# Patient Record
Sex: FEMALE | Race: WHITE | Employment: OTHER | URBAN - METROPOLITAN AREA
[De-identification: names, ages, dates, MRNs, and addresses within clinical notes are randomized per-mention and may not be internally consistent; named-entity substitution may affect disease eponyms.]

---

## 2017-01-01 ENCOUNTER — HOSPITAL ENCOUNTER (OUTPATIENT)
Dept: OTHER | Age: 77
Discharge: HOME | End: 2017-01-31
Attending: FAMILY MEDICINE | Admitting: FAMILY MEDICINE

## 2017-01-09 ENCOUNTER — OFFICE VISIT (OUTPATIENT)
Dept: FAMILY MEDICINE CLINIC | Age: 77
End: 2017-01-09

## 2017-01-09 VITALS
SYSTOLIC BLOOD PRESSURE: 156 MMHG | WEIGHT: 135 LBS | DIASTOLIC BLOOD PRESSURE: 68 MMHG | HEIGHT: 60 IN | BODY MASS INDEX: 26.5 KG/M2

## 2017-01-09 DIAGNOSIS — R53.83 FATIGUE, UNSPECIFIED TYPE: ICD-10-CM

## 2017-01-09 DIAGNOSIS — R30.0 DYSURIA: Primary | ICD-10-CM

## 2017-01-09 DIAGNOSIS — Z23 NEED FOR INFLUENZA VACCINATION: ICD-10-CM

## 2017-01-09 DIAGNOSIS — N39.0 URINARY TRACT INFECTION WITHOUT HEMATURIA, SITE UNSPECIFIED: ICD-10-CM

## 2017-01-09 LAB
A/G RATIO: 1.5 (ref 1.1–2.2)
ALBUMIN SERPL-MCNC: 4.1 G/DL (ref 3.4–5)
ALP BLD-CCNC: 176 U/L (ref 40–129)
ALT SERPL-CCNC: 10 U/L (ref 10–40)
ANION GAP SERPL CALCULATED.3IONS-SCNC: 14 MMOL/L (ref 3–16)
AST SERPL-CCNC: 18 U/L (ref 15–37)
BILIRUB SERPL-MCNC: 0.4 MG/DL (ref 0–1)
BILIRUBIN, POC: ABNORMAL
BLOOD URINE, POC: ABNORMAL
BUN BLDV-MCNC: 23 MG/DL (ref 7–20)
CALCIUM SERPL-MCNC: 10.6 MG/DL (ref 8.3–10.6)
CHLORIDE BLD-SCNC: 101 MMOL/L (ref 99–110)
CLARITY, POC: ABNORMAL
CO2: 27 MMOL/L (ref 21–32)
COLOR, POC: ABNORMAL
CREAT SERPL-MCNC: 0.8 MG/DL (ref 0.6–1.2)
GFR AFRICAN AMERICAN: >60
GFR NON-AFRICAN AMERICAN: >60
GLOBULIN: 2.8 G/DL
GLUCOSE BLD-MCNC: 103 MG/DL (ref 70–99)
GLUCOSE URINE, POC: ABNORMAL
HCT VFR BLD CALC: 38.6 % (ref 36–48)
HEMOGLOBIN: 12.8 G/DL (ref 12–16)
KETONES, POC: ABNORMAL
LEUKOCYTE EST, POC: ABNORMAL
MCH RBC QN AUTO: 30.6 PG (ref 26–34)
MCHC RBC AUTO-ENTMCNC: 33.1 G/DL (ref 31–36)
MCV RBC AUTO: 92.7 FL (ref 80–100)
NITRITE, POC: ABNORMAL
PDW BLD-RTO: 13.8 % (ref 12.4–15.4)
PH, POC: 5.5
PLATELET # BLD: 223 K/UL (ref 135–450)
PMV BLD AUTO: 8.6 FL (ref 5–10.5)
POTASSIUM SERPL-SCNC: 4.4 MMOL/L (ref 3.5–5.1)
PROTEIN, POC: ABNORMAL
RBC # BLD: 4.17 M/UL (ref 4–5.2)
SODIUM BLD-SCNC: 142 MMOL/L (ref 136–145)
SPECIFIC GRAVITY, POC: >1.03
TOTAL PROTEIN: 6.9 G/DL (ref 6.4–8.2)
TSH SERPL DL<=0.05 MIU/L-ACNC: 2.72 UIU/ML (ref 0.27–4.2)
UROBILINOGEN, POC: 0.2
WBC # BLD: 7 K/UL (ref 4–11)

## 2017-01-09 PROCEDURE — 81002 URINALYSIS NONAUTO W/O SCOPE: CPT | Performed by: FAMILY MEDICINE

## 2017-01-09 PROCEDURE — G0008 ADMIN INFLUENZA VIRUS VAC: HCPCS | Performed by: FAMILY MEDICINE

## 2017-01-09 PROCEDURE — 90662 IIV NO PRSV INCREASED AG IM: CPT | Performed by: FAMILY MEDICINE

## 2017-01-09 PROCEDURE — 36415 COLL VENOUS BLD VENIPUNCTURE: CPT | Performed by: FAMILY MEDICINE

## 2017-01-09 PROCEDURE — 99214 OFFICE O/P EST MOD 30 MIN: CPT | Performed by: FAMILY MEDICINE

## 2017-01-09 ASSESSMENT — ENCOUNTER SYMPTOMS: SHORTNESS OF BREATH: 1

## 2017-01-11 LAB
ORGANISM: ABNORMAL
URINE CULTURE, ROUTINE: ABNORMAL

## 2017-01-11 RX ORDER — TETRACYCLINE HYDROCHLORIDE 500 MG/1
500 CAPSULE ORAL 2 TIMES DAILY
Qty: 14 CAPSULE | Refills: 0 | Status: SHIPPED | OUTPATIENT
Start: 2017-01-11 | End: 2017-02-14 | Stop reason: ALTCHOICE

## 2017-01-11 RX ORDER — SERTRALINE HYDROCHLORIDE 100 MG/1
TABLET, FILM COATED ORAL
Qty: 90 TABLET | Refills: 0 | Status: SHIPPED | OUTPATIENT
Start: 2017-01-11 | End: 2017-04-06 | Stop reason: SDUPTHER

## 2017-01-11 RX ORDER — OMEPRAZOLE 40 MG/1
CAPSULE, DELAYED RELEASE ORAL
Qty: 180 CAPSULE | Refills: 0 | Status: SHIPPED | OUTPATIENT
Start: 2017-01-11 | End: 2017-04-06 | Stop reason: SDUPTHER

## 2017-01-11 RX ORDER — BUPROPION HYDROCHLORIDE 150 MG/1
TABLET, EXTENDED RELEASE ORAL
Qty: 180 TABLET | Refills: 0 | Status: SHIPPED | OUTPATIENT
Start: 2017-01-11 | End: 2017-04-06 | Stop reason: SDUPTHER

## 2017-01-11 RX ORDER — CLONAZEPAM 1 MG/1
TABLET ORAL
Qty: 90 TABLET | Refills: 0 | Status: SHIPPED | OUTPATIENT
Start: 2017-01-11 | End: 2017-04-06 | Stop reason: SDUPTHER

## 2017-01-19 ENCOUNTER — TELEPHONE (OUTPATIENT)
Dept: FAMILY MEDICINE CLINIC | Age: 77
End: 2017-01-19

## 2017-01-20 ENCOUNTER — NURSE ONLY (OUTPATIENT)
Dept: FAMILY MEDICINE CLINIC | Age: 77
End: 2017-01-20

## 2017-01-20 DIAGNOSIS — N39.0 URINARY TRACT INFECTION, SITE UNSPECIFIED: Primary | ICD-10-CM

## 2017-01-20 LAB
BILIRUBIN, POC: NORMAL
BLOOD URINE, POC: NORMAL
CLARITY, POC: NORMAL
COLOR, POC: YELLOW
GLUCOSE URINE, POC: NORMAL
KETONES, POC: NORMAL
LEUKOCYTE EST, POC: NORMAL
NITRITE, POC: NORMAL
PH, POC: 6
PROTEIN, POC: NORMAL
SPECIFIC GRAVITY, POC: >=1.03
UROBILINOGEN, POC: 0.2

## 2017-01-20 PROCEDURE — 81002 URINALYSIS NONAUTO W/O SCOPE: CPT | Performed by: FAMILY MEDICINE

## 2017-01-23 ENCOUNTER — HOSPITAL ENCOUNTER (OUTPATIENT)
Dept: CT IMAGING | Age: 77
Discharge: HOME | End: 2017-01-23
Attending: INTERNAL MEDICINE | Admitting: INTERNAL MEDICINE

## 2017-01-23 DIAGNOSIS — J84.9 INTERSTITIAL PULMONARY DISEASE (HCC): ICD-10-CM

## 2017-01-23 DIAGNOSIS — J84.9 ILD (INTERSTITIAL LUNG DISEASE) (HCC): ICD-10-CM

## 2017-01-24 ENCOUNTER — HOSPITAL ENCOUNTER (OUTPATIENT)
Dept: PHYSICAL THERAPY | Age: 77
Discharge: HOME | End: 2017-01-24
Admitting: FAMILY MEDICINE

## 2017-01-30 ENCOUNTER — HOSPITAL ENCOUNTER (OUTPATIENT)
Dept: PHYSICAL THERAPY | Age: 77
Discharge: HOME | End: 2017-01-30
Admitting: FAMILY MEDICINE

## 2017-02-02 ENCOUNTER — HOSPITAL ENCOUNTER (OUTPATIENT)
Dept: PHYSICAL THERAPY | Age: 77
Discharge: HOME | End: 2017-02-02
Admitting: FAMILY MEDICINE

## 2017-02-08 ENCOUNTER — HOSPITAL ENCOUNTER (OUTPATIENT)
Dept: PHYSICAL THERAPY | Age: 77
Discharge: HOME | End: 2017-02-08
Admitting: FAMILY MEDICINE

## 2017-02-10 ENCOUNTER — HOSPITAL ENCOUNTER (OUTPATIENT)
Dept: PHYSICAL THERAPY | Age: 77
Discharge: HOME | End: 2017-02-10
Admitting: FAMILY MEDICINE

## 2017-02-14 ENCOUNTER — OFFICE VISIT (OUTPATIENT)
Dept: PULMONOLOGY | Age: 77
End: 2017-02-14

## 2017-02-14 ENCOUNTER — HOSPITAL ENCOUNTER (OUTPATIENT)
Dept: PHYSICAL THERAPY | Age: 77
Discharge: HOME | End: 2017-02-14
Admitting: FAMILY MEDICINE

## 2017-02-14 VITALS
SYSTOLIC BLOOD PRESSURE: 138 MMHG | HEIGHT: 60 IN | OXYGEN SATURATION: 90 % | DIASTOLIC BLOOD PRESSURE: 60 MMHG | TEMPERATURE: 97.6 F | HEART RATE: 67 BPM | RESPIRATION RATE: 16 BRPM

## 2017-02-14 DIAGNOSIS — I77.82 ANCA-POSITIVE VASCULITIS: ICD-10-CM

## 2017-02-14 DIAGNOSIS — J96.11 CHRONIC RESPIRATORY FAILURE WITH HYPOXIA (HCC): ICD-10-CM

## 2017-02-14 DIAGNOSIS — J84.9 ILD (INTERSTITIAL LUNG DISEASE) (HCC): Primary | ICD-10-CM

## 2017-02-14 DIAGNOSIS — R76.8 SCL-70 ANTIBODY POSITIVE: ICD-10-CM

## 2017-02-14 PROCEDURE — 1036F TOBACCO NON-USER: CPT | Performed by: INTERNAL MEDICINE

## 2017-02-14 PROCEDURE — G8420 CALC BMI NORM PARAMETERS: HCPCS | Performed by: INTERNAL MEDICINE

## 2017-02-14 PROCEDURE — 3014F SCREEN MAMMO DOC REV: CPT | Performed by: INTERNAL MEDICINE

## 2017-02-14 PROCEDURE — 99214 OFFICE O/P EST MOD 30 MIN: CPT | Performed by: INTERNAL MEDICINE

## 2017-02-14 PROCEDURE — G8400 PT W/DXA NO RESULTS DOC: HCPCS | Performed by: INTERNAL MEDICINE

## 2017-02-14 PROCEDURE — 1123F ACP DISCUSS/DSCN MKR DOCD: CPT | Performed by: INTERNAL MEDICINE

## 2017-02-14 PROCEDURE — 1090F PRES/ABSN URINE INCON ASSESS: CPT | Performed by: INTERNAL MEDICINE

## 2017-02-14 PROCEDURE — G8427 DOCREV CUR MEDS BY ELIG CLIN: HCPCS | Performed by: INTERNAL MEDICINE

## 2017-02-14 PROCEDURE — G8484 FLU IMMUNIZE NO ADMIN: HCPCS | Performed by: INTERNAL MEDICINE

## 2017-02-14 PROCEDURE — 4040F PNEUMOC VAC/ADMIN/RCVD: CPT | Performed by: INTERNAL MEDICINE

## 2017-02-14 PROCEDURE — 3017F COLORECTAL CA SCREEN DOC REV: CPT | Performed by: INTERNAL MEDICINE

## 2017-02-16 ENCOUNTER — HOSPITAL ENCOUNTER (OUTPATIENT)
Dept: PHYSICAL THERAPY | Age: 77
Discharge: HOME | End: 2017-02-16
Admitting: FAMILY MEDICINE

## 2017-02-22 ENCOUNTER — OFFICE VISIT (OUTPATIENT)
Dept: FAMILY MEDICINE CLINIC | Age: 77
End: 2017-02-22

## 2017-02-22 VITALS
BODY MASS INDEX: 26.11 KG/M2 | DIASTOLIC BLOOD PRESSURE: 86 MMHG | SYSTOLIC BLOOD PRESSURE: 138 MMHG | HEIGHT: 60 IN | WEIGHT: 133 LBS

## 2017-02-22 DIAGNOSIS — M81.0 OSTEOPOROSIS: Primary | ICD-10-CM

## 2017-02-22 DIAGNOSIS — J84.9 ILD (INTERSTITIAL LUNG DISEASE) (HCC): ICD-10-CM

## 2017-02-22 DIAGNOSIS — N39.0 CHRONIC UTI: ICD-10-CM

## 2017-02-22 DIAGNOSIS — M48.54XD COMPRESSION FRACTURE OF THORACIC SPINE, NON-TRAUMATIC, WITH ROUTINE HEALING, SUBSEQUENT ENCOUNTER: ICD-10-CM

## 2017-02-22 PROCEDURE — 3017F COLORECTAL CA SCREEN DOC REV: CPT | Performed by: FAMILY MEDICINE

## 2017-02-22 PROCEDURE — 4040F PNEUMOC VAC/ADMIN/RCVD: CPT | Performed by: FAMILY MEDICINE

## 2017-02-22 PROCEDURE — 1123F ACP DISCUSS/DSCN MKR DOCD: CPT | Performed by: FAMILY MEDICINE

## 2017-02-22 PROCEDURE — G8400 PT W/DXA NO RESULTS DOC: HCPCS | Performed by: FAMILY MEDICINE

## 2017-02-22 PROCEDURE — G8420 CALC BMI NORM PARAMETERS: HCPCS | Performed by: FAMILY MEDICINE

## 2017-02-22 PROCEDURE — G8427 DOCREV CUR MEDS BY ELIG CLIN: HCPCS | Performed by: FAMILY MEDICINE

## 2017-02-22 PROCEDURE — 1090F PRES/ABSN URINE INCON ASSESS: CPT | Performed by: FAMILY MEDICINE

## 2017-02-22 PROCEDURE — 1036F TOBACCO NON-USER: CPT | Performed by: FAMILY MEDICINE

## 2017-02-22 PROCEDURE — 99214 OFFICE O/P EST MOD 30 MIN: CPT | Performed by: FAMILY MEDICINE

## 2017-02-22 PROCEDURE — 3014F SCREEN MAMMO DOC REV: CPT | Performed by: FAMILY MEDICINE

## 2017-02-22 PROCEDURE — 4005F PHARM THX FOR OP RXD: CPT | Performed by: FAMILY MEDICINE

## 2017-02-22 PROCEDURE — G8484 FLU IMMUNIZE NO ADMIN: HCPCS | Performed by: FAMILY MEDICINE

## 2017-02-22 RX ORDER — IBANDRONATE SODIUM 150 MG/1
150 TABLET, FILM COATED ORAL
Qty: 3 TABLET | Refills: 3 | Status: SHIPPED | OUTPATIENT
Start: 2017-02-22 | End: 2017-05-25 | Stop reason: ALTCHOICE

## 2017-02-22 ASSESSMENT — ENCOUNTER SYMPTOMS
RESPIRATORY NEGATIVE: 1
BACK PAIN: 1
GASTROINTESTINAL NEGATIVE: 1

## 2017-02-24 ENCOUNTER — HOSPITAL ENCOUNTER (OUTPATIENT)
Dept: PHYSICAL THERAPY | Age: 77
Discharge: HOME | End: 2017-02-24
Admitting: FAMILY MEDICINE

## 2017-03-01 ENCOUNTER — HOSPITAL ENCOUNTER (OUTPATIENT)
Dept: OTHER | Age: 77
Discharge: HOME | End: 2017-03-31
Attending: FAMILY MEDICINE | Admitting: FAMILY MEDICINE

## 2017-03-03 ENCOUNTER — HOSPITAL ENCOUNTER (OUTPATIENT)
Dept: PHYSICAL THERAPY | Age: 77
Discharge: HOME | End: 2017-03-04
Admitting: FAMILY MEDICINE

## 2017-03-07 ENCOUNTER — HOSPITAL ENCOUNTER (OUTPATIENT)
Dept: PHYSICAL THERAPY | Age: 77
Discharge: HOME | End: 2017-03-08
Admitting: FAMILY MEDICINE

## 2017-03-14 ENCOUNTER — HOSPITAL ENCOUNTER (OUTPATIENT)
Dept: PHYSICAL THERAPY | Age: 77
Discharge: HOME | End: 2017-03-15
Admitting: FAMILY MEDICINE

## 2017-03-15 ENCOUNTER — OFFICE VISIT (OUTPATIENT)
Dept: ORTHOPEDIC SURGERY | Age: 77
End: 2017-03-15

## 2017-03-15 VITALS
DIASTOLIC BLOOD PRESSURE: 79 MMHG | BODY MASS INDEX: 26.11 KG/M2 | HEART RATE: 68 BPM | HEIGHT: 60 IN | WEIGHT: 133 LBS | SYSTOLIC BLOOD PRESSURE: 138 MMHG

## 2017-03-15 DIAGNOSIS — M54.16 LUMBAR RADICULAR PAIN: ICD-10-CM

## 2017-03-15 DIAGNOSIS — S72.141D CLOSED INTERTROCHANTERIC FRACTURE OF RIGHT FEMUR, WITH ROUTINE HEALING, SUBSEQUENT ENCOUNTER: ICD-10-CM

## 2017-03-15 DIAGNOSIS — M25.551 HIP PAIN, RIGHT: Primary | ICD-10-CM

## 2017-03-15 PROCEDURE — 73502 X-RAY EXAM HIP UNI 2-3 VIEWS: CPT | Performed by: ORTHOPAEDIC SURGERY

## 2017-03-15 PROCEDURE — 1036F TOBACCO NON-USER: CPT | Performed by: ORTHOPAEDIC SURGERY

## 2017-03-15 PROCEDURE — G8400 PT W/DXA NO RESULTS DOC: HCPCS | Performed by: ORTHOPAEDIC SURGERY

## 2017-03-15 PROCEDURE — 3017F COLORECTAL CA SCREEN DOC REV: CPT | Performed by: ORTHOPAEDIC SURGERY

## 2017-03-15 PROCEDURE — 4040F PNEUMOC VAC/ADMIN/RCVD: CPT | Performed by: ORTHOPAEDIC SURGERY

## 2017-03-15 PROCEDURE — G8420 CALC BMI NORM PARAMETERS: HCPCS | Performed by: ORTHOPAEDIC SURGERY

## 2017-03-15 PROCEDURE — G8427 DOCREV CUR MEDS BY ELIG CLIN: HCPCS | Performed by: ORTHOPAEDIC SURGERY

## 2017-03-15 PROCEDURE — 1090F PRES/ABSN URINE INCON ASSESS: CPT | Performed by: ORTHOPAEDIC SURGERY

## 2017-03-15 PROCEDURE — 99213 OFFICE O/P EST LOW 20 MIN: CPT | Performed by: ORTHOPAEDIC SURGERY

## 2017-03-15 PROCEDURE — G8484 FLU IMMUNIZE NO ADMIN: HCPCS | Performed by: ORTHOPAEDIC SURGERY

## 2017-03-15 PROCEDURE — 3014F SCREEN MAMMO DOC REV: CPT | Performed by: ORTHOPAEDIC SURGERY

## 2017-03-15 PROCEDURE — 1123F ACP DISCUSS/DSCN MKR DOCD: CPT | Performed by: ORTHOPAEDIC SURGERY

## 2017-03-15 RX ORDER — METHYLPREDNISOLONE 4 MG/1
TABLET ORAL
Qty: 1 KIT | Refills: 0 | Status: SHIPPED | OUTPATIENT
Start: 2017-03-15 | End: 2017-03-27 | Stop reason: SDUPTHER

## 2017-03-16 ENCOUNTER — HOSPITAL ENCOUNTER (OUTPATIENT)
Dept: PHYSICAL THERAPY | Age: 77
Discharge: HOME | End: 2017-03-17
Admitting: FAMILY MEDICINE

## 2017-03-22 ENCOUNTER — HOSPITAL ENCOUNTER (OUTPATIENT)
Dept: PHYSICAL THERAPY | Age: 77
Discharge: HOME | End: 2017-03-23
Admitting: FAMILY MEDICINE

## 2017-03-24 ENCOUNTER — HOSPITAL ENCOUNTER (OUTPATIENT)
Dept: PHYSICAL THERAPY | Age: 77
Discharge: HOME | End: 2017-03-25
Admitting: FAMILY MEDICINE

## 2017-03-27 RX ORDER — METHYLPREDNISOLONE 4 MG/1
TABLET ORAL
Qty: 1 KIT | Refills: 0 | Status: SHIPPED | OUTPATIENT
Start: 2017-03-27 | End: 2017-04-02

## 2017-04-12 ENCOUNTER — OFFICE VISIT (OUTPATIENT)
Dept: ORTHOPEDIC SURGERY | Age: 77
End: 2017-04-12

## 2017-04-12 VITALS
HEART RATE: 60 BPM | DIASTOLIC BLOOD PRESSURE: 77 MMHG | HEIGHT: 60 IN | SYSTOLIC BLOOD PRESSURE: 165 MMHG | BODY MASS INDEX: 29.45 KG/M2 | WEIGHT: 150 LBS

## 2017-04-12 DIAGNOSIS — M54.5 LOW BACK PAIN, UNSPECIFIED BACK PAIN LATERALITY, UNSPECIFIED CHRONICITY, WITH SCIATICA PRESENCE UNSPECIFIED: Primary | ICD-10-CM

## 2017-04-12 PROCEDURE — G8427 DOCREV CUR MEDS BY ELIG CLIN: HCPCS | Performed by: PHYSICIAN ASSISTANT

## 2017-04-12 PROCEDURE — 72100 X-RAY EXAM L-S SPINE 2/3 VWS: CPT | Performed by: PHYSICIAN ASSISTANT

## 2017-04-12 PROCEDURE — 1123F ACP DISCUSS/DSCN MKR DOCD: CPT | Performed by: PHYSICIAN ASSISTANT

## 2017-04-12 PROCEDURE — G8400 PT W/DXA NO RESULTS DOC: HCPCS | Performed by: PHYSICIAN ASSISTANT

## 2017-04-12 PROCEDURE — 1090F PRES/ABSN URINE INCON ASSESS: CPT | Performed by: PHYSICIAN ASSISTANT

## 2017-04-12 PROCEDURE — G8420 CALC BMI NORM PARAMETERS: HCPCS | Performed by: PHYSICIAN ASSISTANT

## 2017-04-12 PROCEDURE — 4040F PNEUMOC VAC/ADMIN/RCVD: CPT | Performed by: PHYSICIAN ASSISTANT

## 2017-04-12 PROCEDURE — 3014F SCREEN MAMMO DOC REV: CPT | Performed by: PHYSICIAN ASSISTANT

## 2017-04-12 PROCEDURE — 1036F TOBACCO NON-USER: CPT | Performed by: PHYSICIAN ASSISTANT

## 2017-04-12 PROCEDURE — 3017F COLORECTAL CA SCREEN DOC REV: CPT | Performed by: PHYSICIAN ASSISTANT

## 2017-04-12 PROCEDURE — 99214 OFFICE O/P EST MOD 30 MIN: CPT | Performed by: PHYSICIAN ASSISTANT

## 2017-04-17 ENCOUNTER — OFFICE VISIT (OUTPATIENT)
Dept: FAMILY MEDICINE CLINIC | Age: 77
End: 2017-04-17

## 2017-04-17 VITALS
WEIGHT: 137 LBS | BODY MASS INDEX: 26.9 KG/M2 | SYSTOLIC BLOOD PRESSURE: 132 MMHG | DIASTOLIC BLOOD PRESSURE: 70 MMHG | HEIGHT: 60 IN

## 2017-04-17 DIAGNOSIS — L97.522 FOOT ULCER, LEFT, WITH FAT LAYER EXPOSED (HCC): Primary | ICD-10-CM

## 2017-04-17 DIAGNOSIS — M81.0 OSTEOPOROSIS: ICD-10-CM

## 2017-04-17 PROCEDURE — 4005F PHARM THX FOR OP RXD: CPT | Performed by: FAMILY MEDICINE

## 2017-04-17 PROCEDURE — 1090F PRES/ABSN URINE INCON ASSESS: CPT | Performed by: FAMILY MEDICINE

## 2017-04-17 PROCEDURE — 99214 OFFICE O/P EST MOD 30 MIN: CPT | Performed by: FAMILY MEDICINE

## 2017-04-17 PROCEDURE — G8420 CALC BMI NORM PARAMETERS: HCPCS | Performed by: FAMILY MEDICINE

## 2017-04-17 PROCEDURE — G8400 PT W/DXA NO RESULTS DOC: HCPCS | Performed by: FAMILY MEDICINE

## 2017-04-17 PROCEDURE — G8427 DOCREV CUR MEDS BY ELIG CLIN: HCPCS | Performed by: FAMILY MEDICINE

## 2017-04-17 PROCEDURE — 1036F TOBACCO NON-USER: CPT | Performed by: FAMILY MEDICINE

## 2017-04-17 PROCEDURE — 1123F ACP DISCUSS/DSCN MKR DOCD: CPT | Performed by: FAMILY MEDICINE

## 2017-04-17 PROCEDURE — 3014F SCREEN MAMMO DOC REV: CPT | Performed by: FAMILY MEDICINE

## 2017-04-17 PROCEDURE — 3017F COLORECTAL CA SCREEN DOC REV: CPT | Performed by: FAMILY MEDICINE

## 2017-04-17 PROCEDURE — 4040F PNEUMOC VAC/ADMIN/RCVD: CPT | Performed by: FAMILY MEDICINE

## 2017-04-17 RX ORDER — IBANDRONATE SODIUM 150 MG/1
150 TABLET, FILM COATED ORAL
Qty: 3 TABLET | Refills: 3 | Status: SHIPPED | OUTPATIENT
Start: 2017-04-17 | End: 2017-05-25 | Stop reason: ALTCHOICE

## 2017-04-17 RX ORDER — CALCITRIOL 0.25 UG/1
0.25 CAPSULE, LIQUID FILLED ORAL DAILY
Qty: 90 CAPSULE | Refills: 3 | Status: SHIPPED | OUTPATIENT
Start: 2017-04-17 | End: 2018-07-21 | Stop reason: SDUPTHER

## 2017-04-17 RX ORDER — IBANDRONATE SODIUM 150 MG/1
150 TABLET, FILM COATED ORAL
Qty: 30 TABLET | Refills: 3 | Status: SHIPPED | OUTPATIENT
Start: 2017-04-17 | End: 2017-04-17 | Stop reason: SDUPTHER

## 2017-04-17 ASSESSMENT — ENCOUNTER SYMPTOMS
BACK PAIN: 1
RESPIRATORY NEGATIVE: 1

## 2017-04-20 ENCOUNTER — HOSPITAL ENCOUNTER (OUTPATIENT)
Dept: WOUND CARE | Age: 77
Discharge: HOME | End: 2017-04-20
Attending: PODIATRIST | Admitting: PODIATRIST

## 2017-04-20 ENCOUNTER — HOSPITAL ENCOUNTER (OUTPATIENT)
Dept: OTHER | Age: 77
Discharge: HOME | End: 2017-04-20
Attending: PODIATRIST | Admitting: PODIATRIST

## 2017-04-20 VITALS
BODY MASS INDEX: 27.4 KG/M2 | RESPIRATION RATE: 20 BRPM | TEMPERATURE: 97.7 F | DIASTOLIC BLOOD PRESSURE: 75 MMHG | HEIGHT: 60 IN | SYSTOLIC BLOOD PRESSURE: 177 MMHG | HEART RATE: 59 BPM | WEIGHT: 139.55 LBS

## 2017-04-20 DIAGNOSIS — L97.522 ULCER OF TOE OF LEFT FOOT, WITH FAT LAYER EXPOSED (HCC): ICD-10-CM

## 2017-04-20 DIAGNOSIS — L97.522 ULCER OF TOE OF LEFT FOOT, WITH FAT LAYER EXPOSED (HCC): Primary | ICD-10-CM

## 2017-04-20 LAB
ALBUMIN SERPL-MCNC: 3.9 G/DL (ref 3.4–5)
ANION GAP SERPL CALCULATED.3IONS-SCNC: 12 MMOL/L (ref 3–16)
BUN BLDV-MCNC: 25 MG/DL (ref 7–20)
C-REACTIVE PROTEIN: 1.6 MG/L (ref 0–5.1)
CALCIUM SERPL-MCNC: 10.9 MG/DL (ref 8.3–10.6)
CHLORIDE BLD-SCNC: 102 MMOL/L (ref 99–110)
CO2: 28 MMOL/L (ref 21–32)
CREAT SERPL-MCNC: 0.8 MG/DL (ref 0.6–1.2)
GFR AFRICAN AMERICAN: >60
GFR NON-AFRICAN AMERICAN: >60
GLUCOSE BLD-MCNC: 96 MG/DL (ref 70–99)
HCT VFR BLD CALC: 37.9 % (ref 36–48)
HEMOGLOBIN: 12.4 G/DL (ref 12–16)
MCH RBC QN AUTO: 31.2 PG (ref 26–34)
MCHC RBC AUTO-ENTMCNC: 32.8 G/DL (ref 31–36)
MCV RBC AUTO: 95.3 FL (ref 80–100)
PDW BLD-RTO: 13.7 % (ref 12.4–15.4)
PLATELET # BLD: 207 K/UL (ref 135–450)
PMV BLD AUTO: 8 FL (ref 5–10.5)
POTASSIUM SERPL-SCNC: 4.3 MMOL/L (ref 3.5–5.1)
RBC # BLD: 3.98 M/UL (ref 4–5.2)
SEDIMENTATION RATE, ERYTHROCYTE: 29 MM/HR (ref 0–30)
SODIUM BLD-SCNC: 142 MMOL/L (ref 136–145)
WBC # BLD: 6.9 K/UL (ref 4–11)

## 2017-04-20 RX ORDER — LIDOCAINE 50 MG/G
OINTMENT TOPICAL ONCE
Status: DISCONTINUED | OUTPATIENT
Start: 2017-04-20 | End: 2017-04-21 | Stop reason: HOSPADM

## 2017-04-24 RX ORDER — ALENDRONATE SODIUM 70 MG/1
70 TABLET ORAL
Qty: 4 TABLET | Refills: 3 | Status: SHIPPED | OUTPATIENT
Start: 2017-04-24 | End: 2017-04-24 | Stop reason: SDUPTHER

## 2017-04-24 RX ORDER — ALENDRONATE SODIUM 70 MG/1
TABLET ORAL
Qty: 12 TABLET | Refills: 3 | Status: SHIPPED | OUTPATIENT
Start: 2017-04-24 | End: 2018-06-06 | Stop reason: SDUPTHER

## 2017-04-26 ENCOUNTER — HOSPITAL ENCOUNTER (OUTPATIENT)
Dept: VASCULAR LAB | Age: 77
Discharge: HOME | End: 2017-04-26
Attending: PODIATRIST | Admitting: PODIATRIST

## 2017-04-26 DIAGNOSIS — L97.522 NON-PRESSURE CHRONIC ULCER OF OTHER PART OF LEFT FOOT WITH FAT LAYER EXPOSED (HCC): ICD-10-CM

## 2017-04-27 ENCOUNTER — HOSPITAL ENCOUNTER (OUTPATIENT)
Dept: WOUND CARE | Age: 77
Discharge: HOME | End: 2017-04-27
Attending: PODIATRIST | Admitting: PODIATRIST

## 2017-04-27 VITALS
SYSTOLIC BLOOD PRESSURE: 164 MMHG | RESPIRATION RATE: 22 BRPM | DIASTOLIC BLOOD PRESSURE: 76 MMHG | TEMPERATURE: 97.5 F | HEART RATE: 64 BPM

## 2017-04-27 RX ORDER — LIDOCAINE 50 MG/G
OINTMENT TOPICAL ONCE
Status: DISCONTINUED | OUTPATIENT
Start: 2017-04-27 | End: 2017-04-28 | Stop reason: HOSPADM

## 2017-04-27 ASSESSMENT — PAIN SCALES - GENERAL: PAINLEVEL_OUTOF10: 0

## 2017-05-04 ENCOUNTER — HOSPITAL ENCOUNTER (OUTPATIENT)
Dept: WOUND CARE | Age: 77
Discharge: HOME | End: 2017-05-04
Attending: PODIATRIST | Admitting: PODIATRIST

## 2017-05-04 VITALS
BODY MASS INDEX: 26.82 KG/M2 | RESPIRATION RATE: 22 BRPM | DIASTOLIC BLOOD PRESSURE: 84 MMHG | HEART RATE: 64 BPM | SYSTOLIC BLOOD PRESSURE: 187 MMHG | TEMPERATURE: 97.6 F | WEIGHT: 137.35 LBS

## 2017-05-04 RX ORDER — LIDOCAINE 50 MG/G
OINTMENT TOPICAL PRN
Status: DISCONTINUED | OUTPATIENT
Start: 2017-05-04 | End: 2017-05-05 | Stop reason: HOSPADM

## 2017-05-04 ASSESSMENT — PAIN SCALES - GENERAL: PAINLEVEL_OUTOF10: 0

## 2017-05-11 ENCOUNTER — HOSPITAL ENCOUNTER (OUTPATIENT)
Dept: WOUND CARE | Age: 77
Discharge: HOME | End: 2017-05-11
Attending: PODIATRIST | Admitting: PODIATRIST

## 2017-05-11 VITALS
DIASTOLIC BLOOD PRESSURE: 77 MMHG | HEART RATE: 73 BPM | RESPIRATION RATE: 20 BRPM | SYSTOLIC BLOOD PRESSURE: 156 MMHG | TEMPERATURE: 97.3 F

## 2017-05-11 RX ORDER — LIDOCAINE 50 MG/G
OINTMENT TOPICAL ONCE
Status: DISCONTINUED | OUTPATIENT
Start: 2017-05-11 | End: 2017-05-12 | Stop reason: HOSPADM

## 2017-05-11 ASSESSMENT — PAIN SCALES - GENERAL: PAINLEVEL_OUTOF10: 0

## 2017-05-18 ENCOUNTER — HOSPITAL ENCOUNTER (OUTPATIENT)
Dept: WOUND CARE | Age: 77
Discharge: HOME | End: 2017-05-18
Attending: PODIATRIST | Admitting: PODIATRIST

## 2017-05-18 VITALS — HEART RATE: 60 BPM | TEMPERATURE: 97.6 F | RESPIRATION RATE: 20 BRPM

## 2017-05-18 ASSESSMENT — PAIN SCALES - GENERAL: PAINLEVEL_OUTOF10: 0

## 2017-05-24 ENCOUNTER — TELEPHONE (OUTPATIENT)
Dept: FAMILY MEDICINE CLINIC | Age: 77
End: 2017-05-24

## 2017-05-24 DIAGNOSIS — R26.9 ABNORMAL GAIT: Primary | ICD-10-CM

## 2017-05-25 ENCOUNTER — OFFICE VISIT (OUTPATIENT)
Dept: FAMILY MEDICINE CLINIC | Age: 77
End: 2017-05-25

## 2017-05-25 VITALS
SYSTOLIC BLOOD PRESSURE: 138 MMHG | DIASTOLIC BLOOD PRESSURE: 68 MMHG | BODY MASS INDEX: 27.64 KG/M2 | HEIGHT: 60 IN | WEIGHT: 140.8 LBS

## 2017-05-25 DIAGNOSIS — J84.9 ILD (INTERSTITIAL LUNG DISEASE) (HCC): Primary | ICD-10-CM

## 2017-05-25 DIAGNOSIS — F32.9 REACTIVE DEPRESSION: ICD-10-CM

## 2017-05-25 DIAGNOSIS — F41.9 ANXIETY: ICD-10-CM

## 2017-05-25 DIAGNOSIS — J84.112 IDIOPATHIC PULMONARY FIBROSIS (HCC): ICD-10-CM

## 2017-05-25 PROCEDURE — G8400 PT W/DXA NO RESULTS DOC: HCPCS | Performed by: FAMILY MEDICINE

## 2017-05-25 PROCEDURE — G8427 DOCREV CUR MEDS BY ELIG CLIN: HCPCS | Performed by: FAMILY MEDICINE

## 2017-05-25 PROCEDURE — 3017F COLORECTAL CA SCREEN DOC REV: CPT | Performed by: FAMILY MEDICINE

## 2017-05-25 PROCEDURE — 4040F PNEUMOC VAC/ADMIN/RCVD: CPT | Performed by: FAMILY MEDICINE

## 2017-05-25 PROCEDURE — 1036F TOBACCO NON-USER: CPT | Performed by: FAMILY MEDICINE

## 2017-05-25 PROCEDURE — 1090F PRES/ABSN URINE INCON ASSESS: CPT | Performed by: FAMILY MEDICINE

## 2017-05-25 PROCEDURE — 1123F ACP DISCUSS/DSCN MKR DOCD: CPT | Performed by: FAMILY MEDICINE

## 2017-05-25 PROCEDURE — G8420 CALC BMI NORM PARAMETERS: HCPCS | Performed by: FAMILY MEDICINE

## 2017-05-25 PROCEDURE — 99214 OFFICE O/P EST MOD 30 MIN: CPT | Performed by: FAMILY MEDICINE

## 2017-05-25 RX ORDER — CLONAZEPAM 1 MG/1
TABLET ORAL
Qty: 180 TABLET | Refills: 0 | Status: SHIPPED | OUTPATIENT
Start: 2017-05-25 | End: 2017-12-22 | Stop reason: SDUPTHER

## 2017-05-25 ASSESSMENT — ENCOUNTER SYMPTOMS
ABDOMINAL PAIN: 0
VISUAL CHANGE: 0

## 2017-06-05 ENCOUNTER — HOSPITAL ENCOUNTER (OUTPATIENT)
Dept: OTHER | Age: 77
Discharge: HOME | End: 2017-06-30
Attending: FAMILY MEDICINE | Admitting: FAMILY MEDICINE

## 2017-06-05 ASSESSMENT — PAIN DESCRIPTION - DESCRIPTORS: DESCRIPTORS: ACHING;DISCOMFORT

## 2017-06-05 ASSESSMENT — PAIN DESCRIPTION - LOCATION: LOCATION: LEG;KNEE

## 2017-06-05 ASSESSMENT — PAIN DESCRIPTION - ORIENTATION: ORIENTATION: LEFT

## 2017-06-05 ASSESSMENT — PAIN DESCRIPTION - FREQUENCY: FREQUENCY: INTERMITTENT

## 2017-06-05 ASSESSMENT — PAIN DESCRIPTION - ONSET: ONSET: ON-GOING

## 2017-06-05 ASSESSMENT — PAIN SCALES - GENERAL: PAINLEVEL_OUTOF10: 4

## 2017-06-05 ASSESSMENT — PAIN DESCRIPTION - PROGRESSION: CLINICAL_PROGRESSION: NOT CHANGED

## 2017-06-05 ASSESSMENT — PAIN DESCRIPTION - PAIN TYPE: TYPE: CHRONIC PAIN

## 2017-06-08 ENCOUNTER — HOSPITAL ENCOUNTER (OUTPATIENT)
Dept: PHYSICAL THERAPY | Age: 77
Discharge: HOME | End: 2017-06-09
Admitting: FAMILY MEDICINE

## 2017-06-09 ENCOUNTER — HOSPITAL ENCOUNTER (OUTPATIENT)
Dept: PHYSICAL THERAPY | Age: 77
Discharge: HOME | End: 2017-06-10
Admitting: FAMILY MEDICINE

## 2017-06-12 ENCOUNTER — HOSPITAL ENCOUNTER (OUTPATIENT)
Dept: PHYSICAL THERAPY | Age: 77
Discharge: HOME | End: 2017-06-13
Admitting: FAMILY MEDICINE

## 2017-06-14 ENCOUNTER — HOSPITAL ENCOUNTER (OUTPATIENT)
Dept: PHYSICAL THERAPY | Age: 77
Discharge: HOME | End: 2017-06-15
Admitting: FAMILY MEDICINE

## 2017-06-20 ENCOUNTER — HOSPITAL ENCOUNTER (OUTPATIENT)
Dept: PHYSICAL THERAPY | Age: 77
Discharge: HOME | End: 2017-06-21
Admitting: FAMILY MEDICINE

## 2017-06-27 ENCOUNTER — HOSPITAL ENCOUNTER (OUTPATIENT)
Dept: PHYSICAL THERAPY | Age: 77
Discharge: HOME | End: 2017-06-28
Admitting: FAMILY MEDICINE

## 2017-06-30 ENCOUNTER — HOSPITAL ENCOUNTER (OUTPATIENT)
Dept: PHYSICAL THERAPY | Age: 77
Discharge: HOME | End: 2017-07-01
Admitting: FAMILY MEDICINE

## 2017-07-02 RX ORDER — SERTRALINE HYDROCHLORIDE 100 MG/1
TABLET, FILM COATED ORAL
Qty: 90 TABLET | Refills: 0 | Status: SHIPPED | OUTPATIENT
Start: 2017-07-02 | End: 2017-09-28 | Stop reason: SDUPTHER

## 2017-07-02 RX ORDER — BUPROPION HYDROCHLORIDE 150 MG/1
TABLET, EXTENDED RELEASE ORAL
Qty: 180 TABLET | Refills: 0 | Status: SHIPPED | OUTPATIENT
Start: 2017-07-02 | End: 2017-09-28 | Stop reason: SDUPTHER

## 2017-07-02 RX ORDER — OMEPRAZOLE 40 MG/1
CAPSULE, DELAYED RELEASE ORAL
Qty: 180 CAPSULE | Refills: 0 | Status: SHIPPED | OUTPATIENT
Start: 2017-07-02 | End: 2017-09-28 | Stop reason: SDUPTHER

## 2017-07-03 ENCOUNTER — HOSPITAL ENCOUNTER (OUTPATIENT)
Dept: PHYSICAL THERAPY | Age: 77
Discharge: HOME | End: 2017-07-04
Admitting: FAMILY MEDICINE

## 2017-07-11 ENCOUNTER — HOSPITAL ENCOUNTER (OUTPATIENT)
Dept: PHYSICAL THERAPY | Age: 77
Discharge: HOME | End: 2017-07-12
Admitting: FAMILY MEDICINE

## 2017-07-13 ENCOUNTER — HOSPITAL ENCOUNTER (OUTPATIENT)
Dept: PHYSICAL THERAPY | Age: 77
Discharge: HOME | End: 2017-07-14
Admitting: FAMILY MEDICINE

## 2017-07-27 ENCOUNTER — HOSPITAL ENCOUNTER (OUTPATIENT)
Dept: WOUND CARE | Age: 77
Discharge: HOME | End: 2017-07-27
Attending: PODIATRIST | Admitting: PODIATRIST

## 2017-07-27 VITALS
HEART RATE: 59 BPM | HEIGHT: 60 IN | WEIGHT: 142.86 LBS | SYSTOLIC BLOOD PRESSURE: 180 MMHG | TEMPERATURE: 97.6 F | BODY MASS INDEX: 28.05 KG/M2 | DIASTOLIC BLOOD PRESSURE: 83 MMHG | RESPIRATION RATE: 18 BRPM

## 2017-07-27 RX ORDER — LIDOCAINE 50 MG/G
OINTMENT TOPICAL PRN
Status: DISCONTINUED | OUTPATIENT
Start: 2017-07-27 | End: 2017-07-28 | Stop reason: HOSPADM

## 2017-07-31 ENCOUNTER — TELEPHONE (OUTPATIENT)
Dept: PULMONOLOGY | Age: 77
End: 2017-07-31

## 2017-07-31 DIAGNOSIS — J84.9 ILD (INTERSTITIAL LUNG DISEASE) (HCC): Primary | ICD-10-CM

## 2017-08-03 ENCOUNTER — HOSPITAL ENCOUNTER (OUTPATIENT)
Dept: WOUND CARE | Age: 77
Discharge: HOME | End: 2017-08-03
Attending: PODIATRIST | Admitting: PODIATRIST

## 2017-08-03 VITALS
RESPIRATION RATE: 20 BRPM | DIASTOLIC BLOOD PRESSURE: 80 MMHG | HEIGHT: 60 IN | HEART RATE: 63 BPM | WEIGHT: 139.11 LBS | BODY MASS INDEX: 27.31 KG/M2 | SYSTOLIC BLOOD PRESSURE: 179 MMHG | TEMPERATURE: 97.5 F

## 2017-08-03 ASSESSMENT — PAIN SCALES - GENERAL
PAINLEVEL_OUTOF10: 0
PAINLEVEL_OUTOF10: 0

## 2017-08-09 ENCOUNTER — HOSPITAL ENCOUNTER (OUTPATIENT)
Dept: CT IMAGING | Age: 77
Discharge: HOME | End: 2017-08-09
Attending: INTERNAL MEDICINE | Admitting: INTERNAL MEDICINE

## 2017-08-09 DIAGNOSIS — J84.9 ILD (INTERSTITIAL LUNG DISEASE) (HCC): ICD-10-CM

## 2017-08-09 DIAGNOSIS — J84.9 INTERSTITIAL PULMONARY DISEASE (HCC): ICD-10-CM

## 2017-08-15 ENCOUNTER — OFFICE VISIT (OUTPATIENT)
Dept: PULMONOLOGY | Age: 77
End: 2017-08-15

## 2017-08-15 VITALS
WEIGHT: 138.6 LBS | DIASTOLIC BLOOD PRESSURE: 74 MMHG | SYSTOLIC BLOOD PRESSURE: 130 MMHG | BODY MASS INDEX: 27.21 KG/M2 | TEMPERATURE: 97.8 F | HEART RATE: 64 BPM | RESPIRATION RATE: 24 BRPM | HEIGHT: 60 IN | OXYGEN SATURATION: 95 %

## 2017-08-15 DIAGNOSIS — J96.11 CHRONIC RESPIRATORY FAILURE WITH HYPOXIA (HCC): ICD-10-CM

## 2017-08-15 DIAGNOSIS — I77.82 ANCA-POSITIVE VASCULITIS: ICD-10-CM

## 2017-08-15 DIAGNOSIS — R76.8 SCL-70 ANTIBODY POSITIVE: ICD-10-CM

## 2017-08-15 DIAGNOSIS — J84.9 ILD (INTERSTITIAL LUNG DISEASE) (HCC): Primary | ICD-10-CM

## 2017-08-15 DIAGNOSIS — J32.9 RHINOSINUSITIS: ICD-10-CM

## 2017-08-15 DIAGNOSIS — J31.0 RHINOSINUSITIS: ICD-10-CM

## 2017-08-15 PROCEDURE — 1123F ACP DISCUSS/DSCN MKR DOCD: CPT | Performed by: INTERNAL MEDICINE

## 2017-08-15 PROCEDURE — 99214 OFFICE O/P EST MOD 30 MIN: CPT | Performed by: INTERNAL MEDICINE

## 2017-08-15 PROCEDURE — 1090F PRES/ABSN URINE INCON ASSESS: CPT | Performed by: INTERNAL MEDICINE

## 2017-08-15 PROCEDURE — 4040F PNEUMOC VAC/ADMIN/RCVD: CPT | Performed by: INTERNAL MEDICINE

## 2017-08-15 PROCEDURE — 3017F COLORECTAL CA SCREEN DOC REV: CPT | Performed by: INTERNAL MEDICINE

## 2017-08-15 PROCEDURE — G8427 DOCREV CUR MEDS BY ELIG CLIN: HCPCS | Performed by: INTERNAL MEDICINE

## 2017-08-15 PROCEDURE — G8419 CALC BMI OUT NRM PARAM NOF/U: HCPCS | Performed by: INTERNAL MEDICINE

## 2017-08-15 PROCEDURE — 1036F TOBACCO NON-USER: CPT | Performed by: INTERNAL MEDICINE

## 2017-08-15 PROCEDURE — G8400 PT W/DXA NO RESULTS DOC: HCPCS | Performed by: INTERNAL MEDICINE

## 2017-08-15 RX ORDER — AZELASTINE 1 MG/ML
1 SPRAY, METERED NASAL 2 TIMES DAILY
Qty: 1 BOTTLE | Refills: 3 | Status: SHIPPED | OUTPATIENT
Start: 2017-08-15 | End: 2018-01-15 | Stop reason: ALTCHOICE

## 2017-08-25 ENCOUNTER — OFFICE VISIT (OUTPATIENT)
Dept: FAMILY MEDICINE CLINIC | Age: 77
End: 2017-08-25

## 2017-08-25 VITALS
HEART RATE: 84 BPM | BODY MASS INDEX: 26.37 KG/M2 | RESPIRATION RATE: 16 BRPM | WEIGHT: 135 LBS | DIASTOLIC BLOOD PRESSURE: 68 MMHG | SYSTOLIC BLOOD PRESSURE: 134 MMHG

## 2017-08-25 DIAGNOSIS — J98.4 CYSTIC-BULLOUS DISEASE OF LUNG: ICD-10-CM

## 2017-08-25 DIAGNOSIS — F32.9 REACTIVE DEPRESSION: ICD-10-CM

## 2017-08-25 DIAGNOSIS — R53.83 FATIGUE, UNSPECIFIED TYPE: Primary | ICD-10-CM

## 2017-08-25 DIAGNOSIS — Z23 NEED FOR INFLUENZA VACCINATION: ICD-10-CM

## 2017-08-25 DIAGNOSIS — N39.0 CHRONIC UTI: ICD-10-CM

## 2017-08-25 DIAGNOSIS — Z23 NEED FOR PNEUMOCOCCAL VACCINATION: ICD-10-CM

## 2017-08-25 LAB
A/G RATIO: 1.5 (ref 1.1–2.2)
ALBUMIN SERPL-MCNC: 4.5 G/DL (ref 3.4–5)
ALP BLD-CCNC: 107 U/L (ref 40–129)
ALT SERPL-CCNC: 9 U/L (ref 10–40)
ANION GAP SERPL CALCULATED.3IONS-SCNC: 15 MMOL/L (ref 3–16)
AST SERPL-CCNC: 19 U/L (ref 15–37)
BILIRUB SERPL-MCNC: 0.4 MG/DL (ref 0–1)
BILIRUBIN, POC: ABNORMAL
BLOOD URINE, POC: ABNORMAL
BUN BLDV-MCNC: 22 MG/DL (ref 7–20)
CALCIUM SERPL-MCNC: 11.3 MG/DL (ref 8.3–10.6)
CHLORIDE BLD-SCNC: 100 MMOL/L (ref 99–110)
CLARITY, POC: ABNORMAL
CO2: 27 MMOL/L (ref 21–32)
COLOR, POC: YELLOW
CREAT SERPL-MCNC: 1 MG/DL (ref 0.6–1.2)
GFR AFRICAN AMERICAN: >60
GFR NON-AFRICAN AMERICAN: 54
GLOBULIN: 3 G/DL
GLUCOSE BLD-MCNC: 99 MG/DL (ref 70–99)
GLUCOSE URINE, POC: ABNORMAL
HCT VFR BLD CALC: 39.6 % (ref 36–48)
HEMOGLOBIN: 13.4 G/DL (ref 12–16)
KETONES, POC: ABNORMAL
LEUKOCYTE EST, POC: ABNORMAL
MCH RBC QN AUTO: 32.2 PG (ref 26–34)
MCHC RBC AUTO-ENTMCNC: 33.7 G/DL (ref 31–36)
MCV RBC AUTO: 95.4 FL (ref 80–100)
NITRITE, POC: POSITIVE
PDW BLD-RTO: 13.4 % (ref 12.4–15.4)
PH, POC: 5.5
PLATELET # BLD: 230 K/UL (ref 135–450)
PMV BLD AUTO: 8.6 FL (ref 5–10.5)
POTASSIUM SERPL-SCNC: 5.2 MMOL/L (ref 3.5–5.1)
PROTEIN, POC: ABNORMAL
RBC # BLD: 4.15 M/UL (ref 4–5.2)
SODIUM BLD-SCNC: 142 MMOL/L (ref 136–145)
SPECIFIC GRAVITY, POC: >=1.03
TOTAL PROTEIN: 7.5 G/DL (ref 6.4–8.2)
TSH SERPL DL<=0.05 MIU/L-ACNC: 3.92 UIU/ML (ref 0.27–4.2)
UROBILINOGEN, POC: 1
VITAMIN B-12: 661 PG/ML (ref 211–911)
WBC # BLD: 6.6 K/UL (ref 4–11)

## 2017-08-25 PROCEDURE — 1090F PRES/ABSN URINE INCON ASSESS: CPT | Performed by: FAMILY MEDICINE

## 2017-08-25 PROCEDURE — G0009 ADMIN PNEUMOCOCCAL VACCINE: HCPCS | Performed by: FAMILY MEDICINE

## 2017-08-25 PROCEDURE — 1123F ACP DISCUSS/DSCN MKR DOCD: CPT | Performed by: FAMILY MEDICINE

## 2017-08-25 PROCEDURE — 4040F PNEUMOC VAC/ADMIN/RCVD: CPT | Performed by: FAMILY MEDICINE

## 2017-08-25 PROCEDURE — 36415 COLL VENOUS BLD VENIPUNCTURE: CPT | Performed by: FAMILY MEDICINE

## 2017-08-25 PROCEDURE — 81002 URINALYSIS NONAUTO W/O SCOPE: CPT | Performed by: FAMILY MEDICINE

## 2017-08-25 PROCEDURE — G8400 PT W/DXA NO RESULTS DOC: HCPCS | Performed by: FAMILY MEDICINE

## 2017-08-25 PROCEDURE — 1036F TOBACCO NON-USER: CPT | Performed by: FAMILY MEDICINE

## 2017-08-25 PROCEDURE — 90662 IIV NO PRSV INCREASED AG IM: CPT | Performed by: FAMILY MEDICINE

## 2017-08-25 PROCEDURE — 3017F COLORECTAL CA SCREEN DOC REV: CPT | Performed by: FAMILY MEDICINE

## 2017-08-25 PROCEDURE — G8427 DOCREV CUR MEDS BY ELIG CLIN: HCPCS | Performed by: FAMILY MEDICINE

## 2017-08-25 PROCEDURE — G8419 CALC BMI OUT NRM PARAM NOF/U: HCPCS | Performed by: FAMILY MEDICINE

## 2017-08-25 PROCEDURE — G0008 ADMIN INFLUENZA VIRUS VAC: HCPCS | Performed by: FAMILY MEDICINE

## 2017-08-25 PROCEDURE — 90670 PCV13 VACCINE IM: CPT | Performed by: FAMILY MEDICINE

## 2017-08-25 PROCEDURE — 99214 OFFICE O/P EST MOD 30 MIN: CPT | Performed by: FAMILY MEDICINE

## 2017-08-25 PROCEDURE — 96372 THER/PROPH/DIAG INJ SC/IM: CPT | Performed by: FAMILY MEDICINE

## 2017-08-25 RX ORDER — CYANOCOBALAMIN 1000 UG/ML
1000 INJECTION INTRAMUSCULAR; SUBCUTANEOUS ONCE
Status: COMPLETED | OUTPATIENT
Start: 2017-08-25 | End: 2017-08-25

## 2017-08-25 RX ADMIN — CYANOCOBALAMIN 1000 MCG: 1000 INJECTION INTRAMUSCULAR; SUBCUTANEOUS at 14:05

## 2017-08-25 ASSESSMENT — PATIENT HEALTH QUESTIONNAIRE - PHQ9
SUM OF ALL RESPONSES TO PHQ QUESTIONS 1-9: 0
1. LITTLE INTEREST OR PLEASURE IN DOING THINGS: 0
2. FEELING DOWN, DEPRESSED OR HOPELESS: 0
SUM OF ALL RESPONSES TO PHQ9 QUESTIONS 1 & 2: 0

## 2017-08-26 ASSESSMENT — ENCOUNTER SYMPTOMS
VISUAL CHANGE: 0
VOMITING: 0
COUGH: 0
NAUSEA: 0
ABDOMINAL PAIN: 0
SWOLLEN GLANDS: 0
SORE THROAT: 0
CHANGE IN BOWEL HABIT: 0

## 2017-08-27 LAB
ORGANISM: ABNORMAL
URINE CULTURE, ROUTINE: ABNORMAL

## 2017-08-27 RX ORDER — TETRACYCLINE HYDROCHLORIDE 250 MG/1
250 CAPSULE ORAL 3 TIMES DAILY
Qty: 30 CAPSULE | Refills: 0 | Status: SHIPPED | OUTPATIENT
Start: 2017-08-27 | End: 2017-12-04 | Stop reason: SDUPTHER

## 2017-09-28 RX ORDER — SERTRALINE HYDROCHLORIDE 100 MG/1
TABLET, FILM COATED ORAL
Qty: 90 TABLET | Refills: 0 | Status: SHIPPED | OUTPATIENT
Start: 2017-09-28 | End: 2017-12-22 | Stop reason: SDUPTHER

## 2017-09-28 RX ORDER — OMEPRAZOLE 40 MG/1
CAPSULE, DELAYED RELEASE ORAL
Qty: 180 CAPSULE | Refills: 0 | Status: SHIPPED | OUTPATIENT
Start: 2017-09-28 | End: 2017-12-22 | Stop reason: SDUPTHER

## 2017-09-28 RX ORDER — BUPROPION HYDROCHLORIDE 150 MG/1
TABLET, EXTENDED RELEASE ORAL
Qty: 180 TABLET | Refills: 0 | Status: SHIPPED | OUTPATIENT
Start: 2017-09-28 | End: 2017-12-22 | Stop reason: SDUPTHER

## 2017-10-16 ENCOUNTER — TELEPHONE (OUTPATIENT)
Dept: FAMILY MEDICINE CLINIC | Age: 77
End: 2017-10-16

## 2017-12-01 ENCOUNTER — TELEPHONE (OUTPATIENT)
Dept: FAMILY MEDICINE CLINIC | Age: 77
End: 2017-12-01

## 2017-12-04 ENCOUNTER — OFFICE VISIT (OUTPATIENT)
Dept: FAMILY MEDICINE CLINIC | Age: 77
End: 2017-12-04

## 2017-12-04 VITALS
DIASTOLIC BLOOD PRESSURE: 78 MMHG | SYSTOLIC BLOOD PRESSURE: 136 MMHG | BODY MASS INDEX: 24.74 KG/M2 | WEIGHT: 126 LBS | HEIGHT: 60 IN | TEMPERATURE: 97.5 F

## 2017-12-04 DIAGNOSIS — F33.41 MAJOR DEPRESSIVE DISORDER, RECURRENT, IN PARTIAL REMISSION (HCC): ICD-10-CM

## 2017-12-04 DIAGNOSIS — M25.50 POLYARTHRALGIA: ICD-10-CM

## 2017-12-04 DIAGNOSIS — K52.9 ACUTE GASTROENTERITIS: Primary | ICD-10-CM

## 2017-12-04 DIAGNOSIS — N30.00 ACUTE CYSTITIS WITHOUT HEMATURIA: ICD-10-CM

## 2017-12-04 PROCEDURE — 1090F PRES/ABSN URINE INCON ASSESS: CPT | Performed by: FAMILY MEDICINE

## 2017-12-04 PROCEDURE — 4040F PNEUMOC VAC/ADMIN/RCVD: CPT | Performed by: FAMILY MEDICINE

## 2017-12-04 PROCEDURE — 3017F COLORECTAL CA SCREEN DOC REV: CPT | Performed by: FAMILY MEDICINE

## 2017-12-04 PROCEDURE — G8400 PT W/DXA NO RESULTS DOC: HCPCS | Performed by: FAMILY MEDICINE

## 2017-12-04 PROCEDURE — G8427 DOCREV CUR MEDS BY ELIG CLIN: HCPCS | Performed by: FAMILY MEDICINE

## 2017-12-04 PROCEDURE — 99214 OFFICE O/P EST MOD 30 MIN: CPT | Performed by: FAMILY MEDICINE

## 2017-12-04 PROCEDURE — 1036F TOBACCO NON-USER: CPT | Performed by: FAMILY MEDICINE

## 2017-12-04 PROCEDURE — 1123F ACP DISCUSS/DSCN MKR DOCD: CPT | Performed by: FAMILY MEDICINE

## 2017-12-04 PROCEDURE — G8420 CALC BMI NORM PARAMETERS: HCPCS | Performed by: FAMILY MEDICINE

## 2017-12-04 PROCEDURE — G8484 FLU IMMUNIZE NO ADMIN: HCPCS | Performed by: FAMILY MEDICINE

## 2017-12-04 RX ORDER — TETRACYCLINE HYDROCHLORIDE 250 MG/1
250 CAPSULE ORAL 3 TIMES DAILY
Qty: 30 CAPSULE | Refills: 0 | Status: SHIPPED | OUTPATIENT
Start: 2017-12-04 | End: 2018-01-15 | Stop reason: ALTCHOICE

## 2017-12-04 ASSESSMENT — ENCOUNTER SYMPTOMS
DIARRHEA: 0
VOMITING: 1
ABDOMINAL PAIN: 0
COUGH: 0
NAUSEA: 1

## 2017-12-05 ENCOUNTER — TELEPHONE (OUTPATIENT)
Dept: FAMILY MEDICINE CLINIC | Age: 77
End: 2017-12-05

## 2017-12-05 ENCOUNTER — CARE COORDINATION (OUTPATIENT)
Dept: CARE COORDINATION | Age: 77
End: 2017-12-05

## 2017-12-05 NOTE — TELEPHONE ENCOUNTER
Pt  called to let Dr Lexx Jo know that the med is working. The pt is starting to eat and the food is staying down.  If any questions call pt [phone]

## 2017-12-15 ENCOUNTER — NURSE ONLY (OUTPATIENT)
Dept: FAMILY MEDICINE CLINIC | Age: 77
End: 2017-12-15

## 2017-12-15 ENCOUNTER — TELEPHONE (OUTPATIENT)
Dept: FAMILY MEDICINE CLINIC | Age: 77
End: 2017-12-15

## 2017-12-15 DIAGNOSIS — E86.0 DEHYDRATION: Primary | ICD-10-CM

## 2017-12-15 LAB
A/G RATIO: 1.5 (ref 1.1–2.2)
ALBUMIN SERPL-MCNC: 4 G/DL (ref 3.4–5)
ALP BLD-CCNC: 90 U/L (ref 40–129)
ALT SERPL-CCNC: 11 U/L (ref 10–40)
ANION GAP SERPL CALCULATED.3IONS-SCNC: 13 MMOL/L (ref 3–16)
AST SERPL-CCNC: 25 U/L (ref 15–37)
BILIRUB SERPL-MCNC: 0.4 MG/DL (ref 0–1)
BUN BLDV-MCNC: 19 MG/DL (ref 7–20)
CALCIUM SERPL-MCNC: 10.3 MG/DL (ref 8.3–10.6)
CHLORIDE BLD-SCNC: 102 MMOL/L (ref 99–110)
CO2: 28 MMOL/L (ref 21–32)
CREAT SERPL-MCNC: 0.7 MG/DL (ref 0.6–1.2)
GFR AFRICAN AMERICAN: >60
GFR NON-AFRICAN AMERICAN: >60
GLOBULIN: 2.6 G/DL
GLUCOSE BLD-MCNC: 105 MG/DL (ref 70–99)
HCT VFR BLD CALC: 38.3 % (ref 36–48)
HEMOGLOBIN: 12.7 G/DL (ref 12–16)
MCH RBC QN AUTO: 31.9 PG (ref 26–34)
MCHC RBC AUTO-ENTMCNC: 33.2 G/DL (ref 31–36)
MCV RBC AUTO: 96 FL (ref 80–100)
PDW BLD-RTO: 14.1 % (ref 12.4–15.4)
PLATELET # BLD: 227 K/UL (ref 135–450)
PMV BLD AUTO: 8.7 FL (ref 5–10.5)
POTASSIUM SERPL-SCNC: 5.1 MMOL/L (ref 3.5–5.1)
RBC # BLD: 3.99 M/UL (ref 4–5.2)
SODIUM BLD-SCNC: 143 MMOL/L (ref 136–145)
TOTAL PROTEIN: 6.6 G/DL (ref 6.4–8.2)
WBC # BLD: 7 K/UL (ref 4–11)

## 2017-12-15 PROCEDURE — 36415 COLL VENOUS BLD VENIPUNCTURE: CPT | Performed by: FAMILY MEDICINE

## 2017-12-15 NOTE — TELEPHONE ENCOUNTER
Pt called because she is not vomiting any more but she still is weak, tongue and throat sore, no energy, off balance and voice raspy. Pt finished the antibiotic that she was on. Pt feels worst then she was over all.  Please give pt a call to let her know what to do [phone]

## 2017-12-22 RX ORDER — BUPROPION HYDROCHLORIDE 150 MG/1
TABLET, EXTENDED RELEASE ORAL
Qty: 180 TABLET | Refills: 0 | Status: SHIPPED | OUTPATIENT
Start: 2017-12-22 | End: 2018-06-07 | Stop reason: SDUPTHER

## 2017-12-22 RX ORDER — OMEPRAZOLE 40 MG/1
CAPSULE, DELAYED RELEASE ORAL
Qty: 180 CAPSULE | Refills: 0 | Status: SHIPPED | OUTPATIENT
Start: 2017-12-22 | End: 2018-06-07 | Stop reason: SDUPTHER

## 2017-12-22 RX ORDER — SERTRALINE HYDROCHLORIDE 100 MG/1
TABLET, FILM COATED ORAL
Qty: 90 TABLET | Refills: 0 | Status: SHIPPED | OUTPATIENT
Start: 2017-12-22 | End: 2018-02-08 | Stop reason: ALTCHOICE

## 2017-12-22 RX ORDER — ACETAMINOPHEN AND CODEINE PHOSPHATE 300; 30 MG/1; MG/1
TABLET ORAL
Qty: 360 TABLET | Refills: 0 | Status: SHIPPED | OUTPATIENT
Start: 2017-12-22 | End: 2018-08-23 | Stop reason: HOSPADM

## 2017-12-22 RX ORDER — CLONAZEPAM 1 MG/1
TABLET ORAL
Qty: 180 TABLET | Refills: 0 | Status: SHIPPED | OUTPATIENT
Start: 2017-12-22 | End: 2018-06-07 | Stop reason: SDUPTHER

## 2017-12-29 ENCOUNTER — TELEPHONE (OUTPATIENT)
Dept: PULMONOLOGY | Age: 77
End: 2017-12-29

## 2017-12-29 DIAGNOSIS — J40 BRONCHITIS: Primary | ICD-10-CM

## 2017-12-29 RX ORDER — DOXYCYCLINE HYCLATE 100 MG/1
100 CAPSULE ORAL 2 TIMES DAILY
Qty: 20 CAPSULE | Refills: 0 | Status: SHIPPED | OUTPATIENT
Start: 2017-12-29 | End: 2018-01-15 | Stop reason: ALTCHOICE

## 2018-01-08 ENCOUNTER — TELEPHONE (OUTPATIENT)
Dept: FAMILY MEDICINE CLINIC | Age: 78
End: 2018-01-08
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
## 2018-01-15 PROBLEM — N13.8 OBSTRUCTIVE NEPHROPATHY: Status: ACTIVE | Noted: 2018-01-15

## 2018-01-19 ENCOUNTER — CARE COORDINATION (OUTPATIENT)
Dept: CASE MANAGEMENT | Age: 78
End: 2018-01-19

## 2018-01-19 ENCOUNTER — TELEPHONE (OUTPATIENT)
Dept: FAMILY MEDICINE CLINIC | Age: 78
End: 2018-01-19

## 2018-01-19 ENCOUNTER — TELEPHONE (OUTPATIENT)
Dept: PULMONOLOGY | Age: 78
End: 2018-01-19

## 2018-01-19 NOTE — TELEPHONE ENCOUNTER
Can we ask the type of anesthesia she will have (general vs local) and the approximate duration of surgery?   This will help me in terms if she needs to be seen    Thanks

## 2018-01-22 RX ORDER — CLOTRIMAZOLE 10 MG/1
10 LOZENGE ORAL; TOPICAL
Qty: 50 TABLET | Refills: 0 | Status: SHIPPED | OUTPATIENT
Start: 2018-01-22 | End: 2018-02-01

## 2018-01-22 NOTE — TELEPHONE ENCOUNTER
Shawanda Hernandez called to report that the patient fell last night. Bruising to her left side and bump on the back of her head. No loss of conscious or dizziness.

## 2018-01-23 ENCOUNTER — TELEPHONE (OUTPATIENT)
Dept: FAMILY MEDICINE CLINIC | Age: 78
End: 2018-01-23

## 2018-01-25 ENCOUNTER — CARE COORDINATION (OUTPATIENT)
Dept: CASE MANAGEMENT | Age: 78
End: 2018-01-25

## 2018-01-25 NOTE — CARE COORDINATION
[facility] Transitions Follow Up Call    2018    Patient: Matthew Khoury  Patient : 1940   MRN: [id number]  Reason for Admission: There are no discharge diagnoses documented for the most recent discharge. Discharge Date: 18 RARS: Risk Score: 20.5       Spoke with: Jackie Vazquez (patient)    Care Transitions Subsequent and Final Call    Subsequent and Final Calls  Do you have any ongoing symptoms?:  No  Have your medications changed?:  No  Do you have any questions related to your medications?:  No  Do you currently have any active services?:  Yes  Are you currently active with any services?:  Home Health  Do you have any needs or concerns that I can assist you with?:  No  Identified Barriers:  None  Care Transitions Interventions  Other Interventions: Follow Up: Spoke with Noris Holden. She states she feels good - no pain. States she is not doing much. Noris Holden fell - states she has poor balance - just some bruising - her  helped her up. [facility] is active - they are working on her balance. Noris Holden is using her home O2. She has not scheduled with PCP because she has some urology procedures coming up. Noris Holden denies any needs or concerns today. She is agreeable to f/u calls per CTC.   Future Appointments  Date Time Provider Karyn Hyde   2018 1:25 PM Zuhair Shelton MD [facility] None       Zeina Aguirre, JORGE LUIS

## 2018-01-31 ENCOUNTER — CARE COORDINATION (OUTPATIENT)
Dept: CASE MANAGEMENT | Age: 78
End: 2018-01-31

## 2018-02-08 ENCOUNTER — PAT TELEPHONE (OUTPATIENT)
Dept: PREADMISSION TESTING | Age: 78
End: 2018-02-08

## 2018-02-08 ENCOUNTER — TELEPHONE (OUTPATIENT)
Dept: FAMILY MEDICINE CLINIC | Age: 78
End: 2018-02-08

## 2018-02-08 VITALS — WEIGHT: 140 LBS | BODY MASS INDEX: 23.9 KG/M2 | HEIGHT: 64 IN

## 2018-02-08 RX ORDER — ASCORBIC ACID 500 MG
500 TABLET ORAL DAILY
COMMUNITY
End: 2019-03-11 | Stop reason: ALTCHOICE

## 2018-02-08 RX ORDER — SERTRALINE HYDROCHLORIDE 100 MG/1
100 TABLET, FILM COATED ORAL DAILY
COMMUNITY
End: 2018-10-05 | Stop reason: ALTCHOICE

## 2018-02-08 NOTE — TELEPHONE ENCOUNTER
Patient  Markel Borden called wanting that his wife might have a UTI. He wants to know if he could drop off a specimen some time today? Contact Nick at [phone] with appointment time.

## 2018-02-08 NOTE — TELEPHONE ENCOUNTER
Patient called wanting to know if the specimen could be dropped off? She needs to know if she has a UTI or not a/c of she is do to have stents removed next week. Please advise.

## 2018-02-09 ENCOUNTER — OFFICE VISIT (OUTPATIENT)
Dept: FAMILY MEDICINE CLINIC | Age: 78
End: 2018-02-09

## 2018-02-09 VITALS
BODY MASS INDEX: 23.05 KG/M2 | SYSTOLIC BLOOD PRESSURE: 116 MMHG | HEART RATE: 76 BPM | HEIGHT: 64 IN | DIASTOLIC BLOOD PRESSURE: 62 MMHG | WEIGHT: 135 LBS

## 2018-02-09 DIAGNOSIS — Z01.818 PREOP EXAMINATION: ICD-10-CM

## 2018-02-09 DIAGNOSIS — F33.41 MAJOR DEPRESSIVE DISORDER, RECURRENT, IN PARTIAL REMISSION (HCC): ICD-10-CM

## 2018-02-09 DIAGNOSIS — M34.9 SCLERODERMA (HCC): ICD-10-CM

## 2018-02-09 DIAGNOSIS — J96.11 HYPOXEMIC RESPIRATORY FAILURE, CHRONIC (HCC): ICD-10-CM

## 2018-02-09 DIAGNOSIS — R31.9 URINARY TRACT INFECTION WITH HEMATURIA, SITE UNSPECIFIED: ICD-10-CM

## 2018-02-09 DIAGNOSIS — N39.0 URINARY TRACT INFECTION WITH HEMATURIA, SITE UNSPECIFIED: ICD-10-CM

## 2018-02-09 DIAGNOSIS — N20.0 KIDNEY STONE: Primary | ICD-10-CM

## 2018-02-09 LAB
BILIRUBIN, POC: ABNORMAL
BLOOD URINE, POC: ABNORMAL
CLARITY, POC: ABNORMAL
COLOR, POC: ABNORMAL
GLUCOSE URINE, POC: ABNORMAL
KETONES, POC: ABNORMAL
LEUKOCYTE EST, POC: ABNORMAL
NITRITE, POC: ABNORMAL
PH, POC: 5.5
PROTEIN, POC: ABNORMAL
SPECIFIC GRAVITY, POC: >1.03
UROBILINOGEN, POC: 0.2

## 2018-02-09 PROCEDURE — G8484 FLU IMMUNIZE NO ADMIN: HCPCS | Performed by: FAMILY MEDICINE

## 2018-02-09 PROCEDURE — 81002 URINALYSIS NONAUTO W/O SCOPE: CPT | Performed by: FAMILY MEDICINE

## 2018-02-09 PROCEDURE — 3017F COLORECTAL CA SCREEN DOC REV: CPT | Performed by: FAMILY MEDICINE

## 2018-02-09 PROCEDURE — G8427 DOCREV CUR MEDS BY ELIG CLIN: HCPCS | Performed by: FAMILY MEDICINE

## 2018-02-09 PROCEDURE — 1090F PRES/ABSN URINE INCON ASSESS: CPT | Performed by: FAMILY MEDICINE

## 2018-02-09 PROCEDURE — G8400 PT W/DXA NO RESULTS DOC: HCPCS | Performed by: FAMILY MEDICINE

## 2018-02-09 PROCEDURE — 4040F PNEUMOC VAC/ADMIN/RCVD: CPT | Performed by: FAMILY MEDICINE

## 2018-02-09 PROCEDURE — 1036F TOBACCO NON-USER: CPT | Performed by: FAMILY MEDICINE

## 2018-02-09 PROCEDURE — G8420 CALC BMI NORM PARAMETERS: HCPCS | Performed by: FAMILY MEDICINE

## 2018-02-09 PROCEDURE — 1123F ACP DISCUSS/DSCN MKR DOCD: CPT | Performed by: FAMILY MEDICINE

## 2018-02-09 PROCEDURE — 1111F DSCHRG MED/CURRENT MED MERGE: CPT | Performed by: FAMILY MEDICINE

## 2018-02-09 PROCEDURE — 99214 OFFICE O/P EST MOD 30 MIN: CPT | Performed by: FAMILY MEDICINE

## 2018-02-09 RX ORDER — CEFDINIR 300 MG/1
300 CAPSULE ORAL EVERY 12 HOURS SCHEDULED
Qty: 10 CAPSULE | Refills: 0 | Status: SHIPPED | OUTPATIENT
Start: 2018-02-09 | End: 2018-02-14

## 2018-02-09 ASSESSMENT — ENCOUNTER SYMPTOMS
SHORTNESS OF BREATH: 1
GASTROINTESTINAL NEGATIVE: 1
BACK PAIN: 1

## 2018-02-09 NOTE — PROGRESS NOTES
0.25 MCG capsule Take 1 capsule by mouth daily 90 capsule 3    OXYGEN Inhale 4 L into the lungs continuous (Patient taking differently: Inhale 3 L into the lungs nightly ) 1 Container 1    ferrous sulfate 324 (65 FE) MG EC tablet Take 1 tablet by mouth 2 times daily (with meals) 30 tablet 1    Multiple Vitamins-Minerals (MULTIVITAMIN PO) Take 1 tablet by mouth daily.  Cholecalciferol ([employer] VITAMIN D) 1000 UNIT CAPS Take 1 capsule by mouth daily          Vitals:    02/09/18 1131   BP: 116/62   Pulse: 76   Weight: 135 lb (61.2 kg)   Height: 5' 4\" (1.626 m)     Body mass index is 23.17 kg/m². Wt Readings from Last 3 Encounters:   02/09/18 135 lb (61.2 kg)   02/08/18 140 lb (63.5 kg)   01/18/18 126 lb 8.7 oz (57.4 kg)     BP Readings from Last 3 Encounters:   02/09/18 116/62   01/18/18 (!) 147/74   12/04/17 136/78       Objective:   Physical Exam   Constitutional: She is oriented to person, place, and time. She appears well-developed and well-nourished. HENT:   Head: Normocephalic. Neck: No thyromegaly present. Cardiovascular: Normal rate, regular rhythm and normal heart sounds. Pulmonary/Chest: Effort normal and breath sounds normal. No respiratory distress. She has no wheezes. She has no rales. She exhibits no tenderness. On continuous O2 nasal cannula     Abdominal: She exhibits no mass. There is no tenderness. Lymphadenopathy:     She has no cervical adenopathy. Neurological: She is alert and oriented to person, place, and time. Skin: No rash noted. Psychiatric: She has a normal mood and affect. Her behavior is normal. Judgment and thought content normal.   Nursing note and vitals reviewed. Assessment:            Plan:      Assessment/plan;  Sammy Chavez was seen today for pre-op exam and urinary tract infection.     Diagnoses and all orders for this visit:    Kidney stone    Preop examination    Scleroderma ([facility] [address]    Urinary tract infection with hematuria, site unspecified  -     POCT

## 2018-02-11 LAB
ORGANISM: ABNORMAL
URINE CULTURE, ROUTINE: ABNORMAL
URINE CULTURE, ROUTINE: ABNORMAL

## 2018-02-12 ENCOUNTER — SURG/PROC ORDERS (OUTPATIENT)
Dept: ANESTHESIOLOGY | Age: 78
End: 2018-02-12

## 2018-02-12 RX ORDER — SODIUM CHLORIDE 0.9 % (FLUSH) 0.9 %
10 SYRINGE (ML) INJECTION PRN
Status: CANCELLED | OUTPATIENT
Start: 2018-02-12

## 2018-02-12 RX ORDER — SODIUM CHLORIDE 9 MG/ML
INJECTION, SOLUTION INTRAVENOUS CONTINUOUS
Status: CANCELLED | OUTPATIENT
Start: 2018-02-12

## 2018-02-12 RX ORDER — SODIUM CHLORIDE 0.9 % (FLUSH) 0.9 %
10 SYRINGE (ML) INJECTION EVERY 12 HOURS SCHEDULED
Status: CANCELLED | OUTPATIENT
Start: 2018-02-12

## 2018-02-13 ENCOUNTER — TELEPHONE (OUTPATIENT)
Dept: FAMILY MEDICINE CLINIC | Age: 78
End: 2018-02-13

## 2018-02-14 ENCOUNTER — HOSPITAL ENCOUNTER (OUTPATIENT)
Dept: SURGERY | Age: 78
Discharge: HOME | End: 2018-02-14
Attending: UROLOGY | Admitting: UROLOGY

## 2018-02-14 VITALS
WEIGHT: 125 LBS | DIASTOLIC BLOOD PRESSURE: 74 MMHG | SYSTOLIC BLOOD PRESSURE: 162 MMHG | OXYGEN SATURATION: 98 % | HEART RATE: 72 BPM | BODY MASS INDEX: 21.46 KG/M2 | TEMPERATURE: 98 F | RESPIRATION RATE: 14 BRPM

## 2018-02-14 LAB
ANION GAP SERPL CALCULATED.3IONS-SCNC: 13 MMOL/L (ref 3–16)
BACTERIA: ABNORMAL /HPF
BILIRUBIN URINE: ABNORMAL
BLOOD, URINE: ABNORMAL
BUN BLDV-MCNC: 19 MG/DL (ref 7–20)
CALCIUM SERPL-MCNC: 10.1 MG/DL (ref 8.3–10.6)
CHLORIDE BLD-SCNC: 108 MMOL/L (ref 99–110)
CLARITY: ABNORMAL
CO2: 21 MMOL/L (ref 21–32)
COLOR: ABNORMAL
COMMENT UA: ABNORMAL
CREAT SERPL-MCNC: 0.9 MG/DL (ref 0.6–1.2)
EPITHELIAL CELLS, UA: ABNORMAL /HPF
GFR AFRICAN AMERICAN: >60
GFR NON-AFRICAN AMERICAN: >60
GLUCOSE BLD-MCNC: 91 MG/DL (ref 70–99)
GLUCOSE URINE: NEGATIVE MG/DL
HCT VFR BLD CALC: 35.6 % (ref 36–48)
HEMOGLOBIN: 12.1 G/DL (ref 12–16)
KETONES, URINE: ABNORMAL MG/DL
LEUKOCYTE ESTERASE, URINE: ABNORMAL
MCH RBC QN AUTO: 31.4 PG (ref 26–34)
MCHC RBC AUTO-ENTMCNC: 34 G/DL (ref 31–36)
MCV RBC AUTO: 92.5 FL (ref 80–100)
MICROSCOPIC EXAMINATION: YES
NITRITE, URINE: NEGATIVE
PDW BLD-RTO: 13.8 % (ref 12.4–15.4)
PH UA: 6
PLATELET # BLD: 250 K/UL (ref 135–450)
PMV BLD AUTO: 7.6 FL (ref 5–10.5)
POTASSIUM REFLEX MAGNESIUM: 4.5 MMOL/L (ref 3.5–5.1)
PROTEIN UA: >=300 MG/DL
RBC # BLD: 3.85 M/UL (ref 4–5.2)
RBC UA: >100 /HPF (ref 0–2)
SODIUM BLD-SCNC: 142 MMOL/L (ref 136–145)
SPECIFIC GRAVITY UA: >=1.03
URINE REFLEX TO CULTURE: YES
URINE TYPE: ABNORMAL
UROBILINOGEN, URINE: 0.2 E.U./DL
WBC # BLD: 7.7 K/UL (ref 4–11)
WBC UA: >100 /HPF (ref 0–5)

## 2018-02-14 RX ORDER — SODIUM CHLORIDE 0.9 % (FLUSH) 0.9 %
10 SYRINGE (ML) INJECTION PRN
Status: DISCONTINUED | OUTPATIENT
Start: 2018-02-14 | End: 2018-02-15 | Stop reason: HOSPADM

## 2018-02-14 RX ORDER — SODIUM CHLORIDE 9 MG/ML
INJECTION, SOLUTION INTRAVENOUS CONTINUOUS
Status: DISCONTINUED | OUTPATIENT
Start: 2018-02-14 | End: 2018-02-15 | Stop reason: HOSPADM

## 2018-02-14 RX ORDER — CEFDINIR 300 MG/1
300 CAPSULE ORAL 2 TIMES DAILY
Qty: 6 CAPSULE | Refills: 0 | Status: SHIPPED | OUTPATIENT
Start: 2018-02-14 | End: 2018-12-04 | Stop reason: SDUPTHER

## 2018-02-14 RX ORDER — SODIUM CHLORIDE 0.9 % (FLUSH) 0.9 %
10 SYRINGE (ML) INJECTION EVERY 12 HOURS SCHEDULED
Status: DISCONTINUED | OUTPATIENT
Start: 2018-02-14 | End: 2018-02-15 | Stop reason: HOSPADM

## 2018-02-14 ASSESSMENT — PAIN SCALES - GENERAL
PAINLEVEL_OUTOF10: 0
PAINLEVEL_OUTOF10: 0

## 2018-02-14 ASSESSMENT — COPD QUESTIONNAIRES: CAT_SEVERITY: SEVERE

## 2018-02-14 ASSESSMENT — ENCOUNTER SYMPTOMS: SHORTNESS OF BREATH: 1

## 2018-02-14 NOTE — ANESTHESIA POST-OP
[facility] Department of Anesthesiology  Post-Anesthesia Note       Name:  Karen Fuentes                                         Age:  78 y.o.   MRN:  [id number]     Last Vitals & Oxygen Saturation: BP (!) 162/74   Pulse 72   Temp 98 °F (36.7 °C) (Temporal)   Resp 14   Wt 125 lb (56.7 kg)   SpO2 98%   BMI 21.46 kg/m²   Patient Vitals for the past 4 hrs:   BP Temp Temp src Pulse Resp SpO2 Weight   02/14/18 1638 (!) 162/74 - - 72 14 98 % -   02/14/18 1504 (!) 156/76 98 °F (36.7 °C) Temporal 70 16 98 % -   02/14/18 1458 (!) 174/87 - - 70 20 98 % -   02/14/18 1452 (!) 182/76 - - 69 16 98 % -   02/14/18 1442 (!) 177/81 - - 61 14 100 % -   02/14/18 1440 (!) 176/80 97.7 °F (36.5 °C) Temporal 71 14 100 % -   02/14/18 1254 - - - - - - 125 lb (56.7 kg)   02/14/18 1251 (!) 141/65 96.8 °F (36 °C) Temporal 58 - 100 % -       Level of consciousness: awake, alert and oriented    Respiratory: stable     Cardiovascular: stable     Hydration: stable     PONV: stable     Post-op pain: adequate analgesia    Post-op assessment: no apparent anesthetic complications    Complications:  none    Kit Flores MD  February 14, 2018   4:48 PM

## 2018-02-14 NOTE — PROGRESS NOTES
Pt arrived to PACU from OR, monitors on VSS 3L O2. Pt alert oriented, denies pain. IV infusing. Pt with moist cough, states cough was persistent preop.

## 2018-02-14 NOTE — BRIEF OP NOTE
Brief Postoperative Note    Candido Paz  YOB: 1940  [id number]    Pre-operative Diagnosis: left renal and ureteral calculi, left hydronephrosis    Post-operative Diagnosis: Same    Procedure: cysto, left uretero with laser litho, left stent excahnge    Anesthesia: General    Surgeons/Assistants: Brian Wagner    Estimated Blood Loss: less than 50     Complications: None    Specimens: Was Not Obtained    Findings: 6mm left proximal ureteral stone with multiple small left renal calculi - all fragmented into 1mm size    Drains: 6/26 left ureteral stent    Electronically signed by Jonathan Bedoya MD on 2/14/2018 at 2:27 PM

## 2018-02-14 NOTE — H&P (VIEW-ONLY)
Hospital Medicine History & Physical      PCP: Mesfin Thorpe DO    Date of Admission: 1/15/2018    Date of Service: Pt seen/examined on 1/15/2018 and Admitted to Inpatient. Chief Complaint:  Confusion with left flank pain      History Of Present Illness: The patient is a 78 y.o. female with a history of frequent UTIs, pulmonary fibrosis, osteoporosis, and nephrolithiasis who presents to [facility] ED with complaints of left flank pain. Patient was unable to give me a full history and her  and son are present at the bedside assisting with HPI. They state that this morning she was \"out of her routine\" and was up early in the morning. They state that later in the day she was unable to walk. They also noted twitching on the right side and was concerned about a stroke. When she got to the ED she had a bout of \"projectile vomiting. \" They state that she's fallen 3 times since Christmas and had a neurology appointment on Thursday to follow up with this. During her hospitalization she complained of left flank pain and a CT was performed showing a 7 mm obstructive stone. No fevers, chills, chest pain, shortness of breath, or dysuria at present. No other aggravating or alleviating factors noted at this time. Chart was reviewed she was treated for UTI in December. She follows with Dr. Bere Petty as an outpatient for ILD.      Past Medical History:        Diagnosis Date    Anxiety     Arthritis     Cancer of skin of leg     Chronic low back pain     Depression     GERD (gastroesophageal reflux disease)     Insomnia     Iron deficiency anemia     Kidney stone     PUD (peptic ulcer disease)     Pulmonary fibrosis ([address]     Stomach ulcer     Ulcer - lesion MARCH 2010    UTI (urinary tract infection)        Past Surgical History:        Procedure clear.  Neck: Supple, No jugular venous distention/bruits. Trachea midline without thyromegaly or adenopathy with full range of motion. Lungs: Clear to auscultation, bilaterally without Rales/Wheezes/Rhonchi with good respiratory effort. Heart: Regular rate and rhythm with Normal S1/S2 without murmurs, rubs or gallops  Abdomen: Soft, non-tender or non-distended without rigidity or guarding and positive bowel sounds all four quadrants. Extremities: No clubbing, cyanosis, or edema bilaterally. Full range of motion without deformity and normal gait intact. Skin: Skin color, texture, turgor normal.  No rashes or lesions. Neurologic: Alert and oriented x1, neurovascularly intact with sensory/motor intact upper extremities/lower extremities, bilaterally. Mental status: Alert, oriented, vague comments  Capillary Refill: Acceptable  < 3 seconds  Peripheral Pulses: +3 Easily felt, not easily obliterated with pressure      CXR:  I have reviewed the CXR with the following interpretation: atelectasis vs PNA  EKG:  I have reviewed the EKG with the following interpretation:     CBC   Recent Labs      01/15/18   1640   WBC  14.7*   HGB  12.8   HCT  38.4   PLT  182      RENAL  Recent Labs      01/15/18   1640   NA  136   K  4.5   CL  99   CO2  25   BUN  24*   CREATININE  0.9     LFT'S  Recent Labs      01/15/18   1640   AST  17   ALT  8*   BILITOT  0.8   ALKPHOS  102     COAG  No results for input(s): INR in the last 72 hours.   CARDIAC ENZYMES  Recent Labs      01/15/18   1640   TROPONINI  <0.01       U/A:    Lab Results   Component Value Date    NITRITE positive 08/25/2017    COLORU YELLOW 01/15/2018    WBCUA 510 01/15/2018    RBCUA 9 01/15/2018    BACTERIA 4+ 01/15/2018    CLARITYU CLOUDY 01/15/2018    SPECGRAV 1.015 01/15/2018    LEUKOCYTESUR LARGE 01/15/2018    BLOODU MODERATE 01/15/2018    GLUCOSEU Negative 01/15/2018    GLUCOSEU NEGATIVE 03/04/2010       ABG  No results found for: KYU7LVK, BEART, W9NLJZIH, PHART,

## 2018-02-14 NOTE — INTERVAL H&P NOTE
Preoperative H&P Update    The patient's History and Physical in the medical record  was reviewed by me today. I reviewed the HPI, medications, allergies, reason for surgery, diagnosis and treatment plan and there has been no change.     Electronically signed by Kaylee Botello MD on 2/14/2018 at 1:18 PM

## 2018-02-14 NOTE — ANESTHESIA PRE-OP
disease) ([facility] [address]    Obstructive nephropathy     Past Medical History:   Diagnosis Date    Anxiety and depression     Arthritis     Cancer of skin of leg     Chronic low back pain     GERD (gastroesophageal reflux disease)     Insomnia     Iron deficiency anemia     Kidney stone     PUD (peptic ulcer disease)     Pulmonary fibrosis (HCC)     Dr. Mary Jo Richter has a CT done every 6 months    Stomach ulcer     Ulcer - lesion MARCH 2010    UTI (urinary tract infection)     frequent     Past Surgical History:   Procedure Laterality Date    BACK SURGERY  MARCH 2004,OCT 02, FEB 05    x4--fusions    BACK SURGERY  06/2000    laminectomy-lumbar    CARPAL TUNNEL RELEASE  9/27/11    Left    COLONOSCOPY      CYSTOSCOPY Left 09/2016    cysto left ureteral stent placement    CYSTOSCOPY  01/15/2018    Left Stent Insertion    EYE SURGERY Bilateral 2008    cataract removed with lens implants    FRACTURE SURGERY Right 06/05/2016    femur fracture    FRACTURE SURGERY Left     femur fracture    HYSTERECTOMY  1993    [address] REPL[address]  4/20/2000    LEFT KNEE    JOINT REPLACEMENT  2/27/08    RIGHT KNEE    STOMACH SURGERY  2010    For PUD     Social History   Substance Use Topics    Smoking status: Never Smoker    Smokeless tobacco: Never Used      Comment: encouraged to never smoke     Alcohol use No     Medications  Current Outpatient Prescriptions on File Prior to Encounter   Medication Sig Dispense Refill    cefdinir (OMNICEF) 300 MG capsule Take 1 capsule by mouth every 12 hours for 5 days 10 capsule 0    sertraline (ZOLOFT) 100 MG tablet Take 100 mg by mouth daily      vitamin C (ASCORBIC ACID) 500 MG tablet Take 500 mg by mouth daily      docusate sodium (COLACE) 100 MG capsule Take 100 mg by mouth daily      buPROPion (WELLBUTRIN SR) 150 MG extended release tablet TAKE 1 TABLET BY MOUTH TWICE DAILY 180 tablet 0    omeprazole (PRILOSEC) 40 MG delayed release capsule TAKE 1

## 2018-02-15 ENCOUNTER — TELEPHONE (OUTPATIENT)
Dept: FAMILY MEDICINE CLINIC | Age: 78
End: 2018-02-15

## 2018-02-15 LAB
ORGANISM: ABNORMAL
URINE CULTURE, ROUTINE: ABNORMAL
URINE CULTURE, ROUTINE: ABNORMAL

## 2018-02-16 NOTE — OP NOTE
identified, grasped, and pulled out of the  urethral meatus. A 0.035 ZIPwire was fed through the stent and up to the  left kidney. The stent was removed and a rigid ureteroscope was placed  alongside the wire and up to the stone. The stone was identified in the  very proximal ureter, and a 365-micron laser fiber was used to fragment the  stone into several small pieces. This mostly pushed back into the kidney,  as it was right at the UPJ. Knowing that the patient did have some renal calculi as well, I elected to  use a flexible scope as well. I placed a wire through the rigid scope and  removed it. I then loaded the flexible cystoscope over the top of the  Sensor wire and up to the kidney. Several more small stones were seen as  well as some blood clots within the kidney. I used a 365-micron laser  fiber to fragment any stones that were larger than 1 mm into smaller  fragments. I pulled back and left at the UPJ and noted inflammation at the  area where the stone had been impacted. So, I elected to replace the  stent. I removed the ureteroscope and placed the cystoscope back over the top of  the wire and replaced the 6-Australian x 26-cm double-J left ureteral stent. The wire was removed and good curl was noted proximally on fluoroscopy and  distally on cystoscopy. The patient tolerated the procedure well and was taken to the recovery room  in good condition.   She will follow up in one or two weeks for stent  removal.        Andrade Delgado MD    D: 02/16/2018 6:27:45       T: 02/16/2018 12:47:09     [email or web address]  Job#: [id number]     Doc#: [id number]    CC:

## 2018-02-22 ENCOUNTER — TELEPHONE (OUTPATIENT)
Dept: FAMILY MEDICINE CLINIC | Age: 78
End: 2018-02-22

## 2018-02-22 NOTE — TELEPHONE ENCOUNTER
Adriana Villa the nurse with [facility] called to let Dr Russell Young know that she is discharging the pt from skilled nursing today. Her  is independent with her care.  If any questions call Adriana Villa [phone]

## 2018-02-23 ENCOUNTER — TELEPHONE (OUTPATIENT)
Dept: PULMONOLOGY | Age: 78
End: 2018-02-23

## 2018-03-09 ENCOUNTER — HOSPITAL ENCOUNTER (OUTPATIENT)
Dept: VASCULAR LAB | Age: 78
Discharge: HOME | End: 2018-03-10

## 2018-03-09 ENCOUNTER — HOSPITAL ENCOUNTER (OUTPATIENT)
Dept: NON INVASIVE DIAGNOSTICS | Age: 78
Discharge: HOME | End: 2018-03-09
Attending: PSYCHIATRY & NEUROLOGY | Admitting: PSYCHIATRY & NEUROLOGY

## 2018-03-09 DIAGNOSIS — I63.9 CEREBRAL INFARCTION (HCC): ICD-10-CM

## 2018-03-09 LAB
LV EF: 58 %
LVEF MODALITY: NORMAL

## 2018-03-12 ENCOUNTER — OFFICE VISIT (OUTPATIENT)
Dept: FAMILY MEDICINE CLINIC | Age: 78
End: 2018-03-12

## 2018-03-12 VITALS
BODY MASS INDEX: 20.83 KG/M2 | HEIGHT: 65 IN | OXYGEN SATURATION: 91 % | TEMPERATURE: 96.3 F | HEART RATE: 67 BPM | WEIGHT: 125 LBS | DIASTOLIC BLOOD PRESSURE: 75 MMHG | SYSTOLIC BLOOD PRESSURE: 149 MMHG | RESPIRATION RATE: 16 BRPM

## 2018-03-12 DIAGNOSIS — R31.9 HEMATURIA, UNSPECIFIED TYPE: ICD-10-CM

## 2018-03-12 DIAGNOSIS — E53.8 VITAMIN B12 DEFICIENCY: Primary | ICD-10-CM

## 2018-03-12 LAB
BILIRUBIN, POC: ABNORMAL
BLOOD URINE, POC: ABNORMAL
CLARITY, POC: ABNORMAL
COLOR, POC: YELLOW
GLUCOSE URINE, POC: ABNORMAL
KETONES, POC: ABNORMAL
LEUKOCYTE EST, POC: ABNORMAL
NITRITE, POC: POSITIVE
PH, POC: 5.5
PROTEIN, POC: ABNORMAL
SPECIFIC GRAVITY, POC: >=1.03
UROBILINOGEN, POC: ABNORMAL
VITAMIN B-12: 420 PG/ML (ref 211–911)

## 2018-03-12 PROCEDURE — 1090F PRES/ABSN URINE INCON ASSESS: CPT | Performed by: FAMILY MEDICINE

## 2018-03-12 PROCEDURE — G8400 PT W/DXA NO RESULTS DOC: HCPCS | Performed by: FAMILY MEDICINE

## 2018-03-12 PROCEDURE — 36415 COLL VENOUS BLD VENIPUNCTURE: CPT | Performed by: FAMILY MEDICINE

## 2018-03-12 PROCEDURE — 1036F TOBACCO NON-USER: CPT | Performed by: FAMILY MEDICINE

## 2018-03-12 PROCEDURE — G8420 CALC BMI NORM PARAMETERS: HCPCS | Performed by: FAMILY MEDICINE

## 2018-03-12 PROCEDURE — G8427 DOCREV CUR MEDS BY ELIG CLIN: HCPCS | Performed by: FAMILY MEDICINE

## 2018-03-12 PROCEDURE — 1123F ACP DISCUSS/DSCN MKR DOCD: CPT | Performed by: FAMILY MEDICINE

## 2018-03-12 PROCEDURE — 81002 URINALYSIS NONAUTO W/O SCOPE: CPT | Performed by: FAMILY MEDICINE

## 2018-03-12 PROCEDURE — 3017F COLORECTAL CA SCREEN DOC REV: CPT | Performed by: FAMILY MEDICINE

## 2018-03-12 PROCEDURE — G8598 ASA/ANTIPLAT THER USED: HCPCS | Performed by: FAMILY MEDICINE

## 2018-03-12 PROCEDURE — 4040F PNEUMOC VAC/ADMIN/RCVD: CPT | Performed by: FAMILY MEDICINE

## 2018-03-12 PROCEDURE — G8482 FLU IMMUNIZE ORDER/ADMIN: HCPCS | Performed by: FAMILY MEDICINE

## 2018-03-12 PROCEDURE — 99213 OFFICE O/P EST LOW 20 MIN: CPT | Performed by: FAMILY MEDICINE

## 2018-03-12 ASSESSMENT — ENCOUNTER SYMPTOMS
GASTROINTESTINAL NEGATIVE: 1
RESPIRATORY NEGATIVE: 1

## 2018-03-12 NOTE — PROGRESS NOTES
Subjective:      Patient ID: Bernadette Chinchilla is a 78 y.o. female. HPI  Vitamin b12 deficiency  Pt went to Dr Yue Langston neurology    He wants her to get a vitamin B12 test  Also had mri of her head and neck  She was told she has had multiple strokes  She had carotid doppler and echo  Wants to review  She did have vitamin B12 deficiency in the past    Chronic UTI  Wants to have her urine rechecked  Recently had a stone and had a stent placed  Feels like since th is was removed her incontinence is worse  Not really burning or urgency    Review of Systems   Constitutional: Negative. Respiratory: Negative. Cardiovascular: Negative. Gastrointestinal: Negative. Skin: Negative. Neurological: Negative. Objective:   Physical Exam   Constitutional: She is oriented to person, place, and time. She appears well-developed and well-nourished. HENT:   Head: Normocephalic. Neurological: She is alert and oriented to person, place, and time. Psychiatric: She has a normal mood and affect. Her behavior is normal. Judgment and thought content normal.       Assessment:      Assessment/plan;  Sammy Chavez was seen today for discuss labs.     Diagnoses and all orders for this visit:    Vitamin B12 deficiency  -     Vitamin B12    await lab

## 2018-03-14 ENCOUNTER — TELEPHONE (OUTPATIENT)
Dept: FAMILY MEDICINE CLINIC | Age: 78
End: 2018-03-14

## 2018-03-14 LAB
ORGANISM: ABNORMAL
URINE CULTURE, ROUTINE: ABNORMAL
URINE CULTURE, ROUTINE: ABNORMAL

## 2018-03-14 NOTE — TELEPHONE ENCOUNTER
Sunny Leyva is calling to let Dr Aleja Oliveiar know that the patient has been discharged from Occupational therapy.  She has met all her goals

## 2018-03-22 ENCOUNTER — TELEPHONE (OUTPATIENT)
Dept: FAMILY MEDICINE CLINIC | Age: 78
End: 2018-03-22

## 2018-03-22 NOTE — TELEPHONE ENCOUNTER
Patient is calling because Dr. Park Lira had suggested she start on B12 and the patient wasn't sure if she is suppose to start taking pills and if so how much or if it was a shot? She would prefer the pills so she would not need to come into the office as much.  Please call the patient and let her know [phone]

## 2018-03-26 ENCOUNTER — OFFICE VISIT (OUTPATIENT)
Dept: PULMONOLOGY | Age: 78
End: 2018-03-26

## 2018-03-26 VITALS
TEMPERATURE: 97 F | RESPIRATION RATE: 20 BRPM | OXYGEN SATURATION: 93 % | HEIGHT: 65 IN | DIASTOLIC BLOOD PRESSURE: 81 MMHG | HEART RATE: 67 BPM | SYSTOLIC BLOOD PRESSURE: 167 MMHG

## 2018-03-26 DIAGNOSIS — R76.8 SCL-70 ANTIBODY POSITIVE: ICD-10-CM

## 2018-03-26 DIAGNOSIS — J84.9 ILD (INTERSTITIAL LUNG DISEASE) (HCC): Primary | ICD-10-CM

## 2018-03-26 DIAGNOSIS — J96.11 CHRONIC RESPIRATORY FAILURE WITH HYPOXIA (HCC): ICD-10-CM

## 2018-03-26 PROCEDURE — G8420 CALC BMI NORM PARAMETERS: HCPCS | Performed by: INTERNAL MEDICINE

## 2018-03-26 PROCEDURE — 4040F PNEUMOC VAC/ADMIN/RCVD: CPT | Performed by: INTERNAL MEDICINE

## 2018-03-26 PROCEDURE — G8598 ASA/ANTIPLAT THER USED: HCPCS | Performed by: INTERNAL MEDICINE

## 2018-03-26 PROCEDURE — 3017F COLORECTAL CA SCREEN DOC REV: CPT | Performed by: INTERNAL MEDICINE

## 2018-03-26 PROCEDURE — G8427 DOCREV CUR MEDS BY ELIG CLIN: HCPCS | Performed by: INTERNAL MEDICINE

## 2018-03-26 PROCEDURE — 1123F ACP DISCUSS/DSCN MKR DOCD: CPT | Performed by: INTERNAL MEDICINE

## 2018-03-26 PROCEDURE — 1090F PRES/ABSN URINE INCON ASSESS: CPT | Performed by: INTERNAL MEDICINE

## 2018-03-26 PROCEDURE — G8400 PT W/DXA NO RESULTS DOC: HCPCS | Performed by: INTERNAL MEDICINE

## 2018-03-26 PROCEDURE — G8482 FLU IMMUNIZE ORDER/ADMIN: HCPCS | Performed by: INTERNAL MEDICINE

## 2018-03-26 PROCEDURE — 99214 OFFICE O/P EST MOD 30 MIN: CPT | Performed by: INTERNAL MEDICINE

## 2018-03-26 PROCEDURE — 1036F TOBACCO NON-USER: CPT | Performed by: INTERNAL MEDICINE

## 2018-03-26 NOTE — PROGRESS NOTES
Chief complaint  This is a 78y.o. year old female  who comes to see me with a chief complaint of   Chief Complaint   Patient presents with    Follow-up       HPI  Here with cc of follow up on ILD. Doing ok. Breathing is stable. Oxygen usage is stable. Measures her oxygen levels in high 80's to low 90's with exertion. This is stable and unchanged. Having falling spells and her Neurologist says she has been having mini-strokes. Recently had kidney stones.   No other changes    Past Medical History:   Diagnosis Date    Anxiety and depression     Arthritis     Cancer of skin of leg     Chronic low back pain     GERD (gastroesophageal reflux disease)     Insomnia     Iron deficiency anemia     Kidney stone     PUD (peptic ulcer disease)     Pulmonary fibrosis (HCC)     Dr. Jenni Montero has a CT done every 6 months    Stomach ulcer     Ulcer - lesion MARCH 2010    UTI (urinary tract infection)     frequent       Past Surgical History:   Procedure Laterality Date    BACK SURGERY  MARCH 2004,OCT 02, [place] 49    x4--fusions    BACK SURGERY  06/2000    laminectomy-lumbar    CARPAL TUNNEL RELEASE  9/27/11    Left    COLONOSCOPY      CYSTOSCOPY Left 09/2016    cysto left ureteral stent placement    CYSTOSCOPY  01/15/2018    Left Stent Insertion    CYSTOSCOPY  02/14/2018    cysto, left uretero with laser litho, left stent excahnge    EYE SURGERY Bilateral 2008    cataract removed with lens implants    FRACTURE SURGERY Right 06/05/2016    femur fracture    FRACTURE SURGERY Left     femur fracture    HYSTERECTOMY  1993    [address] REPLAC[address]  4/20/2000    LEFT KNEE    JOINT REPLACEMENT  2/27/08    RIGHT KNEE    STOMACH SURGERY  2010    For PUD           Current Outpatient Prescriptions   Medication Sig Dispense Refill    sertraline (ZOLOFT) 100 MG tablet Take 100 mg by mouth daily      vitamin C (ASCORBIC ACID) 500 MG tablet Take 500 mg by mouth daily      docusate sodium

## 2018-03-29 ENCOUNTER — TELEPHONE (OUTPATIENT)
Dept: FAMILY MEDICINE CLINIC | Age: 78
End: 2018-03-29

## 2018-04-13 ENCOUNTER — OFFICE VISIT (OUTPATIENT)
Dept: ORTHOPEDIC SURGERY | Age: 78
End: 2018-04-13

## 2018-04-13 VITALS
SYSTOLIC BLOOD PRESSURE: 131 MMHG | BODY MASS INDEX: 20.83 KG/M2 | HEIGHT: 65 IN | DIASTOLIC BLOOD PRESSURE: 68 MMHG | WEIGHT: 125 LBS | HEART RATE: 68 BPM

## 2018-04-13 DIAGNOSIS — M97.12XA PERIPROSTHETIC FRACTURE AROUND INTERNAL PROSTHETIC LEFT KNEE JOINT, INITIAL ENCOUNTER: Primary | ICD-10-CM

## 2018-04-13 DIAGNOSIS — M25.562 ACUTE PAIN OF LEFT KNEE: ICD-10-CM

## 2018-04-13 PROCEDURE — 1123F ACP DISCUSS/DSCN MKR DOCD: CPT | Performed by: ORTHOPAEDIC SURGERY

## 2018-04-13 PROCEDURE — G8598 ASA/ANTIPLAT THER USED: HCPCS | Performed by: ORTHOPAEDIC SURGERY

## 2018-04-13 PROCEDURE — 3017F COLORECTAL CA SCREEN DOC REV: CPT | Performed by: ORTHOPAEDIC SURGERY

## 2018-04-13 PROCEDURE — 1036F TOBACCO NON-USER: CPT | Performed by: ORTHOPAEDIC SURGERY

## 2018-04-13 PROCEDURE — G8427 DOCREV CUR MEDS BY ELIG CLIN: HCPCS | Performed by: ORTHOPAEDIC SURGERY

## 2018-04-13 PROCEDURE — 99214 OFFICE O/P EST MOD 30 MIN: CPT | Performed by: ORTHOPAEDIC SURGERY

## 2018-04-13 PROCEDURE — 1090F PRES/ABSN URINE INCON ASSESS: CPT | Performed by: ORTHOPAEDIC SURGERY

## 2018-04-13 PROCEDURE — G8400 PT W/DXA NO RESULTS DOC: HCPCS | Performed by: ORTHOPAEDIC SURGERY

## 2018-04-13 PROCEDURE — G8420 CALC BMI NORM PARAMETERS: HCPCS | Performed by: ORTHOPAEDIC SURGERY

## 2018-04-13 PROCEDURE — 4040F PNEUMOC VAC/ADMIN/RCVD: CPT | Performed by: ORTHOPAEDIC SURGERY

## 2018-04-14 ENCOUNTER — TELEPHONE (OUTPATIENT)
Dept: ORTHOPEDIC SURGERY | Age: 78
End: 2018-04-14

## 2018-04-14 RX ORDER — METHYLPREDNISOLONE 4 MG/1
TABLET ORAL
Qty: 1 KIT | Refills: 0 | Status: SHIPPED | OUTPATIENT
Start: 2018-04-14 | End: 2018-04-19

## 2018-04-26 ENCOUNTER — OFFICE VISIT (OUTPATIENT)
Dept: ORTHOPEDIC SURGERY | Age: 78
End: 2018-04-26

## 2018-04-26 VITALS
SYSTOLIC BLOOD PRESSURE: 139 MMHG | WEIGHT: 125 LBS | BODY MASS INDEX: 20.83 KG/M2 | DIASTOLIC BLOOD PRESSURE: 75 MMHG | HEART RATE: 70 BPM | HEIGHT: 65 IN

## 2018-04-26 DIAGNOSIS — M54.5 LOW BACK PAIN, UNSPECIFIED BACK PAIN LATERALITY, UNSPECIFIED CHRONICITY, WITH SCIATICA PRESENCE UNSPECIFIED: Primary | ICD-10-CM

## 2018-04-26 DIAGNOSIS — M70.62 GREATER TROCHANTERIC BURSITIS OF LEFT HIP: ICD-10-CM

## 2018-04-26 PROCEDURE — 1123F ACP DISCUSS/DSCN MKR DOCD: CPT | Performed by: NURSE PRACTITIONER

## 2018-04-26 PROCEDURE — 99214 OFFICE O/P EST MOD 30 MIN: CPT | Performed by: NURSE PRACTITIONER

## 2018-04-26 PROCEDURE — G8420 CALC BMI NORM PARAMETERS: HCPCS | Performed by: NURSE PRACTITIONER

## 2018-04-26 PROCEDURE — G8598 ASA/ANTIPLAT THER USED: HCPCS | Performed by: NURSE PRACTITIONER

## 2018-04-26 PROCEDURE — 1036F TOBACCO NON-USER: CPT | Performed by: NURSE PRACTITIONER

## 2018-04-26 PROCEDURE — G8400 PT W/DXA NO RESULTS DOC: HCPCS | Performed by: NURSE PRACTITIONER

## 2018-04-26 PROCEDURE — 20610 DRAIN/INJ JOINT/BURSA W/O US: CPT | Performed by: NURSE PRACTITIONER

## 2018-04-26 PROCEDURE — 4040F PNEUMOC VAC/ADMIN/RCVD: CPT | Performed by: NURSE PRACTITIONER

## 2018-04-26 PROCEDURE — 1090F PRES/ABSN URINE INCON ASSESS: CPT | Performed by: NURSE PRACTITIONER

## 2018-04-26 PROCEDURE — G8427 DOCREV CUR MEDS BY ELIG CLIN: HCPCS | Performed by: NURSE PRACTITIONER

## 2018-04-30 ENCOUNTER — TELEPHONE (OUTPATIENT)
Dept: ORTHOPEDIC SURGERY | Age: 78
End: 2018-04-30

## 2018-05-30 ENCOUNTER — TELEPHONE (OUTPATIENT)
Dept: ORTHOPEDIC SURGERY | Age: 78
End: 2018-05-30

## 2018-06-06 RX ORDER — ALENDRONATE SODIUM 70 MG/1
TABLET ORAL
Qty: 12 TABLET | Refills: 0 | Status: SHIPPED | OUTPATIENT
Start: 2018-06-06 | End: 2018-08-26 | Stop reason: SDUPTHER

## 2018-06-06 RX ORDER — SERTRALINE HYDROCHLORIDE 100 MG/1
TABLET, FILM COATED ORAL
Qty: 90 TABLET | Refills: 0 | Status: SHIPPED | OUTPATIENT
Start: 2018-06-06 | End: 2018-06-19 | Stop reason: ALTCHOICE

## 2018-06-07 ENCOUNTER — OFFICE VISIT (OUTPATIENT)
Dept: ORTHOPEDIC SURGERY | Age: 78
End: 2018-06-07

## 2018-06-07 VITALS
HEART RATE: 60 BPM | SYSTOLIC BLOOD PRESSURE: 137 MMHG | WEIGHT: 125 LBS | HEIGHT: 65 IN | DIASTOLIC BLOOD PRESSURE: 76 MMHG | BODY MASS INDEX: 20.83 KG/M2

## 2018-06-07 DIAGNOSIS — M70.62 GREATER TROCHANTERIC BURSITIS OF LEFT HIP: Primary | ICD-10-CM

## 2018-06-07 DIAGNOSIS — F41.1 GAD (GENERALIZED ANXIETY DISORDER): Primary | ICD-10-CM

## 2018-06-07 PROCEDURE — 1123F ACP DISCUSS/DSCN MKR DOCD: CPT | Performed by: NURSE PRACTITIONER

## 2018-06-07 PROCEDURE — 1036F TOBACCO NON-USER: CPT | Performed by: NURSE PRACTITIONER

## 2018-06-07 PROCEDURE — G8400 PT W/DXA NO RESULTS DOC: HCPCS | Performed by: NURSE PRACTITIONER

## 2018-06-07 PROCEDURE — 1090F PRES/ABSN URINE INCON ASSESS: CPT | Performed by: NURSE PRACTITIONER

## 2018-06-07 PROCEDURE — 99213 OFFICE O/P EST LOW 20 MIN: CPT | Performed by: NURSE PRACTITIONER

## 2018-06-07 PROCEDURE — G8598 ASA/ANTIPLAT THER USED: HCPCS | Performed by: NURSE PRACTITIONER

## 2018-06-07 PROCEDURE — G8420 CALC BMI NORM PARAMETERS: HCPCS | Performed by: NURSE PRACTITIONER

## 2018-06-07 PROCEDURE — 4040F PNEUMOC VAC/ADMIN/RCVD: CPT | Performed by: NURSE PRACTITIONER

## 2018-06-07 PROCEDURE — G8427 DOCREV CUR MEDS BY ELIG CLIN: HCPCS | Performed by: NURSE PRACTITIONER

## 2018-06-07 RX ORDER — BUPROPION HYDROCHLORIDE 150 MG/1
TABLET, EXTENDED RELEASE ORAL
Qty: 180 TABLET | Refills: 0 | Status: SHIPPED | OUTPATIENT
Start: 2018-06-07 | End: 2018-12-24 | Stop reason: SDUPTHER

## 2018-06-07 RX ORDER — CLONAZEPAM 1 MG/1
TABLET ORAL
Qty: 180 TABLET | Refills: 0 | Status: SHIPPED | OUTPATIENT
Start: 2018-06-07 | End: 2018-10-05 | Stop reason: ALTCHOICE

## 2018-06-07 RX ORDER — OMEPRAZOLE 40 MG/1
CAPSULE, DELAYED RELEASE ORAL
Qty: 180 CAPSULE | Refills: 0 | Status: SHIPPED | OUTPATIENT
Start: 2018-06-07 | End: 2018-10-18 | Stop reason: SDUPTHER

## 2018-06-19 ENCOUNTER — TELEPHONE (OUTPATIENT)
Dept: FAMILY MEDICINE CLINIC | Age: 78
End: 2018-06-19

## 2018-06-19 ENCOUNTER — OFFICE VISIT (OUTPATIENT)
Dept: FAMILY MEDICINE CLINIC | Age: 78
End: 2018-06-19

## 2018-06-19 VITALS
HEIGHT: 65 IN | WEIGHT: 125.4 LBS | BODY MASS INDEX: 20.89 KG/M2 | DIASTOLIC BLOOD PRESSURE: 70 MMHG | SYSTOLIC BLOOD PRESSURE: 140 MMHG

## 2018-06-19 DIAGNOSIS — L89.222 DECUBITUS ULCER OF LEFT HIP, STAGE 2 (HCC): Primary | ICD-10-CM

## 2018-06-19 DIAGNOSIS — J40 BRONCHITIS: ICD-10-CM

## 2018-06-19 PROCEDURE — G8420 CALC BMI NORM PARAMETERS: HCPCS | Performed by: FAMILY MEDICINE

## 2018-06-19 PROCEDURE — G8427 DOCREV CUR MEDS BY ELIG CLIN: HCPCS | Performed by: FAMILY MEDICINE

## 2018-06-19 PROCEDURE — 1090F PRES/ABSN URINE INCON ASSESS: CPT | Performed by: FAMILY MEDICINE

## 2018-06-19 PROCEDURE — 99213 OFFICE O/P EST LOW 20 MIN: CPT | Performed by: FAMILY MEDICINE

## 2018-06-19 PROCEDURE — 1123F ACP DISCUSS/DSCN MKR DOCD: CPT | Performed by: FAMILY MEDICINE

## 2018-06-19 PROCEDURE — 4040F PNEUMOC VAC/ADMIN/RCVD: CPT | Performed by: FAMILY MEDICINE

## 2018-06-19 PROCEDURE — G8598 ASA/ANTIPLAT THER USED: HCPCS | Performed by: FAMILY MEDICINE

## 2018-06-19 PROCEDURE — 1036F TOBACCO NON-USER: CPT | Performed by: FAMILY MEDICINE

## 2018-06-19 PROCEDURE — G8400 PT W/DXA NO RESULTS DOC: HCPCS | Performed by: FAMILY MEDICINE

## 2018-06-19 RX ORDER — DOXYCYCLINE HYCLATE 100 MG/1
100 CAPSULE ORAL 2 TIMES DAILY
Qty: 20 CAPSULE | Refills: 0 | Status: SHIPPED | OUTPATIENT
Start: 2018-06-19 | End: 2018-07-02 | Stop reason: ALTCHOICE

## 2018-06-19 ASSESSMENT — ENCOUNTER SYMPTOMS
RESPIRATORY NEGATIVE: 1
BACK PAIN: 1

## 2018-06-25 ENCOUNTER — HOSPITAL ENCOUNTER (OUTPATIENT)
Dept: WOUND CARE | Age: 78
Discharge: HOME | End: 2018-06-25
Attending: SURGERY | Admitting: SURGERY

## 2018-06-25 VITALS
DIASTOLIC BLOOD PRESSURE: 79 MMHG | HEART RATE: 60 BPM | WEIGHT: 131.61 LBS | TEMPERATURE: 97.6 F | SYSTOLIC BLOOD PRESSURE: 149 MMHG | RESPIRATION RATE: 18 BRPM | BODY MASS INDEX: 21.93 KG/M2 | HEIGHT: 65 IN

## 2018-06-25 RX ORDER — LIDOCAINE HYDROCHLORIDE 40 MG/ML
SOLUTION TOPICAL PRN
Status: DISCONTINUED | OUTPATIENT
Start: 2018-06-25 | End: 2018-06-26 | Stop reason: HOSPADM

## 2018-06-25 ASSESSMENT — PAIN SCALES - GENERAL: PAINLEVEL_OUTOF10: 8

## 2018-06-25 ASSESSMENT — PAIN DESCRIPTION - PAIN TYPE: TYPE: ACUTE PAIN

## 2018-06-25 ASSESSMENT — PAIN DESCRIPTION - ONSET: ONSET: ON-GOING

## 2018-06-25 ASSESSMENT — PAIN DESCRIPTION - PROGRESSION: CLINICAL_PROGRESSION: NOT CHANGED

## 2018-06-25 ASSESSMENT — PAIN DESCRIPTION - FREQUENCY: FREQUENCY: INTERMITTENT

## 2018-06-25 ASSESSMENT — PAIN DESCRIPTION - DESCRIPTORS: DESCRIPTORS: ACHING

## 2018-06-25 ASSESSMENT — PAIN DESCRIPTION - LOCATION: LOCATION: HIP

## 2018-06-25 ASSESSMENT — PAIN DESCRIPTION - ORIENTATION: ORIENTATION: LEFT

## 2018-07-02 ENCOUNTER — HOSPITAL ENCOUNTER (OUTPATIENT)
Dept: WOUND CARE | Age: 78
Discharge: HOME | End: 2018-07-02
Attending: SURGERY | Admitting: SURGERY

## 2018-07-02 VITALS
HEART RATE: 62 BPM | RESPIRATION RATE: 17 BRPM | SYSTOLIC BLOOD PRESSURE: 152 MMHG | DIASTOLIC BLOOD PRESSURE: 72 MMHG | TEMPERATURE: 97.8 F

## 2018-07-02 DIAGNOSIS — S71.002A: ICD-10-CM

## 2018-07-02 RX ORDER — LIDOCAINE HYDROCHLORIDE 40 MG/ML
SOLUTION TOPICAL PRN
Status: DISCONTINUED | OUTPATIENT
Start: 2018-07-02 | End: 2018-07-03 | Stop reason: HOSPADM

## 2018-07-02 ASSESSMENT — PAIN SCALES - GENERAL: PAINLEVEL_OUTOF10: 0

## 2018-07-02 ASSESSMENT — PAIN DESCRIPTION - FREQUENCY: FREQUENCY: INTERMITTENT

## 2018-07-02 ASSESSMENT — PAIN DESCRIPTION - PROGRESSION: CLINICAL_PROGRESSION: NOT CHANGED

## 2018-07-02 ASSESSMENT — PAIN DESCRIPTION - DESCRIPTORS: DESCRIPTORS: SHARP

## 2018-07-02 ASSESSMENT — PAIN DESCRIPTION - LOCATION: LOCATION: HIP

## 2018-07-02 ASSESSMENT — PAIN DESCRIPTION - ORIENTATION: ORIENTATION: LEFT

## 2018-07-02 ASSESSMENT — PAIN DESCRIPTION - ONSET: ONSET: ON-GOING

## 2018-07-03 NOTE — PROGRESS NOTES
Alyssa Olivier 39   Progress Note and Procedure Note      Felicita Zamarripa [facility]  MEDICAL RECORD NUMBER:  [id number]  AGE: 70 y.o. GENDER: female  : 1940  EPISODE DATE:  2018    Subjective:     Chief Complaint   Patient presents with    Wound Check     follow up for wound on left buttocks         HISTORY of PRESENT ILLNESS SORAIDA Ponce is a 70 y.o. female who presents today for wound/ulcer evaluation. History of Wound Context:  Pt presents with a wound in the lt buttock. The area started out as a skin dimpling causing pain. Pt has had hip fracture surgery by  and pt saw him who referred her to  who thought this could be bursitis and gave an injection and prescribed a compounded cream containing 4 different medications to be applied to the site. The area subsequently opened up becoming a wound. Pt saw Mariya Hernandez, her PCP who prescribed Doxycycline and discontinued the cream. Pt was advised to be seen at the [address] [place] [address],[address].   Wound/Ulcer Pain Timing/Severity: intermittent  Quality of pain: aching  Severity:  3 / 10   Modifying Factors: None  Associated Signs/Symptoms: erythema, drainage and pain     Ulcer Identification:  Ulcer Type: pressure  Contributing Factors: decreased mobility, shear force and decreased tissue oxygenation     Wound: N/A            PAST MEDICAL HISTORY        Diagnosis Date    Anxiety and depression     Arthritis     Cancer of skin of leg     Chronic low back pain     GERD (gastroesophageal reflux disease)     Insomnia     Iron deficiency anemia     Kidney stone     PUD (peptic ulcer disease)     Pulmonary fibrosis (HCC)     Dr. Art Brito has a CT done every 6 months    Stomach ulcer     Ulcer - lesion 2010    UTI (urinary tract infection)     frequent       PAST SURGICAL HISTORY    Past Surgical History:   Procedure Laterality Date    BACK SURGERY  2004,OCT 02, FEB 5    x4--fusions    BACK SURGERY  2000    laminectomy-lumbar   [facility] by mouth      aspirin 81 MG tablet Take 81 mg by mouth daily      sertraline (ZOLOFT) 100 MG tablet Take 100 mg by mouth daily      vitamin C (ASCORBIC ACID) 500 MG tablet Take 500 mg by mouth daily      docusate sodium (COLACE) 100 MG capsule Take 100 mg by mouth daily      acetaminophen-codeine (TYLENOL #3) 300-30 MG per tablet TAKE 1 TABLET BY MOUTH EVERY 4 HOURS AS NEEDED 360 tablet 0    calcitRIOL (ROCALTROL) 0.25 MCG capsule Take 1 capsule by mouth daily 90 capsule 3    OXYGEN Inhale 4 L into the lungs continuous (Patient taking differently: Inhale 3 L into the lungs nightly ) 1 Container 1    ferrous sulfate 324 (65 FE) MG EC tablet Take 1 tablet by mouth 2 times daily (with meals) 30 tablet 1    Multiple Vitamins-Minerals (MULTIVITAMIN PO) Take 1 tablet by mouth daily.  Cholecalciferol ([employer] VITAMIN D) 1000 UNIT CAPS Take 1 capsule by mouth daily       collagenase (SANTYL) 250 UNIT/GM ointment Apply topically daily. 1 Tube 5     No current facility-administered medications on file prior to encounter. REVIEW OF SYSTEMS    A comprehensive review of systems was negative. Objective:      BP (!) 152/72   Pulse 62   Temp 97.8 °F (36.6 °C) (Oral)   Resp 17     Wt Readings from Last 3 Encounters:   06/25/18 131 lb 9.8 oz (59.7 kg)   06/19/18 125 lb 6.4 oz (56.9 kg)   06/07/18 125 lb (56.7 kg)       PHYSICAL EXAM    Lt hip wound as scribed. Minimal erythema and  drainage.        Assessment:      Patient Active Problem List   Diagnosis Code    Abnormality of gait R26.9    Osteoporosis M81.0    Peptic ulcer K27.9    Disorder of kidney and ureter N28.9    Anemia D64.9    Pneumonia due to organism J18.9    Acute bronchitis J20.9    Osteoarthrosis, unspecified whether generalized or localized, pelvic region and thigh M16.9    Closed fracture of thoracic vertebra (HCC) S22.009A    Displacement of lumbar intervertebral disc without myelopathy M51.26    Scoliosis (and kyphoscoliosis), except measurements:    Wound 06/25/18 Buttocks Left Wound #3; noted 6/18/18 (Active)   Wound Image   6/25/2018 11:00 AM   Wound Type Wound 7/2/2018 10:34 AM   Wound Pressure Stage  3 7/2/2018 10:34 AM   Dressing Status Intact; Old drainage 7/2/2018 10:34 AM   Dressing Changed Changed/New 6/25/2018 12:00 PM   Dressing/Treatment Other (Comment) 7/2/2018 11:33 AM   Wound Length (cm) 1.4 cm 7/2/2018 11:07 AM   Wound Width (cm) 1.3 cm 7/2/2018 11:07 AM   Wound Depth (cm)  0.6 7/2/2018 11:07 AM   Calculated Wound Size (cm^2) (l*w) 1.82 cm^2 7/2/2018 11:07 AM   Change in Wound Size % (l*w) 38.1 7/2/2018 11:07 AM   Wound Assessment Granulation tissue;Pink;Slough; Yellow 7/2/2018 10:34 AM   Drainage Amount Small 7/2/2018 10:34 AM   Drainage Description Yellow 7/2/2018 10:34 AM   Odor None 7/2/2018 10:34 AM   Margins Undefined edges 7/2/2018 10:34 AM   Mirian-wound Assessment Dry;Fragile;Pink 7/2/2018 10:34 AM   Non-staged Wound Description Full thickness 7/2/2018 10:34 AM   [place]%Wound Bed 5 7/2/2018 10:34 AM   Yellow%Wound Bed 95 7/2/2018 10:34 AM   Op First Treatment Date 06/25/18 7/2/2018 10:34 AM   Number of days: 7       Percent of Wound(s)/Ulcer(s) Debrided: 100%    Total Surface Area Debrided:  1.82 sq cm       Diabetic/Pressure/Non Pressure Ulcers only:  Ulcer: Non-Pressure ulcer, fat layer exposed      Estimated Blood Loss:  Minimal    Hemostasis Achieved:  by pressure    Procedural Pain:  4  / 10     Post Procedural Pain:  0 / 10     Response to treatment:  Well tolerated by patient. Plan:     Treatment Note please see attached Discharge Instructions    Written patient dismissal instructions given to patient and signed by patient or POA.          Discharge Instructions       Wound Clinic Physician Orders and Discharge Instructions  [address]   [address] [place] [address] [facility]  Telephone: [phone]     NAME:  Mary Cortez  DATE OF BIRTH:

## 2018-07-09 ENCOUNTER — HOSPITAL ENCOUNTER (OUTPATIENT)
Dept: WOUND CARE | Age: 78
Discharge: HOME | End: 2018-07-09
Attending: SURGERY | Admitting: SURGERY

## 2018-07-09 VITALS
HEART RATE: 69 BPM | TEMPERATURE: 97.6 F | DIASTOLIC BLOOD PRESSURE: 50 MMHG | WEIGHT: 126.98 LBS | BODY MASS INDEX: 21.13 KG/M2 | SYSTOLIC BLOOD PRESSURE: 134 MMHG | RESPIRATION RATE: 18 BRPM

## 2018-07-09 RX ORDER — LIDOCAINE 50 MG/G
OINTMENT TOPICAL PRN
Status: DISCONTINUED | OUTPATIENT
Start: 2018-07-09 | End: 2018-07-10 | Stop reason: HOSPADM

## 2018-07-10 NOTE — PROGRESS NOTES
Alyssa Olivier 39   Progress Note and Procedure Note      Yumiko Thomason [facility]  MEDICAL RECORD NUMBER:  [id number]  AGE: 70 y.o. GENDER: female  : 1940  EPISODE DATE:  2018    Subjective:     Chief Complaint   Patient presents with    Wound Check         HISTORY of PRESENT ILLNESS HPI    Yumiko New is a 70 y. o. female who presents today for wound/ulcer evaluation. History of Wound Context:  Pt presents with a wound in the lt buttock. The area started out as a skin dimpling causing pain. Pt has had hip fracture surgery by  and pt saw him who referred her to  who thought this could be bursitis and gave an injection and prescribed a compounded cream containing 4 different medications to be applied to the site. The area subsequently opened up becoming a wound. Pt saw Chrissy Graham, her PCP who prescribed Doxycycline and discontinued the cream. Pt was advised to be seen at the [address] [place] [address],[address].   Wound/Ulcer Pain Timing/Severity: intermittent  Quality of pain: aching  Severity:  3 / 10   Modifying Factors: None  Associated Signs/Symptoms: erythema, drainage and pain     Ulcer Identification:  Ulcer Type: pressure  Contributing Factors: decreased mobility, shear force and decreased tissue oxygenation     Wound: N/A              PAST MEDICAL HISTORY        Diagnosis Date    Anxiety and depression     Arthritis     Cancer of skin of leg     Chronic low back pain     GERD (gastroesophageal reflux disease)     Insomnia     Iron deficiency anemia     Kidney stone     PUD (peptic ulcer disease)     Pulmonary fibrosis (HCC)     Dr. Marco Martinez has a CT done every 6 months    Stomach ulcer     Ulcer - lesion 2010    UTI (urinary tract infection)     frequent       PAST SURGICAL HISTORY    Past Surgical History:   Procedure Laterality Date   [facility] SURGERY  2004,OCT 02, [place] 08    x4--fusions    BACK SURGERY  2000    laminectomy-lumbar    CARPAL TUNNEL RELEASE  11    Left  COLONOSCOPY      CYSTOSCOPY Left 2016    cysto left ureteral stent placement    CYSTOSCOPY  01/15/2018    Left Stent Insertion    CYSTOSCOPY  2018    cysto, left uretero with laser litho, left stent excahnge    EYE SURGERY Bilateral     cataract removed with lens implants    FRACTURE SURGERY Right 2016    femur fracture    FRACTURE SURGERY Left     femur fracture    HYSTERECTOMY      HYSTEROSCOPY  1993    JOINT REPLACEMENT  2000    LEFT KNEE    JOINT REPLACEMENT  08    RIGHT KNEE    STOMACH SURGERY      For PUD       FAMILY HISTORY    Family History   Problem Relation Age of Onset    Emphysema Mother          AGE 64    Depression Mother     Clotting Disorder Father             Stroke Father        SOCIAL HISTORY    Social History   Substance Use Topics    Smoking status: Never Smoker    Smokeless tobacco: Never Used      Comment: encouraged to never smoke     Alcohol use No       ALLERGIES    Allergies   Allergen Reactions    Ampicillin Hives    Ciprofloxacin Other (See Comments)     Sores in mouth      Nitrofurantoin Other (See Comments)     Unable to recall reaction    Sulfa Antibiotics Other (See Comments)     Unable to recall reaction    Penicillins Hives and Rash       MEDICATIONS    Current Outpatient Prescriptions on File Prior to Encounter   Medication Sig Dispense Refill    UNABLE TO FIND       buPROPion (WELLBUTRIN SR) 150 MG extended release tablet TAKE 1 TABLET BY MOUTH TWICE DAILY 180 tablet 0    omeprazole (PRILOSEC) 40 MG delayed release capsule TAKE 1 CAPSULE BY MOUTH TWICE DAILY 180 capsule 0    clonazePAM (KLONOPIN) 1 MG tablet TAKE 1/2 TABLET BY MOUTH IN THE MORNING, 1/2 TABLET IN THE AFTERNOON, AND 1 TABLET AT BEDTIME AS NEEDED FOR ANXIETY 180 tablet 0    alendronate (FOSAMAX) 70 MG tablet TAKE 1 TABLET BY MOUTH EVERY 7 DAYS 12 tablet 0    Cyanocobalamin (VITAMIN B 12 PO) Take by mouth      aspirin 81 MG tablet 2 hours when in bed.  Avoid position directing pressure on wound site.  Limit side lying to 30 degree tilt.  Limit HOB elevation to 30 degrees.          Off-Loading:   [] Off-loading when        [] walking           [] in bed [] sitting  [] Total non-weight bearing  [] Right Leg  [] Left Leg          [x] Assistive Device         [x] Walker            [] Cane   [] Wheelchair      [] Crutches              [] Surgical shoe    [] Podus Boot(s)   [] Foam Boot(s)  [] Roll About              [] Cast Boot       [] CROW Boot  [] Other:     Dietary:  [] Diet as tolerated:        [] Calorie Diabetic Diet: [] No Added Salt:  [] Increase Protein:        [] Other:     Activity:  [x] Activity as tolerated:  [] Patient has no activity restrictions     [] Strict Bedrest:            [] Remain off Work:     [] May return to full duty work: Damian Black to work with [employer] [address]     Return Appointment:  [x] Wound and dressing supply provider: [facility]  [] ECF or Home Healthcare:  [] Nurse visit: Philippe Frankel or NP scheduled for Nurse Visit:   [x] Return Appointment: With DR Zoila Haywood  in  1 Week(s)  [] Ordered tests:      **STOP BIOMED CREAM**     Nurse Case Manger: Elysia Rowe     Electronically signed by José Swanson RN on 7/9/2018 at [address] [place] [address] Information: Should you experience any significant changes in your wound(s) or have questions about your wound care, please contact the [address] [place] [address] [phone] [facility] - THURSDAY 8:30 am - 4:30 pm and Friday 8:30 am - 1:00 pm.  If you need help with your wound outside these hours and cannot wait until we are again available, contact your PCP or go to the hospital emergency room.      Nurse Signature:_______________________     Date: ___________ Time:  ____________        Discharge Nurse Signature        PLEASE NOTE: IF YOU ARE UNABLE TO OBTAIN WOUND SUPPLIES, CONTINUE TO USE THE SUPPLIES YOU HAVE AVAILABLE UNTIL YOU ARE ABLE TO REACH US.  IT IS MOST IMPORTANT TO KEEP THE WOUND COVERED AT ALL TIMES.     Physician Signature:_______________________     Date: ___________ Time:  ____________                    [] Dr Ebony Lara    [] Dr Selma Sutton   [] Cynthia Oneill CNP                 [] Dr Kia Hernandez  [] Dr Wendy Estevez   [] Dr Flora Kern             [x] Dr Cindy Brooks                [] Dr Andrew Stauffer   [] Jason Arreguin NP            The information contained in the After Visit Summary has been reviewed with me, the patient and/or responsible adult, by my health care provider(s).  I had the opportunity to ask questions regarding this information.  I have elected to receive;        Patient Signature:_______________________     Date: ___________ Time:  ____________     [] Patient unable to sign Discharge Instructions given to ECF/Transportation/POA        [x]  After Visit Summary  []  Comprehensive Discharge Instruction        Electronically signed by Josefina Tran MD on 7/10/2018 at 9:24 AM

## 2018-07-16 ENCOUNTER — HOSPITAL ENCOUNTER (OUTPATIENT)
Dept: WOUND CARE | Age: 78
Discharge: HOME | End: 2018-07-16
Attending: SURGERY | Admitting: SURGERY

## 2018-07-16 VITALS
TEMPERATURE: 97.4 F | HEART RATE: 69 BPM | RESPIRATION RATE: 20 BRPM | SYSTOLIC BLOOD PRESSURE: 163 MMHG | DIASTOLIC BLOOD PRESSURE: 66 MMHG

## 2018-07-16 RX ORDER — LIDOCAINE 50 MG/G
OINTMENT TOPICAL PRN
Status: DISCONTINUED | OUTPATIENT
Start: 2018-07-16 | End: 2018-07-17 | Stop reason: HOSPADM

## 2018-07-16 RX ORDER — LIDOCAINE HYDROCHLORIDE 40 MG/ML
SOLUTION TOPICAL PRN
Status: DISCONTINUED | OUTPATIENT
Start: 2018-07-16 | End: 2018-07-17 | Stop reason: HOSPADM

## 2018-07-17 NOTE — PROGRESS NOTES
CARPAL TUNNEL RELEASE  11    Left    COLONOSCOPY      CYSTOSCOPY Left 2016    cysto left ureteral stent placement    CYSTOSCOPY  01/15/2018    Left Stent Insertion    CYSTOSCOPY  2018    cysto, left uretero with laser litho, left stent excahnge    EYE SURGERY Bilateral     cataract removed with lens implants    FRACTURE SURGERY Right 2016    femur fracture    FRACTURE SURGERY Left     femur fracture    HYSTERECTOMY  1993    HYSTEROSCOPY  1993    JOINT REPLACEMENT  2000    LEFT KNEE    JOINT REPLACEMENT  08    RIGHT KNEE    STOMACH SURGERY      For PUD       FAMILY HISTORY    Family History   Problem Relation Age of Onset    Emphysema Mother          AGE 64    Depression Mother     Clotting Disorder Father             Stroke Father        SOCIAL HISTORY    Social History   Substance Use Topics    Smoking status: Never Smoker    Smokeless tobacco: Never Used      Comment: encouraged to never smoke     Alcohol use No       ALLERGIES    Allergies   Allergen Reactions    Ampicillin Hives    Ciprofloxacin Other (See Comments)     Sores in mouth      Nitrofurantoin Other (See Comments)     Unable to recall reaction    Sulfa Antibiotics Other (See Comments)     Unable to recall reaction    Penicillins Hives and Rash       MEDICATIONS    Current Outpatient Prescriptions on File Prior to Encounter   Medication Sig Dispense Refill    buPROPion (WELLBUTRIN SR) 150 MG extended release tablet TAKE 1 TABLET BY MOUTH TWICE DAILY 180 tablet 0    omeprazole (PRILOSEC) 40 MG delayed release capsule TAKE 1 CAPSULE BY MOUTH TWICE DAILY 180 capsule 0    clonazePAM (KLONOPIN) 1 MG tablet TAKE 1/2 TABLET BY MOUTH IN THE MORNING, 1/2 TABLET IN THE AFTERNOON, AND 1 TABLET AT BEDTIME AS NEEDED FOR ANXIETY 180 tablet 0    alendronate (FOSAMAX) 70 MG tablet TAKE 1 TABLET BY MOUTH EVERY 7 DAYS 12 tablet 0    Cyanocobalamin (VITAMIN B 12 PO) Take by mouth      aspirin neuritis or radiculitis, unspecified [id number]    Dermatitis due to drug taken internally L27.0    Cystitis N30.90    Edema R60.9    Lumbago M54.5    CTS (carpal tunnel syndrome) G56.00    Depression F32.9    Anxiety F41.9    Depression F32.9    Femoral distal fracture ([facility]) S72.409A    Fibrotic lung diseases ([facility]) J84.10    Periprosthetic fracture around internal prosthetic left knee joint M97. 12XA    Fracture of femur ([address] S72.90XA    Acute respiratory failure with hypoxia ([facility]) J96.01    Acute blood loss anemia D62    Pain from implanted hardware T85.848A    Idiopathic pulmonary fibrosis ([facility]) J84.112    Hypoxia R09.02    Abnormal CT scan, chest R93.8    Exertional dyspnea R06.09    Scleroderma ([facility]) M34.9    Closed intertrochanteric fracture of right femur ([facility]) S72.141A    Chronic respiratory failure with hypoxia ([facility]) J96.11    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 Performed by: Ellie Barnes MD    Consent obtained:  Yes    Time out taken:  Yes    Pain Control: Anesthetic  Anesthetic: 5% Lidocaine Ointment Topical (1cm)       Debridement:Excisional Debridement    Using curette and forceps the wound(s)/ulcer(s) was/were sharply debrided down through and including the removal of epidermis, dermis and subcutaneous tissue.         Devitalized Tissue Debrided:  fibrin and slough    Pre Debridement Measurements:  Are located in the [facility]  Documentation Flow Sheet    Wound/Ulcer #: 3    Post Debridement Measurements:  Wound/Ulcer Descriptions are Pre Debridement except measurements:    Wound 06/25/18 Buttocks Left Wound #3; noted 6/18/18 (Active)   Wound  1940  MEDICAL RECORD NUMBER:  [id number]  DATE:  7/16/2018     Wound Cleansing:   Do not scrub or use excessive force. Cleanse wound prior to applying a clean dressing with:  [x] Normal Saline            [] Keep Wound Dry in Shower    [] Wound Cleanser   [] Cleanse wound with Mild Soap & Water  [x] May Shower    [] Other:        Topical Treatments:  Do not apply lotions, creams, or ointments to wound bed unless directed. [] Apply moisturizing lotion to skin surrounding the wound prior to dressing change.  [] Apply antifungal ointment to skin surrounding the wound prior to dressing change. [x] Apply thin film of moisture barrier ointment to skin immediately around wound. [] Other:       Dressings:                  Wound Location : RIGHT BUTTOCK           [x] Apply Primary Dressing:                                                 [x]  SANTYL COVERED WITH SALINE DAMPENED 1/4\" PLAIN GUAZE     [x] Cover and Secure with:                       [x] DRY Gauze [] Daniela   [] Kerlix              [] Ace Wrap       [x] Cover Roll Tape         [] ABD                              [] Other:               Avoid contact of tape with skin. [x] Change dressing:       [x] Daily              [] Every Other Day        [] Three times per week              [] Once a week  [] Do Not Change Dressing         [] Other:     Negative Pressure:           Wound Location:   Dirrobert@BOOK A TIGER              [] Black  [] White Foam    [] Other:   []Change dressing:        []Three times per week             []Other:      Pressure Relief:  [x] When sitting, shift position or do seat lifts every 15 minutes.  DO NOT SIT FOR MORE THAN 30 MINUTES AT A TIME  [] Wheelchair cushion   [] Specialty Bed/Mattress  [x] Turn every 2 hours when in bed.  Avoid position directing pressure on wound site.  Limit side lying to 30 degree tilt.  Limit HOB elevation to 30 degrees.           Off-Loading:   [] Off-loading when        [] walking           [] in bed [] sitting  [] Total non-weight bearing  [] Right Leg  [] Left Leg          [x] Assistive Device         [x] Walker            [] Cane   [] Wheelchair      [] Crutches              [] Surgical shoe    [] Podus Boot(s)   [] Foam Boot(s)  [] Roll About              [] Cast Boot       [] CROW Boot  [] Other:     Dietary:  [] Diet as tolerated:        [] Calorie Diabetic Diet: [] No Added Salt:  [] Increase Protein:        [] Other:     Activity:  [x] Activity as tolerated:  [] Patient has no activity restrictions     [] Strict Bedrest:            [] Remain off Work:     [] May return to full duty work:                                       [] [employer] to work with [employer]     Return Appointment:  [x] Wound and dressing supply provider: [facility]  [] ECF or Home Healthcare:  [] Nurse visit: [place] Soto or NP scheduled for Nurse Visit:   [x] Return Appointment: With DR Steven Cohen  in  1 Week(s)  [] Ordered tests:      **STOP BIOMED CREAM**     Nurse Case Manger: Bobcus Avers     Electronically signed by Deloris Garcia RN on 7/16/2018 at 11:27 [address] Information: Should you experience any significant changes in your wound(s) or have questions about your wound care, please contact the [address] [place] [address] [phone] [facility] - THURSDAY 8:30 am - 4:30 pm and Friday 8:30 am - 1:00 pm.  If you need help with your wound outside these hours and cannot wait until we are again available, contact your PCP or go to the hospital emergency room.      Nurse Signature:_______________________     Date: ___________ Time:  ____________        Discharge Nurse Signature        PLEASE NOTE: IF YOU ARE UNABLE TO OBTAIN WOUND SUPPLIES, CONTINUE TO USE THE SUPPLIES YOU HAVE AVAILABLE UNTIL YOU ARE ABLE TO [facility].  IT IS MOST IMPORTANT TO KEEP THE WOUND COVERED AT ALL TIMES.     Physician

## 2018-07-21 DIAGNOSIS — M81.0 OSTEOPOROSIS: ICD-10-CM

## 2018-07-22 RX ORDER — CALCITRIOL 0.25 UG/1
0.25 CAPSULE, LIQUID FILLED ORAL DAILY
Qty: 90 CAPSULE | Refills: 0 | Status: SHIPPED | OUTPATIENT
Start: 2018-07-22 | End: 2018-09-26 | Stop reason: SDUPTHER

## 2018-07-23 ENCOUNTER — HOSPITAL ENCOUNTER (OUTPATIENT)
Dept: WOUND CARE | Age: 78
Discharge: HOME | End: 2018-07-23
Attending: SURGERY | Admitting: SURGERY

## 2018-07-23 VITALS
HEART RATE: 69 BPM | TEMPERATURE: 98.3 F | SYSTOLIC BLOOD PRESSURE: 152 MMHG | DIASTOLIC BLOOD PRESSURE: 73 MMHG | RESPIRATION RATE: 20 BRPM

## 2018-07-23 RX ORDER — LIDOCAINE HYDROCHLORIDE 40 MG/ML
SOLUTION TOPICAL PRN
Status: DISCONTINUED | OUTPATIENT
Start: 2018-07-23 | End: 2018-07-24 | Stop reason: HOSPADM

## 2018-07-23 ASSESSMENT — PAIN SCALES - GENERAL: PAINLEVEL_OUTOF10: 0

## 2018-07-24 NOTE — PROGRESS NOTES
Alyssa Olivier 39   Progress Note and Procedure Note      [facility]  MEDICAL RECORD NUMBER:  [id number]  AGE: 70 y.o. GENDER: female  : 1940  EPISODE DATE:  2018    Subjective:     Chief Complaint   Patient presents with    Wound Check     Follow up visit for left buttocks wound         HISTORY of PRESENT ILLNESS HPI         Denice New is a 70 y. o. female who presents today for wound/ulcer evaluation. History of Wound Context:  Pt presents with a wound in the lt buttock. The area started out as a skin dimpling causing pain. Pt has had hip fracture surgery by  and pt saw him who referred her to  who thought this could be bursitis and gave an injection and prescribed a compounded cream containing 4 different medications to be applied to the site. The area subsequently opened up becoming a wound. Pt saw Tone Nicholson, her PCP who prescribed Doxycycline and discontinued the cream. Pt was advised to be seen at the [facility].   Wound/Ulcer Pain Timing/Severity: intermittent  Quality of pain: aching  Severity:  3 / 10   Modifying Factors: None  Associated Signs/Symptoms: erythema, drainage and pain     Ulcer Identification:  Ulcer Type: pressure  Contributing Factors: decreased mobility, shear force and decreased tissue oxygenation     Wound: N/A            PAST MEDICAL HISTORY        Diagnosis Date    Anxiety and depression     Arthritis     Cancer of skin of leg     Chronic low back pain     GERD (gastroesophageal reflux disease)     Insomnia     Iron deficiency anemia     Kidney stone     PUD (peptic ulcer disease)     Pulmonary fibrosis (HCC)     Dr. Mariya Lafleur has a CT done every 6 months    Stomach ulcer     Ulcer - lesion 2010    UTI (urinary tract infection)     frequent       PAST SURGICAL HISTORY    Past Surgical History:   Procedure Laterality Date   [facility] SURGERY  2004,OCT 02, FEB 5    x4--fusions    BACK SURGERY  2000 laminectomy-lumbar    CARPAL TUNNEL RELEASE  11    Left    COLONOSCOPY      CYSTOSCOPY Left 2016    cysto left ureteral stent placement    CYSTOSCOPY  01/15/2018    Left Stent Insertion    CYSTOSCOPY  2018    cysto, left uretero with laser litho, left stent excahnge    EYE SURGERY Bilateral     cataract removed with lens implants    FRACTURE SURGERY Right 2016    femur fracture    FRACTURE SURGERY Left     femur fracture    HYSTERECTOMY  1993    HYSTEROSCOPY  1993    JOINT REPLACEMENT  2000    LEFT KNEE    JOINT REPLACEMENT  08    RIGHT KNEE    STOMACH SURGERY      For PUD       FAMILY HISTORY    Family History   Problem Relation Age of Onset    Emphysema Mother          AGE 64    Depression Mother     Clotting Disorder Father             Stroke Father        SOCIAL HISTORY    Social History   Substance Use Topics    Smoking status: Never Smoker    Smokeless tobacco: Never Used      Comment: encouraged to never smoke     Alcohol use No       ALLERGIES    Allergies   Allergen Reactions    Ampicillin Hives    Ciprofloxacin Other (See Comments)     Sores in mouth      Nitrofurantoin Other (See Comments)     Unable to recall reaction    Sulfa Antibiotics Other (See Comments)     Unable to recall reaction    Penicillins Hives and Rash       MEDICATIONS    Current Outpatient Prescriptions on File Prior to Encounter   Medication Sig Dispense Refill    calcitRIOL (ROCALTROL) 0.25 MCG capsule TAKE 1 CAPSULE BY MOUTH DAILY 90 capsule 0    buPROPion (WELLBUTRIN SR) 150 MG extended release tablet TAKE 1 TABLET BY MOUTH TWICE DAILY 180 tablet 0    omeprazole (PRILOSEC) 40 MG delayed release capsule TAKE 1 CAPSULE BY MOUTH TWICE DAILY 180 capsule 0    clonazePAM (KLONOPIN) 1 MG tablet TAKE 1/2 TABLET BY MOUTH IN THE MORNING, 1/2 TABLET IN THE AFTERNOON, AND 1 TABLET AT BEDTIME AS NEEDED FOR ANXIETY 180 tablet 0    alendronate (FOSAMAX) 70 MG tablet  Thoracic or lumbosacral neuritis or radiculitis, unspecified [id number]    Dermatitis due to drug taken internally L27.0    Cystitis N30.90    Edema R60.9    Lumbago M54.5    CTS (carpal tunnel syndrome) G56.00    Depression F32.9    Anxiety F41.9    Depression F32.9    Femoral distal fracture ([facility]) S72.409A    Fibrotic lung diseases ([facility]) J84.10    Periprosthetic fracture around internal prosthetic left knee joint M97. 12XA    Fracture of femur ([address] S72.90XA    Acute respiratory failure with hypoxia ([facility]) J96.01    Acute blood loss anemia D62    Pain from implanted hardware T85.848A    Idiopathic pulmonary fibrosis ([facility]) J84.112    Hypoxia R09.02    Abnormal CT scan, chest R93.8    Exertional dyspnea R06.09    Scleroderma ([facility]) M34.9    Closed intertrochanteric fracture of right femur ([facility]) S72.141A    Chronic respiratory failure with hypoxia ([facility]) J96.11    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 Performed by: Marcel Cornell MD    Consent obtained:  Yes    Time out taken:  Yes    Pain Control: Anesthetic  Anesthetic: 4% Lidocaine Liquid Topical (2.5 ml)       Debridement:Excisional Debridement    Using curette and forceps the wound(s)/ulcer(s) was/were sharply debrided down through and including the removal of epidermis, dermis and subcutaneous tissue.         Devitalized Tissue Debrided:  fibrin and slough    Pre Debridement Measurements:  Are located in the [place]  Documentation Flow Sheet    Wound/Ulcer #: 3    Post Debridement Measurements:  Wound/Ulcer Descriptions are Pre Debridement except measurements:    Wound 06/25/18 Buttocks Left Wound #3; noted 6/18/18 (Active)   Wound Image   7/9/2018 10:26 AM   Wound Type Wound 7/23/2018 10:45 AM   Wound Pressure Stage  3 7/2/2018 10:34 AM   Dressing Status Old drainage 7/23/2018 10:45 AM   Dressing Changed Changed/New 7/23/2018 11:30 AM   Dressing/Treatment Other (Comment) 7/23/2018 11:30 AM   Wound Length (cm) 0.9 cm 7/23/2018 11:18 AM   Wound Width (cm) 2.5 cm 7/23/2018 11:18 AM   Wound Depth (cm)  1.3 7/23/2018 11:18 AM   Calculated Wound Size (cm^2) (l*w) 2.25 cm^2 7/23/2018 11:18 AM   Change in Wound Size % (l*w) 23.47 7/23/2018 11:18 AM   Distance Tunneling (cm) 1.8 cm 7/23/2018 11:18 AM   Tunneling Position ___ O'Clock 8 7/23/2018 11:18 AM   Wound Assessment Granulation tissue 7/23/2018 10:45 AM   Drainage Amount Small 7/23/2018 10:45 AM   Drainage Description Serous; Yellow 7/16/2018 10:54 AM   Odor None 7/23/2018 10:45 AM   Margins Undefined edges 7/23/2018 10:45 AM   Mirian-wound Assessment Clean;Dry 7/23/2018 10:45 AM   Non-staged Wound Description Full thickness 7/23/2018 10:45 AM   [place]%Wound Bed 90 7/23/2018 10:45 AM   Yellow%Wound Bed 10 7/23/2018 10:45 AM   Op First Treatment Date 06/25/18 7/2/2018 10:34 AM   Number of days: 28       Percent of Wound(s)/Ulcer(s) Debrided: 100%    Total Surface Area Debrided:  2.25 sq cm       Diabetic/Pressure/Non Pressure Ulcers only:  Ulcer: Pressure ulcer, Stage 3      Estimated Blood Loss:  Minimal    Hemostasis Achieved:  by pressure    Procedural Pain:  3  / 10     Post Procedural Pain:  0 / 10     Response to treatment:  Well tolerated by patient. Plan:     Treatment Note please see attached Discharge Instructions    Written patient dismissal instructions given to patient and signed by patient or POA.          Discharge Instructions       Wound Clinic Physician Orders and Discharge Instructions  [address]   [address] [facility] [address], [facility] [address]  Telephone: [phone]      FAX [phone]     NAME: Robert Plasencia COVERED AT ALL TIMES.     Physician Signature:_______________________     Date: ___________ Time:  ____________                    [] Dr Isabelle Stern    [] Dr Celia Mendieta   [] Cynthia Oneill CNP                 [] Dr Monisha Khoury  [] Dr Meagan Lombardo   [] Dr Daryle Becker Rose             [x] Dr Torri Hameed                [] Dr Seema Hess   [] Jason Salas NP            The information contained in the After Visit Summary has been reviewed with me, the patient and/or responsible adult, by my health care provider(s).  I had the opportunity to ask questions regarding this information.  I have elected to receive;        Patient Signature:_______________________     Date: ___________ Time:  ____________     [] Patient unable to sign Discharge Instructions given to ECF/Transportation/POA        [x]  After Visit Summary  []  Comprehensive Discharge Instruction        Electronically signed by Teresa Mg MD on 7/23/2018 at 10:23 PM

## 2018-07-30 ENCOUNTER — HOSPITAL ENCOUNTER (OUTPATIENT)
Dept: WOUND CARE | Age: 78
Discharge: HOME | End: 2018-07-30
Attending: SURGERY | Admitting: SURGERY

## 2018-07-30 VITALS
HEART RATE: 67 BPM | RESPIRATION RATE: 18 BRPM | TEMPERATURE: 97.6 F | DIASTOLIC BLOOD PRESSURE: 73 MMHG | SYSTOLIC BLOOD PRESSURE: 158 MMHG

## 2018-07-30 RX ORDER — LIDOCAINE HYDROCHLORIDE 40 MG/ML
SOLUTION TOPICAL PRN
Status: DISCONTINUED | OUTPATIENT
Start: 2018-07-30 | End: 2018-07-31 | Stop reason: HOSPADM

## 2018-07-31 NOTE — PROGRESS NOTES
2000    laminectomy-lumbar    CARPAL TUNNEL RELEASE  11    Left    COLONOSCOPY      CYSTOSCOPY Left 2016    cysto left ureteral stent placement    CYSTOSCOPY  01/15/2018    Left Stent Insertion    CYSTOSCOPY  2018    cysto, left uretero with laser litho, left stent excahnge    EYE SURGERY Bilateral     cataract removed with lens implants    FRACTURE SURGERY Right 2016    femur fracture    FRACTURE SURGERY Left     femur fracture    HYSTERECTOMY  1993    HYSTEROSCOPY  1993    JOINT REPLACEMENT  2000    LEFT KNEE    JOINT REPLACEMENT  08    RIGHT KNEE    STOMACH SURGERY      For PUD       FAMILY HISTORY    Family History   Problem Relation Age of Onset    Emphysema Mother          AGE 64    Depression Mother     Clotting Disorder Father             Stroke Father        SOCIAL HISTORY    Social History   Substance Use Topics    Smoking status: Never Smoker    Smokeless tobacco: Never Used      Comment: encouraged to never smoke     Alcohol use No       ALLERGIES    Allergies   Allergen Reactions    Ampicillin Hives    Ciprofloxacin Other (See Comments)     Sores in mouth      Nitrofurantoin Other (See Comments)     Unable to recall reaction    Sulfa Antibiotics Other (See Comments)     Unable to recall reaction    Penicillins Hives and Rash       MEDICATIONS    Current Outpatient Prescriptions on File Prior to Encounter   Medication Sig Dispense Refill    calcitRIOL (ROCALTROL) 0.25 MCG capsule TAKE 1 CAPSULE BY MOUTH DAILY 90 capsule 0    buPROPion (WELLBUTRIN SR) 150 MG extended release tablet TAKE 1 TABLET BY MOUTH TWICE DAILY 180 tablet 0    omeprazole (PRILOSEC) 40 MG delayed release capsule TAKE 1 CAPSULE BY MOUTH TWICE DAILY 180 capsule 0    clonazePAM (KLONOPIN) 1 MG tablet TAKE 1/2 TABLET BY MOUTH IN THE MORNING, 1/2 TABLET IN THE AFTERNOON, AND 1 TABLET AT BEDTIME AS NEEDED FOR ANXIETY 180 tablet 0    alendronate (FOSAMAX) 70 MG tablet TAKE 1 TABLET BY MOUTH EVERY 7 DAYS 12 tablet 0    Cyanocobalamin (VITAMIN B 12 PO) Take by mouth      aspirin 81 MG tablet Take 81 mg by mouth daily      sertraline (ZOLOFT) 100 MG tablet Take 100 mg by mouth daily      vitamin C (ASCORBIC ACID) 500 MG tablet Take 500 mg by mouth daily      docusate sodium (COLACE) 100 MG capsule Take 100 mg by mouth daily      ferrous sulfate 324 (65 FE) MG EC tablet Take 1 tablet by mouth 2 times daily (with meals) 30 tablet 1    Multiple Vitamins-Minerals (MULTIVITAMIN PO) Take 1 tablet by mouth daily.  Cholecalciferol ([employer] VITAMIN D) 1000 UNIT CAPS Take 1 capsule by mouth daily       UNABLE TO FIND       acetaminophen-codeine (TYLENOL #3) 300-30 MG per tablet TAKE 1 TABLET BY MOUTH EVERY 4 HOURS AS NEEDED 360 tablet 0    OXYGEN Inhale 4 L into the lungs continuous (Patient taking differently: Inhale 3 L into the lungs nightly ) 1 Container 1     No current facility-administered medications on file prior to encounter. REVIEW OF SYSTEMS    A comprehensive review of systems was negative. Objective:      BP (!) 158/73   Pulse 67   Temp 97.6 °F (36.4 °C) (Oral)   Resp 18     Wt Readings from Last 3 Encounters:   07/09/18 126 lb 15.8 oz (57.6 kg)   06/25/18 131 lb 9.8 oz (59.7 kg)   06/19/18 125 lb 6.4 oz (56.9 kg)       PHYSICAL EXAM    Lt buttock wound with tunneling and undermining. No overt infection.       Assessment:      Patient Active Problem List   Diagnosis Code    Abnormality of gait R26.9    Osteoporosis M81.0    Peptic ulcer K27.9    Disorder of kidney and ureter N28.9    Anemia D64.9    Pneumonia due to organism J18.9    Acute bronchitis J20.9    Osteoarthrosis, unspecified whether generalized or localized, pelvic region and thigh M16.9    Closed fracture of thoracic vertebra (HCC) S22.009A    Displacement of lumbar intervertebral disc without myelopathy M51.26    Scoliosis (and kyphoscoliosis), idiopathic M41.20    Enthesopathy of hip region M76.899    Thoracic or lumbosacral neuritis or radiculitis, unspecified [id number]    Dermatitis due to drug taken internally L27.0    Cystitis N30.90    Edema R60.9    Lumbago M54.5    CTS (carpal tunnel syndrome) G56.00    Depression F32.9    Anxiety F41.9    Depression F32.9    Femoral distal fracture ([facility]) S72.409A    Fibrotic lung diseases ([facility]) J84.10    Periprosthetic fracture around internal prosthetic left knee joint M97. 12XA    Fracture of femur ([address] S72.90XA    Acute respiratory failure with hypoxia ([facility]) J96.01    Acute blood loss anemia D62    Pain from implanted hardware T85.848A    Idiopathic pulmonary fibrosis ([facility]) J84.112    Hypoxia R09.02    Abnormal CT scan, chest R93.8    Exertional dyspnea R06.09    Scleroderma ([facility]) M34.9    Closed intertrochanteric fracture of right femur ([facility]) S72.141A    Chronic respiratory failure with hypoxia ([facility]) J96.11    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    ILD (interstitial lung disease) ([facility]) J84.9    Obstructive nephropathy N13.8    Open wound of hip, complicated, left, initial encounter S71.002A        Procedure Note  Indications:  Based on my examination of this patient's wound(s)/ulcer(s) today, debridement is required to promote healing and evaluate the wound base. Performed by: Liliana Russo MD    Consent obtained:  Yes    Time out taken:  Yes    Pain Control: Anesthetic  Anesthetic: 4% Lidocaine Liquid Topical (2.5 ml)       Debridement:Excisional Debridement    Using curette and forceps the wound(s)/ulcer(s) was/were sharply debrided down through and including the removal of epidermis, dermis and subcutaneous tissue.         Devitalized Tissue Debrided:  fibrin and slough    Pre Debridement Measurements:  Are located in the Wound/Ulcer Documentation Flow Sheet    Wound/Ulcer #: 3    Post Debridement Measurements:  Wound/Ulcer Descriptions are Pre Debridement except measurements:    Wound  ____________        Discharge Nurse Signature        PLEASE NOTE: IF YOU ARE UNABLE TO OBTAIN WOUND SUPPLIES, CONTINUE TO USE THE SUPPLIES YOU HAVE AVAILABLE UNTIL YOU ARE ABLE TO REACH US.  IT IS MOST IMPORTANT TO KEEP THE WOUND COVERED AT ALL TIMES.     Physician Signature:_______________________     Date: ___________ Time:  ____________                    [] Dr Vianca Flores    [] Dr John Fiore   [] Cynthia Oneill CNP                 [] Dr Kiera Valencia  [] Dr Gertrudis Moreland   [] Dr Mey Kern             [x] Dr Angelia Ramires                [] Dr Juliann Villegas   [] Jason Bhatia NP            The information contained in the After Visit Summary has been reviewed with me, the patient and/or responsible adult, by my health care provider(s).  I had the opportunity to ask questions regarding this information.  I have elected to receive;        Patient Signature:_______________________     Date: ___________ Time:  ____________     [] Patient unable to sign Discharge Instructions given to ECF/Transportation/POA        [x]  After Visit Summary  []  Comprehensive Discharge Instruction          Electronically signed by Remo Bloch, MD on 7/31/2018 at 8:55 AM

## 2018-08-06 ENCOUNTER — HOSPITAL ENCOUNTER (OUTPATIENT)
Dept: WOUND CARE | Age: 78
Discharge: HOME | End: 2018-08-06
Attending: SURGERY | Admitting: SURGERY

## 2018-08-06 VITALS
DIASTOLIC BLOOD PRESSURE: 75 MMHG | TEMPERATURE: 97.5 F | HEART RATE: 69 BPM | SYSTOLIC BLOOD PRESSURE: 161 MMHG | RESPIRATION RATE: 18 BRPM

## 2018-08-06 RX ORDER — LIDOCAINE HYDROCHLORIDE 40 MG/ML
SOLUTION TOPICAL PRN
Status: DISCONTINUED | OUTPATIENT
Start: 2018-08-06 | End: 2018-08-07 | Stop reason: HOSPADM

## 2018-08-07 NOTE — PROGRESS NOTES
laminectomy-lumbar    CARPAL TUNNEL RELEASE  11    Left    COLONOSCOPY      CYSTOSCOPY Left 2016    cysto left ureteral stent placement    CYSTOSCOPY  01/15/2018    Left Stent Insertion    CYSTOSCOPY  2018    cysto, left uretero with laser litho, left stent excahnge    EYE SURGERY Bilateral     cataract removed with lens implants    FRACTURE SURGERY Right 2016    femur fracture    FRACTURE SURGERY Left     femur fracture    HYSTERECTOMY  1993    HYSTEROSCOPY  1993    JOINT REPLACEMENT  2000    LEFT KNEE    JOINT REPLACEMENT  08    RIGHT KNEE    STOMACH SURGERY      For PUD       FAMILY HISTORY    Family History   Problem Relation Age of Onset    Emphysema Mother          AGE 64    Depression Mother     Clotting Disorder Father             Stroke Father        SOCIAL HISTORY    Social History   Substance Use Topics    Smoking status: Never Smoker    Smokeless tobacco: Never Used      Comment: encouraged to never smoke     Alcohol use No       ALLERGIES    Allergies   Allergen Reactions    Ampicillin Hives    Ciprofloxacin Other (See Comments)     Sores in mouth      Nitrofurantoin Other (See Comments)     Unable to recall reaction    Sulfa Antibiotics Other (See Comments)     Unable to recall reaction    Penicillins Hives and Rash       MEDICATIONS    Current Outpatient Prescriptions on File Prior to Encounter   Medication Sig Dispense Refill    calcitRIOL (ROCALTROL) 0.25 MCG capsule TAKE 1 CAPSULE BY MOUTH DAILY 90 capsule 0    UNABLE TO FIND       buPROPion (WELLBUTRIN SR) 150 MG extended release tablet TAKE 1 TABLET BY MOUTH TWICE DAILY 180 tablet 0    omeprazole (PRILOSEC) 40 MG delayed release capsule TAKE 1 CAPSULE BY MOUTH TWICE DAILY 180 capsule 0    clonazePAM (KLONOPIN) 1 MG tablet TAKE 1/2 TABLET BY MOUTH IN THE MORNING, 1/2 TABLET IN THE AFTERNOON, AND 1 TABLET AT BEDTIME AS NEEDED FOR ANXIETY 180 tablet 0    alendronate of hip region M76.899    Thoracic or lumbosacral neuritis or radiculitis, unspecified [id number]    Dermatitis due to drug taken internally L27.0    Cystitis N30.90    Edema R60.9    Lumbago M54.5    CTS (carpal tunnel syndrome) G56.00    Depression F32.9    Anxiety F41.9    Depression F32.9    Femoral distal fracture ([facility]) S72.409A    Fibrotic lung diseases ([facility]) J84.10    Periprosthetic fracture around internal prosthetic left knee joint M97. 12XA    Fracture of femur ([address] S72.90XA    Acute respiratory failure with hypoxia ([facility]) J96.01    Acute blood loss anemia D62    Pain from implanted hardware T85.848A    Idiopathic pulmonary fibrosis ([facility]) J84.112    Hypoxia R09.02    Abnormal CT scan, chest R93.8    Exertional dyspnea R06.09    Scleroderma ([facility]) M34.9    Closed intertrochanteric fracture of right femur ([facility]) S72.141A    Chronic respiratory failure with hypoxia ([facility]) J96.11    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    ILD (interstitial lung disease) ([facility]) J84.9    Obstructive nephropathy N13.8    Open wound of hip, complicated, left, initial encounter S71.002A        Procedure Note  Indications:  Based on my examination of this patient's wound(s)/ulcer(s) today, debridement is required to promote healing and evaluate the wound base. Performed by: Anna Morgan MD    Consent obtained:  Yes    Time out taken:  Yes    Pain Control: Anesthetic  Anesthetic: 4% Lidocaine Liquid Topical (2.5ml)       Debridement:Excisional Debridement    Using curette and forceps the wound(s)/ulcer(s) was/were sharply debrided down through and including the removal of epidermis, dermis and subcutaneous tissue.         Devitalized Tissue Debrided:  fibrin and slough    Pre Debridement Measurements:  Are located in the [place]  Documentation Flow Sheet    Wound/Ulcer #: 3    Post Debridement Measurements:  Wound/Ulcer Descriptions are Pre Debridement except measurements:    [facility] 06[facility]25/[facility]   [address] [facility]   [address] [place] [address]  Telephone: [phone]      FAX [phone]     NAME: Marlena Romberg  DATE OF BIRTH:  1940  MEDICAL RECORD NUMBER:  [id number]  DATE:  8/6/2018     Wound Cleansing:   Do not scrub or use excessive force. Cleanse wound prior to applying a clean dressing with:  [x] Normal Saline            [] Keep Wound Dry in Shower    [] Wound Cleanser   [] Cleanse wound with Mild Soap & Water  [x] May Shower    [] Other:        Topical Treatments:  Do not apply lotions, creams, or ointments to wound bed unless directed. [] Apply moisturizing lotion to skin surrounding the wound prior to dressing change.  [] Apply antifungal ointment to skin surrounding the wound prior to dressing change. [x] Apply thin film of moisture barrier ointment to skin immediately around wound. [] Other:       Dressings:                  Wound Location : LEFT BUTTOCK           [x] Apply Primary Dressing:                                                 [x]  PACK BASE OF WOUND WITH COLLAGEN WITH SILVER DAMPENED SLIGHTLY WITH SALINE, THEN PLAIN ALGINATE ROPE TO REST OF WOUND     [x] Cover and Secure with:                       [x] DRY Gauze [] Daniela   [] Kerlix              [] Ace Wrap       [x] Cover Roll Tape         [] ABD                              [] Other:               Avoid contact of tape with skin. [x] Change dressing:       [] Daily              [] Every Other Day        [x] Three times per week               [] Once a week  [] Do Not Change Dressing         [] Other:     Negative Pressure:           Wound Location:   [email or web address]              [] Black  [] White Foam    [] Other:   []Change dressing:        []Three times per week             []Other:      Pressure Relief:  [x] When sitting, shift position or do seat lifts every 15 minutes.  DO NOT SIT FOR MORE THAN 30 MINUTES AT A TIME  [] Wheelchair cushion   [] Specialty Bed/Mattress  [x] Turn every 2 hours when in bed.  Avoid position directing pressure on wound site.  Limit side lying to 30 degree tilt.  Limit HOB elevation to 30 degrees.           Off-Loading:   [] Off-loading when        [] walking           [] in bed [] sitting  [] Total non-weight bearing  [] Right Leg  [] Left Leg          [x] Assistive Device         [x] Walker            [] Cane   [] Wheelchair      [] Crutches              [] Surgical shoe    [] Podus Boot(s)   [] Foam Boot(s)  [] Roll About              [] Cast Boot       [] CROW Boot  [] Other:     Dietary:  [] Diet as tolerated:        [] Calorie Diabetic Diet: [] No Added Salt:  [] Increase Protein:        [] Other:     Activity:  [x] Activity as tolerated:  [] Patient has no activity restrictions     [] Strict Bedrest:            [] Remain off Work:     [] May return to full duty work:                                       [] [employer] to work with [employer]     Return Appointment:  [x] Wound and dressing supply provider: [facility]  [] ECF or Home Healthcare:  [] Nurse visit: Azra Chaney or NP scheduled for Nurse Visit:   [x] Return Appointment: With DR Laurent West  in  1 Week(s) - EVALUATION FOR POSSIBLE SURGERY TO OPEN WOUND  [] Ordered tests:      **STOP BIOMED CREAM**     Nurse Case Manger: Mark Salinas     Electronically signed by Kamaljit Mcnamara RN on 8/6/2018 at 11:39 AM          [address] [facility] Information: Should you experience any significant changes in your wound(s) or have questions about your wound care, please contact the [address] [place] [address] [phone] [facility] - THURSDAY 8:30 am - 4:30 pm and Friday 8:30 am - 1:00 pm.  If you need help with your wound outside these hours and cannot wait until we are again available, contact your PCP or go to the hospital emergency room.      Nurse Signature:_______________________     Date: ___________ Time:

## 2018-08-16 ENCOUNTER — HOSPITAL ENCOUNTER (OUTPATIENT)
Dept: WOUND CARE | Age: 78
Discharge: HOME | End: 2018-08-16
Attending: SURGERY | Admitting: SURGERY

## 2018-08-16 VITALS
DIASTOLIC BLOOD PRESSURE: 79 MMHG | TEMPERATURE: 97.6 F | RESPIRATION RATE: 18 BRPM | SYSTOLIC BLOOD PRESSURE: 178 MMHG | HEART RATE: 73 BPM

## 2018-08-16 DIAGNOSIS — S71.002A: Primary | ICD-10-CM

## 2018-08-16 PROCEDURE — 11042 DBRDMT SUBQ TIS 1ST 20SQCM/<: CPT | Performed by: SURGERY

## 2018-08-16 RX ORDER — LIDOCAINE HYDROCHLORIDE 40 MG/ML
SOLUTION TOPICAL ONCE
Status: DISCONTINUED | OUTPATIENT
Start: 2018-08-16 | End: 2018-08-17 | Stop reason: HOSPADM

## 2018-08-16 NOTE — PROGRESS NOTES
8/16/18     Patient presents with    Wound Check       f/u left buttocks wound         HISTORY of PRESENT ILLNESS HPI     Ramona New is a 70 y. o. female who presents today for wound/ulcer evaluation. History of Wound Context:  Pt presents with a wound in the lt buttock. The area started out as a skin dimpling causing pain. Pt has had hip fracture surgery by  and pt saw him who referred her to  who thought this could be bursitis and gave an injection and prescribed a compounded cream containing 4 different medications to be applied to the site. The area subsequently opened up becoming a wound. Pt saw Davi Donato, her PCP who prescribed Doxycycline and discontinued the cream. Pt was advised to be [facility] at the [facility]. H/o Pulmonary fibrosis. O2 dependent. Dr. Corbin Adam is asked us to consult on Mrs. New about operative debridement. Her left hip decubitus this skin opening has gotten small but there is still depth and 2 cm undermining also is having a lot of pain when her  tries to pack it because of the small opening    Wound/Ulcer Pain Timing/Severity: intermittent  Quality of pain: aching  Severity:  3 / 10   Modifying Factors: None  Associated Signs/Symptoms: erythema, drainage and pain     Ulcer Identification:  Ulcer Type: pressure  Contributing Factors: decreased mobility, shear force and decreased tissue oxygenation                                                 PAST MEDICAL HISTORY     Past Medical History             Diagnosis Date    Anxiety and depression      Arthritis      Cancer of skin of leg      Chronic low back pain      GERD (gastroesophageal reflux disease)      Insomnia      Iron deficiency anemia      Kidney stone      PUD (peptic ulcer disease)      Pulmonary fibrosis (HCC)       Dr. Parrish Ovalle has a CT done every 6 months    Stomach ulcer      Ulcer - lesion MARCH 2010    UTI (urinary tract infection)       frequent            PAST SURGICAL HISTORY     Past Surgical History         Past Surgical History:   Procedure Laterality Date    BACK SURGERY   2004,OCT 02, FEB 5     x4--fusions    BACK SURGERY   2000     laminectomy-lumbar    CARPAL TUNNEL RELEASE   11     Left    COLONOSCOPY        CYSTOSCOPY Left 2016     cysto left ureteral stent placement    CYSTOSCOPY   01/15/2018     Left Stent Insertion    CYSTOSCOPY   2018     cysto, left uretero with laser litho, left stent excahnge    EYE SURGERY Bilateral      cataract removed with lens implants    FRACTURE SURGERY Right 2016     femur fracture    FRACTURE SURGERY Left       femur fracture    HYSTERECTOMY   1993    HYSTEROSCOPY   1993    JOINT REPLACEMENT   2000     LEFT KNEE    JOINT REPLACEMENT   08     RIGHT KNEE    STOMACH SURGERY        For PUD            FAMILY HISTORY     Family History   Family History   Problem Relation Age of Onset    Emphysema Mother            AGE 64    Depression Mother      Clotting Disorder Father               Stroke Father              SOCIAL HISTORY            Social History   Substance Use Topics    Smoking status: Never Smoker    Smokeless tobacco: Never Used         Comment: encouraged to never smoke     Alcohol use No         ALLERGIES           Allergies   Allergen Reactions    Ampicillin Hives    Ciprofloxacin Other (See Comments)       Sores in mouth       Nitrofurantoin Other (See Comments)       Unable to recall reaction    Sulfa Antibiotics Other (See Comments)       Unable to recall reaction    Penicillins Hives and Rash         MEDICATIONS            Current Outpatient Prescriptions on File Prior to Encounter   Medication Sig Dispense Refill    calcitRIOL (ROCALTROL) 0.25 MCG capsule TAKE 1 CAPSULE BY MOUTH DAILY 90 capsule 0    UNABLE TO FIND          buPROPion (WELLBUTRIN SR) 150 MG extended release tablet TAKE 1 TABLET BY MOUTH TWICE DAILY 180 tablet 0   

## 2018-08-20 ENCOUNTER — HOSPITAL ENCOUNTER (OUTPATIENT)
Dept: WOUND CARE | Age: 78
Discharge: HOME | End: 2018-08-20
Attending: SURGERY | Admitting: SURGERY

## 2018-08-20 VITALS
SYSTOLIC BLOOD PRESSURE: 192 MMHG | DIASTOLIC BLOOD PRESSURE: 79 MMHG | TEMPERATURE: 97.6 F | RESPIRATION RATE: 18 BRPM | HEART RATE: 71 BPM

## 2018-08-20 RX ORDER — LIDOCAINE HYDROCHLORIDE 40 MG/ML
SOLUTION TOPICAL PRN
Status: DISCONTINUED | OUTPATIENT
Start: 2018-08-20 | End: 2018-08-21 | Stop reason: HOSPADM

## 2018-08-21 NOTE — PROGRESS NOTES
History:   Procedure Laterality Date    BACK SURGERY  [address], FEB 05    x4--fusions    BACK SURGERY  2000    laminectomy-lumbar    CARPAL TUNNEL RELEASE  11    Left    COLONOSCOPY      CYSTOSCOPY Left 2016    cysto left ureteral stent placement    CYSTOSCOPY  01/15/2018    Left Stent Insertion    CYSTOSCOPY  2018    cysto, left uretero with laser litho, left stent excahnge    EYE SURGERY Bilateral     cataract removed with lens implants    FRACTURE SURGERY Right 2016    femur fracture    FRACTURE SURGERY Left     femur fracture    HYSTERECTOMY      HYSTEROSCOPY  1993    JOINT REPLACEMENT  2000    LEFT KNEE    JOINT REPLACEMENT  08    RIGHT KNEE    STOMACH SURGERY      For PUD       FAMILY HISTORY    Family History   Problem Relation Age of Onset    Emphysema Mother          AGE 64    Depression Mother     Clotting Disorder Father             Stroke Father        SOCIAL HISTORY    Social History   Substance Use Topics    Smoking status: Never Smoker    Smokeless tobacco: Never Used      Comment: encouraged to never smoke     Alcohol use No       ALLERGIES    Allergies   Allergen Reactions    Ampicillin Hives    Ciprofloxacin Other (See Comments)     Sores in mouth      Nitrofurantoin Other (See Comments)     Unable to recall reaction    Sulfa Antibiotics Other (See Comments)     Unable to recall reaction    Penicillins Hives and Rash       MEDICATIONS    Current Outpatient Prescriptions on File Prior to Encounter   Medication Sig Dispense Refill    calcitRIOL (ROCALTROL) 0.25 MCG capsule TAKE 1 CAPSULE BY MOUTH DAILY 90 capsule 0    buPROPion (WELLBUTRIN SR) 150 MG extended release tablet TAKE 1 TABLET BY MOUTH TWICE DAILY 180 tablet 0    omeprazole (PRILOSEC) 40 MG delayed release capsule TAKE 1 CAPSULE BY MOUTH TWICE DAILY 180 capsule 0    clonazePAM (KLONOPIN) 1 MG tablet TAKE 1/2 TABLET BY MOUTH IN THE MORNING, /2 TABLET IN THE AFTERNOON, AND 1 TABLET AT BEDTIME AS NEEDED FOR ANXIETY 180 tablet 0    alendronate (FOSAMAX) 70 MG tablet TAKE 1 TABLET BY MOUTH EVERY 7 DAYS 12 tablet 0    Cyanocobalamin (VITAMIN B 12 PO) Take by mouth      aspirin 81 MG tablet Take 81 mg by mouth daily      vitamin C (ASCORBIC ACID) 500 MG tablet Take 500 mg by mouth daily      docusate sodium (COLACE) 100 MG capsule Take 100 mg by mouth daily      acetaminophen-codeine (TYLENOL #3) 300-30 MG per tablet TAKE 1 TABLET BY MOUTH EVERY 4 HOURS AS NEEDED 360 tablet 0    OXYGEN Inhale 4 L into the lungs continuous (Patient taking differently: Inhale 3 L into the lungs nightly ) 1 Container 1    ferrous sulfate 324 (65 FE) MG EC tablet Take 1 tablet by mouth 2 times daily (with meals) 30 tablet 1    Multiple Vitamins-Minerals (MULTIVITAMIN PO) Take 1 tablet by mouth daily.  Cholecalciferol ([employer] VITAMIN D) 1000 UNIT CAPS Take 1 capsule by mouth daily       UNABLE TO FIND       sertraline (ZOLOFT) 100 MG tablet Take 100 mg by mouth daily       No current facility-administered medications on file prior to encounter. REVIEW OF SYSTEMS    Pertinent items are noted in HPI. Objective:      BP (!) 192/79   Pulse 71   Temp 97.6 °F (36.4 °C) (Oral)   Resp 18     Wt Readings from Last 3 Encounters:   07/09/18 126 lb 15.8 oz (57.6 kg)   06/25/18 131 lb 9.8 oz (59.7 kg)   06/19/18 125 lb 6.4 oz (56.9 kg)       PHYSICAL EXAM    General Appearance: alert and oriented to person, place and time, well developed and well- nourished, in no acute distress  Skin: warm and dry, no rash or erythema  Head: normocephalic and atraumatic  Eyes: pupils equal, round, and reactive to light, extraocular eye movements intact, conjunctivae normal  ENT: external ear and ear canal normal bilaterally, nose without deformity, nasal O2 in place.   Neck: supple and non-tender without mass, no thyromegaly or thyroid nodules, no cervical lymphadenopathy  Pulmonary/Chest: clear to auscultation bilaterally- no wheezes, rales or rhonchi, normal air movement, no respiratory distress  Cardiovascular: normal rate, regular rhythm, normal S1 and S2, no murmurs, rubs, clicks, or gallops, distal pulses intact,   Abdomen: soft, non-tender, non-distended, normal bowel sounds, no masses or organomegaly  Extremities: no cyanosis, clubbing or edema. Lt upper gluteal wound as scribed. Skin opening is very small but undermining persists. No overt infection. Musculoskeletal: normal range of motion, no joint swelling, deformity or tenderness  Neurologic:  no cranial nerve deficit, coordination and speech normal      Assessment:      Patient Active Problem List   Diagnosis Code    Abnormality of gait R26.9    Osteoporosis M81.0    Peptic ulcer K27.9    Disorder of kidney and ureter N28.9    Anemia D64.9    Pneumonia due to organism J18.9    Acute bronchitis J20.9    Osteoarthrosis, unspecified whether generalized or localized, pelvic region and thigh M16.9    Closed fracture of thoracic vertebra ([address] S22.009A    Displacement of lumbar intervertebral disc without myelopathy M51.26    Scoliosis (and kyphoscoliosis), idiopathic M41.20    Enthesopathy of hip region M76.899    Thoracic or lumbosacral neuritis or radiculitis, unspecified [id number]    Dermatitis due to drug taken internally L27.0    Cystitis N30.90    Edema R60.9    Lumbago M54.5    CTS (carpal tunnel syndrome) G56.00    Depression F32.9    Anxiety F41.9    Depression F32.9    Femoral distal fracture ([facility]) S72.409A    Fibrotic lung diseases ([address] J84.10    Periprosthetic fracture around internal prosthetic left knee joint M97. 12XA    Fracture of femur ([address] S72.90XA    Acute respiratory failure with hypoxia ([facility]) J96.01    Acute blood loss anemia D62    Pain from implanted hardware T85.848A    Idiopathic pulmonary fibrosis ([facility]) J84.112    Hypoxia R09.02    Abnormal CT scan, chest R93.8    Exertional dyspnea R06.09    Scleroderma ([facility] M34.9    Closed intertrochanteric fracture of right femur ([facility]) S72.141A    Chronic respiratory failure with hypoxia ([facility]) J96.11    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    ILD (interstitial lung disease) ([facility] J84.9    Obstructive nephropathy N13.8    Open wound of hip, complicated, left, initial encounter S71.002A        Procedure Note : none         Wound 06/25/18 Buttocks Left Wound #3; noted 6/18/18 (Active)   Wound Image   8/6/2018 10:56 AM   Wound Type Wound 8/6/2018 10:56 AM   Wound Pressure Stage  3 7/2/2018 10:34 AM   Dressing Status Intact; Old drainage 8/20/2018 10:42 AM   Dressing Changed Changed/New 8/20/2018 11:14 AM   Dressing/Treatment Other (Comment) 8/20/2018 11:14 AM   Wound Length (cm) 0.3 cm 8/20/2018 10:42 AM   Wound Width (cm) 0.5 cm 8/20/2018 10:42 AM   Wound Depth (cm)  1.7 8/20/2018 10:42 AM   Calculated Wound Size (cm^2) (l*w) 0.15 cm^2 8/20/2018 10:42 AM   Change in Wound Size % (l*w) 94.9 8/20/2018 10:42 AM   Distance Tunneling (cm) 1.8 cm 7/23/2018 11:18 AM   Tunneling Position ___ O'Clock 8 7/23/2018 11:18 AM   Undermining Starts ___ O'Clock 6 8/20/2018 10:42 AM   Undermining Ends___ O'Clock 12 8/20/2018 10:42 AM   Undermining Maxium Distance (cm) 1.8 8/20/2018 10:42 AM   Wound Assessment Granulation tissue;Red;Yellow 8/20/2018 10:42 AM   Drainage Amount Small 8/20/2018 10:42 AM   Drainage Description Serosanguinous 8/20/2018 10:42 AM   Odor None 8/20/2018 10:42 AM   Margins Undefined edges 8/20/2018 10:42 AM   Mirian-wound Assessment Dry;Pink 8/20/2018 10:42 AM   Non-staged Wound Description Full thickness 8/20/2018 10:42 AM   [place]%Wound Bed 20 8/6/2018 10:56 AM   Red%Wound Bed 95 8/20/2018 10:42 AM   Yellow%Wound Bed 5 8/20/2018 10:42 AM   Op First Treatment Date 06/25/18 7/2/2018 10:34 AM   Number of days: 56         Plan:     Treatment Note please see attached Discharge Instructions    Written patient dismissal instructions given to patient and signed by patient or POA. Discharge Instructions       Wound Clinic Physician Orders and Discharge Instructions  [address]   [address], [place]  Telephone: [phone]      FAX [phone]     NAME: Enrike Gilbert OF BIRTH:  1940  MEDICAL RECORD NUMBER:  [id number]  DATE:  8/20/2018     Wound Cleansing:   Do not scrub or use excessive force. Cleanse wound prior to applying a clean dressing with:  [x] Normal Saline            [] Keep Wound Dry in Shower    [] Wound Cleanser   [] Cleanse wound with Mild Soap & Water  [x] May Shower    [] Other:        Topical Treatments:  Do not apply lotions, creams, or ointments to wound bed unless directed. [] Apply moisturizing lotion to skin surrounding the wound prior to dressing change.  [] Apply antifungal ointment to skin surrounding the wound prior to dressing change. [x] Apply thin film of moisture barrier ointment to skin immediately around wound. [] Other:       Dressings:                  Wound Location : LEFT BUTTOCK           [x] Apply Primary Dressing:                                                 [x]  PLAIN ALGINATE ROPE      [x] Cover and Secure with:                       [x] DRY Gauze [] Daniela   [] Kerlix              [] Ace Wrap       [x] Cover Roll Tape         [] ABD                              [] Other:               Avoid contact of tape with skin.   [] Change dressing:       [] Daily              [] Every Other Day        [] Three times per week               [] Once a week  [x] Do Not Change Dressing         [] Other:      Negative Pressure:           Wound Location:   [email or web address]              [] Black  [] White Foam    [] Other:   []Change dressing:        []Three times per week             []Other:      Pressure Relief:  [x] When sitting, shift position or do seat    Nurse Signature:_______________________     Date: ___________ Time:  ____________        Discharge Nurse Signature        PLEASE NOTE: IF YOU ARE UNABLE TO OBTAIN WOUND SUPPLIES, CONTINUE TO USE THE SUPPLIES YOU HAVE AVAILABLE UNTIL YOU ARE ABLE TO REACH US.  IT IS MOST IMPORTANT TO KEEP THE WOUND COVERED AT ALL TIMES.     Physician Signature:_______________________     Date: ___________ Time:  ____________                    [] Dr Vy Emmanuel    [] Dr Sd Salazar CNP               [] Dr Renu Carbone  [] Dr Geraldo Gomez   [] Dr Rommel Kern             [x] Dr Allison Zheng                [] Dr Rashard Lobato   [] Jason Artis Knee NP            The information contained in the After Visit Summary has been reviewed with me, the patient and/or responsible adult, by my health care provider(s).  I had the opportunity to ask questions regarding this information.  I have elected to receive;        Patient Signature:_______________________     Date: ___________ Time:  ____________     [] Patient unable to sign Discharge Instructions given to ECF/Transportation/POA        [x]  After Visit Summary  []  Comprehensive Discharge Instruction        Electronically signed by Mika Preciado MD on 8/21/2018 at 10:32 AM

## 2018-08-23 ENCOUNTER — HOSPITAL ENCOUNTER (OUTPATIENT)
Dept: SURGERY | Age: 78
Discharge: HOME | End: 2018-08-23
Attending: SURGERY | Admitting: SURGERY

## 2018-08-23 VITALS
HEART RATE: 68 BPM | OXYGEN SATURATION: 99 % | RESPIRATION RATE: 20 BRPM | SYSTOLIC BLOOD PRESSURE: 162 MMHG | DIASTOLIC BLOOD PRESSURE: 74 MMHG | TEMPERATURE: 97.3 F

## 2018-08-23 DIAGNOSIS — S71.002A: ICD-10-CM

## 2018-08-23 DIAGNOSIS — M34.9 SCLERODERMA (HCC): ICD-10-CM

## 2018-08-23 DIAGNOSIS — G89.18 ACUTE POST-OPERATIVE PAIN: Primary | ICD-10-CM

## 2018-08-23 PROCEDURE — 11043 DBRDMT MUSC&/FSCA 1ST 20/<: CPT | Performed by: SURGERY

## 2018-08-23 RX ORDER — OXYCODONE HYDROCHLORIDE AND ACETAMINOPHEN 5; 325 MG/1; MG/1
1 TABLET ORAL EVERY 6 HOURS PRN
Qty: 20 TABLET | Refills: 0 | Status: SHIPPED | OUTPATIENT
Start: 2018-08-23 | End: 2018-08-28

## 2018-08-23 ASSESSMENT — PAIN SCALES - GENERAL
PAINLEVEL_OUTOF10: 0
PAINLEVEL_OUTOF10: 0

## 2018-08-23 ASSESSMENT — PAIN DESCRIPTION - DESCRIPTORS: DESCRIPTORS: ACHING;DISCOMFORT;SORE

## 2018-08-23 ASSESSMENT — PAIN - FUNCTIONAL ASSESSMENT: PAIN_FUNCTIONAL_ASSESSMENT: 0-10

## 2018-08-23 NOTE — BRIEF OP NOTE
Brief Postoperative Note    Mccurdy Heal  YOB: 1940  [id number]    Pre-operative Diagnosis: non healing lt hip decubitus ulcer    Post-operative Diagnosis: Same    Procedure: excisional debridement skin sub q and Fascia Lt hip    Anesthesia: Local    Surgeons/Assistants: Simran     Estimated Blood Loss: less than 50     Complications: None    Specimens: Was Obtained: debris    Findings: not done to bone just fascia    Electronically signed by Amol Lockwood MD on 8/23/2018 at 9:03 AM

## 2018-08-23 NOTE — PROGRESS NOTES
Alert and oriented. Agreeable to procedure. Consent signed by pt.   Electronically signed by José Miguel Rangel RN on 8/23/2018 at 8:10 AM Lisinopril: When do you want return visit?     Last OV relevant to medication: 11/21/17 htn  Last refill date: 4/23/18     #/refills: 90+0  When pt was asked to return for OV: not stated  Upcoming appt/reason: none    Lab Results  Component Value Date   GLU

## 2018-08-24 NOTE — OP NOTE
[address]                                 OPERATIVE REPORT    PATIENT NAME: Tequila Pickering                     :        1940  MED REC NO:   [id number]                          ROOM:  ACCOUNT NO:   [de-identified]                          ADMIT DATE: 2018  PROVIDER:     Krista Martínez MD    DATE OF PROCEDURE:  2018    PREOPERATIVE DIAGNOSES:  Decubitus ulcer of the left hip. POSTOPERATIVE DIAGNOSIS:  Decubitus ulcer of the left hip. OPERATION PERFORMED:  Wide excisional debridement of skin, subcutaneous  tissue, and fascia. SURGEON:  Krista Martínez MD    ANESTHESIA:  Local 1% lidocaine. INDICATIONS:  This is a 70-year-old frail woman, patient of  _____ and  Dr. Garland Mohamud, who has been seen at the wound clinic, has an  open wound over left hip, presumably a combination of pressure and  scleroderma. The woman is on chronic oxygen and again is relatively thin  and frail. Preop diagnosis is nonhealing left decubitus ulcer that has  become a small tract and is difficult to pack and difficult to debride at  least as an outpatient in the wound clinic, and we have been asked to  widely debride this and open it up, so that it is more amenable to healing,  to debridement, and to packing. OPERATIVE PROCEDURE:  The patient was placed on the table on the right side  down, made sure she was comfortable to load and warm and had oxygen in  place. The 1% lidocaine local anesthetic was used that is infiltrated by  anesthesia circumferentially around the lesion. The opening was relatively  small just a few millimeters, but it went in good 2 cm and there was  significant undermining from about the 9 to 1 o'clock position.   In any  case, after infiltrating with local, we slipped a hemostat in and split  down to this tunnel and then excised the entire tract including the skin  and subcutaneous tissue and down to the fascia. No bone was palpable. The  tissue itself was very harder rubbery, and we used scissors, forceps,  scalpel, and a 4-mm curette until we got down to healthy tissue, taking out  all the scar tissue and this left an opening now that was 2.3 cm wide, 1.3  cm long, and 1.6 cm deep. We as said made sure it was dried with the Bovie unit and packed it with a  4x4 gauze and covered with dressing. She is going to go back to her usual  dressing changes on Saturday. Her  is going to do it and I believe  he has _____ as well as another dressing that he uses routinely and then  they are going to see Dr. Briseida Clayton and then we will leave the wound care  directions up to Dr. Briseida Clayton to deal from there. The patient tolerated  the procedure well. I did give her prescription for Percocet 20 tablets  and warned her about the side effects of Percocet. She had been taking Tylenol No. 3 at home.   I told her not to take both of  these things        Shun Real MD    D: 08/23/2018 9:43:00       T: 08/23/2018 9:44:57     [email or web address]  Job#: [id number]     Doc#: [id number]    CC:  MD Celia Maradiaga MD Chancy Forester,

## 2018-08-27 ENCOUNTER — HOSPITAL ENCOUNTER (OUTPATIENT)
Dept: WOUND CARE | Age: 78
Discharge: HOME | End: 2018-08-27
Attending: SURGERY | Admitting: SURGERY

## 2018-08-27 ENCOUNTER — TELEPHONE (OUTPATIENT)
Dept: PULMONOLOGY | Age: 78
End: 2018-08-27

## 2018-08-27 VITALS
SYSTOLIC BLOOD PRESSURE: 155 MMHG | HEART RATE: 69 BPM | TEMPERATURE: 97.8 F | RESPIRATION RATE: 18 BRPM | DIASTOLIC BLOOD PRESSURE: 71 MMHG

## 2018-08-27 RX ORDER — ALENDRONATE SODIUM 70 MG/1
TABLET ORAL
Qty: 12 TABLET | Refills: 0 | Status: SHIPPED | OUTPATIENT
Start: 2018-08-27 | End: 2018-10-05 | Stop reason: ALTCHOICE

## 2018-08-27 RX ORDER — LIDOCAINE HYDROCHLORIDE 40 MG/ML
SOLUTION TOPICAL PRN
Status: DISCONTINUED | OUTPATIENT
Start: 2018-08-27 | End: 2018-08-28 | Stop reason: HOSPADM

## 2018-08-27 ASSESSMENT — PAIN SCALES - GENERAL: PAINLEVEL_OUTOF10: 0

## 2018-08-27 NOTE — PROGRESS NOTES
[address]  Telephone: [phone]      FAX [phone]     NAME: Ana Lilia Freeman  DATE OF BIRTH:  1940  MEDICAL RECORD NUMBER:  [id number]  DATE:  8/27/2018     Wound Cleansing:   Do not scrub or use excessive force. Cleanse wound prior to applying a clean dressing with:  [x] Normal Saline            [] Keep Wound Dry in Shower    [] Wound Cleanser   [] Cleanse wound with Mild Soap & Water  [x] May Shower    [] Other:        Topical Treatments:  Do not apply lotions, creams, or ointments to wound bed unless directed. [] Apply moisturizing lotion to skin surrounding the wound prior to dressing change.  [] Apply antifungal ointment to skin surrounding the wound prior to dressing change.  [] Apply thin film of moisture barrier ointment to skin immediately around wound. [x] Other: OVER THE COUNTER HYDROCORTISONE TO RED RASH AROUND WOUND     Dressings:                  Wound Location : LEFT BUTTOCK           [x] Apply Primary Dressing:                                                 [x]  SILVER ALGINATE SHEET - GENTLY PACK INTO WOUND      [x] Cover and Secure with:                       [x] DRY Gauze - ALSO APPLY TO RED RASH AROUND WOUND [] Daniela   [] Kerlix              [] Ace Wrap       [x] Cover Roll Tape (DO NOT APPLY TO RED RASH)         [] ABD                              [] Other:               Avoid contact of tape with skin. [x] Change dressing:       [] Daily              [] Every Other Day        [x] Three times per week               [] Once a week  [] Do Not Change Dressing         [] Other:      Negative Pressure:           Wound Location:   [email or web address]              [] Black  [] White Foam    [] Other:   []Change dressing:        []Three times per week             []Other:      Pressure Relief:  [x] When sitting, shift position or do seat lifts every 15 minutes.  DO NOT SIT FOR MORE THAN 30 MINUTES AT A TIME  [] Wheelchair cushion   [] Specialty Bed/Mattress  [x] Turn every 2 hours when in bed.  Avoid position directing pressure on wound site.  Limit side lying to 30 degree tilt.  Limit HOB elevation to 30 degrees.           Off-Loading:   [] Off-loading when        [] walking           [] in bed [] sitting  [] Total non-weight bearing  [] Right Leg  [] Left Leg          [x] Assistive Device         [x] Walker            [] Cane   [] Wheelchair      [] Crutches              [] Surgical shoe    [] Podus Boot(s)   [] Foam Boot(s)  [] Roll About              [] Cast Boot       [] CROW Boot  [] Other:     Dietary:  [] Diet as tolerated:        [] Calorie Diabetic Diet: [] No Added Salt:  [] Increase Protein:        [] Other:     Activity:  [x] Activity as tolerated:  [] Patient has no activity restrictions     [] Strict Bedrest:            [] Remain off Work:     [] May return to full duty work:                                       [] [employer] to work with [employer]     Return Appointment:  [x] Wound and dressing supply provider: [facility]  [] ECF or Home Healthcare:  [] Nurse visit: Waqar Roa or NP scheduled for Nurse Visit:   [x] Return Appointment: With DR Perla Ross  in  1 Week(s)   [] Ordered tests:        **STOP BIOMED CREAM**     Nurse Case Manger: Donna Bansal     Electronically signed by Damir Gonzalez RN on 8/27/2018 at 10:47 AM      [address] [place] [address] Information: Should you experience any significant changes in your wound(s) or have questions about your wound care, please contact the [address] [place] [address] [phone] [facility] - THURSDAY 8:30 am - 4:30 pm and Friday 8:30 am - 1:00 pm.  If you need help with your wound outside these hours and cannot wait until we are again available, contact your PCP or go to the hospital emergency room.      Nurse Signature:_______________________     Date: ___________ Time:  ____________        Discharge Nurse Signature        PLEASE NOTE: IF YOU ARE

## 2018-08-30 ENCOUNTER — HOSPITAL ENCOUNTER (OUTPATIENT)
Dept: CT IMAGING | Age: 78
Discharge: HOME | End: 2018-08-30
Attending: INTERNAL MEDICINE | Admitting: INTERNAL MEDICINE

## 2018-08-30 DIAGNOSIS — J84.9 INTERSTITIAL PULMONARY DISEASE (HCC): ICD-10-CM

## 2018-08-30 DIAGNOSIS — J84.9 ILD (INTERSTITIAL LUNG DISEASE) (HCC): ICD-10-CM

## 2018-09-03 RX ORDER — SERTRALINE HYDROCHLORIDE 100 MG/1
TABLET, FILM COATED ORAL
Qty: 90 TABLET | Refills: 0 | Status: SHIPPED | OUTPATIENT
Start: 2018-09-03 | End: 2018-12-02 | Stop reason: SDUPTHER

## 2018-09-05 ENCOUNTER — HOSPITAL ENCOUNTER (OUTPATIENT)
Dept: WOUND CARE | Age: 78
Discharge: HOME | End: 2018-09-05
Attending: SURGERY | Admitting: SURGERY

## 2018-09-05 VITALS
SYSTOLIC BLOOD PRESSURE: 150 MMHG | RESPIRATION RATE: 18 BRPM | DIASTOLIC BLOOD PRESSURE: 73 MMHG | HEART RATE: 69 BPM | TEMPERATURE: 98.1 F

## 2018-09-05 RX ORDER — LIDOCAINE HYDROCHLORIDE 40 MG/ML
SOLUTION TOPICAL PRN
Status: DISCONTINUED | OUTPATIENT
Start: 2018-09-05 | End: 2018-09-06 | Stop reason: HOSPADM

## 2018-09-06 NOTE — PROGRESS NOTES
Flow Sheet    Wound/Ulcer #: 4    Post Debridement Measurements:  Wound/Ulcer Descriptions are Pre Debridement except measurements:    Wound 08/27/18 Buttocks Left Wound #4 (previously wound #3-surgically opened w/ Dr. Yuval Wilson) on 8/23/18  (Active)   Wound Image   8/27/2018 10:22 AM   Wound Type Wound 9/5/2018 10:21 AM   Wound trauma 8/27/2018 10:22 AM   Dressing Status Intact; Old drainage 9/5/2018 10:21 AM   Dressing Changed Changed/New 9/5/2018 11:10 AM   Dressing/Treatment Other (Comment) 9/5/2018 11:10 AM   Wound Length (cm) 1.3 cm 9/5/2018 10:59 AM   Wound Width (cm) 2.2 cm 9/5/2018 10:59 AM   Wound Depth (cm)  2.2 9/5/2018 10:59 AM   Calculated Wound Size (cm^2) (l*w) 2.86 cm^2 9/5/2018 10:59 AM   Change in Wound Size % (l*w) 15.38 9/5/2018 10:59 AM   Wound Assessment Granulation tissue;Slough 9/5/2018 10:21 AM   Drainage Amount Moderate 9/5/2018 10:21 AM   Drainage Description Brown;Serosanguinous 9/5/2018 10:21 AM   Odor None 9/5/2018 10:21 AM   Margins Attached edges 9/5/2018 10:21 AM   Mirian-wound Assessment Excoriated 9/5/2018 10:21 AM   Non-staged Wound Description Full thickness 9/5/2018 10:21 AM   Red%Wound Bed 60 9/5/2018 10:21 AM   Yellow%Wound Bed 40 9/5/2018 10:21 AM   Op First Treatment Date 08/27/18 8/27/2018 10:22 AM   Number of days: 9       Percent of Wound(s)/Ulcer(s) Debrided: 100%    Total Surface Area Debrided:  2.86 sq cm       Diabetic/Pressure/Non Pressure Ulcers only:  Ulcer: Non-Pressure ulcer, muscle necrosis      Estimated Blood Loss:  Minimal    Hemostasis Achieved:  by pressure    Procedural Pain:  4  / 10     Post Procedural Pain:  0 / 10     Response to treatment:  Well tolerated by patient. Plan:     Treatment Note please see attached Discharge Instructions    Written patient dismissal instructions given to patient and signed by patient or POA.          Discharge Instructions       Wound Clinic Physician Orders and Discharge [facility]  [address] [facility]   [address]  Telephone: [phone]      FAX [phone]     NAME: Tad Bran  DATE OF BIRTH:  1940  MEDICAL RECORD NUMBER:  [id number]  DATE:  9/5/2018     Wound Cleansing:   Do not scrub or use excessive force. Cleanse wound prior to applying a clean dressing with:  [x] Normal Saline            [] Keep Wound Dry in Shower    [] Wound Cleanser   [] Cleanse wound with Mild Soap & Water  [x] May Shower    [] Other:        Topical Treatments:  Do not apply lotions, creams, or ointments to wound bed unless directed. [] Apply moisturizing lotion to skin surrounding the wound prior to dressing change.  [] Apply antifungal ointment to skin surrounding the wound prior to dressing change.  [] Apply thin film of moisture barrier ointment to skin immediately around wound. [x] Other: OVER THE COUNTER HYDROCORTISONE TO RED RASH AROUND WOUND     Dressings:                  Wound Location : LEFT BUTTOCK   **UNTIL WOUND VAC ARRIVES**        [x] Apply Primary Dressing:                                                 [x]  SILVER ALGINATE SHEET (UNTIL WOUND VAC ARRIVES) - GENTLY PACK INTO WOUND                [X] APPLY COLLAGEN WITH SILVER DAMPENED WITH SALINE TO WOUND BED PRIOR TO WOUND VAC SPONGE BEING APPLIED      [x] Cover and Secure with:                       [x] DRY Gauze - ALSO APPLY TO RED RASH AROUND WOUND [] Daniela   [] Kerlix              [] Ace Wrap       [x] Cover Roll Tape (DO NOT APPLY TO RED RASH)         [] ABD                              [] Other:               Avoid contact of tape with skin.   [x] Change dressing:       [] Daily              [] Every Other Day        [x] Three times per week               [] Once a week  [] Do Not Change Dressing         [] Other:      Negative Pressure:           Wound Location: LEFT HIP **[employer]CI WOUND VAC WILL BE APPLIED FOR BY [facility] [place]**  [email or web address]              [x] Black  [] White Foam    [] Other:   [x]Change dressing:        [x]Three times per week             []Other:      Pressure Relief:  [x] When sitting, shift position or do seat lifts every 15 minutes.  DO NOT SIT FOR MORE THAN 30 MINUTES AT A TIME  [] Wheelchair cushion   [] Specialty Bed/Mattress  [x] Turn every 2 hours when in bed.  Avoid position directing pressure on wound site.  Limit side lying to 30 degree tilt.  Limit HOB elevation to 30 degrees.           Off-Loading:   [] Off-loading when        [] walking           [] in bed [] sitting  [] Total non-weight bearing  [] Right Leg  [] Left Leg          [x] Assistive Device         [x] Walker            [] Cane   [] Wheelchair      [] Crutches              [] Surgical shoe    [] Podus Boot(s)   [] Foam Boot(s)  [] Roll About              [] Cast Boot       [] CROW Boot  [] Other:     Dietary:  [] Diet as tolerated:        [] Calorie Diabetic Diet: [] No Added Salt:  [] Increase Protein:        [] Other:     Activity:  [x] Activity as tolerated:  [] Patient has no activity restrictions     [] Strict Bedrest:            [] Remain off Work:     [] May return to full duty work:                                       [] [employer] to work with [address]     Return Appointment:  [x] Wound and dressing supply provider: [facility]  [x] ECF or Home Healthcare: [address] [facility]  [] Nurse visit:     [] Physician or NP scheduled for Nurse Visit:   [x] Return Appointment: With DR QUINTON SUAZO [facility]  in  1 Week(s)   [] Ordered tests:         **STOP BIOMED CREAM**     Nurse Case Manger: Raf Andersen     Electronically signed by Paulo James RN on 9/5/2018 at 10:43 AM          [address] [facility] Information: Should you experience any significant changes in your wound(s) or have questions about your wound care,

## 2018-09-10 ENCOUNTER — HOSPITAL ENCOUNTER (OUTPATIENT)
Dept: WOUND CARE | Age: 78
Discharge: HOME | End: 2018-09-10
Attending: SURGERY | Admitting: SURGERY

## 2018-09-10 VITALS
DIASTOLIC BLOOD PRESSURE: 68 MMHG | RESPIRATION RATE: 20 BRPM | TEMPERATURE: 97.3 F | HEART RATE: 79 BPM | SYSTOLIC BLOOD PRESSURE: 173 MMHG

## 2018-09-10 RX ORDER — LIDOCAINE HYDROCHLORIDE 40 MG/ML
SOLUTION TOPICAL PRN
Status: DISCONTINUED | OUTPATIENT
Start: 2018-09-10 | End: 2018-09-11 | Stop reason: HOSPADM

## 2018-09-10 ASSESSMENT — PAIN SCALES - GENERAL: PAINLEVEL_OUTOF10: 0

## 2018-09-10 NOTE — PROGRESS NOTES
Negative Pressure    NAME:  Angela Olea  YOB: 1940  MEDICAL RECORD NUMBER:  [id number]  DATE:  9/10/2018     Applied Negative Pressure to left buttock wound(s)/ulcer(s).  [x] Applied skin barrier prep to haley-wound.  [x] Cut strips of plastic drape to picture frame wound so that haley-wound is     covered with the drape.  [] If bridging dressing to less prominent site, cover any intact skin that will come in contact with the Negative Pressure Therapy sponge, gauze or channel drain with plastic drape. The sponge should never touch intact skin.  [x] Cut sponge, gauze or channel drain to size which will fit into the wound/ulcer bed without being forced.  [x] Be sure the sponge is large enough to hold the entire round plastic flange which is attached to the tubing. Never allow flange to be larger than the sponge or it will produce suction damaging intact skin.  Total number of individual pieces of foam used within the wound bed: 2     [x] If bridging the dressing away from the primary site, be sure the bridge leads to a piece of sponge large enough to hold the entire flange without allowing any of the flange to overlap onto intact skin.  [x] Covered sponge, gauze or channel drain with plastic drape.  [x] Cut a hole in this plastic drape directly over the sponge the same size as the plastic drain tubing.  [x] Removed plastic liner from flange and apply it directly over the hole you cut.  [x] Removed the plastic cover from the flange.  [x] Attached the tubing to the wound/ulcer Negative Pressure Therapy and turn it on to be sure a vacuum is created and that there are no leaks.  [x] If air leaks occur, use plastic drape to patch them.  [x] Secured Negative Pressure Therapy dressing with ace wrap loosely if located on an extremity. Maintain tubing outside of ace wrap. Tubing must not exert pressure on intact skin.     Applied per [facility] Patel Guidelines      Electronically signed by Mynor Gutiérrez RN on 9/10/2018 at 11:38 AM

## 2018-09-11 ENCOUNTER — OFFICE VISIT (OUTPATIENT)
Dept: PULMONOLOGY | Age: 78
End: 2018-09-11

## 2018-09-11 VITALS
TEMPERATURE: 97 F | DIASTOLIC BLOOD PRESSURE: 65 MMHG | OXYGEN SATURATION: 91 % | HEIGHT: 65 IN | RESPIRATION RATE: 20 BRPM | SYSTOLIC BLOOD PRESSURE: 137 MMHG | HEART RATE: 63 BPM | BODY MASS INDEX: 21.13 KG/M2

## 2018-09-11 DIAGNOSIS — J84.9 ILD (INTERSTITIAL LUNG DISEASE) (HCC): Primary | ICD-10-CM

## 2018-09-11 DIAGNOSIS — J96.11 CHRONIC RESPIRATORY FAILURE WITH HYPOXIA (HCC): ICD-10-CM

## 2018-09-11 DIAGNOSIS — R76.8 SCL-70 ANTIBODY POSITIVE: ICD-10-CM

## 2018-09-11 PROCEDURE — 1123F ACP DISCUSS/DSCN MKR DOCD: CPT | Performed by: INTERNAL MEDICINE

## 2018-09-11 PROCEDURE — 4040F PNEUMOC VAC/ADMIN/RCVD: CPT | Performed by: INTERNAL MEDICINE

## 2018-09-11 PROCEDURE — G8420 CALC BMI NORM PARAMETERS: HCPCS | Performed by: INTERNAL MEDICINE

## 2018-09-11 PROCEDURE — 1036F TOBACCO NON-USER: CPT | Performed by: INTERNAL MEDICINE

## 2018-09-11 PROCEDURE — 99213 OFFICE O/P EST LOW 20 MIN: CPT | Performed by: INTERNAL MEDICINE

## 2018-09-11 PROCEDURE — 1101F PT FALLS ASSESS-DOCD LE1/YR: CPT | Performed by: INTERNAL MEDICINE

## 2018-09-11 PROCEDURE — 1090F PRES/ABSN URINE INCON ASSESS: CPT | Performed by: INTERNAL MEDICINE

## 2018-09-11 PROCEDURE — G8400 PT W/DXA NO RESULTS DOC: HCPCS | Performed by: INTERNAL MEDICINE

## 2018-09-11 PROCEDURE — G8598 ASA/ANTIPLAT THER USED: HCPCS | Performed by: INTERNAL MEDICINE

## 2018-09-11 PROCEDURE — G8427 DOCREV CUR MEDS BY ELIG CLIN: HCPCS | Performed by: INTERNAL MEDICINE

## 2018-09-11 NOTE — PROGRESS NOTES
No systemic signs of sclerosis    Declines lung biopsy or aggressive work up    Follow up in 6 months    Call with the first sign of an infection    She has had a positive Scl 70 since 2013. It was 47 in 2013. Greater than 41 units is consider a positive test result    Once again I continued to disagree with Radiology interpretation. I feel this represents a cystic lung process with possible fibrosis. I don't UIP is the predominant pattern. I also dont think this is emphysema as the cystic changes are predominantly in the lower lung fields and there are significant variation in the size of cysts.

## 2018-09-11 NOTE — PROGRESS NOTES
SURGICAL HISTORY    Past Surgical History:   Procedure Laterality Date    BACK SURGERY  [address], [place] [address]    x4--fusions    BACK SURGERY  2000    laminectomy-lumbar    CARPAL TUNNEL RELEASE  11    Left    COLONOSCOPY      CYSTOSCOPY Left 2016    cysto left ureteral stent placement    CYSTOSCOPY  01/15/2018    Left Stent Insertion    CYSTOSCOPY  2018    cysto, left uretero with laser litho, left stent excahnge    EYE SURGERY Bilateral     cataract removed with lens implants    FRACTURE SURGERY Right 2016    femur fracture    FRACTURE SURGERY Left     femur fracture    HYSTERECTOMY  1993    HYSTEROSCOPY  1993    JOINT REPLACEMENT  2000    LEFT KNEE    JOINT REPLACEMENT  08    RIGHT KNEE    STOMACH SURGERY      For PUD       FAMILY HISTORY    Family History   Problem Relation Age of Onset    Emphysema Mother          AGE 64    Depression Mother     Clotting Disorder Father             Stroke Father        SOCIAL HISTORY    Social History   Substance Use Topics    Smoking status: Never Smoker    Smokeless tobacco: Never Used      Comment: encouraged to never smoke     Alcohol use No       ALLERGIES    Allergies   Allergen Reactions    Ampicillin Hives    Ciprofloxacin Other (See Comments)     Sores in mouth      Nitrofurantoin Other (See Comments)     Unable to recall reaction    Sulfa Antibiotics Other (See Comments)     Unable to recall reaction    Penicillins Hives and Rash       MEDICATIONS    Current Outpatient Prescriptions on File Prior to Encounter   Medication Sig Dispense Refill    sertraline (ZOLOFT) 100 MG tablet TAKE 1 TABLET BY MOUTH EVERY DAY 90 tablet 0    alendronate (FOSAMAX) 70 MG tablet TAKE 1 TABLET BY MOUTH EVERY 7 DAYS 12 tablet 0    FLUoxetine HCl (PROZAC PO) Take by mouth Per Patient Takes As Needed every 6 Hours      calcitRIOL (ROCALTROL) 0.25 MCG capsule TAKE 1 CAPSULE BY MOUTH DAILY 90 capsule 0    buPROPion (WELLBUTRIN SR) 150 MG extended release tablet TAKE 1 TABLET BY MOUTH TWICE DAILY 180 tablet 0    omeprazole (PRILOSEC) 40 MG delayed release capsule TAKE 1 CAPSULE BY MOUTH TWICE DAILY 180 capsule 0    Cyanocobalamin (VITAMIN B 12 PO) Take by mouth      aspirin 81 MG tablet Take 81 mg by mouth daily      sertraline (ZOLOFT) 100 MG tablet Take 100 mg by mouth daily      vitamin C (ASCORBIC ACID) 500 MG tablet Take 500 mg by mouth daily      docusate sodium (COLACE) 100 MG capsule Take 100 mg by mouth daily      OXYGEN Inhale 4 L into the lungs continuous (Patient taking differently: Inhale 3 L into the lungs nightly ) 1 Container 1    ferrous sulfate 324 (65 FE) MG EC tablet Take 1 tablet by mouth 2 times daily (with meals) 30 tablet 1    Multiple Vitamins-Minerals (MULTIVITAMIN PO) Take 1 tablet by mouth daily.  Cholecalciferol ([employer]S VITAMIN D) 1000 UNIT CAPS Take 1 capsule by mouth daily       UNABLE TO FIND       clonazePAM (KLONOPIN) 1 MG tablet TAKE 1/2 TABLET BY MOUTH IN THE MORNING, 1/2 TABLET IN THE AFTERNOON, AND 1 TABLET AT BEDTIME AS NEEDED FOR ANXIETY 180 tablet 0     No current facility-administered medications on file prior to encounter. REVIEW OF SYSTEMS    A comprehensive review of systems was negative. Objective:      BP (!) 173/68   Pulse 79   Temp 97.3 °F (36.3 °C) (Oral)   Resp 20     Wt Readings from Last 3 Encounters:   07/09/18 126 lb 15.8 oz (57.6 kg)   06/25/18 131 lb 9.8 oz (59.7 kg)   06/19/18 125 lb 6.4 oz (56.9 kg)       PHYSICAL EXAM    Left gluteal wound as scribed. No overt infection. Depth down to muscle.       Assessment:      Patient Active Problem List   Diagnosis Code    Abnormality of gait R26.9    Osteoporosis M81.0    Peptic ulcer K27.9    Disorder of kidney and ureter N28.9    Anemia D64.9    Pneumonia due to organism J18.9    Acute bronchitis J20.9    Osteoarthrosis, unspecified whether generalized or localized, pelvic region and

## 2018-09-17 ENCOUNTER — HOSPITAL ENCOUNTER (OUTPATIENT)
Dept: WOUND CARE | Age: 78
Discharge: HOME | End: 2018-09-17
Attending: SURGERY | Admitting: SURGERY

## 2018-09-17 VITALS
SYSTOLIC BLOOD PRESSURE: 167 MMHG | DIASTOLIC BLOOD PRESSURE: 74 MMHG | RESPIRATION RATE: 20 BRPM | TEMPERATURE: 97.4 F | HEART RATE: 66 BPM

## 2018-09-17 DIAGNOSIS — E46 MALNUTRITION, UNSPECIFIED TYPE (HCC): Primary | ICD-10-CM

## 2018-09-17 RX ORDER — LIDOCAINE HYDROCHLORIDE 40 MG/ML
SOLUTION TOPICAL PRN
Status: DISCONTINUED | OUTPATIENT
Start: 2018-09-17 | End: 2018-09-18 | Stop reason: HOSPADM

## 2018-09-17 ASSESSMENT — PAIN SCALES - GENERAL: PAINLEVEL_OUTOF10: 0

## 2018-09-18 NOTE — PROGRESS NOTES
[address]   Progress Note and Procedure Note      Armando Sanchez [facility]  MEDICAL RECORD NUMBER:  [id number]  AGE: 70 y.o. GENDER: female  : 1940  EPISODE DATE:  2018    Subjective:     Chief Complaint   Patient presents with    Wound Check     Follow up visit for left buttocks wound         HISTORY of PRESENT ILLNESS HPI    Armando New is a 70 y. o. female who presents today for wound/ulcer evaluation. History of Wound Context:  Pt presents with a wound in the lt buttock. The area started out as a skin dimpling causing pain. Pt has had hip fracture surgery by  and pt saw him who referred her to  who thought this could be bursitis and gave an injection and prescribed a compounded cream containing 4 different medications to be applied to the site. The area subsequently opened up becoming a wound. Pt saw Edouard Fair, her PCP who prescribed Doxycycline and discontinued the cream. Pt was advised to [facility] at [facility]. H/o Pulmonary fibrosis. O2 dependent. Pt has seen Dr. Eleno Zambrano and is awaiting surgical revision of the wound as the skin opening is very small and undermining persists.   Wound/Ulcer Pain Timing/Severity: intermittent  Quality of pain: aching  Severity:  3 / 10   Modifying Factors: None  Associated Signs/Symptoms: erythema, drainage and pain     Ulcer Identification:  Ulcer Type: traumatic  Contributing Factors: decreased mobility, shear force and decreased tissue oxygenation             PAST MEDICAL HISTORY        Diagnosis Date    Anxiety and depression     Arthritis     Cancer of skin of leg     Chronic low back pain     GERD (gastroesophageal reflux disease)     Insomnia     Iron deficiency anemia     Kidney stone     PUD (peptic ulcer disease)     Pulmonary fibrosis (HCC)     Dr. China Kyle has a CT done every 6 months    Stomach ulcer     Ulcer - lesion 2010    UTI (urinary tract infection)     frequent       PAST SURGICAL HISTORY    Past Surgical History:   Procedure Laterality Date   [facility] SURGERY  [address] [address], [place] [facility]    x4--fusions    BACK SURGERY  2000    laminectomy-lumbar    CARPAL TUNNEL RELEASE  11    Left    COLONOSCOPY      CYSTOSCOPY Left 2016    cysto left ureteral stent placement    CYSTOSCOPY  01/15/2018    Left Stent Insertion    CYSTOSCOPY  2018    cysto, left uretero with laser litho, left stent excahnge    EYE SURGERY Bilateral     cataract removed with lens implants    FRACTURE SURGERY Right 2016    femur fracture    FRACTURE SURGERY Left     femur fracture    HYSTERECTOMY  1993    HYSTEROSCOPY  1993    JOINT REPLACEMENT  2000    LEFT KNEE    JOINT REPLACEMENT  08    RIGHT KNEE    STOMACH SURGERY      For PUD       FAMILY HISTORY    Family History   Problem Relation Age of Onset    Emphysema Mother          AGE 64    Depression Mother     Clotting Disorder Father             Stroke Father        SOCIAL HISTORY    Social History   Substance Use Topics    Smoking status: Never Smoker    Smokeless tobacco: Never Used      Comment: encouraged to never smoke     Alcohol use No       ALLERGIES    Allergies   Allergen Reactions    Ampicillin Hives    Ciprofloxacin Other (See Comments)     Sores in mouth      Nitrofurantoin Other (See Comments)     Unable to recall reaction    Sulfa Antibiotics Other (See Comments)     Unable to recall reaction    Penicillins Hives and Rash       MEDICATIONS    Current Outpatient Prescriptions on File Prior to Encounter   Medication Sig Dispense Refill    Acetaminophen-Codeine (TYLENOL WITH CODEINE #3 PO) Take by mouth      sertraline (ZOLOFT) 100 MG tablet TAKE 1 TABLET BY MOUTH EVERY DAY 90 tablet 0    alendronate (FOSAMAX) 70 MG tablet TAKE 1 TABLET BY MOUTH EVERY 7 DAYS 12 tablet 0    FLUoxetine HCl (PROZAC PO) Take by mouth Per Patient Takes As Needed every 6 Hours      calcitRIOL (ROCALTROL)

## 2018-09-24 ENCOUNTER — HOSPITAL ENCOUNTER (OUTPATIENT)
Dept: WOUND CARE | Age: 78
Discharge: HOME | End: 2018-09-24
Payer: MEDICARE

## 2018-09-24 VITALS
DIASTOLIC BLOOD PRESSURE: 76 MMHG | RESPIRATION RATE: 18 BRPM | SYSTOLIC BLOOD PRESSURE: 159 MMHG | TEMPERATURE: 97.5 F | HEART RATE: 74 BPM

## 2018-09-24 PROCEDURE — 97605 NEG PRS WND THER DME<=50SQCM: CPT

## 2018-09-24 PROCEDURE — 11042 DBRDMT SUBQ TIS 1ST 20SQCM/<: CPT

## 2018-09-24 RX ORDER — LIDOCAINE HYDROCHLORIDE 40 MG/ML
SOLUTION TOPICAL PRN
Status: DISCONTINUED | OUTPATIENT
Start: 2018-09-24 | End: 2018-09-27 | Stop reason: HOSPADM

## 2018-09-24 NOTE — PLAN OF CARE
Negative Pressure    NAME:  Rolly Goyal  YOB: 1940  MEDICAL RECORD NUMBER:  [id number]  DATE:  9/24/2018     Applied Negative Pressure to Left buttock wound.  [x] Applied skin barrier prep to haley-wound.  [x] Cut strips of plastic drape to picture frame wound so that haley-wound is covered with the drape.  [x] If bridging dressing to less prominent site, cover any intact skin that will come in contact with the Negative Pressure Therapy sponge, gauze or channel drain with plastic drape. The sponge should never touch intact skin.  [x] Cut sponge, gauze or channel drain to size which will fit into the wound/ulcer bed without being forced.  [x] Be sure the sponge is large enough to hold the entire round plastic flange which is attached to the tubing. Never allow flange to be larger than the sponge or it will produce suction damaging intact skin.  Total number of individual pieces of foam used within the wound bed: 1     [x] If bridging the dressing away from the primary site, be sure the bridge leads to a piece of sponge large enough to hold the entire flange without allowing any of the flange to overlap onto intact skin.  [x] Covered sponge, gauze or channel drain with plastic drape.  [x] Cut a hole in this plastic drape directly over the sponge the same size as the plastic drain tubing.  [x] Removed plastic liner from flange and apply it directly over the hole you cut.  [x] Removed the plastic cover from the flange.  [x] Attached the tubing to the wound/ulcer Negative Pressure Therapy and turn it on to be sure a vacuum is created and that there are no leaks.  [x] If air leaks occur, use plastic drape to patch them.  [x] Secured Negative Pressure Therapy dressing with ace wrap loosely if located on an extremity. Maintain tubing outside of ace wrap. Tubing must not exert pressure on intact skin.     Applied per  Guidelines      Electronically signed by Daniela Krueger RN on 9/24/2018 at 1:10 PM

## 2018-09-25 NOTE — PROGRESS NOTES
(WELLBUTRIN SR) 150 MG extended release tablet TAKE 1 TABLET BY MOUTH TWICE DAILY 180 tablet 0    omeprazole (PRILOSEC) 40 MG delayed release capsule TAKE 1 CAPSULE BY MOUTH TWICE DAILY 180 capsule 0    Cyanocobalamin (VITAMIN B 12 PO) Take by mouth      aspirin 81 MG tablet Take 81 mg by mouth daily      sertraline (ZOLOFT) 100 MG tablet Take 100 mg by mouth daily      vitamin C (ASCORBIC ACID) 500 MG tablet Take 500 mg by mouth daily      docusate sodium (COLACE) 100 MG capsule Take 100 mg by mouth daily      ferrous sulfate 324 (65 FE) MG EC tablet Take 1 tablet by mouth 2 times daily (with meals) 30 tablet 1    Multiple Vitamins-Minerals (MULTIVITAMIN PO) Take 1 tablet by mouth daily.  Cholecalciferol (CVS VITAMIN D) 1000 UNIT CAPS Take 1 capsule by mouth daily       Acetaminophen-Codeine (TYLENOL WITH CODEINE #3 PO) Take by mouth      UNABLE TO FIND       clonazePAM (KLONOPIN) 1 MG tablet TAKE 1/2 TABLET BY MOUTH IN THE MORNING, 1/2 TABLET IN THE AFTERNOON, AND 1 TABLET AT BEDTIME AS NEEDED FOR ANXIETY 180 tablet 0    OXYGEN Inhale 4 L into the lungs continuous (Patient taking differently: Inhale 3 L into the lungs nightly ) 1 Container 1     No current facility-administered medications on file prior to encounter. REVIEW OF SYSTEMS    A comprehensive review of systems was negative. Objective:      BP (!) 159/76   Pulse 74   Temp 97.5 °F (36.4 °C) (Oral)   Resp 18     Wt Readings from Last 3 Encounters:   07/09/18 126 lb 15.8 oz (57.6 kg)   06/25/18 131 lb 9.8 oz (59.7 kg)   06/19/18 125 lb 6.4 oz (56.9 kg)       PHYSICAL EXAM    Lt hip/buttock wound as scribed. No overt infection.       Assessment:      Patient Active Problem List   Diagnosis Code    Abnormality of gait R26.9    Osteoporosis M81.0    Peptic ulcer K27.9    Disorder of kidney and ureter N28.9    Anemia D64.9    Pneumonia due to organism J18.9    Acute bronchitis J20.9    Osteoarthrosis, unspecified whether slough    Pre Debridement Measurements:  Are located in the [place]  Documentation Flow Sheet    Wound/Ulcer #: 4    Post Debridement Measurements:  Wound/Ulcer Descriptions are Pre Debridement except measurements:    Wound 08/27/18 Buttocks Left Wound #4 (previously wound #3-surgically opened w/ Dr. Fatmata Beckford) on 8/23/18  (Active)   Wound Image   8/27/2018 10:22 AM   Wound Type Wound 9/17/2018 11:30 AM   Wound Traumatic 9/5/2018 10:21 AM   Dressing Status Old drainage 9/17/2018 11:30 AM   Dressing Changed Changed/New 9/17/2018 12:04 PM   Dressing/Treatment Vacuum dressing 9/24/2018  1:11 PM   Wound Cleansed Rinsed/Irrigated with saline 9/10/2018 10:07 AM   Wound Length (cm) 1.3 cm 9/24/2018 12:25 PM   Wound Width (cm) 1.7 cm 9/24/2018 12:25 PM   Wound Depth (cm)  2.9 9/24/2018 12:25 PM   Calculated Wound Size (cm^2) (l*w) 2.21 cm^2 9/24/2018 12:25 PM   Change in Wound Size % (l*w) 34.62 9/24/2018 12:25 PM   Undermining Starts ___ O'Clock 9 9/17/2018 12:04 PM   Undermining Ends___ O'Clock 11 9/17/2018 12:04 PM   Undermining Maxium Distance (cm) 2.5 9/17/2018 12:04 PM   Wound Assessment Fragile;Slough 9/24/2018 12:15 PM   Drainage Amount Small 9/24/2018 12:15 PM   Drainage Description Serosanguinous 9/24/2018 12:15 PM   Odor None 9/24/2018 12:15 PM   Margins Attached edges 9/24/2018 12:15 PM   Mirian-wound Assessment Excoriated;Pink 9/24/2018 12:15 PM   Non-staged Wound Description Full thickness 9/24/2018 12:15 PM   [place]oy%Wound Bed 20 9/17/2018 11:30 AM   Red%Wound Bed 50 9/24/2018 12:15 PM   Yellow%Wound Bed 50 9/24/2018 12:15 PM   Op First Treatment Date 08/27/18 8/27/2018 10:22 AM   Number of days: 28       Percent of Wound(s)/Ulcer(s) Debrided: 100%    Total Surface Area Debrided:  2.21 sq cm       Diabetic/Pressure/Non Pressure Ulcers only:  Ulcer: Non-Pressure ulcer, fat layer exposed      Estimated Blood Loss:  Minimal    Hemostasis Achieved:  by pressure    Procedural Pain:  3  / 10     Post Procedural Pain:    Negative Pressure:           Wound Location: LEFT HIP   [email or web address]        [X] Black Foam  [x] White Foam - PLEASE PACK FOAM ALL THE WAY TO THE BOTTOM OF THE WOUND                                     [] Other:   [x]Change dressing:        [x]Three times per week             []Other:      Pressure Relief:  [x] When sitting, shift position or do seat lifts every 15 minutes.  DO NOT SIT FOR MORE THAN 30 MINUTES AT A TIME  [] Wheelchair cushion   [] Specialty Bed/Mattress  [x] Turn every 2 hours when in bed.  Avoid position directing pressure on wound site.  Limit side lying to 30 degree tilt.  Limit HOB elevation to 30 degrees.           Off-Loading:   [] Off-loading when        [] walking           [] in bed [] sitting  [] Total non-weight bearing  [] Right Leg  [] Left Leg          [x] Assistive Device         [x] Walker            [] Cane   [] Wheelchair      [] Crutches              [] Surgical shoe    [] Podus Boot(s)   [] Foam Boot(s)  [] Roll About              [] Cast Boot       [] CROW Boot  [] Other:     Dietary:  [] Diet as tolerated:        [] Calorie Diabetic Diet: [] No Added Salt:  [] Increase Protein:        [] Other:     Activity:  [x] Activity as tolerated:  [] Patient has no activity restrictions     [] Strict Bedrest:            [] Remain off Work:     [] May return to full duty work:                                       [] DANNY to work with [address]     Return Appointment:  [x] Wound and dressing supply provider: [facility]  [x] ECF or Home Healthcare: [facility]   [] Nurse visit:     [] Physician or NP scheduled for Nurse Visit:   [x] Return Appointment: With DR QUINTON SUAZO [facility]  in  1 Week(s)   [x] Ordered tests: HOME [facility]ARE :  CRP AND PRE-ALBUMIN.          [facility] M[facility]     Electronically signed by Nuvia Rivera RN on 9/24/2018 at 12:24 PM    [facility] Information: Should you experience

## 2018-09-26 DIAGNOSIS — M81.0 OSTEOPOROSIS: ICD-10-CM

## 2018-09-26 RX ORDER — CALCITRIOL 0.25 UG/1
0.25 CAPSULE, LIQUID FILLED ORAL DAILY
Qty: 90 CAPSULE | Refills: 0 | Status: SHIPPED | OUTPATIENT
Start: 2018-09-26 | End: 2018-12-24 | Stop reason: SDUPTHER

## 2018-09-28 LAB
C-REACTIVE PROTEIN: 25.1 MG/L (ref 0–5.1)
PREALBUMIN: 17.1 MG/DL (ref 20–40)

## 2018-10-01 ENCOUNTER — HOSPITAL ENCOUNTER (OUTPATIENT)
Dept: WOUND CARE | Age: 78
Discharge: HOME | End: 2018-10-01
Payer: MEDICARE

## 2018-10-01 VITALS
DIASTOLIC BLOOD PRESSURE: 80 MMHG | RESPIRATION RATE: 18 BRPM | TEMPERATURE: 97 F | HEART RATE: 101 BPM | SYSTOLIC BLOOD PRESSURE: 179 MMHG

## 2018-10-01 PROCEDURE — 11042 DBRDMT SUBQ TIS 1ST 20SQCM/<: CPT

## 2018-10-01 PROCEDURE — 97605 NEG PRS WND THER DME<=50SQCM: CPT

## 2018-10-01 RX ORDER — LIDOCAINE 50 MG/G
OINTMENT TOPICAL PRN
Status: DISCONTINUED | OUTPATIENT
Start: 2018-10-01 | End: 2018-10-02 | Stop reason: HOSPADM

## 2018-10-01 NOTE — PROGRESS NOTES
FLUoxetine HCl (PROZAC PO) Take by mouth Per Patient Takes As Needed every 6 Hours      buPROPion (WELLBUTRIN SR) 150 MG extended release tablet TAKE 1 TABLET BY MOUTH TWICE DAILY 180 tablet 0    omeprazole (PRILOSEC) 40 MG delayed release capsule TAKE 1 CAPSULE BY MOUTH TWICE DAILY 180 capsule 0    Cyanocobalamin (VITAMIN B 12 PO) Take by mouth      aspirin 81 MG tablet Take 81 mg by mouth daily      sertraline (ZOLOFT) 100 MG tablet Take 100 mg by mouth daily      vitamin C (ASCORBIC ACID) 500 MG tablet Take 500 mg by mouth daily      docusate sodium (COLACE) 100 MG capsule Take 100 mg by mouth daily      OXYGEN Inhale 4 L into the lungs continuous (Patient taking differently: Inhale 3 L into the lungs nightly ) 1 Container 1    ferrous sulfate 324 (65 FE) MG EC tablet Take 1 tablet by mouth 2 times daily (with meals) 30 tablet 1    Multiple Vitamins-Minerals (MULTIVITAMIN PO) Take 1 tablet by mouth daily.  Cholecalciferol (CVS VITAMIN D) 1000 UNIT CAPS Take 1 capsule by mouth daily       UNABLE TO FIND       clonazePAM (KLONOPIN) 1 MG tablet TAKE 1/2 TABLET BY MOUTH IN THE MORNING, 1/2 TABLET IN THE AFTERNOON, AND 1 TABLET AT BEDTIME AS NEEDED FOR ANXIETY 180 tablet 0     No current facility-administered medications on file prior to encounter. REVIEW OF SYSTEMS    A comprehensive review of systems was negative. Objective:      BP (!) 179/80   Pulse 101   Temp 97 °F (36.1 °C) (Oral)   Resp 18     Wt Readings from Last 3 Encounters:   07/09/18 126 lb 15.8 oz (57.6 kg)   06/25/18 131 lb 9.8 oz (59.7 kg)   06/19/18 125 lb 6.4 oz (56.9 kg)       PHYSICAL EXAM    Lt buttock/hip wound as scribed. no overt infection.       Assessment:      Patient Active Problem List   Diagnosis Code    Abnormality of gait R26.9    Osteoporosis M81.0    Peptic ulcer K27.9    Disorder of kidney and ureter N28.9    Anemia D64.9    Pneumonia due to organism J18.9    Acute bronchitis J20.9    Minimal    Hemostasis Achieved:  by pressure    Procedural Pain:  3  / 10     Post Procedural Pain:  0 / 10     Response to treatment:  Well tolerated by patient. Plan:     Treatment Note please see attached Discharge Instructions    Written patient dismissal instructions given to patient and signed by patient or POA. Discharge Instructions       Wound Clinic Physician Orders and Discharge Instructions  [address]   [address] [place] [address]  Telephone: [phone]      FAX [phone]     NAME: Sohail Badillo OF BIRTH:  1940  MEDICAL RECORD NUMBER:  [id number]  DATE:  10/1/2018     Wound Cleansing:   Do not scrub or use excessive force. Cleanse wound prior to applying a clean dressing with:  [x] Normal Saline            [x] Keep Wound Dry in Shower    [] Wound Cleanser   [] Cleanse wound with Mild Soap & Water  [x] May Shower    [] Other:        Topical Treatments:  Do not apply lotions, creams, or ointments to wound bed unless directed. [] Apply moisturizing lotion to skin surrounding the wound prior to dressing change.  [] Apply antifungal ointment to skin surrounding the wound prior to dressing change. [x] Apply thin film of moisture barrier ointment to skin immediately around wound. [x] Other: Apply Skin Prep to surrounding area around Buttock Wound      Dressings:                  Wound Location : LEFT BUTTOCK        [x] Apply Primary Dressing:                                                 []                  [X] APPLY COLLAGEN WITH SILVER DAMPENED WITH SALINE TO WOUND (to about 1/3 the depth of the wound) PRIOR TO WOUND VAC SPONGE BEING APPLIED      [x] Cover and Secure with:                       [] DRY Gauze  [] Daniela   [] Kerlix              [] Ace Wrap       [] Cover Roll Tape         [] ABD                              [] Other:               Avoid contact of tape with skin.   [x] Change dressing:       [] Daily              [] Every Other Day        [x] Three times per week WITH WOUND VAC CHANGES               [] Once a week  [] Do Not Change Dressing         [] Other:      Negative Pressure:           Wound Location: LEFT HIP   [email or web address]@[email or web address]        [X] Black Foam  [x] White Foam - PLEASE PACK FOAM ALL THE WAY TO THE BOTTOM OF THE WOUND                                     [] Other:   [x]Change dressing:        [x]Three times per week             []Other:      Pressure Relief:  [x] When sitting, shift position or do seat lifts every 15 minutes.  DO NOT SIT FOR MORE THAN 30 MINUTES AT A TIME  [] Wheelchair cushion   [] Specialty Bed/Mattress  [x] Turn every 2 hours when in bed.  Avoid position directing pressure on wound site.  Limit side lying to 30 degree tilt.  Limit HOB elevation to 30 degrees.        Off-Loading:   [] Off-loading when        [] walking           [] in bed [] sitting  [] Total non-weight bearing  [] Right Leg  [] Left Leg          [x] Assistive Device         [x] Walker            [] Cane   [] Wheelchair      [] Crutches              [] Surgical shoe    [] Podus Boot(s)   [] Foam Boot(s)  [] Roll About              [] Cast Boot       [] CROW Boot  [] Other:     Dietary:  [] Diet as tolerated:        [] Calorie Diabetic Diet: [] No Added Salt:  [] Increase Protein:        [] Other:     Activity:  [x] Activity as tolerated:  [] Patient has no activity restrictions     [] Strict Bedrest:            [] Remain off Work:     [] May return to full duty work:                                       [] Return to work with [address]     Return Appointment:  [x] Wound and dressing supply provider: [facility]  [x] ECF or Home Healthcare: [facility]   [] Nurse visit:     [] Physician or NP scheduled for Nurse Visit:   [x] Return Appointment: With DR QUINTON SUAZO [facility]  in  2 Week(s)   [] Ordered tests:         Nurse Case Manger: Michael Urbano  Electronically

## 2018-10-03 ENCOUNTER — HOSPITAL ENCOUNTER (OUTPATIENT)
Age: 78
Discharge: HOME | DRG: 871 | End: 2018-10-03
Payer: MEDICARE

## 2018-10-03 ENCOUNTER — HOSPITAL ENCOUNTER (OUTPATIENT)
Dept: GENERAL RADIOLOGY | Age: 78
Discharge: HOME | DRG: 871 | End: 2018-10-03
Payer: MEDICARE

## 2018-10-03 ENCOUNTER — TELEPHONE (OUTPATIENT)
Dept: PULMONOLOGY | Age: 78
End: 2018-10-03

## 2018-10-03 DIAGNOSIS — J84.9 ILD (INTERSTITIAL LUNG DISEASE) (HCC): ICD-10-CM

## 2018-10-03 DIAGNOSIS — J96.11 CHRONIC RESPIRATORY FAILURE WITH HYPOXIA (HCC): ICD-10-CM

## 2018-10-03 DIAGNOSIS — J84.9 ILD (INTERSTITIAL LUNG DISEASE) (HCC): Primary | ICD-10-CM

## 2018-10-03 PROCEDURE — 71046 X-RAY EXAM CHEST 2 VIEWS: CPT

## 2018-10-04 ENCOUNTER — TELEPHONE (OUTPATIENT)
Dept: PULMONOLOGY | Age: 78
End: 2018-10-04

## 2018-10-04 NOTE — TELEPHONE ENCOUNTER
Phone call to patient with Dr. Agustín Price response, she stated she is coughing up mucus when first awakens which is thick and white. If she keeps her O2 on she stays above 90, otherwise she drops down into the 60's. She was advised to keep her O2 on when she is up and about. She expressed understanding.

## 2018-10-04 NOTE — TELEPHONE ENCOUNTER
Called and stated had an CXR yesterday and that if Dr. Gino Ngo needs to call them and give results to call  [phone] before 130. After 130 call their daughters house at [phone].

## 2018-10-05 ENCOUNTER — HOSPITAL ENCOUNTER (INPATIENT)
Age: 78
LOS: 6 days | Discharge: HOME HEALTH | DRG: 871 | End: 2018-10-11
Attending: EMERGENCY MEDICINE | Admitting: INTERNAL MEDICINE
Payer: MEDICARE

## 2018-10-05 ENCOUNTER — APPOINTMENT (OUTPATIENT)
Dept: CT IMAGING | Age: 78
DRG: 871 | End: 2018-10-05
Payer: MEDICARE

## 2018-10-05 ENCOUNTER — APPOINTMENT (OUTPATIENT)
Dept: GENERAL RADIOLOGY | Age: 78
DRG: 871 | End: 2018-10-05
Payer: MEDICARE

## 2018-10-05 DIAGNOSIS — N39.0 URINARY TRACT INFECTION WITHOUT HEMATURIA, SITE UNSPECIFIED: Primary | ICD-10-CM

## 2018-10-05 DIAGNOSIS — R06.00 DYSPNEA, UNSPECIFIED TYPE: ICD-10-CM

## 2018-10-05 PROBLEM — R06.82 TACHYPNEA: Status: ACTIVE | Noted: 2018-10-05

## 2018-10-05 PROBLEM — L89.329 PRESSURE INJURY OF SKIN OF LEFT BUTTOCK: Status: ACTIVE | Noted: 2018-10-05

## 2018-10-05 PROBLEM — R50.9 FEVER: Status: ACTIVE | Noted: 2018-10-05

## 2018-10-05 PROBLEM — A41.9 SEPSIS (HCC): Status: ACTIVE | Noted: 2018-10-05

## 2018-10-05 PROBLEM — R00.0 TACHYCARDIA: Status: ACTIVE | Noted: 2018-10-05

## 2018-10-05 LAB
ANION GAP SERPL CALCULATED.3IONS-SCNC: 11 MMOL/L (ref 3–16)
BACTERIA: ABNORMAL /HPF
BASE EXCESS ARTERIAL: -0.1 MMOL/L (ref -3–3)
BASOPHILS ABSOLUTE: 0 K/UL (ref 0–0.2)
BASOPHILS RELATIVE PERCENT: 0.2 %
BILIRUBIN URINE: NEGATIVE
BLOOD, URINE: NEGATIVE
BUN BLDV-MCNC: 25 MG/DL (ref 7–20)
CALCIUM SERPL-MCNC: 11.3 MG/DL (ref 8.3–10.6)
CARBOXYHEMOGLOBIN ARTERIAL: 1.4 % (ref 0–1.5)
CHLORIDE BLD-SCNC: 102 MMOL/L (ref 99–110)
CLARITY: CLEAR
CO2: 26 MMOL/L (ref 21–32)
COLOR: YELLOW
CREAT SERPL-MCNC: 0.8 MG/DL (ref 0.6–1.2)
EOSINOPHILS ABSOLUTE: 0.1 K/UL (ref 0–0.6)
EOSINOPHILS RELATIVE PERCENT: 0.7 %
EPITHELIAL CELLS, UA: 1 /HPF (ref 0–5)
GFR AFRICAN AMERICAN: >60
GFR NON-AFRICAN AMERICAN: >60
GLUCOSE BLD-MCNC: 125 MG/DL (ref 70–99)
GLUCOSE URINE: NEGATIVE MG/DL
HCO3 ARTERIAL: 23.2 MMOL/L (ref 21–29)
HCT VFR BLD CALC: 37.2 % (ref 36–48)
HEMOGLOBIN, ART, EXTENDED: 14.2 G/DL (ref 12–16)
HEMOGLOBIN: 12.6 G/DL (ref 12–16)
HYALINE CASTS: 0 /LPF (ref 0–8)
KETONES, URINE: ABNORMAL MG/DL
LACTIC ACID: 1.2 MMOL/L (ref 0.4–2)
LEUKOCYTE ESTERASE, URINE: ABNORMAL
LYMPHOCYTES ABSOLUTE: 0.6 K/UL (ref 1–5.1)
LYMPHOCYTES RELATIVE PERCENT: 5.3 %
MCH RBC QN AUTO: 31.3 PG (ref 26–34)
MCHC RBC AUTO-ENTMCNC: 33.8 G/DL (ref 31–36)
MCV RBC AUTO: 92.4 FL (ref 80–100)
METHEMOGLOBIN ARTERIAL: 0.8 %
MICROSCOPIC EXAMINATION: YES
MONOCYTES ABSOLUTE: 0.6 K/UL (ref 0–1.3)
MONOCYTES RELATIVE PERCENT: 5.4 %
NEUTROPHILS ABSOLUTE: 9.5 K/UL (ref 1.7–7.7)
NEUTROPHILS RELATIVE PERCENT: 88.4 %
NITRITE, URINE: POSITIVE
O2 CONTENT ARTERIAL: 19 ML/DL
O2 SAT, ARTERIAL: 96.3 %
O2 THERAPY: NORMAL
PCO2 ARTERIAL: 35.3 MMHG (ref 35–45)
PDW BLD-RTO: 13.8 % (ref 12.4–15.4)
PH ARTERIAL: 7.43 (ref 7.35–7.45)
PH UA: 5
PLATELET # BLD: 246 K/UL (ref 135–450)
PMV BLD AUTO: 7.2 FL (ref 5–10.5)
PO2 ARTERIAL: 80 MMHG (ref 75–108)
POTASSIUM SERPL-SCNC: 4.9 MMOL/L (ref 3.5–5.1)
PRO-BNP: 401 PG/ML (ref 0–449)
PROCALCITONIN: 0.08 NG/ML (ref 0–0.15)
PROTEIN UA: NEGATIVE MG/DL
RBC # BLD: 4.03 M/UL (ref 4–5.2)
SODIUM BLD-SCNC: 139 MMOL/L (ref 136–145)
SPECIFIC GRAVITY UA: 1.02
TCO2 ARTERIAL: 24.2 MMOL/L
TROPONIN: <0.01 NG/ML
URINE REFLEX TO CULTURE: YES
URINE TYPE: ABNORMAL
UROBILINOGEN, URINE: 0.2 E.U./DL
WBC # BLD: 10.7 K/UL (ref 4–11)
WBC UA: 7 /HPF (ref 0–5)

## 2018-10-05 PROCEDURE — 2700000000 HC OXYGEN THERAPY PER DAY

## 2018-10-05 PROCEDURE — 36415 COLL VENOUS BLD VENIPUNCTURE: CPT

## 2018-10-05 PROCEDURE — 96365 THER/PROPH/DIAG IV INF INIT: CPT

## 2018-10-05 PROCEDURE — 87086 URINE CULTURE/COLONY COUNT: CPT

## 2018-10-05 PROCEDURE — 6360000004 HC RX CONTRAST MEDICATION: Performed by: EMERGENCY MEDICINE

## 2018-10-05 PROCEDURE — 84484 ASSAY OF TROPONIN QUANT: CPT

## 2018-10-05 PROCEDURE — 6370000000 HC RX 637 (ALT 250 FOR IP): Performed by: INTERNAL MEDICINE

## 2018-10-05 PROCEDURE — 2580000003 HC RX 258: Performed by: EMERGENCY MEDICINE

## 2018-10-05 PROCEDURE — 84145 PROCALCITONIN (PCT): CPT

## 2018-10-05 PROCEDURE — 93005 ELECTROCARDIOGRAM TRACING: CPT | Performed by: EMERGENCY MEDICINE

## 2018-10-05 PROCEDURE — 6360000002 HC RX W HCPCS: Performed by: EMERGENCY MEDICINE

## 2018-10-05 PROCEDURE — 71260 CT THORAX DX C+: CPT

## 2018-10-05 PROCEDURE — 85025 COMPLETE CBC W/AUTO DIFF WBC: CPT

## 2018-10-05 PROCEDURE — 81001 URINALYSIS AUTO W/SCOPE: CPT

## 2018-10-05 PROCEDURE — 71045 X-RAY EXAM CHEST 1 VIEW: CPT

## 2018-10-05 PROCEDURE — 2580000003 HC RX 258: Performed by: INTERNAL MEDICINE

## 2018-10-05 PROCEDURE — 94762 N-INVAS EAR/PLS OXIMTRY CONT: CPT

## 2018-10-05 PROCEDURE — 96375 TX/PRO/DX INJ NEW DRUG ADDON: CPT

## 2018-10-05 PROCEDURE — 1200000000 HC SEMI PRIVATE

## 2018-10-05 PROCEDURE — 83605 ASSAY OF LACTIC ACID: CPT

## 2018-10-05 PROCEDURE — 87077 CULTURE AEROBIC IDENTIFY: CPT

## 2018-10-05 PROCEDURE — 82803 BLOOD GASES ANY COMBINATION: CPT

## 2018-10-05 PROCEDURE — 99285 EMERGENCY DEPT VISIT HI MDM: CPT

## 2018-10-05 PROCEDURE — 80048 BASIC METABOLIC PNL TOTAL CA: CPT

## 2018-10-05 PROCEDURE — 87186 SC STD MICRODIL/AGAR DIL: CPT

## 2018-10-05 PROCEDURE — 83880 ASSAY OF NATRIURETIC PEPTIDE: CPT

## 2018-10-05 PROCEDURE — 36600 WITHDRAWAL OF ARTERIAL BLOOD: CPT

## 2018-10-05 PROCEDURE — 6360000002 HC RX W HCPCS: Performed by: INTERNAL MEDICINE

## 2018-10-05 RX ORDER — CALCITRIOL 0.25 UG/1
0.25 CAPSULE, LIQUID FILLED ORAL DAILY
Status: DISCONTINUED | OUTPATIENT
Start: 2018-10-06 | End: 2018-10-11 | Stop reason: HOSPADM

## 2018-10-05 RX ORDER — ALENDRONATE SODIUM 35 MG/1
TABLET ORAL
Refills: 0 | COMMUNITY
Start: 2018-09-04 | End: 2018-10-05 | Stop reason: ALTCHOICE

## 2018-10-05 RX ORDER — BISACODYL 10 MG
10 SUPPOSITORY, RECTAL RECTAL DAILY PRN
Status: DISCONTINUED | OUTPATIENT
Start: 2018-10-05 | End: 2018-10-11 | Stop reason: HOSPADM

## 2018-10-05 RX ORDER — ALENDRONATE SODIUM 35 MG/1
70 TABLET ORAL
COMMUNITY
End: 2019-02-26

## 2018-10-05 RX ORDER — PANTOPRAZOLE SODIUM 40 MG/1
40 TABLET, DELAYED RELEASE ORAL
Status: DISCONTINUED | OUTPATIENT
Start: 2018-10-06 | End: 2018-10-11 | Stop reason: HOSPADM

## 2018-10-05 RX ORDER — SODIUM CHLORIDE 0.9 % (FLUSH) 0.9 %
10 SYRINGE (ML) INJECTION EVERY 12 HOURS SCHEDULED
Status: DISCONTINUED | OUTPATIENT
Start: 2018-10-05 | End: 2018-10-11 | Stop reason: HOSPADM

## 2018-10-05 RX ORDER — METHYLPREDNISOLONE SODIUM SUCCINATE 125 MG/2ML
125 INJECTION, POWDER, LYOPHILIZED, FOR SOLUTION INTRAMUSCULAR; INTRAVENOUS ONCE
Status: COMPLETED | OUTPATIENT
Start: 2018-10-05 | End: 2018-10-05

## 2018-10-05 RX ORDER — IPRATROPIUM BROMIDE AND ALBUTEROL SULFATE 2.5; .5 MG/3ML; MG/3ML
1 SOLUTION RESPIRATORY (INHALATION) EVERY 4 HOURS PRN
Status: DISCONTINUED | OUTPATIENT
Start: 2018-10-05 | End: 2018-10-11 | Stop reason: HOSPADM

## 2018-10-05 RX ORDER — METHYLPREDNISOLONE SODIUM SUCCINATE 40 MG/ML
40 INJECTION, POWDER, LYOPHILIZED, FOR SOLUTION INTRAMUSCULAR; INTRAVENOUS EVERY 6 HOURS
Status: DISCONTINUED | OUTPATIENT
Start: 2018-10-06 | End: 2018-10-07

## 2018-10-05 RX ORDER — ONDANSETRON 2 MG/ML
4 INJECTION INTRAMUSCULAR; INTRAVENOUS EVERY 4 HOURS PRN
Status: DISCONTINUED | OUTPATIENT
Start: 2018-10-05 | End: 2018-10-11 | Stop reason: HOSPADM

## 2018-10-05 RX ORDER — NICOTINE 21 MG/24HR
1 PATCH, TRANSDERMAL 24 HOURS TRANSDERMAL DAILY PRN
Status: DISCONTINUED | OUTPATIENT
Start: 2018-10-05 | End: 2018-10-05

## 2018-10-05 RX ORDER — SODIUM CHLORIDE 0.9 % (FLUSH) 0.9 %
10 SYRINGE (ML) INJECTION PRN
Status: DISCONTINUED | OUTPATIENT
Start: 2018-10-05 | End: 2018-10-11 | Stop reason: HOSPADM

## 2018-10-05 RX ORDER — SERTRALINE HYDROCHLORIDE 100 MG/1
100 TABLET, FILM COATED ORAL DAILY
Status: DISCONTINUED | OUTPATIENT
Start: 2018-10-06 | End: 2018-10-11 | Stop reason: HOSPADM

## 2018-10-05 RX ORDER — BUPROPION HYDROCHLORIDE 150 MG/1
150 TABLET, EXTENDED RELEASE ORAL 2 TIMES DAILY
Status: DISCONTINUED | OUTPATIENT
Start: 2018-10-05 | End: 2018-10-11 | Stop reason: HOSPADM

## 2018-10-05 RX ORDER — DOCUSATE SODIUM 100 MG/1
100 CAPSULE, LIQUID FILLED ORAL 2 TIMES DAILY PRN
Status: DISCONTINUED | OUTPATIENT
Start: 2018-10-05 | End: 2018-10-11 | Stop reason: HOSPADM

## 2018-10-05 RX ORDER — ASPIRIN 81 MG/1
81 TABLET, CHEWABLE ORAL DAILY
Status: DISCONTINUED | OUTPATIENT
Start: 2018-10-06 | End: 2018-10-11 | Stop reason: HOSPADM

## 2018-10-05 RX ADMIN — METHYLPREDNISOLONE SODIUM SUCCINATE 125 MG: 125 INJECTION, POWDER, FOR SOLUTION INTRAMUSCULAR; INTRAVENOUS at 21:18

## 2018-10-05 RX ADMIN — CEFTRIAXONE 1 G: 1 INJECTION, POWDER, FOR SOLUTION INTRAMUSCULAR; INTRAVENOUS at 20:49

## 2018-10-05 RX ADMIN — BUPROPION HYDROCHLORIDE 150 MG: 150 TABLET, EXTENDED RELEASE ORAL at 23:35

## 2018-10-05 RX ADMIN — Medication 10 ML: at 23:36

## 2018-10-05 RX ADMIN — IOPAMIDOL 75 ML: 755 INJECTION, SOLUTION INTRAVENOUS at 19:02

## 2018-10-05 RX ADMIN — AZITHROMYCIN MONOHYDRATE 500 MG: 500 INJECTION, POWDER, LYOPHILIZED, FOR SOLUTION INTRAVENOUS at 23:35

## 2018-10-05 NOTE — ED TRIAGE NOTES
Pt arrived to the ED via private vehicle from home. Pt states she has been SOB for the past couple of weeks. Son states they called Dr. Jose Villa and he told her to come into the ED. Pt states her o2 was low at home. They checked it at home and it was anywhere between 50-high 62s. Pt states the SOB as increased over the past week. Pt denies chest pain at this time.

## 2018-10-06 LAB
ANION GAP SERPL CALCULATED.3IONS-SCNC: 12 MMOL/L (ref 3–16)
BASOPHILS ABSOLUTE: 0 K/UL (ref 0–0.2)
BASOPHILS RELATIVE PERCENT: 0.2 %
BUN BLDV-MCNC: 22 MG/DL (ref 7–20)
CALCIUM SERPL-MCNC: 11 MG/DL (ref 8.3–10.6)
CHLORIDE BLD-SCNC: 102 MMOL/L (ref 99–110)
CO2: 26 MMOL/L (ref 21–32)
CREAT SERPL-MCNC: 0.7 MG/DL (ref 0.6–1.2)
EOSINOPHILS ABSOLUTE: 0 K/UL (ref 0–0.6)
EOSINOPHILS RELATIVE PERCENT: 0.1 %
GFR AFRICAN AMERICAN: >60
GFR NON-AFRICAN AMERICAN: >60
GLUCOSE BLD-MCNC: 152 MG/DL (ref 70–99)
HCT VFR BLD CALC: 36 % (ref 36–48)
HEMOGLOBIN: 12 G/DL (ref 12–16)
LYMPHOCYTES ABSOLUTE: 0.6 K/UL (ref 1–5.1)
LYMPHOCYTES RELATIVE PERCENT: 9.8 %
MCH RBC QN AUTO: 30.7 PG (ref 26–34)
MCHC RBC AUTO-ENTMCNC: 33.4 G/DL (ref 31–36)
MCV RBC AUTO: 91.8 FL (ref 80–100)
MONOCYTES ABSOLUTE: 0.1 K/UL (ref 0–1.3)
MONOCYTES RELATIVE PERCENT: 1.1 %
NEUTROPHILS ABSOLUTE: 5.5 K/UL (ref 1.7–7.7)
NEUTROPHILS RELATIVE PERCENT: 88.8 %
PDW BLD-RTO: 13.5 % (ref 12.4–15.4)
PLATELET # BLD: 269 K/UL (ref 135–450)
PMV BLD AUTO: 7.7 FL (ref 5–10.5)
POTASSIUM REFLEX MAGNESIUM: 4.9 MMOL/L (ref 3.5–5.1)
RBC # BLD: 3.92 M/UL (ref 4–5.2)
SODIUM BLD-SCNC: 140 MMOL/L (ref 136–145)
WBC # BLD: 6.2 K/UL (ref 4–11)

## 2018-10-06 PROCEDURE — 6370000000 HC RX 637 (ALT 250 FOR IP): Performed by: INTERNAL MEDICINE

## 2018-10-06 PROCEDURE — 80048 BASIC METABOLIC PNL TOTAL CA: CPT

## 2018-10-06 PROCEDURE — 93010 ELECTROCARDIOGRAM REPORT: CPT | Performed by: INTERNAL MEDICINE

## 2018-10-06 PROCEDURE — 94640 AIRWAY INHALATION TREATMENT: CPT

## 2018-10-06 PROCEDURE — 6360000002 HC RX W HCPCS: Performed by: INTERNAL MEDICINE

## 2018-10-06 PROCEDURE — 2580000003 HC RX 258: Performed by: INTERNAL MEDICINE

## 2018-10-06 PROCEDURE — 36415 COLL VENOUS BLD VENIPUNCTURE: CPT

## 2018-10-06 PROCEDURE — 85025 COMPLETE CBC W/AUTO DIFF WBC: CPT

## 2018-10-06 PROCEDURE — 1200000000 HC SEMI PRIVATE

## 2018-10-06 PROCEDURE — 94761 N-INVAS EAR/PLS OXIMETRY MLT: CPT

## 2018-10-06 PROCEDURE — 2700000000 HC OXYGEN THERAPY PER DAY

## 2018-10-06 PROCEDURE — 94664 DEMO&/EVAL PT USE INHALER: CPT

## 2018-10-06 PROCEDURE — 87040 BLOOD CULTURE FOR BACTERIA: CPT

## 2018-10-06 PROCEDURE — 99223 1ST HOSP IP/OBS HIGH 75: CPT | Performed by: INTERNAL MEDICINE

## 2018-10-06 RX ORDER — IPRATROPIUM BROMIDE AND ALBUTEROL SULFATE 2.5; .5 MG/3ML; MG/3ML
1 SOLUTION RESPIRATORY (INHALATION) 2 TIMES DAILY
Status: DISCONTINUED | OUTPATIENT
Start: 2018-10-06 | End: 2018-10-06

## 2018-10-06 RX ORDER — AZITHROMYCIN 250 MG/1
250 TABLET, FILM COATED ORAL DAILY
Status: DISCONTINUED | OUTPATIENT
Start: 2018-10-06 | End: 2018-10-11

## 2018-10-06 RX ORDER — FUROSEMIDE 10 MG/ML
20 INJECTION INTRAMUSCULAR; INTRAVENOUS ONCE
Status: COMPLETED | OUTPATIENT
Start: 2018-10-06 | End: 2018-10-06

## 2018-10-06 RX ADMIN — Medication 10 ML: at 21:25

## 2018-10-06 RX ADMIN — METHYLPREDNISOLONE SODIUM SUCCINATE 40 MG: 40 INJECTION, POWDER, FOR SOLUTION INTRAMUSCULAR; INTRAVENOUS at 15:18

## 2018-10-06 RX ADMIN — Medication 10 ML: at 08:12

## 2018-10-06 RX ADMIN — ASPIRIN 81 MG CHEWABLE TABLET 81 MG: 81 TABLET CHEWABLE at 08:13

## 2018-10-06 RX ADMIN — METHYLPREDNISOLONE SODIUM SUCCINATE 40 MG: 40 INJECTION, POWDER, FOR SOLUTION INTRAMUSCULAR; INTRAVENOUS at 08:13

## 2018-10-06 RX ADMIN — METHYLPREDNISOLONE SODIUM SUCCINATE 40 MG: 40 INJECTION, POWDER, FOR SOLUTION INTRAMUSCULAR; INTRAVENOUS at 03:15

## 2018-10-06 RX ADMIN — SERTRALINE 100 MG: 100 TABLET, FILM COATED ORAL at 08:13

## 2018-10-06 RX ADMIN — METHYLPREDNISOLONE SODIUM SUCCINATE 40 MG: 40 INJECTION, POWDER, FOR SOLUTION INTRAMUSCULAR; INTRAVENOUS at 21:25

## 2018-10-06 RX ADMIN — PANTOPRAZOLE SODIUM 40 MG: 40 TABLET, DELAYED RELEASE ORAL at 06:17

## 2018-10-06 RX ADMIN — ENOXAPARIN SODIUM 40 MG: 40 INJECTION SUBCUTANEOUS at 08:12

## 2018-10-06 RX ADMIN — BUPROPION HYDROCHLORIDE 150 MG: 150 TABLET, EXTENDED RELEASE ORAL at 08:13

## 2018-10-06 RX ADMIN — FUROSEMIDE 20 MG: 10 INJECTION, SOLUTION INTRAMUSCULAR; INTRAVENOUS at 08:13

## 2018-10-06 RX ADMIN — BUPROPION HYDROCHLORIDE 150 MG: 150 TABLET, EXTENDED RELEASE ORAL at 21:25

## 2018-10-06 RX ADMIN — AZITHROMYCIN 250 MG: 250 TABLET, FILM COATED ORAL at 15:49

## 2018-10-06 RX ADMIN — CEFTRIAXONE 1 G: 1 INJECTION, POWDER, FOR SOLUTION INTRAMUSCULAR; INTRAVENOUS at 08:12

## 2018-10-06 RX ADMIN — CALCITRIOL 0.25 MCG: 0.25 CAPSULE, LIQUID FILLED ORAL at 08:13

## 2018-10-06 RX ADMIN — IPRATROPIUM BROMIDE AND ALBUTEROL SULFATE 1 AMPULE: .5; 3 SOLUTION RESPIRATORY (INHALATION) at 20:37

## 2018-10-06 ASSESSMENT — PAIN SCALES - GENERAL: PAINLEVEL_OUTOF10: 0

## 2018-10-06 NOTE — ED PROVIDER NOTES
Triage Chief Complaint:   Shortness of Breath (x 1 wk but worse today, hx of pulmonary fibrosis)    [place]:  Hever Vo is a 70 y.o. female that presents To the emergency Department with complaints of having shortness breath that seems be getting worse over the last week. Patient is a history of pulmonary fibrosis, and son state that she seems to be out of breath with even the slightest of movements. The patient has not had any fevers or chills, and she does not have a cough that is productive of the present time. Patient denies any chest pains or back pains. Patient denies any recent falls. Patient states that she does feel generally weak and states that the weakness becomes more severe or tries to ambulate. Patient does not normally use oxygen at home    ROS:  At least 10 systems reviewed and otherwise acutely negative except as in the [address]     Past Medical History:   Diagnosis Date    Anxiety and depression     Arthritis     Cancer of skin of leg     Chronic low back pain     GERD (gastroesophageal reflux disease)     Insomnia     Iron deficiency anemia     Kidney stone     PUD (peptic ulcer disease)     Pulmonary fibrosis (HCC)     Dr. Sunil Cherry has a CT done every 6 months    Stomach ulcer     Ulcer - lesion MARCH 2010    UTI (urinary tract infection)     frequent     Past Surgical History:   Procedure Laterality Date   [facility] SURGERY  MARCH 2004,OCT 02, [place] 53    x4--fusions    BACK SURGERY  06/2000    laminectomy-lumbar    CARPAL TUNNEL RELEASE  9/27/11    Left    COLONOSCOPY      CYSTOSCOPY Left 09/2016    cysto left ureteral stent placement    CYSTOSCOPY  01/15/2018    Left Stent Insertion    CYSTOSCOPY  02/14/2018    cysto, left uretero with laser litho, left stent excahnge    EYE SURGERY Bilateral 2008    cataract removed with lens implants    FRACTURE SURGERY Right 06/05/2016    femur fracture    FRACTURE SURGERY Left     femur fracture   [address] REPL[address]  2000    LEFT KNEE    JOINT REPLACEMENT  08    RIGHT KNEE    STOMACH SURGERY      For PUD     Family History   Problem Relation Age of Onset    Emphysema Mother          AGE 64    Depression Mother     Clotting Disorder Father             Stroke Father      Social History     Social History    Marital status:      Spouse name: RITO    Number of children: 2    Years of education: N/A     Occupational History    retired      Social History Main Topics    Smoking status: Never Smoker    Smokeless tobacco: Never Used      Comment: encouraged to never smoke     Alcohol use No    Drug use: No    Sexual activity: No     Other Topics Concern    Not on file     Social History Narrative    No narrative on file     Current Facility-Administered Medications   Medication Dose Route Frequency Provider Last Rate Last Dose    [START ON 10/7/2018] influenza quadrivalent split vaccine (FLUZONE;FLUARIX;FLULAVAL;AFLURIA) injection 0.5 mL  0.5 mL Intramuscular Once Hannah Villatoro MD        sertraline (ZOLOFT) tablet 100 mg  100 mg Oral Daily Hannah Villatoro MD        pantoprazole (PROTONIX) tablet 40 mg  40 mg Oral QAM AC Hannah Villatoro MD   40 mg at 10/06/18 0617    calcitRIOL (ROCALTROL) capsule 0.25 mcg  0.25 mcg Oral Daily Hannah Villatoro MD        buPROPion [facility]) extended release tablet 150 mg  150 mg Oral BID Hannah Villatoro MD   150 mg at 10/05/18 2335    aspirin chewable tablet 81 mg  81 mg Oral Daily Hannah Villatoro MD        cefTRIAXone (ROCEPHIN) 1 g IVPB in 50 mL D5W minibag  1 g Intravenous Q24H Hannah Villatoro MD        sodium chloride flush 0.9 % injection 10 mL  10 mL Intravenous 2 times per day Hannah Villatoro MD   10 mL at 10/05/18 2336    sodium chloride flush 0.9 % injection 10 mL  10 mL Intravenous PRN Hannah Villatoro MD        magnesium hydroxide (MILK OF MAGNESIA) 400 MG/5ML suspension 30 mL this visit (if applicable):  Results for orders placed or performed during the hospital encounter of 10/05/18   Troponin   Result Value Ref Range    Troponin <0.01 <0.01 ng/mL   CBC Auto Differential   Result Value Ref Range    WBC 10.7 4.0 - 11.0 K/uL    RBC 4.03 4.00 - 5.20 M/uL    Hemoglobin 12.6 12.0 - 16.0 g/dL    Hematocrit 37.2 36.0 - 48.0 %    MCV 92.4 80.0 - 100.0 fL    MCH 31.3 26.0 - 34.0 pg    MCHC 33.8 31.0 - 36.0 g/dL    RDW 13.8 12.4 - 15.4 %    Platelets 871 530 - 245 K/uL    MPV 7.2 5.0 - 10.5 fL    Neutrophils % 88.4 %    Lymphocytes % 5.3 %    Monocytes % 5.4 %    Eosinophils % 0.7 %    Basophils % 0.2 %    Neutrophils # 9.5 (H) 1.7 - 7.7 K/uL    Lymphocytes # 0.6 (L) 1.0 - 5.1 K/uL    Monocytes # 0.6 0.0 - 1.3 K/uL    Eosinophils # 0.1 0.0 - 0.6 K/uL    Basophils # 0.0 0.0 - 0.2 K/uL   Basic Metabolic Panel   Result Value Ref Range    Sodium 139 136 - 145 mmol/L    Potassium 4.9 3.5 - 5.1 mmol/L    Chloride 102 99 - 110 mmol/L    CO2 26 21 - 32 mmol/L    Anion Gap 11 3 - 16    Glucose 125 (H) 70 - 99 mg/dL    BUN 25 (H) 7 - 20 mg/dL    CREATININE 0.8 0.6 - 1.2 mg/dL    GFR Non-African American >60 >60    GFR African American >60 >60    Calcium 11.3 (H) 8.3 - 10.6 mg/dL   Blood Gas, Arterial   Result Value Ref Range    pH, Arterial 7.433 7.350 - 7.450    pCO2, Arterial 35.3 35.0 - 45.0 mmHg    pO2, Arterial 80.0 75.0 - 108.0 mmHg    HCO3, Arterial 23.2 21.0 - 29.0 mmol/L    Base Excess, Arterial -0.1 -3.0 - 3.0 mmol/L    Hemoglobin, Art, Extended 14.2 12.0 - 16.0 g/dL    O2 Sat, Arterial 96.3 >92 %    Carboxyhgb, Arterial 1.4 0.0 - 1.5 %    Methemoglobin, Arterial 0.8 <1.5 %    TCO2, Arterial 24.2 Not Established mmol/L    O2 Content, Arterial 19 Not Established mL/dL    O2 Therapy Unknown    Brain Natriuretic Peptide   Result Value Ref Range    Pro- 0 - 449 pg/mL   Lactic Acid, Plasma   Result Value Ref Range    Lactic Acid 1.2 0.4 - 2.0 mmol/L   Urine, reflex to culture   Result Value Ref pulmonary edema, so I decided also to do a chest CT. Patient's chest CT last was done approximately a year ago. Patient denies any chest pain at the present time, and cardiac testing demonstrates normal troponin, normal BMP, and a normal lactic acid. The patient also had other lab testing which was also normal.  Patient's symptoms seem to be steadily worse, and her urinalysis also seem to demonstrate a urinary tract infection. My feeling is that the patient's dyspnea is most likely secondary to the infection, however we are going to have the patient admitted and put on steroids. I did speak with the hospitalist, and he is willing to write for admission were we are going to evaluate the patient after receiving IV antibiotics here in the emergency department. Clinical Impression:  1. Urinary tract infection without hematuria, site unspecified    2.  Dyspnea, unspecified type      (Please note that portions of this note may have been completed with a voice recognition program. Efforts were made to edit the dictations but occasionally words are mis-transcribed.)    MD Elbert Willis MD  10/06/18 [id number]

## 2018-10-06 NOTE — PLAN OF CARE
Problem: Nutrition  Goal: Optimal nutrition therapy  Outcome: Ongoing  Nutrition Problem: Increased nutrient needs  Intervention: Food and/or Nutrient Delivery: Continue current diet, Start ONS  Nutritional Goals: consume >50% of meals and supplement during admission.

## 2018-10-06 NOTE — CONSULTS
Facility-Administered Medications: [START ON 10/7/2018] influenza quadrivalent split vaccine (FLUZONE;FLUARIX;FLULAVAL;AFLURIA) injection 0.5 mL, 0.5 mL, Intramuscular, Once  cefTRIAXone (ROCEPHIN) 1 g IVPB in 50 mL D5W minibag, 1 g, Intravenous, Q24H  sertraline (ZOLOFT) tablet 100 mg, 100 mg, Oral, Daily  pantoprazole (PROTONIX) tablet 40 mg, 40 mg, Oral, QAM AC  calcitRIOL (ROCALTROL) capsule 0.25 mcg, 0.25 mcg, Oral, Daily  buPROPion [facility]) extended release tablet 150 mg, 150 mg, Oral, BID  aspirin chewable tablet 81 mg, 81 mg, Oral, Daily  sodium chloride flush 0.9 % injection 10 mL, 10 mL, Intravenous, 2 times per day  sodium chloride flush 0.9 % injection 10 mL, 10 mL, Intravenous, PRN  magnesium hydroxide (MILK OF MAGNESIA) 400 MG/5ML suspension 30 mL, 30 mL, Oral, Daily PRN  docusate sodium (COLACE) capsule 100 mg, 100 mg, Oral, BID PRN  bisacodyl (DULCOLAX) suppository 10 mg, 10 mg, Rectal, Daily PRN  ondansetron (ZOFRAN) injection 4 mg, 4 mg, Intravenous, Q4H PRN  enoxaparin (LOVENOX) injection 40 mg, 40 mg, Subcutaneous, Daily  ipratropium-albuterol (DUONEB) nebulizer solution 1 ampule, 1 ampule, Inhalation, Q4H PRN  methylPREDNISolone sodium (SOLU-MEDROL) injection 40 mg, 40 mg, Intravenous, Q6H      ALLERGIES:  Patient is allergic to ampicillin; ciprofloxacin; nitrofurantoin; sulfa antibiotics; and penicillins. FAMILY HISTORY:  family history includes Clotting Disorder in her father; Depression in her mother; Emphysema in her mother; Stroke in her father.     SOCIAL HISTORY:  Social History     Social History    Marital status:      Spouse name: RITO    Number of children: 2    Years of education: N/A     Occupational History    retired      Social History Main Topics    Smoking status: Never Smoker    Smokeless tobacco: Never Used      Comment: encouraged to never smoke     Alcohol use No    Drug use: No    Sexual activity: No     Other Topics Concern    Not on file

## 2018-10-06 NOTE — PROGRESS NOTES
Hospitalist Progress Note    CC: Acute on chronic respiratory failure with hypoxia [facility])    Hospital course:  69HG WF with chroic ILD on 4L oxygen at home. Recently pt has had increased desaturations and fatigue with sats decreasing to the 50s with minimal ambulation. She did have some patchy opacity of the lungs suggestive of pneumonitis, but her legs have slight pitting edema and this could be related to fluid overload. Will do gentle diuresis and await pulm consult. Also she has a wound vac for chronic wound on L hip due to be changed on Monday. Admit date: 10/5/2018  Days in hospital:  1    24 Hour Events: feels OK    Subjective: currently on  Nebs, steroids    ROS:   A comprehensive review of systems was negative except for: increased dyspnea on exertion . Objective:    /61   Pulse 74   Temp 97.8 °F (36.6 °C) (Oral)   Resp 18   Ht 5' 2\" (1.575 m)   Wt 132 lb 11.5 oz (60.2 kg)   SpO2 96%   BMI 24.27 kg/m²     Gen: frail chronically ill appearing  HEENT: NC/AT, moist mucous membranes, no oropharyngeal erythema or exudate  Neck: supple, trachea midline, no anterior cervical or SC LAD  Heart:  Normal s1/s2, RRR, no murmurs, gallops, or rubs. Trace pitting leg edema  Lungs:  Some crackles bilaterally, no wheeze, no rales, no rhonchi, some likely chronic crackles, pos with exertion use of accessory muscles  Abd: bowel sounds present, soft, nontender, nondistended, no masses  Extrem:  No clubbing, cyanosis,  Trace pitting edema  Skin: no rashes or lesions  Psych: A & O x3  Neuro: grossly intact, moves all four extremities.     Assessment:    Principal Problem:    Acute on chronic respiratory failure with hypoxia (HCC)  Active Problems:    Acute cystitis without hematuria    ILD (interstitial lung disease) (HCC)    Sepsis (HCC)    Fever    Tachycardia    Tachypnea    Pressure injury of skin of left buttock  Resolved Problems:    * No resolved hospital problems. *      Plan:  1. Acute on chronic resp failure  2. UTI - on ceftriaxone - has gram neg susan>100K -   3. Chronic wound on L hip - has wound vac on - needs to be changed on Monday - wound may need to be opened further, it was starting to close  4. Possible bronchitis = procalcitonin negative - could be edema - will give dose of lasix    Prognosis:  Fair    Code status:  FULL    DVT prophylaxis: [x] Lovenox  [] SQ Heparin  [] SCDs because of  [] warfarin/oral direct thrombin inhibitor [] Encourage ambulation  GI prophylaxis: [] [id number]  [x] not indicated  Diet:  DIET GENERAL;    Disposition:  [] Home  [x] Home with home health [] Rehab [] Psych [] SNF  [] LTAC  [] Long term nursing home or group home [] Transfer to ICU  [] Transfer to PCU [] Other:     Medications:  Scheduled Meds:   [START ON 10/7/2018] influenza virus vaccine  0.5 mL Intramuscular Once    cefTRIAXone (ROCEPHIN) IV  1 g Intravenous Q24H    furosemide  20 mg Intravenous Once    sertraline  100 mg Oral Daily    pantoprazole  40 mg Oral QAM AC    calcitRIOL  0.25 mcg Oral Daily    buPROPion  150 mg Oral BID    aspirin  81 mg Oral Daily    sodium chloride flush  10 mL Intravenous 2 times per day    enoxaparin  40 mg Subcutaneous Daily    methylPREDNISolone  40 mg Intravenous Q6H       PRN Meds:  sodium chloride flush, magnesium hydroxide, docusate sodium, bisacodyl, ondansetron, ipratropium-albuterol    IV:        Intake/Output Summary (Last 24 hours) at 10/06/18 0755  Last data filed at 10/06/18 0405   Gross per 24 hour   Intake               50 ml   Output              200 ml   Net             -150 ml       Results:  CBC: Recent Labs      10/05/18   1715   WBC  10.7   HGB  12.6   HCT  37.2   MCV  92.4   PLT  246     BMP: Recent Labs      10/05/18   1715   NA  139   K  4.9   CL  102   CO2  26   BUN  25*   CREATININE  0.8     Mag: No results for input(s): MAG in the last 72 hours.   Phos:   Lab Results   Component

## 2018-10-06 NOTE — H&P
insight    CBC:   Recent Labs      10/05/18   1715   WBC  10.7   HGB  12.6   PLT  246     BMP:    Recent Labs      10/05/18   1715   NA  139   K  4.9   CL  102   CO2  26   BUN  25*   CREATININE  0.8   GLUCOSE  125*     Troponin:   Recent Labs      10/05/18   1715   TROPONINI  <0.01     PT/INR:  No results found for: PTINR  U/A:    Lab Results   Component Value Date    LEUKOCYTESUR TRACE 10/05/2018    NITRITE positive 03/12/2018    RBCUA >100 02/14/2018    SPECGRAV 1.020 10/05/2018    UROBILINOGEN 0.2 10/05/2018    BILIRUBINUR Negative 10/05/2018    BILIRUBINUR neg 03/12/2018    BILIRUBINUR MODERATE 03/04/2010    BLOODU Negative 10/05/2018    GLUCOSEU Negative 10/05/2018    GLUCOSEU NEGATIVE 03/04/2010    PROTEINU Negative 10/05/2018         RAD:   I have independently reviewed and interpreted the imaging studies below and based my recommendations to the patient on those findings. Xr Chest Standard (2 Vw)    Result Date: 10/3/2018  EXAMINATION: TWO VIEWS OF THE CHEST 10/3/2018 5:12 pm COMPARISON: 01/15/2018 HISTORY: ORDERING SYSTEM PROVIDED HISTORY: ILD (interstitial lung disease) ([facility] [address] TECHNOLOGIST PROVIDED HISTORY: Reason for exam:->worsening hypoxia, known ILD Ordering Physician Provided Reason for Exam: worsening hypoxia, known ILD Acuity: Acute Type of Exam: Initial FINDINGS: Posterior fixation rods remain in place. The heart size is stable. Emphysematous changes and interstitial infiltrates persist.  No new airspace disease. No pleural effusion.      Unchanged chest demonstrating pulmonary fibrosis and emphysematous changes     Xr Chest Portable    Result Date: 10/5/2018  EXAMINATION: SINGLE XRAY VIEW OF THE CHEST 10/5/2018 1:42 pm COMPARISON: 10/03/2018 HISTORY: ORDERING SYSTEM PROVIDED HISTORY: dyspnea TECHNOLOGIST PROVIDED HISTORY: Reason for exam:->dyspnea Ordering Physician Provided Reason for Exam: sob Acuity: Acute Type of Exam: Initial FINDINGS: The cardial-pericardial silhouette is enlarged but member and is reflected in the code status. Further orders associated with this have been entered if appropriate)    Disposition: Anticipate that patient will remain in the hospital for 2 to 3 days depending on further evaluation and clinical course.      Tam Viveros MD

## 2018-10-07 LAB
L. PNEUMOPHILA SEROGP 1 UR AG: NORMAL
ORGANISM: ABNORMAL
PROCALCITONIN: 0.09 NG/ML (ref 0–0.15)
URINE CULTURE, ROUTINE: ABNORMAL
URINE CULTURE, ROUTINE: ABNORMAL

## 2018-10-07 PROCEDURE — 2580000003 HC RX 258: Performed by: INTERNAL MEDICINE

## 2018-10-07 PROCEDURE — 99232 SBSQ HOSP IP/OBS MODERATE 35: CPT | Performed by: INTERNAL MEDICINE

## 2018-10-07 PROCEDURE — 6370000000 HC RX 637 (ALT 250 FOR IP): Performed by: INTERNAL MEDICINE

## 2018-10-07 PROCEDURE — 87449 NOS EACH ORGANISM AG IA: CPT

## 2018-10-07 PROCEDURE — 94761 N-INVAS EAR/PLS OXIMETRY MLT: CPT

## 2018-10-07 PROCEDURE — 90686 IIV4 VACC NO PRSV 0.5 ML IM: CPT | Performed by: INTERNAL MEDICINE

## 2018-10-07 PROCEDURE — 1200000000 HC SEMI PRIVATE

## 2018-10-07 PROCEDURE — 2700000000 HC OXYGEN THERAPY PER DAY

## 2018-10-07 PROCEDURE — 6360000002 HC RX W HCPCS: Performed by: INTERNAL MEDICINE

## 2018-10-07 PROCEDURE — 36415 COLL VENOUS BLD VENIPUNCTURE: CPT

## 2018-10-07 PROCEDURE — 84145 PROCALCITONIN (PCT): CPT

## 2018-10-07 PROCEDURE — 94640 AIRWAY INHALATION TREATMENT: CPT

## 2018-10-07 PROCEDURE — G0008 ADMIN INFLUENZA VIRUS VAC: HCPCS | Performed by: INTERNAL MEDICINE

## 2018-10-07 RX ORDER — METHYLPREDNISOLONE SODIUM SUCCINATE 40 MG/ML
40 INJECTION, POWDER, LYOPHILIZED, FOR SOLUTION INTRAMUSCULAR; INTRAVENOUS 2 TIMES DAILY
Status: DISCONTINUED | OUTPATIENT
Start: 2018-10-07 | End: 2018-10-08

## 2018-10-07 RX ORDER — FUROSEMIDE 10 MG/ML
40 INJECTION INTRAMUSCULAR; INTRAVENOUS ONCE
Status: COMPLETED | OUTPATIENT
Start: 2018-10-07 | End: 2018-10-07

## 2018-10-07 RX ORDER — IPRATROPIUM BROMIDE AND ALBUTEROL SULFATE 2.5; .5 MG/3ML; MG/3ML
1 SOLUTION RESPIRATORY (INHALATION) 2 TIMES DAILY
Status: DISCONTINUED | OUTPATIENT
Start: 2018-10-07 | End: 2018-10-11 | Stop reason: HOSPADM

## 2018-10-07 RX ADMIN — METHYLPREDNISOLONE SODIUM SUCCINATE 40 MG: 40 INJECTION, POWDER, FOR SOLUTION INTRAMUSCULAR; INTRAVENOUS at 21:54

## 2018-10-07 RX ADMIN — BUPROPION HYDROCHLORIDE 150 MG: 150 TABLET, EXTENDED RELEASE ORAL at 08:10

## 2018-10-07 RX ADMIN — IPRATROPIUM BROMIDE AND ALBUTEROL SULFATE 1 AMPULE: .5; 3 SOLUTION RESPIRATORY (INHALATION) at 20:57

## 2018-10-07 RX ADMIN — METHYLPREDNISOLONE SODIUM SUCCINATE 40 MG: 40 INJECTION, POWDER, FOR SOLUTION INTRAMUSCULAR; INTRAVENOUS at 14:54

## 2018-10-07 RX ADMIN — CEFTRIAXONE 1 G: 1 INJECTION, POWDER, FOR SOLUTION INTRAMUSCULAR; INTRAVENOUS at 08:10

## 2018-10-07 RX ADMIN — ASPIRIN 81 MG CHEWABLE TABLET 81 MG: 81 TABLET CHEWABLE at 08:11

## 2018-10-07 RX ADMIN — METHYLPREDNISOLONE SODIUM SUCCINATE 40 MG: 40 INJECTION, POWDER, FOR SOLUTION INTRAMUSCULAR; INTRAVENOUS at 03:23

## 2018-10-07 RX ADMIN — FUROSEMIDE 40 MG: 10 INJECTION, SOLUTION INTRAMUSCULAR; INTRAVENOUS at 16:48

## 2018-10-07 RX ADMIN — INFLUENZA A VIRUS A/MICHIGAN/45/2015 X-275 (H1N1) ANTIGEN (FORMALDEHYDE INACTIVATED), INFLUENZA A VIRUS A/SINGAPORE/INFIMH-16-0019/2016 IVR-186 (H3N2) ANTIGEN (FORMALDEHYDE INACTIVATED), INFLUENZA B VIRUS B/PHUKET/3073/2013 ANTIGEN (FORMALDEHYDE INACTIVATED), AND INFLUENZA B VIRUS B/MARYLAND/15/2016 BX-69A ANTIGEN (FORMALDEHYDE INACTIVATED) 0.5 ML: 15; 15; 15; 15 INJECTION, SUSPENSION INTRAMUSCULAR at 08:25

## 2018-10-07 RX ADMIN — Medication 10 ML: at 21:54

## 2018-10-07 RX ADMIN — SERTRALINE 100 MG: 100 TABLET, FILM COATED ORAL at 08:10

## 2018-10-07 RX ADMIN — BUPROPION HYDROCHLORIDE 150 MG: 150 TABLET, EXTENDED RELEASE ORAL at 21:54

## 2018-10-07 RX ADMIN — CALCITRIOL 0.25 MCG: 0.25 CAPSULE, LIQUID FILLED ORAL at 08:10

## 2018-10-07 RX ADMIN — METHYLPREDNISOLONE SODIUM SUCCINATE 40 MG: 40 INJECTION, POWDER, FOR SOLUTION INTRAMUSCULAR; INTRAVENOUS at 08:09

## 2018-10-07 RX ADMIN — Medication 10 ML: at 08:11

## 2018-10-07 RX ADMIN — AZITHROMYCIN 250 MG: 250 TABLET, FILM COATED ORAL at 08:10

## 2018-10-07 RX ADMIN — ENOXAPARIN SODIUM 40 MG: 40 INJECTION SUBCUTANEOUS at 08:10

## 2018-10-07 RX ADMIN — PANTOPRAZOLE SODIUM 40 MG: 40 TABLET, DELAYED RELEASE ORAL at 06:47

## 2018-10-07 ASSESSMENT — PAIN SCALES - GENERAL: PAINLEVEL_OUTOF10: 0

## 2018-10-07 NOTE — PROGRESS NOTES
saturation greater than 90%     Cystic lung disease  - Duonebs twice daily     Urine tract infection  - Ceftriaxone        Thank you for allowing me to participate in the care of this patient. Will follow.      Raine Yusuf MD  [facility]

## 2018-10-08 LAB
ANION GAP SERPL CALCULATED.3IONS-SCNC: 13 MMOL/L (ref 3–16)
BUN BLDV-MCNC: 38 MG/DL (ref 7–20)
CALCIUM SERPL-MCNC: 11 MG/DL (ref 8.3–10.6)
CHLORIDE BLD-SCNC: 99 MMOL/L (ref 99–110)
CO2: 27 MMOL/L (ref 21–32)
CREAT SERPL-MCNC: 0.9 MG/DL (ref 0.6–1.2)
EKG ATRIAL RATE: 90 BPM
EKG DIAGNOSIS: NORMAL
EKG P AXIS: 49 DEGREES
EKG P-R INTERVAL: 146 MS
EKG Q-T INTERVAL: 344 MS
EKG QRS DURATION: 80 MS
EKG QTC CALCULATION (BAZETT): 420 MS
EKG R AXIS: -14 DEGREES
EKG T AXIS: 18 DEGREES
EKG VENTRICULAR RATE: 90 BPM
GFR AFRICAN AMERICAN: >60
GFR NON-AFRICAN AMERICAN: >60
GLUCOSE BLD-MCNC: 173 MG/DL (ref 70–99)
HCT VFR BLD CALC: 33.9 % (ref 36–48)
HEMOGLOBIN: 11.3 G/DL (ref 12–16)
MCH RBC QN AUTO: 31.1 PG (ref 26–34)
MCHC RBC AUTO-ENTMCNC: 33.3 G/DL (ref 31–36)
MCV RBC AUTO: 93.1 FL (ref 80–100)
PDW BLD-RTO: 14.1 % (ref 12.4–15.4)
PLATELET # BLD: 301 K/UL (ref 135–450)
PMV BLD AUTO: 7.2 FL (ref 5–10.5)
POTASSIUM SERPL-SCNC: 3.8 MMOL/L (ref 3.5–5.1)
RBC # BLD: 3.64 M/UL (ref 4–5.2)
SODIUM BLD-SCNC: 139 MMOL/L (ref 136–145)
WBC # BLD: 12.7 K/UL (ref 4–11)

## 2018-10-08 PROCEDURE — 6370000000 HC RX 637 (ALT 250 FOR IP): Performed by: INTERNAL MEDICINE

## 2018-10-08 PROCEDURE — 6370000000 HC RX 637 (ALT 250 FOR IP): Performed by: NURSE PRACTITIONER

## 2018-10-08 PROCEDURE — 99232 SBSQ HOSP IP/OBS MODERATE 35: CPT | Performed by: NURSE PRACTITIONER

## 2018-10-08 PROCEDURE — 80048 BASIC METABOLIC PNL TOTAL CA: CPT

## 2018-10-08 PROCEDURE — 6360000002 HC RX W HCPCS: Performed by: INTERNAL MEDICINE

## 2018-10-08 PROCEDURE — 85027 COMPLETE CBC AUTOMATED: CPT

## 2018-10-08 PROCEDURE — 2580000003 HC RX 258: Performed by: INTERNAL MEDICINE

## 2018-10-08 PROCEDURE — 36415 COLL VENOUS BLD VENIPUNCTURE: CPT

## 2018-10-08 PROCEDURE — 94760 N-INVAS EAR/PLS OXIMETRY 1: CPT

## 2018-10-08 PROCEDURE — 2700000000 HC OXYGEN THERAPY PER DAY

## 2018-10-08 PROCEDURE — 94640 AIRWAY INHALATION TREATMENT: CPT

## 2018-10-08 PROCEDURE — 1200000000 HC SEMI PRIVATE

## 2018-10-08 RX ORDER — PREDNISONE 20 MG/1
40 TABLET ORAL DAILY
Status: DISCONTINUED | OUTPATIENT
Start: 2018-10-08 | End: 2018-10-11 | Stop reason: HOSPADM

## 2018-10-08 RX ADMIN — CALCITRIOL 0.25 MCG: 0.25 CAPSULE, LIQUID FILLED ORAL at 08:45

## 2018-10-08 RX ADMIN — Medication 10 ML: at 08:46

## 2018-10-08 RX ADMIN — IPRATROPIUM BROMIDE AND ALBUTEROL SULFATE 1 AMPULE: .5; 3 SOLUTION RESPIRATORY (INHALATION) at 20:14

## 2018-10-08 RX ADMIN — ENOXAPARIN SODIUM 40 MG: 40 INJECTION SUBCUTANEOUS at 08:46

## 2018-10-08 RX ADMIN — SERTRALINE 100 MG: 100 TABLET, FILM COATED ORAL at 08:46

## 2018-10-08 RX ADMIN — PANTOPRAZOLE SODIUM 40 MG: 40 TABLET, DELAYED RELEASE ORAL at 05:50

## 2018-10-08 RX ADMIN — CEFTRIAXONE 1 G: 1 INJECTION, POWDER, FOR SOLUTION INTRAMUSCULAR; INTRAVENOUS at 08:45

## 2018-10-08 RX ADMIN — METHYLPREDNISOLONE SODIUM SUCCINATE 40 MG: 40 INJECTION, POWDER, FOR SOLUTION INTRAMUSCULAR; INTRAVENOUS at 08:45

## 2018-10-08 RX ADMIN — PREDNISONE 40 MG: 20 TABLET ORAL at 15:34

## 2018-10-08 RX ADMIN — AZITHROMYCIN 250 MG: 250 TABLET, FILM COATED ORAL at 08:45

## 2018-10-08 RX ADMIN — BUPROPION HYDROCHLORIDE 150 MG: 150 TABLET, EXTENDED RELEASE ORAL at 08:45

## 2018-10-08 RX ADMIN — BUPROPION HYDROCHLORIDE 150 MG: 150 TABLET, EXTENDED RELEASE ORAL at 21:14

## 2018-10-08 RX ADMIN — Medication 10 ML: at 21:14

## 2018-10-08 RX ADMIN — ASPIRIN 81 MG CHEWABLE TABLET 81 MG: 81 TABLET CHEWABLE at 08:45

## 2018-10-08 NOTE — PROGRESS NOTES
Pt resting in bed with eyes closed. Respirations easy and even. No signs of distress noted. Wound vac remains intact. Will continue to monitor. Call light within reach. Bed alarm on.

## 2018-10-08 NOTE — PROGRESS NOTES
Hospitalist Progress Note  10/8/2018 8:07 AM    PCP: Gloria Cantu DO    [id number]     Date of Admission: 10/5/2018                                                                                                                     HOSPITAL COURSE    Patient demographics:  The patient  Alexey Rueda is a 70 y.o. female     Significant past medical history:   Patient Active Problem List   Diagnosis    Abnormality of gait    Osteoporosis    Peptic ulcer    Disorder of kidney and ureter    Anemia    Pneumonia due to organism    Acute bronchitis    Osteoarthrosis, unspecified whether generalized or localized, pelvic region and thigh    Closed fracture of thoracic vertebra (HCC)    Displacement of lumbar intervertebral disc without myelopathy    Scoliosis (and kyphoscoliosis), idiopathic    Enthesopathy of hip region    Thoracic or lumbosacral neuritis or radiculitis, unspecified    Dermatitis due to drug taken internally    Acute cystitis without hematuria    Edema    Lumbago    CTS (carpal tunnel syndrome)    Depression    Anxiety    Depression    Femoral distal fracture ([address]    Fibrotic lung diseases ([address]    Periprosthetic fracture around internal prosthetic left knee joint    Fracture of femur ([address]    Acute on chronic respiratory failure with hypoxia ([address]    Acute blood loss anemia    Pain from implanted hardware    Idiopathic pulmonary fibrosis ([address]    Hypoxia    Abnormal CT scan, chest    Exertional dyspnea    Scleroderma ([address]    Closed intertrochanteric fracture of right femur ([address]    Chronic respiratory failure with hypoxia ([address]    Cystic-bullous disease of lung    ILD (interstitial lung disease) ([address]    Obstructive nephropathy    Open wound of hip, complicated, left, initial encounter    Acute post-operative pain    Sepsis ([address]    Fever    Tachycardia    Tachypnea    Pressure injury of skin of left buttock    Multifocal pneumonia    Abnormal chest Closed intertrochanteric fracture of right femur (HCC)    Chronic respiratory failure with hypoxia (HCC)    Cystic-bullous disease of lung    ILD (interstitial lung disease) (HCC)    Obstructive nephropathy    Open wound of hip, complicated, left, initial encounter    Acute post-operative pain    [facility]is ([facility]    Fever    Tachycardia    Tachypnea    Pressure injury of skin of left buttock    Multifocal pneumonia    Abnormal chest CT       Allergies  Ampicillin; Ciprofloxacin; Nitrofurantoin; Sulfa antibiotics; and Penicillins    Medications    Scheduled Meds:   ipratropium-albuterol  1 ampule Inhalation BID    methylPREDNISolone  40 mg Intravenous BID    cefTRIAXone (ROCEPHIN) IV  1 g Intravenous Q24H    azithromycin  250 mg Oral Daily    sertraline  100 mg Oral Daily    pantoprazole  40 mg Oral QAM AC    calcitRIOL  0.25 mcg Oral Daily    buPROPion  150 mg Oral BID    aspirin  81 mg Oral Daily    sodium chloride flush  10 mL Intravenous 2 times per day    enoxaparin  40 mg Subcutaneous Daily     Continuous Infusions:  PRN Meds:  sodium chloride flush, magnesium hydroxide, docusate sodium, bisacodyl, ondansetron, ipratropium-albuterol    Vitals   Vitals /wt   Patient Vitals for the past 8 hrs:   BP Temp Temp src Pulse Resp SpO2 Weight   10/08/18 0451 - - - - - - 129 lb 13.6 oz (58.9 kg)   10/08/18 0425 (!) 144/72 98.1 °F (36.7 °C) Oral 71 18 97 % -        72HR INTAKE/OUTPUT:      Intake/Output Summary (Last 24 hours) at 10/08/18 0807  Last data filed at 10/07/18 2059   Gross per 24 hour   Intake              120 ml   Output                0 ml   Net              120 ml       Exam:    Gen:   Alert and oriented ×3   Eyes: PERRL. No sclera icterus. No conjunctival injection. ENT: No discharge. Pharynx clear. External appearance of ears and nose normal.  Neck: Trachea midline. No obvious mass. Resp: bilateral coarse crackles  CV: Regular rate. Regular rhythm. No murmur or rub. No edema.    GI: Non-tender. Non-distended. No hernia. Skin: Warm, dry, normal texture and turgor. Lymph: No cervical LAD. No supraclavicular LAD. M/S: / Ext. Left hip wound VAC in place  Neuro: CN 2-12 are intact,  no neurologic deficits noted. PT/INR: No results for input(s): PROTIME, INR in the last 72 hours. APTT: No results for input(s): APTT in the last 72 hours. CBC:   Recent Labs      10/05/18   1715  10/06/18   0842   WBC  10.7  6.2   HGB  12.6  12.0   HCT  37.2  36.0   MCV  92.4  91.8   PLT  246  269       BMP:   Recent Labs      10/05/18   1715  10/06/18   0842   NA  139  140   K  4.9  4.9   CL  102  102   CO2  26  26   BUN  25*  22*   CREATININE  0.8  0.7       LIVER PROFILE: No results for input(s): ALKPHOS, AST, ALT, ALB, BILIDIR, BILITOT, ALKPHOS in the last 72 hours. No results for input(s): AMYLASE in the last 72 hours. No results for input(s): LIPASE in the last 72 hours. UA:  Recent Labs      10/05/18   1915   WBCUA  7*       TROPONIN:   Recent Labs      10/05/18   1715   TROPONINI  <0.01       Lab Results   Component Value Date/Time    URRFLXCULT Yes 10/05/2018 06:46 PM       No results for input(s): TSHREFLEX in the last 72 hours. No components found for: [id number]  POC GLUCOSE:  No results for input(s): POCGLU in the last 72 hours. No results for input(s): LABA1C in the last 72 hours. No results found for: LABA1C       Echo  estimated ejection fraction of 55-60%. (3/9/2018)    ASSESSMENT AND PLAN    Acute on chronic resp failure  4 L nasal cannula oxygen at home-Pt is on baseline    Multifocal pneumonia  Continue on ceftriaxone and Zithromax  Received a dose of Lasix yesterday and feeling better today  Continue to monitor     UTI -  urine culture positive for E.  Coli sensitive to ceftriaxone and ciprofloxacin     Chronic wound on L hip    has wound vac on -  Needs to be changed Monday Wednesday Friday      Severe emphysema  Continue bronchodilators      Code Status: Full Code        Dispo

## 2018-10-09 ENCOUNTER — APPOINTMENT (OUTPATIENT)
Dept: GENERAL RADIOLOGY | Age: 78
DRG: 871 | End: 2018-10-09
Payer: MEDICARE

## 2018-10-09 PROCEDURE — 6370000000 HC RX 637 (ALT 250 FOR IP): Performed by: NURSE PRACTITIONER

## 2018-10-09 PROCEDURE — 6360000002 HC RX W HCPCS: Performed by: INTERNAL MEDICINE

## 2018-10-09 PROCEDURE — 71046 X-RAY EXAM CHEST 2 VIEWS: CPT

## 2018-10-09 PROCEDURE — 94760 N-INVAS EAR/PLS OXIMETRY 1: CPT

## 2018-10-09 PROCEDURE — 6370000000 HC RX 637 (ALT 250 FOR IP): Performed by: INTERNAL MEDICINE

## 2018-10-09 PROCEDURE — 2700000000 HC OXYGEN THERAPY PER DAY

## 2018-10-09 PROCEDURE — 94640 AIRWAY INHALATION TREATMENT: CPT

## 2018-10-09 PROCEDURE — 2580000003 HC RX 258: Performed by: INTERNAL MEDICINE

## 2018-10-09 PROCEDURE — 1200000000 HC SEMI PRIVATE

## 2018-10-09 PROCEDURE — 99232 SBSQ HOSP IP/OBS MODERATE 35: CPT | Performed by: INTERNAL MEDICINE

## 2018-10-09 RX ADMIN — CALCITRIOL 0.25 MCG: 0.25 CAPSULE, LIQUID FILLED ORAL at 09:40

## 2018-10-09 RX ADMIN — BUPROPION HYDROCHLORIDE 150 MG: 150 TABLET, EXTENDED RELEASE ORAL at 09:40

## 2018-10-09 RX ADMIN — IPRATROPIUM BROMIDE AND ALBUTEROL SULFATE 1 AMPULE: .5; 3 SOLUTION RESPIRATORY (INHALATION) at 08:49

## 2018-10-09 RX ADMIN — AZITHROMYCIN 250 MG: 250 TABLET, FILM COATED ORAL at 09:39

## 2018-10-09 RX ADMIN — Medication 10 ML: at 23:07

## 2018-10-09 RX ADMIN — SERTRALINE 100 MG: 100 TABLET, FILM COATED ORAL at 09:39

## 2018-10-09 RX ADMIN — ASPIRIN 81 MG CHEWABLE TABLET 81 MG: 81 TABLET CHEWABLE at 09:39

## 2018-10-09 RX ADMIN — IPRATROPIUM BROMIDE AND ALBUTEROL SULFATE 1 AMPULE: .5; 3 SOLUTION RESPIRATORY (INHALATION) at 20:58

## 2018-10-09 RX ADMIN — CEFTRIAXONE 1 G: 1 INJECTION, POWDER, FOR SOLUTION INTRAMUSCULAR; INTRAVENOUS at 09:39

## 2018-10-09 RX ADMIN — Medication 10 ML: at 09:40

## 2018-10-09 RX ADMIN — PANTOPRAZOLE SODIUM 40 MG: 40 TABLET, DELAYED RELEASE ORAL at 05:59

## 2018-10-09 RX ADMIN — PREDNISONE 40 MG: 20 TABLET ORAL at 09:39

## 2018-10-09 RX ADMIN — BUPROPION HYDROCHLORIDE 150 MG: 150 TABLET, EXTENDED RELEASE ORAL at 23:07

## 2018-10-09 RX ADMIN — ENOXAPARIN SODIUM 40 MG: 40 INJECTION SUBCUTANEOUS at 09:39

## 2018-10-09 ASSESSMENT — PAIN SCALES - GENERAL
PAINLEVEL_OUTOF10: 0

## 2018-10-09 NOTE — PROGRESS NOTES
Pulmonary Progress Note    Date of Admission: 10/5/2018   LOS: 4 days       CC:  Pneumonia    Subjective: The mildly better today. Slightly less shortness of breath. ROS:   No nausea  No Vomiting  No chest pain    PICC D#     No intake or output data in the 24 hours ending 10/09/18 1107      PHYSICAL EXAM:   Blood pressure (!) 163/82, pulse 64, temperature 98 °F (36.7 °C), temperature source Oral, resp. rate 16, height 5' 2\" (1.575 m), weight 132 lb 11.5 oz (60.2 kg), SpO2 96 %, not currently breastfeeding.'  Gen: No distress. Eyes: PERRL. No sclera icterus. No conjunctival injection. ENT: No discharge. Pharynx clear. External appearance of ears and nose normal.  Neck: Trachea midline. No obvious mass. Resp: No accessory muscle use. Crackles at bases. No wheezes. No rhonchi. CV: Regular rate. Regular rhythm. No murmur or rub. No edema. Skin: Warm, dry, normal texture and turgor. No nodule on exposed extremities. Lymph: No cervical LAD. No supraclavicular LAD. M/S: No cyanosis. No clubbing. No joint deformity. Neuro: Moves all four extremities. Psych: Oriented x 3. No anxiety. Awake. Alert. Intact judgement and insight.       Medications:    Scheduled Meds:   predniSONE  40 mg Oral Daily    ipratropium-albuterol  1 ampule Inhalation BID    cefTRIAXone (ROCEPHIN) IV  1 g Intravenous Q24H    azithromycin  250 mg Oral Daily    sertraline  100 mg Oral Daily    pantoprazole  40 mg Oral QAM AC    calcitRIOL  0.25 mcg Oral Daily    buPROPion  150 mg Oral BID    aspirin  81 mg Oral Daily    sodium chloride flush  10 mL Intravenous 2 times per day    enoxaparin  40 mg Subcutaneous Daily       Continuous Infusions:      PRN Meds:  sodium chloride flush, magnesium hydroxide, docusate sodium, bisacodyl, ondansetron, ipratropium-albuterol    Labs reviewed:  CBC:   Recent Labs      10/08/18   1055   WBC  12.7*   HGB  11.3*   HCT  33.9*   MCV  93.1   PLT  301     BMP:   Recent Labs 10/08/18   1055   NA  139   K  3.8   CL  99   CO2  27   BUN  38*   CREATININE  0.9     LIVER PROFILE: No results for input(s): AST, ALT, LIPASE, BILIDIR, BILITOT, ALKPHOS in the last 72 hours. Invalid input(s): AMYLASE,  ALB  PT/INR: No results for input(s): PROTIME, INR in the last 72 hours. APTT: No results for input(s): APTT in the last 72 hours. UA:No results for input(s): NITRITE, COLORU, PHUR, LABCAST, WBCUA, RBCUA, MUCUS, TRICHOMONAS, YEAST, BACTERIA, CLARITYU, SPECGRAV, LEUKOCYTESUR, UROBILINOGEN, BILIRUBINUR, BLOODU, GLUCOSEU, AMORPHOUS in the last 72 hours. Invalid input(s): Shirley Sergio  No results for input(s): PH, PCO2, PO2 in the last 72 hours. Cx:      Films:  Radiology Review:  Pertinent images / reports were reviewed as a part of this visit. CT Chest w/ contrast: No results found for this or any previous visit. CT Chest w/o contrast:   Results for orders placed during the hospital encounter of 08/26/13   CT Chest WO Contrast    Narrative    Exam indication pulmonary fibrosis. Comparison CT scan of the chest dated 8/15/2012. CT SCANS OF THE CHEST     Subpleural blebs are again noted bilaterally. Chronic interstitial  disease with honeycombing involves peripheral on the and is  greater at the lung bases and tends to spare the lung apices. Mild bronchiectasis is again noted involving the right middle lobe  and both lower lobes. On high-resolution images interstitial  disease has increased at the lung bases and the left lower lobe  and the lingular of the left upper lobe. No lung masses or noncalcified nodules are identified. A benign  calcified granuloma is unchanged the in the left lower lobe and in  the jenifer and in the mediastinum. Calcified and noncalcified mediastinal lymph nodes are again noted  assuring up to about 10 mm in maximal diameter. Shift of the  mediastinum to the left is unchanged.      IMPRESSION- EXTENSIVE CHRONIC LUNG DISEASE WITH BLEBS LUNG pneumothorax. No evidence of pleural effusion. Upper Abdomen: Cyst is seen at the upper pole of the right kidney,  incompletely imaged. Soft Tissues/Bones: Streak artifact is seen from thoracic fusion hardware. Evidence of prior upper thoracic vertebral body augmentation. Impression No gross findings of pulmonary embolism. Severe emphysema. Multifocal patchy ground-glass opacity has developed,  suggestive of superimposed pneumonitis or edema. Mild mediastinal and hilar  adenopathy is likely reactive. Follow-up to resolution suggested. CXR PA/LAT:   Results for orders placed during the hospital encounter of 10/03/18   XR CHEST STANDARD (2 VW)    Narrative EXAMINATION:  TWO VIEWS OF THE CHEST    10/3/2018 5:12 pm    COMPARISON:  01/15/2018    HISTORY:  ORDERING SYSTEM PROVIDED HISTORY: ILD (interstitial lung disease) ([facility] [address]  TECHNOLOGIST PROVIDED HISTORY:  Reason for exam:->worsening hypoxia, known ILD  Ordering Physician Provided Reason for Exam: worsening hypoxia, known ILD  Acuity: Acute  Type of Exam: Initial    FINDINGS:  Posterior fixation rods remain in place. The heart size is stable. Emphysematous changes and interstitial infiltrates persist.  No new airspace  disease. No pleural effusion. Impression Unchanged chest demonstrating pulmonary fibrosis and emphysematous changes         CXR portable:   Results for orders placed during the hospital encounter of 10/05/18   XR CHEST PORTABLE    Narrative EXAMINATION:  SINGLE XRAY VIEW OF THE CHEST    10/5/2018 1:42 pm    COMPARISON:  10/03/2018    HISTORY:  ORDERING SYSTEM PROVIDED HISTORY: dyspnea  TECHNOLOGIST PROVIDED HISTORY:  Reason for exam:->dyspnea  Ordering Physician Provided Reason for Exam: sob  Acuity: Acute  Type of Exam: Initial    FINDINGS:  The cardial-pericardial silhouette is enlarged but stable. Increased  interstitial opacities identified within both lungs.   Hazy ground-glass  opacity throughout both lungs is

## 2018-10-09 NOTE — CARE COORDINATION
MG tablet Take 81 mg by mouth daily      vitamin C (ASCORBIC ACID) 500 MG tablet Take 500 mg by mouth daily      Multiple Vitamins-Minerals (MULTIVITAMIN PO) Take 1 tablet by mouth daily.       Cholecalciferol ([employer] VITAMIN D) 1000 UNIT CAPS Take 1 capsule by mouth daily       OXYGEN Inhale 4 L into the lungs continuous (Patient taking differently: Inhale 3 L into the lungs nightly ) 1 Container 1       Objective    BP (!) 149/76   Pulse 71   Temp 98 °F (36.7 °C) (Oral)   Resp 16   Ht 5' 2\" (1.575 m)   Wt 129 lb 13.6 oz (58.9 kg)   SpO2 94%   BMI 23.75 kg/m²     LABS:  WBC:    Lab Results   Component Value Date    WBC 12.7 10/08/2018     H/H:    Lab Results   Component Value Date    HGB 11.3 10/08/2018    HCT 33.9 10/08/2018     PTT:    Lab Results   Component Value Date    APTT 60.4 09/27/2013   [APTT}  PT/INR:    Lab Results   Component Value Date    PROTIME 9.9 06/05/2016    INR 0.87 06/05/2016     HgBA1c:  No results found for: LABA1C    Assessment   Tio Risk Score: Tio Scale Score: 16 at risk     Patient Active Problem List   Diagnosis Code    Abnormality of gait R26.9    Osteoporosis M81.0    Peptic ulcer K27.9    Disorder of kidney and ureter N28.9    Anemia D64.9    Pneumonia due to organism J18.9    Acute bronchitis J20.9    Osteoarthrosis, unspecified whether generalized or localized, pelvic region and thigh M16.9    Closed fracture of thoracic vertebra (HCC) S22.009A    Displacement of lumbar intervertebral disc without myelopathy M51.26    Scoliosis (and kyphoscoliosis), idiopathic M41.20    Enthesopathy of hip region M76.899    Thoracic or lumbosacral neuritis or radiculitis, unspecified [id number]    Dermatitis due to drug taken internally L27.0    Acute cystitis without hematuria N30.00    Edema R60.9    Lumbago M54.5    CTS (carpal tunnel syndrome) G56.00    Depression F32.9    Anxiety F41.9    Depression F32.9    Femoral distal fracture (HCC) S72.409A    Fibrotic [x] Indicates understanding       [x] Needs reinforcement  [] Unsuccessful      [] Verbal Understanding  [] Demonstrated understanding       [] No evidence of learning  [] Refused teaching         [] N/A       Electronically signed by Irma Kimbrough RN, [id number] on 10/8/2018 at 11:06 PM

## 2018-10-09 NOTE — PROGRESS NOTES
results of any tests, a time was given to answer questions, a plan was proposed and they agreed with plan. Milton Lang MD    This note was transcribed using [id number]. Please disregard any translational errors.

## 2018-10-10 PROCEDURE — 97166 OT EVAL MOD COMPLEX 45 MIN: CPT

## 2018-10-10 PROCEDURE — 6370000000 HC RX 637 (ALT 250 FOR IP): Performed by: INTERNAL MEDICINE

## 2018-10-10 PROCEDURE — 97116 GAIT TRAINING THERAPY: CPT

## 2018-10-10 PROCEDURE — 97605 NEG PRS WND THER DME<=50SQCM: CPT

## 2018-10-10 PROCEDURE — 6360000002 HC RX W HCPCS: Performed by: INTERNAL MEDICINE

## 2018-10-10 PROCEDURE — 6370000000 HC RX 637 (ALT 250 FOR IP): Performed by: NURSE PRACTITIONER

## 2018-10-10 PROCEDURE — 99232 SBSQ HOSP IP/OBS MODERATE 35: CPT | Performed by: INTERNAL MEDICINE

## 2018-10-10 PROCEDURE — 1200000000 HC SEMI PRIVATE

## 2018-10-10 PROCEDURE — G8987 SELF CARE CURRENT STATUS: HCPCS

## 2018-10-10 PROCEDURE — G8979 MOBILITY GOAL STATUS: HCPCS

## 2018-10-10 PROCEDURE — 94760 N-INVAS EAR/PLS OXIMETRY 1: CPT

## 2018-10-10 PROCEDURE — 94664 DEMO&/EVAL PT USE INHALER: CPT

## 2018-10-10 PROCEDURE — 97530 THERAPEUTIC ACTIVITIES: CPT

## 2018-10-10 PROCEDURE — 2580000003 HC RX 258: Performed by: INTERNAL MEDICINE

## 2018-10-10 PROCEDURE — 94640 AIRWAY INHALATION TREATMENT: CPT

## 2018-10-10 PROCEDURE — 97162 PT EVAL MOD COMPLEX 30 MIN: CPT

## 2018-10-10 PROCEDURE — G8978 MOBILITY CURRENT STATUS: HCPCS

## 2018-10-10 PROCEDURE — G8988 SELF CARE GOAL STATUS: HCPCS

## 2018-10-10 PROCEDURE — 2700000000 HC OXYGEN THERAPY PER DAY

## 2018-10-10 RX ADMIN — BUPROPION HYDROCHLORIDE 150 MG: 150 TABLET, EXTENDED RELEASE ORAL at 21:14

## 2018-10-10 RX ADMIN — BUPROPION HYDROCHLORIDE 150 MG: 150 TABLET, EXTENDED RELEASE ORAL at 10:22

## 2018-10-10 RX ADMIN — PANTOPRAZOLE SODIUM 40 MG: 40 TABLET, DELAYED RELEASE ORAL at 06:43

## 2018-10-10 RX ADMIN — ENOXAPARIN SODIUM 40 MG: 40 INJECTION SUBCUTANEOUS at 10:21

## 2018-10-10 RX ADMIN — PREDNISONE 40 MG: 20 TABLET ORAL at 10:22

## 2018-10-10 RX ADMIN — CALCITRIOL 0.25 MCG: 0.25 CAPSULE, LIQUID FILLED ORAL at 10:22

## 2018-10-10 RX ADMIN — ASPIRIN 81 MG CHEWABLE TABLET 81 MG: 81 TABLET CHEWABLE at 10:22

## 2018-10-10 RX ADMIN — SERTRALINE 100 MG: 100 TABLET, FILM COATED ORAL at 10:22

## 2018-10-10 RX ADMIN — CEFTRIAXONE 1 G: 1 INJECTION, POWDER, FOR SOLUTION INTRAMUSCULAR; INTRAVENOUS at 10:21

## 2018-10-10 RX ADMIN — AZITHROMYCIN 250 MG: 250 TABLET, FILM COATED ORAL at 10:22

## 2018-10-10 RX ADMIN — IPRATROPIUM BROMIDE AND ALBUTEROL SULFATE 1 AMPULE: .5; 3 SOLUTION RESPIRATORY (INHALATION) at 20:41

## 2018-10-10 RX ADMIN — Medication 10 ML: at 21:14

## 2018-10-10 RX ADMIN — Medication 10 ML: at 10:20

## 2018-10-10 RX ADMIN — IPRATROPIUM BROMIDE AND ALBUTEROL SULFATE 1 AMPULE: .5; 3 SOLUTION RESPIRATORY (INHALATION) at 09:01

## 2018-10-10 ASSESSMENT — PAIN SCALES - GENERAL: PAINLEVEL_OUTOF10: 1

## 2018-10-10 NOTE — CARE COORDINATION
Discharge Planning:  Therapy recommending home care  Patient would like to stay with [facility] (already active with RN)  Goal is home with family support  SW following for d/c     Electronically signed by EMIL Nam on 10/10/2018 at 4:21 PM  [phone]

## 2018-10-10 NOTE — PROGRESS NOTES
unspecified. A diagnosis of Dyspnea, unspecified type was also pertinent to this visit. has a past medical history of Anxiety and depression; Arthritis; Cancer of skin of leg; Chronic low back pain; GERD (gastroesophageal reflux disease); Insomnia; Iron deficiency anemia; Kidney stone; PUD (peptic ulcer disease); Pulmonary fibrosis ([facility] [address] Stomach ulcer; Ulcer - lesion; and UTI (urinary tract infection). has a past surgical history that includes hysteroscopy (1993); joint replacement (4/20/2000); joint replacement (2/27/08); Carpal tunnel release (9/27/11); Hysterectomy (1993); Stomach surgery (2010); Cystocopy (Left, 09/2016); Colonoscopy; Cystoscopy (01/15/2018); eye surgery (Bilateral, 2008); back surgery (MARCH 2004,OCT 02, FEB 05); back surgery (06/2000); fracture surgery (Right, 06/05/2016); fracture surgery (Left); and Cystoscopy (02/14/2018). Treatment Diagnosis: Decreased functional mobility ; Decreased ADL status; Decreased endurance; Restrictions  Restrictions/Precautions  Restrictions/Precautions: Fall Risk (desaturates when ambulating)  Position Activity Restriction  Other position/activity restrictions: wound vac on left hip from pressure ulcer; 4L O2 per NC (at baseline)    Subjective   General  Chart Reviewed: Yes  Patient assessed for rehabilitation services?: Yes  Additional Pertinent Hx: Per Dr. Corine Tirado H&P 10/5: \"Patient is a 77-year-old woman with history of interstitial lung disease With chronic hypoxia respiratory failure requiring 4 L of continuous oxygen via nasal cannula at baseline. She presents to the emergency room at the request of her pulmonologist after she was found to have an oxygen saturation in the high 50s to low 60s at home. She reports a one-week history of shortness of breath.  She states that her shortness of breath is severe, worse with exertion improved with rest. In the emergency room, a CT of her chest reveals patchy ground glass opacity suggestive of

## 2018-10-11 ENCOUNTER — APPOINTMENT (OUTPATIENT)
Dept: GENERAL RADIOLOGY | Age: 78
DRG: 871 | End: 2018-10-11
Payer: MEDICARE

## 2018-10-11 VITALS
RESPIRATION RATE: 18 BRPM | OXYGEN SATURATION: 90 % | DIASTOLIC BLOOD PRESSURE: 70 MMHG | HEIGHT: 62 IN | HEART RATE: 73 BPM | SYSTOLIC BLOOD PRESSURE: 137 MMHG | WEIGHT: 128.97 LBS | BODY MASS INDEX: 23.73 KG/M2 | TEMPERATURE: 98.1 F

## 2018-10-11 LAB
BLOOD CULTURE, ROUTINE: NORMAL
CULTURE, BLOOD 2: NORMAL

## 2018-10-11 PROCEDURE — 6370000000 HC RX 637 (ALT 250 FOR IP): Performed by: NURSE PRACTITIONER

## 2018-10-11 PROCEDURE — 94640 AIRWAY INHALATION TREATMENT: CPT

## 2018-10-11 PROCEDURE — 94664 DEMO&/EVAL PT USE INHALER: CPT

## 2018-10-11 PROCEDURE — 2580000003 HC RX 258: Performed by: INTERNAL MEDICINE

## 2018-10-11 PROCEDURE — 6370000000 HC RX 637 (ALT 250 FOR IP): Performed by: INTERNAL MEDICINE

## 2018-10-11 PROCEDURE — 71045 X-RAY EXAM CHEST 1 VIEW: CPT

## 2018-10-11 PROCEDURE — 94760 N-INVAS EAR/PLS OXIMETRY 1: CPT

## 2018-10-11 PROCEDURE — 99231 SBSQ HOSP IP/OBS SF/LOW 25: CPT | Performed by: INTERNAL MEDICINE

## 2018-10-11 PROCEDURE — 6360000002 HC RX W HCPCS: Performed by: INTERNAL MEDICINE

## 2018-10-11 PROCEDURE — 2700000000 HC OXYGEN THERAPY PER DAY

## 2018-10-11 RX ORDER — AZITHROMYCIN 250 MG/1
TABLET, FILM COATED ORAL
Qty: 7 TABLET | Refills: 0 | Status: SHIPPED | OUTPATIENT
Start: 2018-10-11 | End: 2018-10-18 | Stop reason: ALTCHOICE

## 2018-10-11 RX ORDER — PREDNISONE 20 MG/1
40 TABLET ORAL DAILY
Qty: 10 TABLET | Refills: 0 | Status: SHIPPED | OUTPATIENT
Start: 2018-10-11 | End: 2018-10-16

## 2018-10-11 RX ORDER — PREDNISONE 20 MG/1
40 TABLET ORAL DAILY
Qty: 14 TABLET | Refills: 0 | Status: SHIPPED | OUTPATIENT
Start: 2018-10-11 | End: 2018-10-11

## 2018-10-11 RX ORDER — AZITHROMYCIN 250 MG/1
TABLET, FILM COATED ORAL
Qty: 7 TABLET | Refills: 0 | Status: SHIPPED | OUTPATIENT
Start: 2018-10-11 | End: 2018-10-11

## 2018-10-11 RX ADMIN — PREDNISONE 40 MG: 20 TABLET ORAL at 10:10

## 2018-10-11 RX ADMIN — IPRATROPIUM BROMIDE AND ALBUTEROL SULFATE 1 AMPULE: .5; 3 SOLUTION RESPIRATORY (INHALATION) at 08:54

## 2018-10-11 RX ADMIN — ASPIRIN 81 MG CHEWABLE TABLET 81 MG: 81 TABLET CHEWABLE at 10:05

## 2018-10-11 RX ADMIN — ENOXAPARIN SODIUM 40 MG: 40 INJECTION SUBCUTANEOUS at 10:13

## 2018-10-11 RX ADMIN — SERTRALINE 100 MG: 100 TABLET, FILM COATED ORAL at 10:12

## 2018-10-11 RX ADMIN — Medication 10 ML: at 10:21

## 2018-10-11 RX ADMIN — PANTOPRAZOLE SODIUM 40 MG: 40 TABLET, DELAYED RELEASE ORAL at 06:34

## 2018-10-11 RX ADMIN — BUPROPION HYDROCHLORIDE 150 MG: 150 TABLET, EXTENDED RELEASE ORAL at 10:08

## 2018-10-11 RX ADMIN — AZITHROMYCIN 250 MG: 250 TABLET, FILM COATED ORAL at 10:07

## 2018-10-11 RX ADMIN — CALCITRIOL 0.25 MCG: 0.25 CAPSULE, LIQUID FILLED ORAL at 10:09

## 2018-10-11 NOTE — CARE COORDINATION
Kimmy confirmed patient's plan to return home with spouse and [facility] at [place]. Kimmy presented patient with IMM letter. Electronically signed by Macy Novak on 10/11/2018 at 11:56 AM    Kimmy called [facility] to confirm patient is being discharged today and paper work being faxed. .. 2:20 PM.     [id number] verbalized understanding about patient needs.      Electronically signed by Marni Chambers on 10/11/2018 at 2:21 PM   Electronically signed by Macy Novak on 10/11/2018 at 2:34 PM

## 2018-10-11 NOTE — PLAN OF CARE
Problem: Risk for Impaired Skin Integrity  Goal: Tissue integrity - skin and mucous membranes  Structural intactness and normal physiological function of skin and  mucous membranes. Outcome: Ongoing  Skin assessment completed every shift. Pt assessed for incontinence, appropriate barrier cream applied prn as needed. Pt encouraged to turn/rotate every 2 hours. Assistance provided if pt unable to do so themselves. Problem: Falls - Risk of:  Goal: Will remain free from falls  Will remain free from falls   Outcome: Ongoing  Pt free from falls this shift. Fall precautions in place at all times. Call light within reach. Pt able to and agreeable to contact for safety appropriately.         Problem: Nutrition  Goal: Optimal nutrition therapy  Outcome: Ongoing

## 2018-10-11 NOTE — FLOWSHEET NOTE
Discharge instructions, f/u appts, and medications reviewed with patient and patient's . Pt. And  Verbalized understanding. Denies questions. Discontinued IV without complications. Pt. tolerated well. Home wound vac placed on patient, settings verified, no airleak noted. Home care RN to visit tomorrow. Pt aware. Home O2 tank present at time of d/c, pt placed on 4L prior to leaving unit, settings verified by this RN.  to drive pt home.    Electronically signed by Laura Cole RN on 10/11/2018 at 2:34 PM

## 2018-10-12 ENCOUNTER — TELEPHONE (OUTPATIENT)
Dept: PHARMACY | Facility: CLINIC | Age: 78
End: 2018-10-12

## 2018-10-12 ENCOUNTER — CARE COORDINATION (OUTPATIENT)
Dept: CASE MANAGEMENT | Age: 78
End: 2018-10-12

## 2018-10-12 DIAGNOSIS — R00.0 TACHYCARDIA: Primary | ICD-10-CM

## 2018-10-15 ENCOUNTER — HOSPITAL ENCOUNTER (OUTPATIENT)
Dept: WOUND CARE | Age: 78
Discharge: HOME | End: 2018-10-15
Payer: MEDICARE

## 2018-10-15 VITALS
HEART RATE: 101 BPM | DIASTOLIC BLOOD PRESSURE: 71 MMHG | SYSTOLIC BLOOD PRESSURE: 151 MMHG | TEMPERATURE: 96.4 F | RESPIRATION RATE: 18 BRPM

## 2018-10-15 PROCEDURE — 11042 DBRDMT SUBQ TIS 1ST 20SQCM/<: CPT

## 2018-10-15 RX ORDER — LIDOCAINE 50 MG/G
OINTMENT TOPICAL PRN
Status: DISCONTINUED | OUTPATIENT
Start: 2018-10-15 | End: 2018-10-16 | Stop reason: HOSPADM

## 2018-10-15 NOTE — TELEPHONE ENCOUNTER
Will sign off.      Thanh Medellin, PharmD, [address]: [phone] C: [phone], toll free [phone], option 7    CLINICAL PHARMACY NOTE   POST-DISCHARGE TELEPHONE FOLLOW-UP ADDENDUM    For Pharmacy Admin Tracking Only    TCM Call Made?: Yes  [facility] [facility] Select Patient?: Yes  Total # of Interventions Recommended: 0  Total # Interventions Accepted: 0  Outreach Status: Patient Unreachable  Care Coordinator Outreach to Patient?: Yes  Provider Contacted?: No  Waiting on response from: n/a  Time Spent (min): 15

## 2018-10-16 ENCOUNTER — TELEPHONE (OUTPATIENT)
Dept: FAMILY MEDICINE CLINIC | Age: 78
End: 2018-10-16

## 2018-10-16 NOTE — PROGRESS NOTES
Alyssa Olivier 39   Progress Note and Procedure Note      Radha [facility]  MEDICAL RECORD NUMBER:  [id number]  AGE: 70 y.o. GENDER: female  : 1940  EPISODE DATE:  10/15/2018    Subjective:     Chief Complaint   Patient presents with    Wound Check     Follow Up on Left Buttock         HISTORY of PRESENT ILLNESS HPI      Radha New is a 70 y. o. female who presents today for wound/ulcer evaluation. History of Wound Context:  Pt presents with a wound in the lt buttock. The area started out as a skin dimpling causing pain. Pt has had hip fracture surgery by  and pt saw him who referred her to  who thought this could be bursitis and gave an injection and prescribed a compounded cream containing 4 different medications to be applied to the site. The area subsequently opened up becoming a wound. Pt saw Joana Aly, her PCP who prescribed Doxycycline and discontinued the cream. Pt was advised to be seen at the [facility]. H/o Pulmonary fibrosis. O2 dependent. H/o scleroderma. Pt was recently admitted to hospital with Pneumonia and UTI.   Wound/Ulcer Pain Timing/Severity: intermittent  Quality of pain: aching  Severity:  3 / 10   Modifying Factors: None  Associated Signs/Symptoms: erythema, drainage and pain     Ulcer Identification:  Ulcer Type: traumatic  Contributing Factors: decreased mobility, shear force and decreased tissue oxygenation             PAST MEDICAL HISTORY        Diagnosis Date    Anxiety and depression     Arthritis     Cancer of skin of leg     Chronic low back pain     GERD (gastroesophageal reflux disease)     Insomnia     Iron deficiency anemia     Kidney stone     PUD (peptic ulcer disease)     Pulmonary fibrosis (HCC)     Dr. Rachel Elizabeth has a CT done every 6 months    Stomach ulcer     Ulcer - lesion 2010    UTI (urinary tract infection)     frequent       PAST SURGICAL HISTORY    Past Surgical History:   Procedure Laterality Date    BACK SURGERY  2004,OCT 02, FEB 05    x4--fusions    BACK SURGERY  2000    laminectomy-lumbar    CARPAL TUNNEL RELEASE  11    Left    COLONOSCOPY      CYSTOSCOPY Left 2016    cysto left ureteral stent placement    CYSTOSCOPY  01/15/2018    Left Stent Insertion    CYSTOSCOPY  2018    cysto, left uretero with laser litho, left stent excahnge    EYE SURGERY Bilateral     cataract removed with lens implants    FRACTURE SURGERY Right 2016    femur fracture    FRACTURE SURGERY Left     femur fracture    HYSTERECTOMY  1993    HYSTEROSCOPY  1993    JOINT REPLACEMENT  2000    LEFT KNEE    JOINT REPLACEMENT  08    RIGHT KNEE    STOMACH SURGERY      For PUD       FAMILY HISTORY    Family History   Problem Relation Age of Onset    Emphysema Mother          AGE 64    Depression Mother     Clotting Disorder Father             Stroke Father        SOCIAL HISTORY    Social History   Substance Use Topics    Smoking status: Never Smoker    Smokeless tobacco: Never Used      Comment: encouraged to never smoke     Alcohol use No       ALLERGIES    Allergies   Allergen Reactions    Ampicillin Hives    Ciprofloxacin Other (See Comments)     Sores in mouth      Nitrofurantoin Other (See Comments)     Unable to recall reaction    Sulfa Antibiotics Other (See Comments)     Unable to recall reaction    Penicillins Hives and Rash       MEDICATIONS    Current Outpatient Prescriptions on File Prior to Encounter   Medication Sig Dispense Refill    predniSONE (DELTASONE) 20 MG tablet Take 2 tablets by mouth daily for 5 days 10 tablet 0    azithromycin (ZITHROMAX) 250 MG tablet 1 tablet daily for 7 days 7 tablet 0    alendronate (FOSAMAX) 35 MG tablet Take 70 mg by mouth every 7 days      calcitRIOL (ROCALTROL) 0.25 MCG capsule TAKE 1 CAPSULE BY MOUTH DAILY 90 capsule 0    sertraline (ZOLOFT) 100 MG tablet TAKE 1 TABLET BY MOUTH EVERY DAY 90 tablet 0    buPROPion Pain:  0 / 10     Response to treatment:  Well tolerated by patient. Plan:     Treatment Note please see attached Discharge Instructions    Written patient dismissal instructions given to patient and signed by patient or POA. Discharge Instructions       Wound Clinic Physician Orders and Discharge Instructions  [address]   [address] [facility] Angelica Salguero  Telephone: [phone]      FAX [phone]     NAME: Vignesh Rash OF BIRTH:  1940  MEDICAL RECORD NUMBER:  [id number]  DATE:  10/15/2018     Wound Cleansing:   Do not scrub or use excessive force. Cleanse wound prior to applying a clean dressing with:  [x] Normal Saline            [x] Keep Wound Dry in Shower    [] Wound Cleanser   [] Cleanse wound with Mild Soap & Water  [x] May Shower    [] Other:        Topical Treatments:  Do not apply lotions, creams, or ointments to wound bed unless directed. [] Apply moisturizing lotion to skin surrounding the wound prior to dressing change.  [] Apply antifungal ointment to skin surrounding the wound prior to dressing change. [x] Apply thin film of moisture barrier ointment to skin immediately around wound. [x] Other: Apply Skin Prep to surrounding area around Buttock Wound      Dressings:                  Wound Location : LEFT BUTTOCK        [x] Apply Primary Dressing:                                              [x]  HYDROFERA BLUE ROPE MOISTENED WITH SALINE - BE SURE TO PACK INTO UNDERMINING AREA ALSO (GOES BACK 9:00-11:00 = 3.5CM)                  [x] Cover and Secure with:                       [x] DRY Gauze  [] Daniela   [] Kerlix              [] Ace Wrap       [x] Cover Roll Tape         [] ABD                              [] Other:               Avoid contact of tape with skin.   [x] Change dressing:       [] Daily              [] Every Other Day        [x] Three times per week                [] Once a week  [] Do Not Change

## 2018-10-18 ENCOUNTER — CARE COORDINATION (OUTPATIENT)
Dept: CASE MANAGEMENT | Age: 78
End: 2018-10-18

## 2018-10-18 ENCOUNTER — TELEPHONE (OUTPATIENT)
Dept: FAMILY MEDICINE CLINIC | Age: 78
End: 2018-10-18

## 2018-10-18 ENCOUNTER — OFFICE VISIT (OUTPATIENT)
Dept: FAMILY MEDICINE CLINIC | Age: 78
End: 2018-10-18
Payer: MEDICARE

## 2018-10-18 VITALS
SYSTOLIC BLOOD PRESSURE: 136 MMHG | BODY MASS INDEX: 24.11 KG/M2 | DIASTOLIC BLOOD PRESSURE: 64 MMHG | WEIGHT: 131 LBS | HEIGHT: 62 IN

## 2018-10-18 DIAGNOSIS — J84.9 ILD (INTERSTITIAL LUNG DISEASE) (HCC): Primary | ICD-10-CM

## 2018-10-18 DIAGNOSIS — N39.0 CHRONIC UTI: ICD-10-CM

## 2018-10-18 DIAGNOSIS — J96.11 CHRONIC RESPIRATORY FAILURE WITH HYPOXIA (HCC): ICD-10-CM

## 2018-10-18 PROCEDURE — 1111F DSCHRG MED/CURRENT MED MERGE: CPT | Performed by: FAMILY MEDICINE

## 2018-10-18 PROCEDURE — 99213 OFFICE O/P EST LOW 20 MIN: CPT | Performed by: FAMILY MEDICINE

## 2018-10-18 RX ORDER — OMEPRAZOLE 40 MG/1
CAPSULE, DELAYED RELEASE ORAL
Qty: 180 CAPSULE | Refills: 0 | Status: SHIPPED | OUTPATIENT
Start: 2018-10-18 | End: 2019-01-16 | Stop reason: SDUPTHER

## 2018-10-18 ASSESSMENT — ENCOUNTER SYMPTOMS
WHEEZING: 1
SHORTNESS OF BREATH: 1
COUGH: 1
CHEST TIGHTNESS: 1
BACK PAIN: 1

## 2018-10-18 ASSESSMENT — PATIENT HEALTH QUESTIONNAIRE - PHQ9
1. LITTLE INTEREST OR PLEASURE IN DOING THINGS: 0
SUM OF ALL RESPONSES TO PHQ9 QUESTIONS 1 & 2: 0
2. FEELING DOWN, DEPRESSED OR HOPELESS: 0
SUM OF ALL RESPONSES TO PHQ QUESTIONS 1-9: 0
SUM OF ALL RESPONSES TO PHQ QUESTIONS 1-9: 0

## 2018-10-22 ENCOUNTER — HOSPITAL ENCOUNTER (OUTPATIENT)
Dept: WOUND CARE | Age: 78
Discharge: HOME | End: 2018-10-22
Payer: MEDICARE

## 2018-10-22 ENCOUNTER — OFFICE VISIT (OUTPATIENT)
Dept: PULMONOLOGY | Age: 78
End: 2018-10-22
Payer: MEDICARE

## 2018-10-22 VITALS
SYSTOLIC BLOOD PRESSURE: 138 MMHG | OXYGEN SATURATION: 92 % | RESPIRATION RATE: 24 BRPM | BODY MASS INDEX: 23.96 KG/M2 | HEIGHT: 62 IN | TEMPERATURE: 97 F | DIASTOLIC BLOOD PRESSURE: 76 MMHG | HEART RATE: 56 BPM

## 2018-10-22 VITALS
HEART RATE: 89 BPM | SYSTOLIC BLOOD PRESSURE: 163 MMHG | TEMPERATURE: 97.4 F | RESPIRATION RATE: 18 BRPM | DIASTOLIC BLOOD PRESSURE: 67 MMHG

## 2018-10-22 DIAGNOSIS — J84.9 ILD (INTERSTITIAL LUNG DISEASE) (HCC): ICD-10-CM

## 2018-10-22 DIAGNOSIS — J84.9 ILD (INTERSTITIAL LUNG DISEASE) (HCC): Primary | ICD-10-CM

## 2018-10-22 DIAGNOSIS — S71.002A: Primary | ICD-10-CM

## 2018-10-22 DIAGNOSIS — R76.8 SCL-70 ANTIBODY POSITIVE: ICD-10-CM

## 2018-10-22 DIAGNOSIS — J96.11 CHRONIC RESPIRATORY FAILURE WITH HYPOXIA (HCC): ICD-10-CM

## 2018-10-22 LAB
C-REACTIVE PROTEIN: 32.9 MG/L (ref 0–5.1)
SEDIMENTATION RATE, ERYTHROCYTE: 54 MM/HR (ref 0–30)

## 2018-10-22 PROCEDURE — 11042 DBRDMT SUBQ TIS 1ST 20SQCM/<: CPT

## 2018-10-22 PROCEDURE — G8598 ASA/ANTIPLAT THER USED: HCPCS | Performed by: INTERNAL MEDICINE

## 2018-10-22 PROCEDURE — G8482 FLU IMMUNIZE ORDER/ADMIN: HCPCS | Performed by: INTERNAL MEDICINE

## 2018-10-22 PROCEDURE — G8400 PT W/DXA NO RESULTS DOC: HCPCS | Performed by: INTERNAL MEDICINE

## 2018-10-22 PROCEDURE — 1090F PRES/ABSN URINE INCON ASSESS: CPT | Performed by: INTERNAL MEDICINE

## 2018-10-22 PROCEDURE — 1101F PT FALLS ASSESS-DOCD LE1/YR: CPT | Performed by: INTERNAL MEDICINE

## 2018-10-22 PROCEDURE — 4040F PNEUMOC VAC/ADMIN/RCVD: CPT | Performed by: INTERNAL MEDICINE

## 2018-10-22 PROCEDURE — 1111F DSCHRG MED/CURRENT MED MERGE: CPT | Performed by: INTERNAL MEDICINE

## 2018-10-22 PROCEDURE — 1036F TOBACCO NON-USER: CPT | Performed by: INTERNAL MEDICINE

## 2018-10-22 PROCEDURE — 99215 OFFICE O/P EST HI 40 MIN: CPT | Performed by: INTERNAL MEDICINE

## 2018-10-22 PROCEDURE — 11042 DBRDMT SUBQ TIS 1ST 20SQCM/<: CPT | Performed by: NURSE PRACTITIONER

## 2018-10-22 PROCEDURE — G8427 DOCREV CUR MEDS BY ELIG CLIN: HCPCS | Performed by: INTERNAL MEDICINE

## 2018-10-22 PROCEDURE — G8420 CALC BMI NORM PARAMETERS: HCPCS | Performed by: INTERNAL MEDICINE

## 2018-10-22 PROCEDURE — 1123F ACP DISCUSS/DSCN MKR DOCD: CPT | Performed by: INTERNAL MEDICINE

## 2018-10-22 NOTE — PROGRESS NOTES
Alyssa Olivier 39   Progress Note and Procedure Note      Shawn Mathis [facility]  MEDICAL RECORD NUMBER:  [id number]  AGE: 70 y.o. GENDER: female  : 1940  EPISODE DATE:  10/22/2018    Subjective:     Chief Complaint   Patient presents with    Wound Check     Follow Up on Left Buttock         HISTORY of PRESENT ILLNESS HPI  Shawn New is a 70 y. o. female who presents today for wound/ulcer evaluation. History of Wound Context:  Pt presents with a wound in the lt buttock. The area started out as a skin dimpling causing pain. Pt has had hip fracture surgery by  and pt saw him who referred her to  who thought this could be bursitis and gave an injection and prescribed a compounded cream containing 4 different medications to be applied to the site. The area subsequently opened up becoming a wound. Pt saw Luis Ray, her PCP who prescribed Doxycycline and discontinued the cream. Pt was advised to be seen at the [facility]. H/o Pulmonary fibrosis. O2 dependent. H/o scleroderma. Pt was recently admitted to hospital with Pneumonia and UTI. No constitutional issues this week. Did report hydrofera blue dressing \"worked its way out\" this past week.  in with pt this week.  Complaining of surface pain to right side of right breast.  Wound/Ulcer Pain Timing/Severity: intermittent  Quality of pain: aching  Severity:  3 / 10   Modifying Factors: None  Associated Signs/Symptoms: erythema, drainage and pain     Ulcer Identification:  Ulcer Type: traumatic  Contributing Factors: decreased mobility, shear force and decreased tissue oxygenation        PAST MEDICAL HISTORY        Diagnosis Date    Anxiety and depression     Arthritis     Cancer of skin of leg     Chronic low back pain     GERD (gastroesophageal reflux disease)     Insomnia     Iron deficiency anemia     Kidney stone     PUD (peptic ulcer disease)     Pulmonary fibrosis (HCC)     Dr. Sherry Muñiz has a CT done every 6 months  Stomach ulcer     Ulcer - lesion 2010    UTI (urinary tract infection)     frequent       PAST SURGICAL HISTORY    Past Surgical History:   Procedure Laterality Date    BACK SURGERY  2004,OCT 02, [place] 81    x4--fusions    BACK SURGERY  2000    laminectomy-lumbar    CARPAL TUNNEL RELEASE  11    Left    COLONOSCOPY      CYSTOSCOPY Left 2016    cysto left ureteral stent placement    CYSTOSCOPY  01/15/2018    Left Stent Insertion    CYSTOSCOPY  2018    cysto, left uretero with laser litho, left stent excahnge    EYE SURGERY Bilateral     cataract removed with lens implants    FRACTURE SURGERY Right 2016    femur fracture    FRACTURE SURGERY Left     femur fracture    HYSTERECTOMY  1993    HYSTEROSCOPY  1993    JOINT REPLACEMENT  2000    LEFT KNEE    JOINT REPLACEMENT  08    RIGHT KNEE    STOMACH SURGERY      For PUD       FAMILY HISTORY    Family History   Problem Relation Age of Onset    Emphysema Mother          AGE 64    Depression Mother     Clotting Disorder Father             Stroke Father        SOCIAL HISTORY    Social History   Substance Use Topics    Smoking status: Never Smoker    Smokeless tobacco: Never Used      Comment: encouraged to never smoke     Alcohol use No       ALLERGIES    Allergies   Allergen Reactions    Ampicillin Hives    Ciprofloxacin Other (See Comments)     Sores in mouth      Nitrofurantoin Other (See Comments)     Unable to recall reaction    Sulfa Antibiotics Other (See Comments)     Unable to recall reaction    Penicillins Hives and Rash       MEDICATIONS    Current Outpatient Prescriptions on File Prior to Encounter   Medication Sig Dispense Refill    omeprazole (PRILOSEC) 40 MG delayed release capsule TAKE 1 CAPSULE BY MOUTH TWICE DAILY (Patient taking differently: TAKE 1 CAPSULE BY MOUTH ONCE DAILY) 180 capsule 0    alendronate (FOSAMAX) 35 MG tablet Take 70 mg by mouth every 7 days dressing change. [x] Apply thin film of moisture barrier ointment to skin immediately around wound. [x] Other: Apply Skin Prep to surrounding area around Buttock Wound      Dressings:                  Wound Location : LEFT BUTTOCK        [x] Apply Primary Dressing:                                              [x]  SILVER ALGINATE ROPE - BE SURE TO PACK INTO UNDERMINING AREA ALSO (GOES BACK 9:00-11:00 = 4.0CM WITH DEEPEST AT 9:00 - 10:00. AREA MARKED WITH SKIN MARKER)                  [x] Cover and Secure with:                       [x] DRY Gauze  [] Daniela   [] Kerlix              [] Ace Wrap       [x] Cover Roll Tape         [] ABD                              [] Other:               Avoid contact of tape with skin. [x] Change dressing:       [] Daily              [] Every Other Day        [x] Three times per week                [] Once a week  [] Do Not Change Dressing         [] Other:      Negative Pressure:           Wound Location:   [email or web address]        [ ] Black Foam  [] White Foam                                     [] Other:   []Change dressing:        []Three times per week             []Other:      Pressure Relief:  [x] When sitting, shift position or do seat lifts every 15 minutes.  DO NOT SIT FOR MORE THAN 30 MINUTES AT A TIME  [] Wheelchair cushion   [] Specialty Bed/Mattress  [x] Turn every 2 hours when in bed.  Avoid position directing pressure on wound site.  Limit side lying to 30 degree tilt.  Limit HOB elevation to 30 degrees.        Off-Loading:   [] Off-loading when        [] walking           [] in bed [] sitting  [] Total non-weight bearing  [] Right Leg  [] Left Leg          [x] Assistive Device         [x] Walker            [] Cane   [] Wheelchair      [] Crutches              [] Surgical shoe    [] Podus Boot(s)   [] Foam Boot(s)  [] Roll About              [] Cast Boot       [] CROW Boot  [] Other:     Dietary:  [] Diet as

## 2018-10-23 ENCOUNTER — CARE COORDINATION (OUTPATIENT)
Dept: CASE MANAGEMENT | Age: 78
End: 2018-10-23

## 2018-10-23 RX ORDER — ACYCLOVIR 200 MG/1
200 CAPSULE ORAL
Qty: 25 CAPSULE | Refills: 0 | Status: SHIPPED | OUTPATIENT
Start: 2018-10-23 | End: 2018-10-28

## 2018-10-24 ENCOUNTER — HOSPITAL ENCOUNTER (OUTPATIENT)
Age: 78
Setting detail: OUTPATIENT SURGERY
Discharge: HOME | End: 2018-10-24
Attending: INTERNAL MEDICINE | Admitting: INTERNAL MEDICINE
Payer: MEDICARE

## 2018-10-24 VITALS
SYSTOLIC BLOOD PRESSURE: 162 MMHG | WEIGHT: 130 LBS | RESPIRATION RATE: 14 BRPM | BODY MASS INDEX: 23.04 KG/M2 | HEIGHT: 63 IN | DIASTOLIC BLOOD PRESSURE: 74 MMHG | HEART RATE: 76 BPM | OXYGEN SATURATION: 98 % | TEMPERATURE: 98 F

## 2018-10-24 DIAGNOSIS — J84.9 INTERSTITIAL LUNG DISEASE (HCC): ICD-10-CM

## 2018-10-24 LAB
APPEARANCE BAL (LAVAGE): CLEAR
APPEARANCE BAL (LAVAGE): CLEAR
CLOT EVALUATION BAL: ABNORMAL
CLOT EVALUATION BAL: ABNORMAL
COLOR LAVAGE: COLORLESS
COLOR LAVAGE: COLORLESS
EOSIN: 1 %
LYMPHOCYTES, BAL: 10 % (ref 5–10)
LYMPHOCYTES, BAL: 11 % (ref 5–10)
MACROPHAGES, BAL: 6 % (ref 90–95)
MONOCYTES, BAL: 57 %
MONOCYTES, BAL: 79 %
NUMBER OF CELLS COUNTED BAL (LAVAGE): 200
NUMBER OF CELLS COUNTED BAL (LAVAGE): 200
RBC, BAL: 1331 /CUMM
RBC, BAL: 17 /CUMM
SCLERODERMA (SCL-70) AB: 43 AU/ML (ref 0–40)
SEGMENTED NEUTROPHILS, BAL: 11 % (ref 5–10)
SEGMENTED NEUTROPHILS, BAL: 25 % (ref 5–10)
VOLUME LAVAGE: 40 ML
WBC/EPI CELLS BAL: 205 /CUMM
WBC/EPI CELLS BAL: 578 /CUMM

## 2018-10-24 PROCEDURE — 31624 DX BRONCHOSCOPE/LAVAGE: CPT | Performed by: INTERNAL MEDICINE

## 2018-10-24 PROCEDURE — 88184 FLOWCYTOMETRY/ TC 1 MARKER: CPT

## 2018-10-24 PROCEDURE — 3609010800 HC BRONCHOSCOPY ALVEOLAR LAVAGE: Performed by: INTERNAL MEDICINE

## 2018-10-24 PROCEDURE — 89051 BODY FLUID CELL COUNT: CPT

## 2018-10-24 PROCEDURE — 7100000010 HC PHASE II RECOVERY - FIRST 15 MIN: Performed by: INTERNAL MEDICINE

## 2018-10-24 PROCEDURE — 88305 TISSUE EXAM BY PATHOLOGIST: CPT

## 2018-10-24 PROCEDURE — 2580000003 HC RX 258: Performed by: INTERNAL MEDICINE

## 2018-10-24 PROCEDURE — 87102 FUNGUS ISOLATION CULTURE: CPT

## 2018-10-24 PROCEDURE — 87070 CULTURE OTHR SPECIMN AEROBIC: CPT

## 2018-10-24 PROCEDURE — 87206 SMEAR FLUORESCENT/ACID STAI: CPT

## 2018-10-24 PROCEDURE — 2709999900 HC NON-CHARGEABLE SUPPLY: Performed by: INTERNAL MEDICINE

## 2018-10-24 PROCEDURE — 88185 FLOWCYTOMETRY/TC ADD-ON: CPT

## 2018-10-24 PROCEDURE — 99152 MOD SED SAME PHYS/QHP 5/>YRS: CPT | Performed by: INTERNAL MEDICINE

## 2018-10-24 PROCEDURE — 87252 VIRUS INOCULATION TISSUE: CPT

## 2018-10-24 PROCEDURE — 6360000002 HC RX W HCPCS: Performed by: INTERNAL MEDICINE

## 2018-10-24 PROCEDURE — 88312 SPECIAL STAINS GROUP 1: CPT

## 2018-10-24 PROCEDURE — 87205 SMEAR GRAM STAIN: CPT

## 2018-10-24 PROCEDURE — 2500000003 HC RX 250 WO HCPCS: Performed by: INTERNAL MEDICINE

## 2018-10-24 PROCEDURE — 87015 SPECIMEN INFECT AGNT CONCNTJ: CPT

## 2018-10-24 PROCEDURE — 7100000011 HC PHASE II RECOVERY - ADDTL 15 MIN: Performed by: INTERNAL MEDICINE

## 2018-10-24 PROCEDURE — 87116 MYCOBACTERIA CULTURE: CPT

## 2018-10-24 PROCEDURE — 88112 CYTOPATH CELL ENHANCE TECH: CPT

## 2018-10-24 RX ORDER — MIDAZOLAM HYDROCHLORIDE 1 MG/ML
INJECTION INTRAMUSCULAR; INTRAVENOUS
Status: COMPLETED | OUTPATIENT
Start: 2018-10-24 | End: 2018-10-24

## 2018-10-24 RX ORDER — SODIUM CHLORIDE 9 MG/ML
INJECTION, SOLUTION INTRAVENOUS CONTINUOUS
Status: DISCONTINUED | OUTPATIENT
Start: 2018-10-24 | End: 2018-10-24 | Stop reason: HOSPADM

## 2018-10-24 RX ORDER — LIDOCAINE HYDROCHLORIDE 20 MG/ML
INJECTION, SOLUTION EPIDURAL; INFILTRATION; INTRACAUDAL; PERINEURAL
Status: COMPLETED | OUTPATIENT
Start: 2018-10-24 | End: 2018-10-24

## 2018-10-24 RX ORDER — FENTANYL CITRATE 50 UG/ML
INJECTION, SOLUTION INTRAMUSCULAR; INTRAVENOUS
Status: COMPLETED | OUTPATIENT
Start: 2018-10-24 | End: 2018-10-24

## 2018-10-24 RX ADMIN — SODIUM CHLORIDE: 9 INJECTION, SOLUTION INTRAVENOUS at 07:18

## 2018-10-24 ASSESSMENT — PAIN - FUNCTIONAL ASSESSMENT: PAIN_FUNCTIONAL_ASSESSMENT: 0-10

## 2018-10-24 ASSESSMENT — PAIN SCALES - GENERAL
PAINLEVEL_OUTOF10: 0
PAINLEVEL_OUTOF10: 0

## 2018-10-24 NOTE — PROCEDURES
BRONCHOSCOPY WITH BAL    Indications:  ILD, possible atypical infection  Consent:  Signed on chart  Pre-op diagnosis:  Same    Type of sedation used: Moderate Sedation  Sedation:  Fentanyl 25 mcg, Versed 1 mg  Anesthetic:  Lidocaine 10 mL    Physician/patient face-to-face sedation start time: 7:30 am   Physician/patient face-to-face sedation stop time: 7:41 am  Total moderate sedation time in minutes: 11 minutes    Patient was monitored continuously 1:1 throughout the entire procedure while sedation was administered    Mallampati Class:  III    ASA Class 4 - A patient with severe systemic disease that is a constant threat to life    Narrative: The patient was anesthestized with 4 % viscous lidocaine. I entered the left nare. I instilled 2 ml of 2% lidocaine on the the vocal cords, and then intubated the patient. The vocal cords were normal.  I then anesthestized the entire bronchial tree with 10 mL of 2% lidocaine. The right upper lobe demonstrated normal anatomy. The middle lobe demonstrated normal anatomy. The right lower lobe demonstrated normal anatomy. The left upper lobe demonstrated normal anatomy. The left lower lobe demonstrated normal anatomy. I then wedged my scope in the anterior segment of the JOSHUA to perform a BAL. I instilled 80 mL of normal saline. I suctioned 36 ml. The fluid was mucoid. I then wedged my scope in the anterior segment of the RUL to perform a BAL. I instilled 80 mL of normal saline. I suctioned 26 ml. The fluid was pink    I then removed my scope to check for bleeding. There was no evidence of bleeding. Post-op diagnosis:  ILD flare vs infection  Complications: None    DO Stevo Pineda [facility].

## 2018-10-24 NOTE — H&P
chloride infusion   Intravenous Continuous Evelyne Jones DO PE    GENERAL:  Frail  HEENT:  No scleral icterus, no conjunctival irritation  NECK:  No thyromegaly, no bruits  LYMPH:  No cervical or supraclavicular adenopathy  HEART:  Regular rate and rhythm, no murmurs  LUNGS:  Diminished with crackles  ABDOMEN:  No distention, no organomegaly  EXTREMITIES:  No edema, no digital clubbing  NEURO:  No localizing deficits, CN II-XII intact    Assessment/Plan:  1. ILD with possible progression vs atypical infection vs other  2.   ASA 4   -BAL, airway exam, airborne precautions    Marylouise Kayser, DO  [place] [facility]  [phone]

## 2018-10-26 ENCOUNTER — CARE COORDINATION (OUTPATIENT)
Dept: CASE MANAGEMENT | Age: 78
End: 2018-10-26

## 2018-10-26 LAB
CULTURE, RESPIRATORY: NORMAL
CULTURE, RESPIRATORY: NORMAL
GRAM STAIN RESULT: NORMAL
GRAM STAIN RESULT: NORMAL

## 2018-10-29 ENCOUNTER — HOSPITAL ENCOUNTER (OUTPATIENT)
Dept: WOUND CARE | Age: 78
Discharge: HOME | End: 2018-10-29
Payer: MEDICARE

## 2018-10-29 VITALS
TEMPERATURE: 97.4 F | SYSTOLIC BLOOD PRESSURE: 151 MMHG | HEART RATE: 83 BPM | DIASTOLIC BLOOD PRESSURE: 73 MMHG | RESPIRATION RATE: 18 BRPM

## 2018-10-29 DIAGNOSIS — L89.329 PRESSURE INJURY OF SKIN OF LEFT BUTTOCK, UNSPECIFIED INJURY STAGE: Primary | ICD-10-CM

## 2018-10-29 PROCEDURE — 11042 DBRDMT SUBQ TIS 1ST 20SQCM/<: CPT | Performed by: SURGERY

## 2018-10-29 PROCEDURE — 11042 DBRDMT SUBQ TIS 1ST 20SQCM/<: CPT

## 2018-10-29 RX ORDER — ACYCLOVIR 200 MG/1
CAPSULE ORAL
COMMUNITY
Start: 2018-10-23 | End: 2018-10-31

## 2018-10-29 RX ORDER — LIDOCAINE HYDROCHLORIDE 40 MG/ML
SOLUTION TOPICAL ONCE
Status: DISCONTINUED | OUTPATIENT
Start: 2018-10-29 | End: 2018-10-30 | Stop reason: HOSPADM

## 2018-10-29 NOTE — PROGRESS NOTES
Alyssa Olivier 39   Progress Note and Procedure Note        Jo Ann Michelle [facility]  MEDICAL RECORD NUMBER:  [id number]  AGE: 70 y.o. GENDER: female  : 1940  EPISODE DATE:  10/29/2018     Subjective:           Chief Complaint   Patient presents with    Wound Check       Follow Up on Left Buttock         HISTORY of PRESENT ILLNESS HPI  Jo Ann New is a 70 y. o. female who presents today for wound/ulcer evaluation. History of Wound Context:  Pt presents with a wound in the lt buttock. The area started out as a skin dimpling causing pain. Pt has had hip fracture surgery by  and pt saw him who referred her to  who thought this could be bursitis and gave an injection and prescribed a compounded cream containing 4 different medications to be applied to the site. The area subsequently opened up becoming a wound. Pt saw Bharti Manriquez, her PCP who prescribed Doxycycline and discontinued the cream. Pt was advised to be seen at the [facility]. H/o Pulmonary fibrosis. O2 dependent. H/o scleroderma. Pt was recently admitted to hospital with Pneumonia and UTI. No constitutional issues this week. Did report hydrofera blue dressing \"worked its way out\" this past week.  in with pt this week.  Complaining of surface pain to right side of right breast.  Wound/Ulcer Pain Timing/Severity: intermittent  Quality of pain: aching  Severity:  3 / 10   Modifying Factors: None  Associated Signs/Symptoms: erythema, drainage and pain     Ulcer Identification:  Ulcer Type: traumatic  Contributing Factors: decreased mobility, shear force and decreased tissue oxygenation        PAST MEDICAL HISTORY     Past Medical History             Diagnosis Date    Anxiety and depression      Arthritis      Cancer of skin of leg      Chronic low back pain      GERD (gastroesophageal reflux disease)      Insomnia      Iron deficiency anemia      Kidney stone      PUD (peptic ulcer disease)      Pulmonary fibrosis (HCC)

## 2018-10-30 ENCOUNTER — TELEPHONE (OUTPATIENT)
Dept: PULMONOLOGY | Age: 78
End: 2018-10-30

## 2018-10-30 DIAGNOSIS — J84.9 ILD (INTERSTITIAL LUNG DISEASE) (HCC): Primary | ICD-10-CM

## 2018-10-30 RX ORDER — PREDNISONE 20 MG/1
20 TABLET ORAL DAILY
Qty: 60 TABLET | Refills: 2 | Status: SHIPPED | OUTPATIENT
Start: 2018-10-30 | End: 2018-12-17 | Stop reason: ALTCHOICE

## 2018-10-30 NOTE — TELEPHONE ENCOUNTER
Called pt to schedule appt and she stated that she has a wound that is on her side that is fairely deep and it is not closing properly, it is opening on another side and they may have to go in and re open it and close it to get it to heal, she would like to know if you have any objections to this being done, please advise

## 2018-11-05 ENCOUNTER — HOSPITAL ENCOUNTER (OUTPATIENT)
Dept: WOUND CARE | Age: 78
Discharge: HOME | End: 2018-11-05
Payer: MEDICARE

## 2018-11-05 VITALS
RESPIRATION RATE: 16 BRPM | DIASTOLIC BLOOD PRESSURE: 60 MMHG | SYSTOLIC BLOOD PRESSURE: 127 MMHG | TEMPERATURE: 97 F | HEART RATE: 72 BPM

## 2018-11-05 DIAGNOSIS — S71.002A: Primary | ICD-10-CM

## 2018-11-05 LAB
FINAL REPORT: NORMAL
FINAL REPORT: NORMAL
PRELIMINARY: NORMAL
PRELIMINARY: NORMAL

## 2018-11-05 PROCEDURE — 11042 DBRDMT SUBQ TIS 1ST 20SQCM/<: CPT

## 2018-11-05 RX ORDER — LIDOCAINE HYDROCHLORIDE 40 MG/ML
SOLUTION TOPICAL PRN
Status: DISCONTINUED | OUTPATIENT
Start: 2018-11-05 | End: 2018-11-06 | Stop reason: HOSPADM

## 2018-11-06 ENCOUNTER — TELEPHONE (OUTPATIENT)
Dept: FAMILY MEDICINE CLINIC | Age: 78
End: 2018-11-06

## 2018-11-09 ENCOUNTER — TELEPHONE (OUTPATIENT)
Dept: FAMILY MEDICINE CLINIC | Age: 78
End: 2018-11-09

## 2018-11-09 NOTE — TELEPHONE ENCOUNTER
Edgardo Metz is calling to let dr Mcihael Wright know that they are continuing home PT for twice a week for four weeks.

## 2018-11-10 ENCOUNTER — HOSPITAL ENCOUNTER (OUTPATIENT)
Dept: MRI IMAGING | Age: 78
Discharge: HOME | End: 2018-11-10
Payer: MEDICARE

## 2018-11-10 DIAGNOSIS — S71.002A: ICD-10-CM

## 2018-11-10 PROCEDURE — A9577 INJ MULTIHANCE: HCPCS | Performed by: FAMILY MEDICINE

## 2018-11-10 PROCEDURE — 6360000004 HC RX CONTRAST MEDICATION: Performed by: FAMILY MEDICINE

## 2018-11-10 PROCEDURE — 73723 MRI JOINT LWR EXTR W/O&W/DYE: CPT

## 2018-11-10 RX ADMIN — GADOBENATE DIMEGLUMINE 11 ML: 529 INJECTION, SOLUTION INTRAVENOUS at 12:45

## 2018-11-12 ENCOUNTER — HOSPITAL ENCOUNTER (OUTPATIENT)
Dept: WOUND CARE | Age: 78
Discharge: HOME | End: 2018-11-12
Payer: MEDICARE

## 2018-11-12 VITALS
RESPIRATION RATE: 16 BRPM | TEMPERATURE: 97.1 F | SYSTOLIC BLOOD PRESSURE: 141 MMHG | HEART RATE: 77 BPM | DIASTOLIC BLOOD PRESSURE: 70 MMHG

## 2018-11-12 PROCEDURE — 11042 DBRDMT SUBQ TIS 1ST 20SQCM/<: CPT

## 2018-11-12 RX ORDER — SODIUM HYPOCHLORITE 2.5 MG/ML
SOLUTION TOPICAL
Qty: 1 BOTTLE | Refills: 3 | Status: SHIPPED | OUTPATIENT
Start: 2018-11-12 | End: 2018-11-19

## 2018-11-12 RX ORDER — LIDOCAINE HYDROCHLORIDE 40 MG/ML
SOLUTION TOPICAL PRN
Status: DISCONTINUED | OUTPATIENT
Start: 2018-11-12 | End: 2018-11-13 | Stop reason: HOSPADM

## 2018-11-13 ENCOUNTER — TELEPHONE (OUTPATIENT)
Dept: FAMILY MEDICINE CLINIC | Age: 78
End: 2018-11-13

## 2018-11-13 NOTE — PROGRESS NOTES
Appointment:  [x] Wound and dressing supply provider: [facility]  [x] ECF or Home Healthcare: [address] [place] [address]   [] Nurse visit: Monika Summers or NP scheduled for Nurse Visit:   [x] Return Appointment: With DR Modesto Wilhelm  1 Week(s)   [] Ordered tests:         Nurse Case Manger: Toño Ron     Electronically signed by Hallie Scott RN on 11/12/2018 at 11:35 AM      [facility] Information: Should you experience any significant changes in your wound(s) or have questions about your wound care, please contact the [address] [place] [address] [phone] [facility] - THURSDAY 8:30 am - 4:30 pm and Friday 8:30 am - 1:00 pm.  If you need help with your wound outside these hours and cannot wait until we are again available, contact your PCP or go to the hospital emergency room.      Nurse Signature:_______________________     Date: ___________ Time:  ____________        Discharge Nurse Signature        PLEASE NOTE: IF YOU ARE UNABLE TO OBTAIN WOUND SUPPLIES, CONTINUE TO USE THE SUPPLIES YOU HAVE AVAILABLE UNTIL YOU ARE ABLE TO [facility].  IT IS MOST IMPORTANT TO KEEP THE WOUND COVERED AT ALL TIMES.     Physician Signature:_______________________     Date: ___________ Time:  ____________                    [] Dr Davi Saez    [] Dr Kristen Martinez CNP               [] Dr Long Aparicio  [] Dr Coralee Mcburney   [] Dr Anne Marie Kern             [x] Dr Riddhi Simons                [] Dr Prieto Client   [] Jason Lovett NP            The information contained in the After Visit Summary has been reviewed with me, the patient and/or responsible adult, by my health care provider(s).  I had the opportunity to ask questions regarding this information.  I have elected to receive;        Patient Signature:_______________________     Date: ___________ Time:  ____________     [] Patient unable to sign Discharge Instructions given to ECF/Transportation/POA        [x]  After Visit Summary  []  Comprehensive Discharge

## 2018-11-14 ENCOUNTER — TELEPHONE (OUTPATIENT)
Dept: SURGERY | Age: 78
End: 2018-11-14

## 2018-11-19 ENCOUNTER — HOSPITAL ENCOUNTER (OUTPATIENT)
Dept: WOUND CARE | Age: 78
Discharge: HOME | End: 2018-11-19
Payer: MEDICARE

## 2018-11-19 VITALS
RESPIRATION RATE: 16 BRPM | TEMPERATURE: 97 F | DIASTOLIC BLOOD PRESSURE: 67 MMHG | HEART RATE: 68 BPM | SYSTOLIC BLOOD PRESSURE: 148 MMHG

## 2018-11-19 DIAGNOSIS — S71.002A: Primary | ICD-10-CM

## 2018-11-19 PROCEDURE — 11042 DBRDMT SUBQ TIS 1ST 20SQCM/<: CPT

## 2018-11-19 RX ORDER — LIDOCAINE HYDROCHLORIDE 40 MG/ML
SOLUTION TOPICAL PRN
Status: DISCONTINUED | OUTPATIENT
Start: 2018-11-19 | End: 2018-11-20 | Stop reason: HOSPADM

## 2018-11-20 NOTE — PROGRESS NOTES
Debridement    Using curette and forceps the wound(s)/ulcer(s) was/were sharply debrided down through and including the removal of epidermis, dermis and subcutaneous tissue. Devitalized Tissue Debrided:  fibrin and slough    Pre Debridement Measurements:  Are located in the [place]  Documentation Flow Sheet    Wound/Ulcer #: 4    Post Debridement Measurements:  Wound/Ulcer Descriptions are Pre Debridement except measurements:    Wound 08/27/18 Buttocks Left Wound #4 (previously wound #3-surgically opened w/ Dr. Violet Forrester) on 8/23/18  (Active)   Wound Image   11/5/2018 11:37 AM   Wound Type Wound 11/19/2018 10:56 AM   Wound Traumatic 11/19/2018 10:56 AM   Dressing Status Intact; Old drainage 11/19/2018 10:56 AM   Dressing Changed Changed/New 11/19/2018 11:33 AM   Dressing/Treatment Other (Comment) 11/19/2018 11:33 AM   Wound Cleansed Rinsed/Irrigated with saline 9/10/2018 10:07 AM   Wound Length (cm) 4.8 cm 11/19/2018 11:24 AM   Wound Width (cm) 0.4 cm 11/19/2018 11:24 AM   Wound Depth (cm)  1.8 11/19/2018 11:24 AM   Calculated Wound Size (cm^2) (l*w) 1.92 cm^2 11/19/2018 11:24 AM   Change in Wound Size % (l*w) 43.2 11/19/2018 11:24 AM   Undermining Starts ___ O'Clock 9 11/19/2018 10:56 AM   Undermining Ends___ O'Clock 11 11/19/2018 10:56 AM   Undermining Maxium Distance (cm) 4.6 11/19/2018 10:56 AM   Wound Assessment Granulation tissue 11/19/2018 10:56 AM   Drainage Amount Moderate 11/19/2018 10:56 AM   Drainage Description Tan 11/19/2018 10:56 AM   Odor None 11/19/2018 10:56 AM   Margins Unattached edges 11/19/2018 10:56 AM   Mirian-wound Assessment Pink 11/19/2018 10:56 AM   Non-staged Wound Description Full thickness 11/19/2018 10:56 AM   [employer]%Wound Bed 50 11/19/2018 10:56 AM   Red%Wound Bed 90% 10/1/2018 12:12 PM   Yellow%Wound Bed 50 11/19/2018 10:56 AM   Op First Treatment Date 08/27/18 8/27/2018 10:22 AM   Number of days: 84          Percent of Wound(s)/Ulcer(s) Debrided: 100%    Total Surface Area

## 2018-11-26 ENCOUNTER — ANESTHESIA EVENT (OUTPATIENT)
Dept: OPERATING ROOM | Age: 78
End: 2018-11-26
Payer: MEDICARE

## 2018-11-26 LAB
FUNGUS (MYCOLOGY) CULTURE: NORMAL
FUNGUS (MYCOLOGY) CULTURE: NORMAL
FUNGUS STAIN: NORMAL
FUNGUS STAIN: NORMAL

## 2018-11-26 RX ORDER — ALENDRONATE SODIUM 35 MG/1
TABLET ORAL
Qty: 24 TABLET | Refills: 0 | Status: SHIPPED | OUTPATIENT
Start: 2018-11-26 | End: 2018-12-04 | Stop reason: SDUPTHER

## 2018-11-27 ENCOUNTER — TELEPHONE (OUTPATIENT)
Dept: SURGERY | Age: 78
End: 2018-11-27

## 2018-11-28 ENCOUNTER — ANESTHESIA (OUTPATIENT)
Dept: OPERATING ROOM | Age: 78
End: 2018-11-28
Payer: MEDICARE

## 2018-11-28 ENCOUNTER — TELEPHONE (OUTPATIENT)
Dept: SURGERY | Age: 78
End: 2018-11-28

## 2018-11-28 ENCOUNTER — HOSPITAL ENCOUNTER (OUTPATIENT)
Age: 78
Setting detail: SURGERY ADMIT
Discharge: HOME | End: 2018-11-28
Attending: SURGERY | Admitting: SURGERY
Payer: MEDICARE

## 2018-11-28 VITALS
OXYGEN SATURATION: 99 % | TEMPERATURE: 98 F | HEART RATE: 64 BPM | SYSTOLIC BLOOD PRESSURE: 157 MMHG | DIASTOLIC BLOOD PRESSURE: 58 MMHG | HEIGHT: 63 IN | BODY MASS INDEX: 23.04 KG/M2 | RESPIRATION RATE: 16 BRPM | WEIGHT: 130 LBS

## 2018-11-28 VITALS — SYSTOLIC BLOOD PRESSURE: 111 MMHG | OXYGEN SATURATION: 99 % | DIASTOLIC BLOOD PRESSURE: 52 MMHG

## 2018-11-28 DIAGNOSIS — L89.324 PRESSURE INJURY OF LEFT BUTTOCK, STAGE 4 (HCC): ICD-10-CM

## 2018-11-28 DIAGNOSIS — G89.18 ACUTE POST-OPERATIVE PAIN: Primary | ICD-10-CM

## 2018-11-28 DIAGNOSIS — S71.002A: ICD-10-CM

## 2018-11-28 PROCEDURE — 7100000000 HC PACU RECOVERY - FIRST 15 MIN: Performed by: SURGERY

## 2018-11-28 PROCEDURE — 6360000002 HC RX W HCPCS: Performed by: NURSE ANESTHETIST, CERTIFIED REGISTERED

## 2018-11-28 PROCEDURE — 3700000000 HC ANESTHESIA ATTENDED CARE: Performed by: SURGERY

## 2018-11-28 PROCEDURE — 11043 DBRDMT MUSC&/FSCA 1ST 20/<: CPT | Performed by: SURGERY

## 2018-11-28 PROCEDURE — 2709999900 HC NON-CHARGEABLE SUPPLY: Performed by: SURGERY

## 2018-11-28 PROCEDURE — 2580000003 HC RX 258: Performed by: SURGERY

## 2018-11-28 PROCEDURE — 2500000003 HC RX 250 WO HCPCS: Performed by: NURSE ANESTHETIST, CERTIFIED REGISTERED

## 2018-11-28 PROCEDURE — 7100000001 HC PACU RECOVERY - ADDTL 15 MIN: Performed by: SURGERY

## 2018-11-28 PROCEDURE — 2580000003 HC RX 258: Performed by: ANESTHESIOLOGY

## 2018-11-28 PROCEDURE — 3700000001 HC ADD 15 MINUTES (ANESTHESIA): Performed by: SURGERY

## 2018-11-28 PROCEDURE — 88304 TISSUE EXAM BY PATHOLOGIST: CPT

## 2018-11-28 PROCEDURE — 7100000010 HC PHASE II RECOVERY - FIRST 15 MIN: Performed by: SURGERY

## 2018-11-28 PROCEDURE — 3600000012 HC SURGERY LEVEL 2 ADDTL 15MIN: Performed by: SURGERY

## 2018-11-28 PROCEDURE — 7100000011 HC PHASE II RECOVERY - ADDTL 15 MIN: Performed by: SURGERY

## 2018-11-28 PROCEDURE — 3600000002 HC SURGERY LEVEL 2 BASE: Performed by: SURGERY

## 2018-11-28 PROCEDURE — 2500000003 HC RX 250 WO HCPCS: Performed by: SURGERY

## 2018-11-28 RX ORDER — OXYCODONE HYDROCHLORIDE AND ACETAMINOPHEN 5; 325 MG/1; MG/1
1 TABLET ORAL PRN
Status: DISCONTINUED | OUTPATIENT
Start: 2018-11-28 | End: 2018-11-28 | Stop reason: HOSPADM

## 2018-11-28 RX ORDER — FENTANYL CITRATE 50 UG/ML
50 INJECTION, SOLUTION INTRAMUSCULAR; INTRAVENOUS EVERY 5 MIN PRN
Status: DISCONTINUED | OUTPATIENT
Start: 2018-11-28 | End: 2018-11-28 | Stop reason: HOSPADM

## 2018-11-28 RX ORDER — MAGNESIUM HYDROXIDE 1200 MG/15ML
LIQUID ORAL CONTINUOUS PRN
Status: DISCONTINUED | OUTPATIENT
Start: 2018-11-28 | End: 2018-11-28 | Stop reason: HOSPADM

## 2018-11-28 RX ORDER — MEPERIDINE HYDROCHLORIDE 25 MG/ML
12.5 INJECTION INTRAMUSCULAR; INTRAVENOUS; SUBCUTANEOUS EVERY 5 MIN PRN
Status: DISCONTINUED | OUTPATIENT
Start: 2018-11-28 | End: 2018-11-28 | Stop reason: HOSPADM

## 2018-11-28 RX ORDER — SODIUM CHLORIDE 0.9 % (FLUSH) 0.9 %
10 SYRINGE (ML) INJECTION PRN
Status: DISCONTINUED | OUTPATIENT
Start: 2018-11-28 | End: 2018-11-28 | Stop reason: HOSPADM

## 2018-11-28 RX ORDER — OXYCODONE HYDROCHLORIDE AND ACETAMINOPHEN 5; 325 MG/1; MG/1
2 TABLET ORAL PRN
Status: DISCONTINUED | OUTPATIENT
Start: 2018-11-28 | End: 2018-11-28 | Stop reason: HOSPADM

## 2018-11-28 RX ORDER — FENTANYL CITRATE 50 UG/ML
25 INJECTION, SOLUTION INTRAMUSCULAR; INTRAVENOUS EVERY 5 MIN PRN
Status: DISCONTINUED | OUTPATIENT
Start: 2018-11-28 | End: 2018-11-28 | Stop reason: HOSPADM

## 2018-11-28 RX ORDER — LIDOCAINE HYDROCHLORIDE 20 MG/ML
INJECTION, SOLUTION EPIDURAL; INFILTRATION; INTRACAUDAL; PERINEURAL PRN
Status: DISCONTINUED | OUTPATIENT
Start: 2018-11-28 | End: 2018-11-28 | Stop reason: SDUPTHER

## 2018-11-28 RX ORDER — SODIUM CHLORIDE 0.9 % (FLUSH) 0.9 %
10 SYRINGE (ML) INJECTION EVERY 12 HOURS SCHEDULED
Status: DISCONTINUED | OUTPATIENT
Start: 2018-11-28 | End: 2018-11-28 | Stop reason: HOSPADM

## 2018-11-28 RX ORDER — SODIUM CHLORIDE 9 MG/ML
INJECTION, SOLUTION INTRAVENOUS CONTINUOUS
Status: DISCONTINUED | OUTPATIENT
Start: 2018-11-28 | End: 2018-11-28 | Stop reason: HOSPADM

## 2018-11-28 RX ORDER — ONDANSETRON 2 MG/ML
4 INJECTION INTRAMUSCULAR; INTRAVENOUS
Status: DISCONTINUED | OUTPATIENT
Start: 2018-11-28 | End: 2018-11-28 | Stop reason: HOSPADM

## 2018-11-28 RX ORDER — FENTANYL CITRATE 50 UG/ML
INJECTION, SOLUTION INTRAMUSCULAR; INTRAVENOUS PRN
Status: DISCONTINUED | OUTPATIENT
Start: 2018-11-28 | End: 2018-11-28 | Stop reason: SDUPTHER

## 2018-11-28 RX ORDER — MORPHINE SULFATE 2 MG/ML
1 INJECTION, SOLUTION INTRAMUSCULAR; INTRAVENOUS EVERY 5 MIN PRN
Status: DISCONTINUED | OUTPATIENT
Start: 2018-11-28 | End: 2018-11-28 | Stop reason: HOSPADM

## 2018-11-28 RX ORDER — OXYCODONE HYDROCHLORIDE AND ACETAMINOPHEN 5; 325 MG/1; MG/1
1 TABLET ORAL EVERY 6 HOURS PRN
Qty: 28 TABLET | Refills: 0 | Status: SHIPPED | OUTPATIENT
Start: 2018-11-28 | End: 2018-12-04 | Stop reason: ALTCHOICE

## 2018-11-28 RX ORDER — MORPHINE SULFATE 2 MG/ML
2 INJECTION, SOLUTION INTRAMUSCULAR; INTRAVENOUS EVERY 5 MIN PRN
Status: DISCONTINUED | OUTPATIENT
Start: 2018-11-28 | End: 2018-11-28 | Stop reason: HOSPADM

## 2018-11-28 RX ORDER — PROPOFOL 10 MG/ML
INJECTION, EMULSION INTRAVENOUS CONTINUOUS PRN
Status: DISCONTINUED | OUTPATIENT
Start: 2018-11-28 | End: 2018-11-28 | Stop reason: SDUPTHER

## 2018-11-28 RX ORDER — LIDOCAINE HYDROCHLORIDE 10 MG/ML
INJECTION, SOLUTION EPIDURAL; INFILTRATION; INTRACAUDAL; PERINEURAL
Status: COMPLETED | OUTPATIENT
Start: 2018-11-28 | End: 2018-11-28

## 2018-11-28 RX ORDER — CLINDAMYCIN PHOSPHATE 900 MG/50ML
900 INJECTION INTRAVENOUS ONCE
Status: DISCONTINUED | OUTPATIENT
Start: 2018-11-28 | End: 2018-11-28 | Stop reason: HOSPADM

## 2018-11-28 RX ADMIN — SODIUM CHLORIDE: 9 INJECTION, SOLUTION INTRAVENOUS at 09:48

## 2018-11-28 RX ADMIN — PROPOFOL 120 MCG/KG/MIN: 10 INJECTION, EMULSION INTRAVENOUS at 10:28

## 2018-11-28 RX ADMIN — LIDOCAINE HYDROCHLORIDE 40 MG: 20 INJECTION, SOLUTION EPIDURAL; INFILTRATION; INTRACAUDAL; PERINEURAL at 10:28

## 2018-11-28 RX ADMIN — FENTANYL CITRATE 50 MCG: 50 INJECTION INTRAMUSCULAR; INTRAVENOUS at 10:17

## 2018-11-28 RX ADMIN — FENTANYL CITRATE 25 MCG: 50 INJECTION INTRAMUSCULAR; INTRAVENOUS at 10:55

## 2018-11-28 ASSESSMENT — ENCOUNTER SYMPTOMS: SHORTNESS OF BREATH: 1

## 2018-11-28 ASSESSMENT — PULMONARY FUNCTION TESTS
PIF_VALUE: 0
PIF_VALUE: 1
PIF_VALUE: 0

## 2018-11-28 ASSESSMENT — PAIN SCALES - GENERAL
PAINLEVEL_OUTOF10: 0

## 2018-11-28 ASSESSMENT — PAIN - FUNCTIONAL ASSESSMENT: PAIN_FUNCTIONAL_ASSESSMENT: 0-10

## 2018-11-28 ASSESSMENT — PAIN DESCRIPTION - DESCRIPTORS: DESCRIPTORS: ACHING;CONSTANT;DISCOMFORT

## 2018-11-28 ASSESSMENT — LIFESTYLE VARIABLES: SMOKING_STATUS: 0

## 2018-11-28 NOTE — ANESTHESIA PRE PROCEDURE
Department of Anesthesiology  Preprocedure Note       Name:  Vanita Barrios   Age:  70 y.o.  :  1940                                          MRN:  [id number]         Date:  2018      Surgeon: Candance Pottier):  Eliana Galarza MD    Procedure: DEBRIDEMENT LEFT HIP DECUBITUS WOUND (Left Hip)    Medications prior to admission:   Prior to Admission medications    Medication Sig Start Date End Date Taking? Authorizing Provider   alendronate (FOSAMAX) 35 MG tablet TAKE 2 TABLETS BY MOUTH EVERY 7 DAYS 18   Oni Pulido, DO   ACETAMINOPHEN-CODEINE #3 PO Take by mouth every 8 hours as needed    Historical Provider, MD   predniSONE (DELTASONE) 20 MG tablet Take 1 tablet by mouth daily Take 2 daily for 5 days, then once daily 10/30/18   Fidel Henriquez,    omeprazole (PRILOSEC) 40 MG delayed release capsule TAKE 1 CAPSULE BY MOUTH TWICE DAILY  Patient taking differently: TAKE 1 CAPSULE BY MOUTH ONCE DAILY 10/18/18   Oni Pulido DO   alendronate (FOSAMAX) 35 MG tablet Take 70 mg by mouth every 7 days    Historical Provider, MD   calcitRIOL (ROCALTROL) 0.25 MCG capsule TAKE 1 CAPSULE BY MOUTH DAILY 18   Oni Pulido DO   sertraline (ZOLOFT) 100 MG tablet TAKE 1 TABLET BY MOUTH EVERY DAY 9/3/18   Oni Pulido, DO   buPROPion (WELLBUTRIN SR) 150 MG extended release tablet TAKE 1 TABLET BY MOUTH TWICE DAILY 18   Oni Pulido, DO   Cyanocobalamin (VITAMIN B 12 PO) Take 1 tablet by mouth daily     Historical Provider, MD   aspirin 81 MG tablet Take 81 mg by mouth daily    Historical Provider, MD   vitamin C (ASCORBIC ACID) 500 MG tablet Take 500 mg by mouth daily    Historical Provider, MD   OXYGEN Inhale 4 L into the lungs continuous  Patient taking differently: Inhale 3 L into the lungs nightly  3/17/16   Malia Jones MD   Multiple Vitamins-Minerals (MULTIVITAMIN PO) Take 1 tablet by mouth daily.     Historical Provider, MD   Cholecalciferol ([employer] VITAMIN D) 1000 UNIT CAPS 09/27/2013       HCG (If Applicable): No results found for: PREGTESTUR, PREGSERUM, HCG, HCGQUANT     ABGs:   Lab Results   Component Value Date    PHART 7.433 10/05/2018    PO2ART 80.0 10/05/2018    XGB9NYS 35.3 10/05/2018    ZYI4GCM 23.2 10/05/2018    BEART -0.1 10/05/2018    Z4AHWJJA 96.3 10/05/2018        Type & Screen (If Applicable):  Lab Results   Component Value Date    LABABO A 03/07/2010    [facility] SUN Positive 03/07/2010       Anesthesia Evaluation  Patient summary reviewed no history of anesthetic complications:   Airway: Mallampati: III  TM distance: >3 FB   Neck ROM: limited  Mouth opening: < 3 FB Dental:    (+) upper dentures      Pulmonary: breath sounds clear to auscultation  (+) COPD (pulm fibrosis ? / 4L O2 cont.):  shortness of breath:      (-) not a current smoker          Patient did not smoke on day of surgery. Cardiovascular:    (+) SPIVEY:,     (-) pacemaker, hypertension, past MI, CAD, CABG/stent and dysrhythmias    ECG reviewed  Rhythm: regular  Rate: normal  Echocardiogram reviewed         Beta Blocker:  Not on Beta Blocker         Neuro/Psych:   (+) neuromuscular disease (cts / b feet neuropathy):, depression/anxiety  (hx dep)            GI/Hepatic/Renal:   (+) GERD:, PUD,      (-) liver disease and no renal disease       Endo/Other:    (+) blood dyscrasia (easy bruise): arthritis:., no malignancy/cancer. (-) no malignancy/cancer               Abdominal:           Vascular:                                        Anesthesia Plan      TIVA     ASA 3       Induction: intravenous. MIPS: Postoperative opioids intended and Prophylactic antiemetics administered. Anesthetic plan and risks discussed with patient and spouse. Plan discussed with CRNA. This pre-anesthesia assessment may be used as a history and physical.    DOS STAFF ADDENDUM:    Pt seen and examined, chart reviewed (including anesthesia, drug and allergy history).   No interval changes to history and

## 2018-11-28 NOTE — PROGRESS NOTES
Pt to PACU from OR. MOJGAN Cardenas. Dry dressing and tegaderm on left hip incision clean dry and intact. Pt able to move toes with ease, cap refill less than 3, appropriate for ethnicity, cool. Pt states that she always has numbness and tingling in right foot and occasionally feels numbness and tingling in left foot, but not at this moment. Respirations easy, unlabored on 3.5 L of O2 nasal canula. Continue to monitor.

## 2018-11-28 NOTE — BRIEF OP NOTE
Brief Postoperative Note  ______________________________________________________________    Patient: Mariam Crump  YOB: 1940  MRN: [id number]  Date of Procedure: 11/28/2018    Pre-Op Diagnosis: OPEN WOUND OF HIP, COMPLICATED, LEFT, INITIAL ENCOUNTER     Post-Op Diagnosis: Same       Procedure(s):  DEBRIDEMENT LEFT HIP SKIN AND SUBCUTANEOUS tissue and  FASCIA    Anesthesia: TIVA    Surgeon(s):  Laurita Lozoya MD    Assistant: Lucinda Blancas    Estimated Blood Loss (mL): less than 50     Complications: None    Specimens:   ID Type Source Tests Collected by Time Destination   1 : A.) DEBRIDED TISSUE LEFT HIP DECUBITIS WOUND Specimen Hip SURGICAL CULTURE Laurita Lozoya MD 11/28/2018 1053        Implants:  * No implants in log *      Drains:   Ureteral Catheter Left ureter 6 fr (Active)       Findings: very close to bone .  Hard tough scar tissue cavity    Laurita Lozoya MD  Date: 11/28/2018  Time: 11:09 AM

## 2018-11-29 LAB
ANAEROBIC CULTURE: NORMAL
CULTURE SURGICAL: NORMAL

## 2018-11-29 NOTE — OP NOTE
once we were satisfied. The wound measured 6.2-cm wide,  2.7-cm long, and a depth of at least 2 cm deep. Once it was dried and  irrigated, we packed it with saline Kerlix gauze. The patient is going  to see the visiting nurse tomorrow and they are going to go back to  their [employer], that is the plan for now. He has an appointment with Dr. Carly Rowell on Monday to decide what the  next wound treatment will be.         Kwame Dean MD    D: 11/28/2018 11:56:31       T: 11/28/2018 15:06:15     [id number]  Job#: [id number]     Doc#: [id number]    CC:  Dr. Danelle Lawson MD

## 2018-12-02 RX ORDER — SERTRALINE HYDROCHLORIDE 100 MG/1
TABLET, FILM COATED ORAL
Qty: 90 TABLET | Refills: 0 | Status: ON HOLD | OUTPATIENT
Start: 2018-12-02 | End: 2019-03-01 | Stop reason: SDUPTHER

## 2018-12-03 ENCOUNTER — HOSPITAL ENCOUNTER (OUTPATIENT)
Dept: WOUND CARE | Age: 78
Discharge: HOME | End: 2018-12-03
Payer: MEDICARE

## 2018-12-03 VITALS
SYSTOLIC BLOOD PRESSURE: 144 MMHG | HEART RATE: 65 BPM | DIASTOLIC BLOOD PRESSURE: 75 MMHG | TEMPERATURE: 97.5 F | RESPIRATION RATE: 18 BRPM | BODY MASS INDEX: 23.91 KG/M2 | WEIGHT: 135 LBS

## 2018-12-03 DIAGNOSIS — S71.002A: Primary | ICD-10-CM

## 2018-12-03 PROCEDURE — 99213 OFFICE O/P EST LOW 20 MIN: CPT

## 2018-12-03 RX ORDER — LIDOCAINE HYDROCHLORIDE 40 MG/ML
SOLUTION TOPICAL PRN
Status: DISCONTINUED | OUTPATIENT
Start: 2018-12-03 | End: 2018-12-04 | Stop reason: HOSPADM

## 2018-12-03 ASSESSMENT — PAIN SCALES - GENERAL: PAINLEVEL_OUTOF10: 0

## 2018-12-04 ENCOUNTER — HOSPITAL ENCOUNTER (OUTPATIENT)
Dept: GENERAL RADIOLOGY | Age: 78
Discharge: HOME | End: 2018-12-04
Payer: MEDICARE

## 2018-12-04 ENCOUNTER — HOSPITAL ENCOUNTER (OUTPATIENT)
Age: 78
Discharge: HOME | End: 2018-12-04
Payer: MEDICARE

## 2018-12-04 ENCOUNTER — OFFICE VISIT (OUTPATIENT)
Dept: FAMILY MEDICINE CLINIC | Age: 78
End: 2018-12-04
Payer: MEDICARE

## 2018-12-04 VITALS — BODY MASS INDEX: 23.91 KG/M2 | SYSTOLIC BLOOD PRESSURE: 144 MMHG | HEIGHT: 63 IN | DIASTOLIC BLOOD PRESSURE: 70 MMHG

## 2018-12-04 DIAGNOSIS — M53.3 COCCYDYNIA: ICD-10-CM

## 2018-12-04 DIAGNOSIS — J84.112 IDIOPATHIC PULMONARY FIBROSIS (HCC): ICD-10-CM

## 2018-12-04 DIAGNOSIS — N30.00 ACUTE CYSTITIS WITHOUT HEMATURIA: Primary | ICD-10-CM

## 2018-12-04 LAB
BILIRUBIN, POC: ABNORMAL
BLOOD URINE, POC: ABNORMAL
CLARITY, POC: ABNORMAL
COLOR, POC: YELLOW
GLUCOSE URINE, POC: ABNORMAL
KETONES, POC: ABNORMAL
LEUKOCYTE EST, POC: ABNORMAL
NITRITE, POC: ABNORMAL
PH, POC: 6
PROTEIN, POC: ABNORMAL
SPECIFIC GRAVITY, POC: >1.03
UROBILINOGEN, POC: 0.2

## 2018-12-04 PROCEDURE — 72220 X-RAY EXAM SACRUM TAILBONE: CPT

## 2018-12-04 PROCEDURE — G8420 CALC BMI NORM PARAMETERS: HCPCS | Performed by: FAMILY MEDICINE

## 2018-12-04 PROCEDURE — 4040F PNEUMOC VAC/ADMIN/RCVD: CPT | Performed by: FAMILY MEDICINE

## 2018-12-04 PROCEDURE — 1101F PT FALLS ASSESS-DOCD LE1/YR: CPT | Performed by: FAMILY MEDICINE

## 2018-12-04 PROCEDURE — G8598 ASA/ANTIPLAT THER USED: HCPCS | Performed by: FAMILY MEDICINE

## 2018-12-04 PROCEDURE — 81002 URINALYSIS NONAUTO W/O SCOPE: CPT | Performed by: FAMILY MEDICINE

## 2018-12-04 PROCEDURE — G8427 DOCREV CUR MEDS BY ELIG CLIN: HCPCS | Performed by: FAMILY MEDICINE

## 2018-12-04 PROCEDURE — G8482 FLU IMMUNIZE ORDER/ADMIN: HCPCS | Performed by: FAMILY MEDICINE

## 2018-12-04 PROCEDURE — 1123F ACP DISCUSS/DSCN MKR DOCD: CPT | Performed by: FAMILY MEDICINE

## 2018-12-04 PROCEDURE — 1036F TOBACCO NON-USER: CPT | Performed by: FAMILY MEDICINE

## 2018-12-04 PROCEDURE — G8400 PT W/DXA NO RESULTS DOC: HCPCS | Performed by: FAMILY MEDICINE

## 2018-12-04 PROCEDURE — 99214 OFFICE O/P EST MOD 30 MIN: CPT | Performed by: FAMILY MEDICINE

## 2018-12-04 PROCEDURE — 1090F PRES/ABSN URINE INCON ASSESS: CPT | Performed by: FAMILY MEDICINE

## 2018-12-04 RX ORDER — CEFDINIR 300 MG/1
300 CAPSULE ORAL 2 TIMES DAILY
Qty: 14 CAPSULE | Refills: 0 | Status: SHIPPED | OUTPATIENT
Start: 2018-12-04 | End: 2018-12-10 | Stop reason: ALTCHOICE

## 2018-12-04 ASSESSMENT — ENCOUNTER SYMPTOMS
WHEEZING: 1
CHEST TIGHTNESS: 1
SHORTNESS OF BREATH: 1
BACK PAIN: 1
GASTROINTESTINAL NEGATIVE: 1

## 2018-12-04 NOTE — PROGRESS NOTES
and Rash       Outpatient Prescriptions Marked as Taking for the 12/4/18 encounter (Office Visit) with Laila Estrada, DO   Medication Sig Dispense Refill    cefdinir (OMNICEF) 300 MG capsule Take 1 capsule by mouth 2 times daily for 7 days 14 capsule 0    sertraline (ZOLOFT) 100 MG tablet TAKE 1 TABLET BY MOUTH EVERY DAY 90 tablet 0    ACETAMINOPHEN-CODEINE #3 PO Take by mouth every 8 hours as needed      predniSONE (DELTASONE) 20 MG tablet Take 1 tablet by mouth daily Take 2 daily for 5 days, then once daily 60 tablet 2    omeprazole (PRILOSEC) 40 MG delayed release capsule TAKE 1 CAPSULE BY MOUTH TWICE DAILY (Patient taking differently: TAKE 1 CAPSULE BY MOUTH ONCE DAILY) 180 capsule 0    alendronate (FOSAMAX) 35 MG tablet Take 70 mg by mouth every 7 days      calcitRIOL (ROCALTROL) 0.25 MCG capsule TAKE 1 CAPSULE BY MOUTH DAILY 90 capsule 0    buPROPion (WELLBUTRIN SR) 150 MG extended release tablet TAKE 1 TABLET BY MOUTH TWICE DAILY 180 tablet 0    Cyanocobalamin (VITAMIN B 12 PO) Take 1 tablet by mouth daily       aspirin 81 MG tablet Take 81 mg by mouth daily      vitamin C (ASCORBIC ACID) 500 MG tablet Take 500 mg by mouth daily      OXYGEN Inhale 4 L into the lungs continuous (Patient taking differently: Inhale 3 L into the lungs nightly ) 1 Container 1    Multiple Vitamins-Minerals (MULTIVITAMIN PO) Take 1 tablet by mouth daily.  Cholecalciferol (CVS VITAMIN D) 1000 UNIT CAPS Take 1 capsule by mouth daily          Vitals:    12/04/18 1355   BP: (!) 144/70   Height: 5' 3\" (1.6 m)     Body mass index is 23.91 kg/m². Wt Readings from Last 3 Encounters:   12/03/18 135 lb (61.2 kg)   11/28/18 130 lb (59 kg)   10/24/18 130 lb (59 kg)     BP Readings from Last 3 Encounters:   12/04/18 (!) 144/70   12/03/18 (!) 144/75   11/28/18 (!) 111/52         Objective:   Physical Exam   Constitutional: She is oriented to person, place, and time. She appears well-developed.    Dyspnea with

## 2018-12-04 NOTE — PROGRESS NOTES
Dispense Refill    sertraline (ZOLOFT) 100 MG tablet TAKE 1 TABLET BY MOUTH EVERY DAY 90 tablet 0    ACETAMINOPHEN-CODEINE #3 PO Take by mouth every 8 hours as needed      predniSONE (DELTASONE) 20 MG tablet Take 1 tablet by mouth daily Take 2 daily for 5 days, then once daily 60 tablet 2    omeprazole (PRILOSEC) 40 MG delayed release capsule TAKE 1 CAPSULE BY MOUTH TWICE DAILY (Patient taking differently: TAKE 1 CAPSULE BY MOUTH ONCE DAILY) 180 capsule 0    alendronate (FOSAMAX) 35 MG tablet Take 70 mg by mouth every 7 days      calcitRIOL (ROCALTROL) 0.25 MCG capsule TAKE 1 CAPSULE BY MOUTH DAILY 90 capsule 0    buPROPion (WELLBUTRIN SR) 150 MG extended release tablet TAKE 1 TABLET BY MOUTH TWICE DAILY 180 tablet 0    Cyanocobalamin (VITAMIN B 12 PO) Take 1 tablet by mouth daily       aspirin 81 MG tablet Take 81 mg by mouth daily      vitamin C (ASCORBIC ACID) 500 MG tablet Take 500 mg by mouth daily      OXYGEN Inhale 4 L into the lungs continuous (Patient taking differently: Inhale 3 L into the lungs nightly ) 1 Container 1    Multiple Vitamins-Minerals (MULTIVITAMIN PO) Take 1 tablet by mouth daily.  Cholecalciferol (CVS VITAMIN D) 1000 UNIT CAPS Take 1 capsule by mouth daily       oxyCODONE-acetaminophen (PERCOCET) 5-325 MG per tablet Take 1 tablet by mouth every 6 hours as needed for Pain for up to 7 days. Intended supply: 7 days. Take lowest dose possible to manage pain. 28 tablet 0    alendronate (FOSAMAX) 35 MG tablet TAKE 2 TABLETS BY MOUTH EVERY 7 DAYS 24 tablet 0     No current facility-administered medications on file prior to encounter. REVIEW OF SYSTEMS    A comprehensive review of systems was negative.     Objective:      BP (!) 144/75   Pulse 65   Temp 97.5 °F (36.4 °C) (Oral)   Resp 18   Wt 135 lb (61.2 kg)   BMI 23.91 kg/m²     Wt Readings from Last 3 Encounters:   12/03/18 135 lb (61.2 kg)   11/28/18 130 lb (59 kg)   10/24/18 130 lb (59 kg)       PHYSICAL Tape         [] ABD                              [] Other:               Avoid contact of tape with skin. [x] Change dressing:       [] Daily              [] Every Other Day        [x] Three times per week PER HOME CARE ON Wednesday, Thursday AND Friday ( FAMILY WILL CHANGE Tuesday AND Saturday)              [] Once a week  [] Do Not Change Dressing         [] Other:      Negative Pressure:           Wound Location: LEFT BUTTOCK ( WILL APPLY FOR [employer] V[employer])  [email or web address]        [X ] Black Foam  [] White Foam                                     [] Other:   [x]Change dressing:        [x]Three times per week             []Other:      Pressure Relief:  [x] When sitting, shift position or do seat lifts every 15 minutes.  DO NOT SIT FOR MORE THAN 30 MINUTES AT A TIME  [] Wheelchair cushion   [] Specialty Bed/Mattress  [x] Turn every 2 hours when in bed.  Avoid position directing pressure on wound site.  Limit side lying to 30 degree tilt.  Limit HOB elevation to 30 degrees.        Off-Loading:   [] Off-loading when        [] walking           [] in bed [] sitting  [] Total non-weight bearing  [] Right Leg  [] Left Leg          [x] Assistive Device         [x] Walker            [] Cane   [] Wheelchair      [] Crutches              [] Surgical shoe    [] Podus Boot(s)   [] Foam Boot(s)  [] Roll About              [] Cast Boot       [] CROW Boot  [] Other:     Dietary:  [] Diet as tolerated:        [] Calorie Diabetic Diet: [] No Added Salt:  [] Increase Protein:        [] Other:     Activity:  [x] Activity as tolerated:  [] Patient has no activity restrictions     [] Strict Bedrest:            [] Remain off Work:     [] May return to full duty work:                                       [] Return to work with [address]     Return Appointment:  [x] Wound and dressing supply provider: [facility]  [x] ECF or Home Healthcare: [facility]   [] Nurse visit:     [] Physician or NP scheduled for Nurse Visit:   [x] Return Appointment: With DR Corey Boxer  1 Week(s)   [] Ordered tests:      Nurse Case Manger: Vivien Rodriguez     Electronically signed by Yomi Hearn RN on 12/3/2018 at 11:58 AM    [facility] Information: Should you experience any significant changes in your wound(s) or have questions about your wound care, please contact the [address] [place] [address] [phone] [facility] - THURSDAY 8:30 am - 4:30 pm and Friday 8:30 am - 1:00 pm.  If you need help with your wound outside these hours and cannot wait until we are again available, contact your PCP or go to the hospital emergency room.      Nurse Signature:_______________________     Date: ___________ Time:  ____________        Discharge Nurse Signature        PLEASE NOTE: IF YOU ARE UNABLE TO OBTAIN WOUND SUPPLIES, CONTINUE TO USE THE SUPPLIES YOU HAVE AVAILABLE UNTIL YOU ARE ABLE TO [facility].  IT IS MOST IMPORTANT TO KEEP THE WOUND COVERED AT ALL TIMES.     Physician Signature:_______________________     Date: ___________ Time:  ____________                    [] Dr Zunilda Osman    [] Dr Jeannie Saeed CNP               [] Dr Mariely Lynch  [] Dr Leslie Aragon   [] Dr Wiley Kern             [x] Dr Duglas Sanchez                [] Dr Vivek Field   [] Jason Perez NP            The information contained in the After Visit Summary has been reviewed with me, the patient and/or responsible adult, by my health care provider(s).  I had the opportunity to ask questions regarding this information.  I have elected to receive;        Patient Signature:_______________________     Date: ___________ Time:  ____________     [] Patient unable to sign Discharge Instructions given to ECF/Transportation/POA        [x]  After Visit Summary  []  Comprehensive Discharge Instruction        Electronically signed by Elizabeth Conte MD on 12/3/2018 at 7:50 PM

## 2018-12-06 ENCOUNTER — TELEPHONE (OUTPATIENT)
Dept: FAMILY MEDICINE CLINIC | Age: 78
End: 2018-12-06

## 2018-12-06 LAB
ORGANISM: ABNORMAL
URINE CULTURE, ROUTINE: ABNORMAL
URINE CULTURE, ROUTINE: ABNORMAL

## 2018-12-07 ENCOUNTER — TELEPHONE (OUTPATIENT)
Dept: FAMILY MEDICINE CLINIC | Age: 78
End: 2018-12-07

## 2018-12-10 ENCOUNTER — HOSPITAL ENCOUNTER (OUTPATIENT)
Dept: WOUND CARE | Age: 78
Discharge: HOME | End: 2018-12-10
Payer: MEDICARE

## 2018-12-10 VITALS
DIASTOLIC BLOOD PRESSURE: 72 MMHG | TEMPERATURE: 97.5 F | RESPIRATION RATE: 16 BRPM | HEART RATE: 80 BPM | SYSTOLIC BLOOD PRESSURE: 152 MMHG

## 2018-12-10 DIAGNOSIS — L89.323 PRESSURE INJURY OF LEFT BUTTOCK, STAGE 3 (HCC): Primary | ICD-10-CM

## 2018-12-10 DIAGNOSIS — S71.002A: ICD-10-CM

## 2018-12-10 DIAGNOSIS — E44.0 MODERATE PROTEIN-CALORIE MALNUTRITION (HCC): ICD-10-CM

## 2018-12-10 PROCEDURE — 97605 NEG PRS WND THER DME<=50SQCM: CPT

## 2018-12-10 PROCEDURE — 11043 DBRDMT MUSC&/FSCA 1ST 20/<: CPT

## 2018-12-10 PROCEDURE — 11046 DBRDMT MUSC&/FSCA EA ADDL: CPT

## 2018-12-10 RX ORDER — LIDOCAINE HYDROCHLORIDE 40 MG/ML
SOLUTION TOPICAL PRN
Status: DISCONTINUED | OUTPATIENT
Start: 2018-12-10 | End: 2018-12-11 | Stop reason: HOSPADM

## 2018-12-11 ENCOUNTER — OFFICE VISIT (OUTPATIENT)
Dept: PULMONOLOGY | Age: 78
End: 2018-12-11
Payer: MEDICARE

## 2018-12-11 VITALS
OXYGEN SATURATION: 94 % | SYSTOLIC BLOOD PRESSURE: 133 MMHG | HEART RATE: 73 BPM | BODY MASS INDEX: 23.91 KG/M2 | HEIGHT: 63 IN | DIASTOLIC BLOOD PRESSURE: 70 MMHG | TEMPERATURE: 97.5 F | RESPIRATION RATE: 24 BRPM

## 2018-12-11 DIAGNOSIS — R76.8 SCL-70 ANTIBODY POSITIVE: ICD-10-CM

## 2018-12-11 DIAGNOSIS — J96.11 CHRONIC RESPIRATORY FAILURE WITH HYPOXIA (HCC): ICD-10-CM

## 2018-12-11 DIAGNOSIS — J84.9 ILD (INTERSTITIAL LUNG DISEASE) (HCC): Primary | ICD-10-CM

## 2018-12-11 PROBLEM — E44.0 MODERATE PROTEIN-CALORIE MALNUTRITION (HCC): Status: ACTIVE | Noted: 2018-12-11

## 2018-12-11 LAB
AFB CULTURE (MYCOBACTERIA): NORMAL
AFB CULTURE (MYCOBACTERIA): NORMAL
AFB SMEAR: NORMAL
AFB SMEAR: NORMAL

## 2018-12-11 PROCEDURE — G8427 DOCREV CUR MEDS BY ELIG CLIN: HCPCS | Performed by: INTERNAL MEDICINE

## 2018-12-11 PROCEDURE — 1090F PRES/ABSN URINE INCON ASSESS: CPT | Performed by: INTERNAL MEDICINE

## 2018-12-11 PROCEDURE — G8482 FLU IMMUNIZE ORDER/ADMIN: HCPCS | Performed by: INTERNAL MEDICINE

## 2018-12-11 PROCEDURE — G8400 PT W/DXA NO RESULTS DOC: HCPCS | Performed by: INTERNAL MEDICINE

## 2018-12-11 PROCEDURE — 1101F PT FALLS ASSESS-DOCD LE1/YR: CPT | Performed by: INTERNAL MEDICINE

## 2018-12-11 PROCEDURE — 1036F TOBACCO NON-USER: CPT | Performed by: INTERNAL MEDICINE

## 2018-12-11 PROCEDURE — 4040F PNEUMOC VAC/ADMIN/RCVD: CPT | Performed by: INTERNAL MEDICINE

## 2018-12-11 PROCEDURE — G8420 CALC BMI NORM PARAMETERS: HCPCS | Performed by: INTERNAL MEDICINE

## 2018-12-11 PROCEDURE — G8598 ASA/ANTIPLAT THER USED: HCPCS | Performed by: INTERNAL MEDICINE

## 2018-12-11 PROCEDURE — 1123F ACP DISCUSS/DSCN MKR DOCD: CPT | Performed by: INTERNAL MEDICINE

## 2018-12-11 PROCEDURE — 99215 OFFICE O/P EST HI 40 MIN: CPT | Performed by: INTERNAL MEDICINE

## 2018-12-11 RX ORDER — MYCOPHENOLATE MOFETIL 500 MG/1
500 TABLET ORAL 2 TIMES DAILY
Qty: 60 TABLET | Refills: 3 | Status: SHIPPED | OUTPATIENT
Start: 2018-12-11 | End: 2019-05-13 | Stop reason: ALTCHOICE

## 2018-12-11 RX ORDER — PREDNISONE 10 MG/1
10 TABLET ORAL DAILY
Qty: 30 TABLET | Refills: 3 | Status: SHIPPED | OUTPATIENT
Start: 2018-12-11 | End: 2019-01-10

## 2018-12-11 NOTE — PROGRESS NOTES
 JOINT REPLACEMENT  2/27/08    RIGHT KNEE    [place] [address] W/BRNCL ALVEOLAR LAVAGE N/A 10/24/2018    BRONCHOSCOPY WITH BRONCHOALVEOLAR LAVAGE performed by Robbie Burnette DO at [facility]. DEBRIDE NECROTIC SKIN/ TISSUE, GENIT & PERINM Left 11/28/2018    DEBRIDEMENT LEFT HIP SKIN AND SUBCUTANEOUS FASCIA performed by Giovanni Adams MD at [address] [place] [address]    For PUD           Current Outpatient Prescriptions   Medication Sig Dispense Refill    mycophenolate (CELLCEPT) 500 MG tablet Take 1 tablet by mouth 2 times daily 60 tablet 3    predniSONE (DELTASONE) 10 MG tablet Take 1 tablet by mouth daily 30 tablet 3    sertraline (ZOLOFT) 100 MG tablet TAKE 1 TABLET BY MOUTH EVERY DAY 90 tablet 0    ACETAMINOPHEN-CODEINE #3 PO Take by mouth every 8 hours as needed      predniSONE (DELTASONE) 20 MG tablet Take 1 tablet by mouth daily Take 2 daily for 5 days, then once daily 60 tablet 2    omeprazole (PRILOSEC) 40 MG delayed release capsule TAKE 1 CAPSULE BY MOUTH TWICE DAILY (Patient taking differently: TAKE 1 CAPSULE BY MOUTH ONCE DAILY) 180 capsule 0    alendronate (FOSAMAX) 35 MG tablet Take 70 mg by mouth every 7 days      calcitRIOL (ROCALTROL) 0.25 MCG capsule TAKE 1 CAPSULE BY MOUTH DAILY 90 capsule 0    buPROPion (WELLBUTRIN SR) 150 MG extended release tablet TAKE 1 TABLET BY MOUTH TWICE DAILY 180 tablet 0    Cyanocobalamin (VITAMIN B 12 PO) Take 1 tablet by mouth daily       aspirin 81 MG tablet Take 81 mg by mouth daily      vitamin C (ASCORBIC ACID) 500 MG tablet Take 500 mg by mouth daily      OXYGEN Inhale 4 L into the lungs continuous (Patient taking differently: Inhale 3 L into the lungs nightly ) 1 Container 1    Multiple Vitamins-Minerals (MULTIVITAMIN PO) Take 1 tablet by mouth daily.  Cholecalciferol (CVS VITAMIN D) 1000 UNIT CAPS Take 1 capsule by mouth daily        No current facility-administered medications for this visit.       ROS:  GENERAL: improve and I would use this as a surrogate response to therapy. She is too frail to do PFTs and too frail to due Pulmonary rehab    3. Scl-70 antibody positive  Last level was 43 with sed rate of 54. CD4:CD8 was elevated. This all could suggest active inflammation. Also the abrupt onset of symptoms suggest something is going on. I am hopeful to induced a response to anti-inflammatories. Follow up in 8 weeks    Once again I continue to disagree with Radiology interpretation. I feel this represents a cystic lung process with possible fibrosis. I don't think UIP is the predominant pattern. I also dont think this is emphysema as the cystic changes are predominantly in the lower lung fields and there are significant variation in the size of cysts.    LIP is also in the differential

## 2018-12-11 NOTE — PROGRESS NOTES
[facility]   Progress Note and Procedure Note      Josseilne Olson [facility]  MEDICAL RECORD NUMBER:  [id number]  AGE: 70 y.o. GENDER: female  : 1940  EPISODE DATE:  12/10/2018    Subjective:     Chief Complaint   Patient presents with    Wound Check     Follow Up on Left Hip         HISTORY of PRESENT ILLNESS HPI    Josseline New is a 70 y. o. female who presents today for wound/ulcer evaluation. History of Wound Context:  Pt presents with a wound in the lt buttock. The area started out as a skin dimpling causing pain. Pt has had hip fracture surgery by  and pt saw him who referred her to  who thought this could be bursitis and gave an injection and prescribed a compounded cream containing 4 different medications to be applied to the site. The area subsequently opened up becoming a wound. Pt saw Asael Corona, her PCP who prescribed Doxycycline and discontinued the cream. Pt was advised to be [facility] at the [facility]. H/o Pulmonary fibrosis. O2 dependent. H/o scleroderma.    Wound/Ulcer Pain Timing/Severity: intermittent  Quality of pain: aching  Severity:  3 / 10   Modifying Factors: None  Associated Signs/Symptoms: erythema, drainage and pain     Ulcer Identification:  Ulcer Type: traumatic  Contributing Factors: decreased mobility, shear force and decreased tissue oxygenation, immune suppression     Today : pt underwent  surgical revision per  18.  No f/ c/ n/ v/ d.            PAST MEDICAL HISTORY        Diagnosis Date    Anxiety and depression     Arthritis     Cancer of skin of leg     Chronic low back pain     GERD (gastroesophageal reflux disease)     Insomnia     Iron deficiency anemia     Kidney stone     PUD (peptic ulcer disease)     Pulmonary fibrosis (HCC)     Dr. Anat Washington has a CT done every 6 months    Stomach ulcer     Ulcer - lesion 2010    UTI (urinary tract infection)     frequent       PAST SURGICAL HISTORY    Past Surgical History: DAY 90 tablet 0    ACETAMINOPHEN-CODEINE #3 PO Take by mouth every 8 hours as needed      predniSONE (DELTASONE) 20 MG tablet Take 1 tablet by mouth daily Take 2 daily for 5 days, then once daily 60 tablet 2    omeprazole (PRILOSEC) 40 MG delayed release capsule TAKE 1 CAPSULE BY MOUTH TWICE DAILY (Patient taking differently: TAKE 1 CAPSULE BY MOUTH ONCE DAILY) 180 capsule 0    alendronate (FOSAMAX) 35 MG tablet Take 70 mg by mouth every 7 days      calcitRIOL (ROCALTROL) 0.25 MCG capsule TAKE 1 CAPSULE BY MOUTH DAILY 90 capsule 0    buPROPion (WELLBUTRIN SR) 150 MG extended release tablet TAKE 1 TABLET BY MOUTH TWICE DAILY 180 tablet 0    Cyanocobalamin (VITAMIN B 12 PO) Take 1 tablet by mouth daily       aspirin 81 MG tablet Take 81 mg by mouth daily      vitamin C (ASCORBIC ACID) 500 MG tablet Take 500 mg by mouth daily      Multiple Vitamins-Minerals (MULTIVITAMIN PO) Take 1 tablet by mouth daily.  Cholecalciferol (CVS VITAMIN D) 1000 UNIT CAPS Take 1 capsule by mouth daily       OXYGEN Inhale 4 L into the lungs continuous (Patient taking differently: Inhale 3 L into the lungs nightly ) 1 Container 1     No current facility-administered medications on file prior to encounter. REVIEW OF SYSTEMS    A comprehensive review of systems was negative. Objective:      BP (!) 152/72   Pulse 80   Temp 97.5 °F (36.4 °C) (Oral)   Resp 16     Wt Readings from Last 3 Encounters:   12/03/18 135 lb (61.2 kg)   11/28/18 130 lb (59 kg)   10/24/18 130 lb (59 kg)       PHYSICAL EXAM    Lt hip/gluteal wound as scribed. No overt infection. Starting to tunnel again.       Assessment:        Problem List Items Addressed This Visit     Open wound of hip, complicated, left, initial encounter    Pressure injury of skin of left buttock - Primary    Moderate protein-calorie malnutrition ([facility] [address]           Procedure Note  Indications:  Based on my examination of this patient's wound(s)/ulcer(s) today, debridement is    Dressings:                  Wound Location : LEFT BUTTOCK        [x] Apply Primary Dressing:                                              [x]COLLAGEN WITH SILVER MOISTENED WITH SALINE - APPLY TO END OF  TUNNEL (10:00 POSITION = 4.1CM) AND TO BASE OF WOUND                  [x] Cover and Secure with:                       [] DRY Gauze    [] Daniela   [] Kerlix              [] Ace Wrap       [] Cover Roll Tape         [] ABD                              [] Other:               Avoid contact of tape with skin. [x] Change dressing:       [] Daily              [] Every Other Day        [x] Three times per week WITH VAC CHANGES              [] Once a week  [] Do Not Change Dressing         [] Other:      Negative Pressure:           Wound Location: LEFT BUTTOCK   [email or web address]        [X ] Black Foam - BE SURE TO GET INTO TUNNEL AT 10:00 POSITION ALSO  [] White Foam                                     [] Other:   [x]Change dressing:        [x]Three times per week             []Other:      Pressure Relief:  [x] When sitting, shift position or do seat lifts every 15 minutes.  DO NOT SIT FOR MORE THAN 30 MINUTES AT A TIME  [] Wheelchair cushion   [] Specialty Bed/Mattress  [x] Turn every 2 hours when in bed.  Avoid position directing pressure on wound site.  Limit side lying to 30 degree tilt.  Limit HOB elevation to 30 degrees.        Off-Loading:   [] Off-loading when        [] walking           [] in bed [] sitting  [] Total non-weight bearing  [] Right Leg  [] Left Leg          [x] Assistive Device         [x] Walker            [] Cane   [] Wheelchair      [] Crutches              [] Surgical shoe    [] Podus Boot(s)   [] Foam Boot(s)  [] Roll About              [] Cast Boot       [] CROW Boot  [] Other:     Dietary:  [] Diet as tolerated:        [] Calorie Diabetic Diet: [] No Added Salt:  [] Increase Protein:        [] Other:     Activity:  [x] Activity as

## 2018-12-12 ENCOUNTER — TELEPHONE (OUTPATIENT)
Dept: PULMONOLOGY | Age: 78
End: 2018-12-12

## 2018-12-17 ENCOUNTER — HOSPITAL ENCOUNTER (OUTPATIENT)
Dept: WOUND CARE | Age: 78
Discharge: HOME | End: 2018-12-17
Payer: MEDICARE

## 2018-12-17 VITALS
SYSTOLIC BLOOD PRESSURE: 139 MMHG | TEMPERATURE: 96.8 F | RESPIRATION RATE: 20 BRPM | DIASTOLIC BLOOD PRESSURE: 71 MMHG | HEART RATE: 67 BPM

## 2018-12-17 DIAGNOSIS — S71.002A: Primary | ICD-10-CM

## 2018-12-17 DIAGNOSIS — E44.0 MODERATE PROTEIN-CALORIE MALNUTRITION (HCC): ICD-10-CM

## 2018-12-17 PROCEDURE — 97605 NEG PRS WND THER DME<=50SQCM: CPT

## 2018-12-17 RX ORDER — LIDOCAINE HYDROCHLORIDE 40 MG/ML
SOLUTION TOPICAL PRN
Status: DISCONTINUED | OUTPATIENT
Start: 2018-12-17 | End: 2018-12-18 | Stop reason: HOSPADM

## 2018-12-17 NOTE — PROGRESS NOTES
Alyssa Olivier 39   Progress Note and Procedure Note      Jose Enrique Cool [facility]  MEDICAL RECORD NUMBER:  [id number]  AGE: 70 y.o. GENDER: female  : 1940  EPISODE DATE:  2018    Subjective:     Chief Complaint   Patient presents with    Wound Check     f/u left hip wound         HISTORY of PRESENT ILLNESS HPI      Jose Enrique New is a 70 y. o. female who presents today for wound/ulcer evaluation. History of Wound Context:  Pt presents with a wound in the lt buttock. The area started out as a skin dimpling causing pain. Pt has had hip fracture surgery by  and pt saw him who referred her to  who thought this could be bursitis and gave an injection and prescribed a compounded cream containing 4 different medications to be applied to the site. The area subsequently opened up becoming a wound. Pt saw Rodrigo Pyle, her PCP who prescribed Doxycycline and discontinued the cream. Pt was advised to be seen at the [address] [place] [address],[address]. H/o Pulmonary fibrosis. O2 dependent. H/o scleroderma.    Wound/Ulcer Pain Timing/Severity: intermittent  Quality of pain: aching  Severity:  3 / 10   Modifying Factors: None  Associated Signs/Symptoms: erythema, drainage and pain     Ulcer Identification:  Ulcer Type: traumatic  Contributing Factors: decreased mobility, shear force and decreased tissue oxygenation, immune suppression     Today : pt underwent  surgical revision per  18.  No f/ c/ n/ v/ d.               PAST MEDICAL HISTORY        Diagnosis Date    Anxiety and depression     Arthritis     Cancer of skin of leg     Chronic low back pain     GERD (gastroesophageal reflux disease)     Insomnia     Iron deficiency anemia     Kidney stone     PUD (peptic ulcer disease)     Pulmonary fibrosis (HCC)     Dr. Munir Nova has a CT done every 6 months    Stomach ulcer     Ulcer - lesion 2010    UTI (urinary tract infection)     frequent       PAST SURGICAL HISTORY    Past Surgical History: ECF/Transportation/POA        [x]  After Visit Summary  []  Comprehensive Discharge Instruction        Electronically signed by Chloé Rasheed MD on 12/17/2018 at 11:36 AM

## 2018-12-24 DIAGNOSIS — F41.1 GAD (GENERALIZED ANXIETY DISORDER): ICD-10-CM

## 2018-12-24 DIAGNOSIS — M81.0 OSTEOPOROSIS: ICD-10-CM

## 2018-12-24 RX ORDER — CALCITRIOL 0.25 UG/1
0.25 CAPSULE, LIQUID FILLED ORAL DAILY
Qty: 90 CAPSULE | Refills: 0 | Status: SHIPPED | OUTPATIENT
Start: 2018-12-24 | End: 2019-03-24 | Stop reason: SDUPTHER

## 2018-12-24 RX ORDER — BUPROPION HYDROCHLORIDE 150 MG/1
TABLET, EXTENDED RELEASE ORAL
Qty: 180 TABLET | Refills: 0 | Status: SHIPPED | OUTPATIENT
Start: 2018-12-24 | End: 2019-03-24 | Stop reason: SDUPTHER

## 2018-12-31 ENCOUNTER — TELEPHONE (OUTPATIENT)
Dept: PULMONOLOGY | Age: 78
End: 2018-12-31

## 2018-12-31 ENCOUNTER — HOSPITAL ENCOUNTER (OUTPATIENT)
Dept: WOUND CARE | Age: 78
Discharge: HOME | End: 2018-12-31
Payer: MEDICARE

## 2018-12-31 VITALS
SYSTOLIC BLOOD PRESSURE: 169 MMHG | TEMPERATURE: 97.6 F | DIASTOLIC BLOOD PRESSURE: 71 MMHG | RESPIRATION RATE: 20 BRPM | HEART RATE: 85 BPM

## 2018-12-31 DIAGNOSIS — J40 BRONCHITIS: Primary | ICD-10-CM

## 2018-12-31 DIAGNOSIS — S71.002A: Primary | ICD-10-CM

## 2018-12-31 PROCEDURE — 97605 NEG PRS WND THER DME<=50SQCM: CPT

## 2018-12-31 RX ORDER — LIDOCAINE HYDROCHLORIDE 40 MG/ML
SOLUTION TOPICAL PRN
Status: DISCONTINUED | OUTPATIENT
Start: 2018-12-31 | End: 2019-01-01 | Stop reason: HOSPADM

## 2018-12-31 RX ORDER — DOXYCYCLINE HYCLATE 100 MG/1
100 CAPSULE ORAL 2 TIMES DAILY
Qty: 20 CAPSULE | Refills: 0 | Status: ON HOLD | OUTPATIENT
Start: 2018-12-31 | End: 2019-01-08 | Stop reason: HOSPADM

## 2019-01-04 ENCOUNTER — APPOINTMENT (OUTPATIENT)
Dept: CT IMAGING | Age: 79
DRG: 189 | End: 2019-01-04
Payer: MEDICARE

## 2019-01-04 ENCOUNTER — HOSPITAL ENCOUNTER (INPATIENT)
Age: 79
LOS: 4 days | Discharge: HOME HEALTH | DRG: 189 | End: 2019-01-08
Attending: EMERGENCY MEDICINE | Admitting: INTERNAL MEDICINE
Payer: MEDICARE

## 2019-01-04 ENCOUNTER — APPOINTMENT (OUTPATIENT)
Dept: GENERAL RADIOLOGY | Age: 79
DRG: 189 | End: 2019-01-04
Payer: MEDICARE

## 2019-01-04 ENCOUNTER — TELEPHONE (OUTPATIENT)
Dept: PULMONOLOGY | Age: 79
End: 2019-01-04

## 2019-01-04 DIAGNOSIS — R09.02 HYPOXIA: Primary | ICD-10-CM

## 2019-01-04 PROBLEM — J96.20 ACUTE AND CHR RESP FAILURE, UNSP W HYPOXIA OR HYPERCAPNIA (HCC): Status: ACTIVE | Noted: 2019-01-04

## 2019-01-04 LAB
ANION GAP SERPL CALCULATED.3IONS-SCNC: 13 MMOL/L (ref 3–16)
BASE EXCESS VENOUS: 2.1 MMOL/L
BASOPHILS ABSOLUTE: 0 K/UL (ref 0–0.2)
BASOPHILS RELATIVE PERCENT: 0.4 %
BILIRUBIN URINE: ABNORMAL
BLOOD, URINE: NEGATIVE
BUN BLDV-MCNC: 20 MG/DL (ref 7–20)
CALCIUM SERPL-MCNC: 10.2 MG/DL (ref 8.3–10.6)
CARBOXYHEMOGLOBIN: 2.1 %
CHLORIDE BLD-SCNC: 102 MMOL/L (ref 99–110)
CLARITY: CLEAR
CO2: 27 MMOL/L (ref 21–32)
COLOR: YELLOW
CREAT SERPL-MCNC: 0.8 MG/DL (ref 0.6–1.2)
EOSINOPHILS ABSOLUTE: 0.1 K/UL (ref 0–0.6)
EOSINOPHILS RELATIVE PERCENT: 1.1 %
GFR AFRICAN AMERICAN: >60
GFR NON-AFRICAN AMERICAN: >60
GLUCOSE BLD-MCNC: 121 MG/DL (ref 70–99)
GLUCOSE URINE: NEGATIVE MG/DL
HCO3 VENOUS: 27 MMOL/L (ref 23–29)
HCT VFR BLD CALC: 40.3 % (ref 36–48)
HEMOGLOBIN: 13.3 G/DL (ref 12–16)
KETONES, URINE: ABNORMAL MG/DL
LACTIC ACID: 1.4 MMOL/L (ref 0.4–2)
LEUKOCYTE ESTERASE, URINE: NEGATIVE
LYMPHOCYTES ABSOLUTE: 1.4 K/UL (ref 1–5.1)
LYMPHOCYTES RELATIVE PERCENT: 14.7 %
MCH RBC QN AUTO: 30.5 PG (ref 26–34)
MCHC RBC AUTO-ENTMCNC: 32.9 G/DL (ref 31–36)
MCV RBC AUTO: 92.8 FL (ref 80–100)
METHEMOGLOBIN VENOUS: 1.1 %
MICROSCOPIC EXAMINATION: ABNORMAL
MONOCYTES ABSOLUTE: 0.6 K/UL (ref 0–1.3)
MONOCYTES RELATIVE PERCENT: 6.8 %
NEUTROPHILS ABSOLUTE: 7.3 K/UL (ref 1.7–7.7)
NEUTROPHILS RELATIVE PERCENT: 77 %
NITRITE, URINE: NEGATIVE
O2 CONTENT, VEN: 11 ML/DL
O2 SAT, VEN: 62 %
O2 THERAPY: NORMAL
PCO2, VEN: 46.9 MMHG (ref 40–50)
PDW BLD-RTO: 14.4 % (ref 12.4–15.4)
PH UA: 5
PH VENOUS: 7.38 (ref 7.35–7.45)
PLATELET # BLD: 183 K/UL (ref 135–450)
PMV BLD AUTO: 7.4 FL (ref 5–10.5)
PO2, VEN: 33 MMHG
POTASSIUM REFLEX MAGNESIUM: 4.2 MMOL/L (ref 3.5–5.1)
PRO-BNP: 366 PG/ML (ref 0–449)
PROCALCITONIN: 0.08 NG/ML (ref 0–0.15)
PROTEIN UA: NEGATIVE MG/DL
RAPID INFLUENZA  B AGN: NEGATIVE
RAPID INFLUENZA A AGN: NEGATIVE
RBC # BLD: 4.34 M/UL (ref 4–5.2)
SODIUM BLD-SCNC: 142 MMOL/L (ref 136–145)
SPECIFIC GRAVITY UA: >1.03
TCO2 CALC VENOUS: 29 MMOL/L
TROPONIN: <0.01 NG/ML
URINE REFLEX TO CULTURE: ABNORMAL
URINE TYPE: ABNORMAL
UROBILINOGEN, URINE: 1 E.U./DL
WBC # BLD: 9.5 K/UL (ref 4–11)

## 2019-01-04 PROCEDURE — 83880 ASSAY OF NATRIURETIC PEPTIDE: CPT

## 2019-01-04 PROCEDURE — 80048 BASIC METABOLIC PNL TOTAL CA: CPT

## 2019-01-04 PROCEDURE — 6360000004 HC RX CONTRAST MEDICATION: Performed by: EMERGENCY MEDICINE

## 2019-01-04 PROCEDURE — 71045 X-RAY EXAM CHEST 1 VIEW: CPT

## 2019-01-04 PROCEDURE — 82803 BLOOD GASES ANY COMBINATION: CPT

## 2019-01-04 PROCEDURE — 6360000002 HC RX W HCPCS: Performed by: EMERGENCY MEDICINE

## 2019-01-04 PROCEDURE — 93005 ELECTROCARDIOGRAM TRACING: CPT | Performed by: EMERGENCY MEDICINE

## 2019-01-04 PROCEDURE — 6370000000 HC RX 637 (ALT 250 FOR IP): Performed by: EMERGENCY MEDICINE

## 2019-01-04 PROCEDURE — 94664 DEMO&/EVAL PT USE INHALER: CPT

## 2019-01-04 PROCEDURE — 83605 ASSAY OF LACTIC ACID: CPT

## 2019-01-04 PROCEDURE — 96374 THER/PROPH/DIAG INJ IV PUSH: CPT

## 2019-01-04 PROCEDURE — 87804 INFLUENZA ASSAY W/OPTIC: CPT

## 2019-01-04 PROCEDURE — 1200000000 HC SEMI PRIVATE

## 2019-01-04 PROCEDURE — 2700000000 HC OXYGEN THERAPY PER DAY

## 2019-01-04 PROCEDURE — 36415 COLL VENOUS BLD VENIPUNCTURE: CPT

## 2019-01-04 PROCEDURE — 94640 AIRWAY INHALATION TREATMENT: CPT

## 2019-01-04 PROCEDURE — 93010 ELECTROCARDIOGRAM REPORT: CPT | Performed by: INTERNAL MEDICINE

## 2019-01-04 PROCEDURE — 84145 PROCALCITONIN (PCT): CPT

## 2019-01-04 PROCEDURE — 99285 EMERGENCY DEPT VISIT HI MDM: CPT

## 2019-01-04 PROCEDURE — 94762 N-INVAS EAR/PLS OXIMTRY CONT: CPT

## 2019-01-04 PROCEDURE — 85025 COMPLETE CBC W/AUTO DIFF WBC: CPT

## 2019-01-04 PROCEDURE — 71260 CT THORAX DX C+: CPT

## 2019-01-04 PROCEDURE — 84484 ASSAY OF TROPONIN QUANT: CPT

## 2019-01-04 PROCEDURE — 81003 URINALYSIS AUTO W/O SCOPE: CPT

## 2019-01-04 RX ORDER — MYCOPHENOLATE MOFETIL 250 MG/1
500 CAPSULE ORAL 2 TIMES DAILY
Status: DISCONTINUED | OUTPATIENT
Start: 2019-01-04 | End: 2019-01-08 | Stop reason: HOSPADM

## 2019-01-04 RX ORDER — SERTRALINE HYDROCHLORIDE 100 MG/1
100 TABLET, FILM COATED ORAL DAILY
Status: DISCONTINUED | OUTPATIENT
Start: 2019-01-05 | End: 2019-01-08 | Stop reason: HOSPADM

## 2019-01-04 RX ORDER — ASPIRIN 81 MG/1
81 TABLET, CHEWABLE ORAL DAILY
Status: DISCONTINUED | OUTPATIENT
Start: 2019-01-05 | End: 2019-01-08 | Stop reason: HOSPADM

## 2019-01-04 RX ORDER — IPRATROPIUM BROMIDE AND ALBUTEROL SULFATE 2.5; .5 MG/3ML; MG/3ML
1 SOLUTION RESPIRATORY (INHALATION) ONCE
Status: COMPLETED | OUTPATIENT
Start: 2019-01-04 | End: 2019-01-04

## 2019-01-04 RX ORDER — ALBUTEROL SULFATE 2.5 MG/3ML
2.5 SOLUTION RESPIRATORY (INHALATION)
Status: DISCONTINUED | OUTPATIENT
Start: 2019-01-04 | End: 2019-01-08 | Stop reason: HOSPADM

## 2019-01-04 RX ORDER — PREDNISONE 20 MG/1
60 TABLET ORAL ONCE
Status: COMPLETED | OUTPATIENT
Start: 2019-01-04 | End: 2019-01-04

## 2019-01-04 RX ORDER — SODIUM CHLORIDE 0.9 % (FLUSH) 0.9 %
10 SYRINGE (ML) INJECTION PRN
Status: DISCONTINUED | OUTPATIENT
Start: 2019-01-04 | End: 2019-01-08 | Stop reason: HOSPADM

## 2019-01-04 RX ORDER — FUROSEMIDE 10 MG/ML
20 INJECTION INTRAMUSCULAR; INTRAVENOUS 2 TIMES DAILY
Status: DISCONTINUED | OUTPATIENT
Start: 2019-01-05 | End: 2019-01-05

## 2019-01-04 RX ORDER — AZITHROMYCIN 250 MG/1
250 TABLET, FILM COATED ORAL DAILY
Status: DISCONTINUED | OUTPATIENT
Start: 2019-01-06 | End: 2019-01-06

## 2019-01-04 RX ORDER — FERROUS SULFATE 325(65) MG
325 TABLET ORAL
COMMUNITY

## 2019-01-04 RX ORDER — SODIUM CHLORIDE 0.9 % (FLUSH) 0.9 %
10 SYRINGE (ML) INJECTION EVERY 12 HOURS SCHEDULED
Status: DISCONTINUED | OUTPATIENT
Start: 2019-01-04 | End: 2019-01-08 | Stop reason: HOSPADM

## 2019-01-04 RX ORDER — METHYLPREDNISOLONE SODIUM SUCCINATE 40 MG/ML
40 INJECTION, POWDER, LYOPHILIZED, FOR SOLUTION INTRAMUSCULAR; INTRAVENOUS EVERY 6 HOURS
Status: DISCONTINUED | OUTPATIENT
Start: 2019-01-04 | End: 2019-01-06

## 2019-01-04 RX ORDER — BUPROPION HYDROCHLORIDE 150 MG/1
150 TABLET, EXTENDED RELEASE ORAL 2 TIMES DAILY
Status: DISCONTINUED | OUTPATIENT
Start: 2019-01-04 | End: 2019-01-08 | Stop reason: HOSPADM

## 2019-01-04 RX ORDER — FUROSEMIDE 10 MG/ML
40 INJECTION INTRAMUSCULAR; INTRAVENOUS ONCE
Status: COMPLETED | OUTPATIENT
Start: 2019-01-04 | End: 2019-01-04

## 2019-01-04 RX ORDER — AZITHROMYCIN 500 MG/1
500 TABLET, FILM COATED ORAL DAILY
Status: COMPLETED | OUTPATIENT
Start: 2019-01-05 | End: 2019-01-05

## 2019-01-04 RX ORDER — ONDANSETRON 2 MG/ML
4 INJECTION INTRAMUSCULAR; INTRAVENOUS EVERY 6 HOURS PRN
Status: DISCONTINUED | OUTPATIENT
Start: 2019-01-04 | End: 2019-01-08 | Stop reason: HOSPADM

## 2019-01-04 RX ORDER — AMOXICILLIN 250 MG
1 CAPSULE ORAL DAILY
COMMUNITY
End: 2019-08-12

## 2019-01-04 RX ADMIN — IPRATROPIUM BROMIDE AND ALBUTEROL SULFATE 1 AMPULE: .5; 3 SOLUTION RESPIRATORY (INHALATION) at 14:48

## 2019-01-04 RX ADMIN — PREDNISONE 60 MG: 20 TABLET ORAL at 15:12

## 2019-01-04 RX ADMIN — FUROSEMIDE 40 MG: 10 INJECTION, SOLUTION INTRAMUSCULAR; INTRAVENOUS at 18:33

## 2019-01-04 RX ADMIN — IOPAMIDOL 75 ML: 755 INJECTION, SOLUTION INTRAVENOUS at 15:55

## 2019-01-04 ASSESSMENT — ENCOUNTER SYMPTOMS
ABDOMINAL DISTENTION: 0
EYE DISCHARGE: 0
SHORTNESS OF BREATH: 1
EYE PAIN: 0
APNEA: 0
STRIDOR: 0
EYE REDNESS: 0
COUGH: 1
CHOKING: 0
WHEEZING: 1
PHOTOPHOBIA: 0
CHEST TIGHTNESS: 0
EYE ITCHING: 0

## 2019-01-04 ASSESSMENT — PAIN SCALES - GENERAL
PAINLEVEL_OUTOF10: 0
PAINLEVEL_OUTOF10: 5

## 2019-01-04 ASSESSMENT — PAIN DESCRIPTION - LOCATION: LOCATION: CHEST

## 2019-01-04 ASSESSMENT — PAIN DESCRIPTION - PAIN TYPE: TYPE: ACUTE PAIN

## 2019-01-05 LAB
ORGANISM: ABNORMAL
REPORT: NORMAL
RESPIRATORY PANEL PCR: ABNORMAL
RESPIRATORY PANEL PCR: ABNORMAL

## 2019-01-05 PROCEDURE — 87798 DETECT AGENT NOS DNA AMP: CPT

## 2019-01-05 PROCEDURE — 6370000000 HC RX 637 (ALT 250 FOR IP): Performed by: INTERNAL MEDICINE

## 2019-01-05 PROCEDURE — 87633 RESP VIRUS 12-25 TARGETS: CPT

## 2019-01-05 PROCEDURE — 6370000000 HC RX 637 (ALT 250 FOR IP): Performed by: NURSE PRACTITIONER

## 2019-01-05 PROCEDURE — 6360000002 HC RX W HCPCS: Performed by: INTERNAL MEDICINE

## 2019-01-05 PROCEDURE — 87641 MR-STAPH DNA AMP PROBE: CPT

## 2019-01-05 PROCEDURE — 2580000003 HC RX 258: Performed by: INTERNAL MEDICINE

## 2019-01-05 PROCEDURE — 87581 M.PNEUMON DNA AMP PROBE: CPT

## 2019-01-05 PROCEDURE — 99223 1ST HOSP IP/OBS HIGH 75: CPT | Performed by: INTERNAL MEDICINE

## 2019-01-05 PROCEDURE — 87486 CHLMYD PNEUM DNA AMP PROBE: CPT

## 2019-01-05 PROCEDURE — 1200000000 HC SEMI PRIVATE

## 2019-01-05 RX ORDER — PANTOPRAZOLE SODIUM 40 MG/1
40 TABLET, DELAYED RELEASE ORAL
Status: DISCONTINUED | OUTPATIENT
Start: 2019-01-06 | End: 2019-01-08 | Stop reason: HOSPADM

## 2019-01-05 RX ORDER — ACETAMINOPHEN 325 MG/1
650 TABLET ORAL EVERY 4 HOURS PRN
Status: DISCONTINUED | OUTPATIENT
Start: 2019-01-05 | End: 2019-01-08 | Stop reason: HOSPADM

## 2019-01-05 RX ORDER — LACTOBACILLUS RHAMNOSUS GG 10B CELL
1 CAPSULE ORAL 2 TIMES DAILY WITH MEALS
Status: DISCONTINUED | OUTPATIENT
Start: 2019-01-05 | End: 2019-01-07

## 2019-01-05 RX ADMIN — METHYLPREDNISOLONE SODIUM SUCCINATE 40 MG: 40 INJECTION, POWDER, FOR SOLUTION INTRAMUSCULAR; INTRAVENOUS at 00:14

## 2019-01-05 RX ADMIN — BUPROPION HYDROCHLORIDE 150 MG: 150 TABLET, EXTENDED RELEASE ORAL at 00:14

## 2019-01-05 RX ADMIN — BUPROPION HYDROCHLORIDE 150 MG: 150 TABLET, EXTENDED RELEASE ORAL at 21:10

## 2019-01-05 RX ADMIN — AZITHROMYCIN 500 MG: 500 TABLET, FILM COATED ORAL at 08:11

## 2019-01-05 RX ADMIN — CEFTRIAXONE 1 G: 1 INJECTION, POWDER, FOR SOLUTION INTRAMUSCULAR; INTRAVENOUS at 00:14

## 2019-01-05 RX ADMIN — SERTRALINE 100 MG: 100 TABLET, FILM COATED ORAL at 08:10

## 2019-01-05 RX ADMIN — FUROSEMIDE 20 MG: 10 INJECTION, SOLUTION INTRAMUSCULAR; INTRAVENOUS at 08:11

## 2019-01-05 RX ADMIN — Medication 10 ML: at 08:11

## 2019-01-05 RX ADMIN — METHYLPREDNISOLONE SODIUM SUCCINATE 40 MG: 40 INJECTION, POWDER, FOR SOLUTION INTRAMUSCULAR; INTRAVENOUS at 18:40

## 2019-01-05 RX ADMIN — Medication 10 ML: at 05:53

## 2019-01-05 RX ADMIN — ENOXAPARIN SODIUM 40 MG: 40 INJECTION SUBCUTANEOUS at 08:10

## 2019-01-05 RX ADMIN — BUPROPION HYDROCHLORIDE 150 MG: 150 TABLET, EXTENDED RELEASE ORAL at 08:11

## 2019-01-05 RX ADMIN — MYCOPHENOLATE MOFETIL 500 MG: 250 CAPSULE ORAL at 11:33

## 2019-01-05 RX ADMIN — Medication 10 ML: at 00:19

## 2019-01-05 RX ADMIN — METHYLPREDNISOLONE SODIUM SUCCINATE 40 MG: 40 INJECTION, POWDER, FOR SOLUTION INTRAMUSCULAR; INTRAVENOUS at 11:33

## 2019-01-05 RX ADMIN — Medication 1 CAPSULE: at 18:40

## 2019-01-05 RX ADMIN — MYCOPHENOLATE MOFETIL 500 MG: 250 CAPSULE ORAL at 21:10

## 2019-01-05 RX ADMIN — MYCOPHENOLATE MOFETIL 500 MG: 250 CAPSULE ORAL at 00:14

## 2019-01-05 RX ADMIN — METHYLPREDNISOLONE SODIUM SUCCINATE 40 MG: 40 INJECTION, POWDER, FOR SOLUTION INTRAMUSCULAR; INTRAVENOUS at 05:53

## 2019-01-05 RX ADMIN — ASPIRIN 81 MG 81 MG: 81 TABLET ORAL at 08:11

## 2019-01-05 RX ADMIN — Medication 10 ML: at 21:11

## 2019-01-06 LAB
ANION GAP SERPL CALCULATED.3IONS-SCNC: 12 MMOL/L (ref 3–16)
BASOPHILS ABSOLUTE: 0 K/UL (ref 0–0.2)
BASOPHILS RELATIVE PERCENT: 0.2 %
BUN BLDV-MCNC: 40 MG/DL (ref 7–20)
CALCIUM SERPL-MCNC: 10.5 MG/DL (ref 8.3–10.6)
CHLORIDE BLD-SCNC: 103 MMOL/L (ref 99–110)
CO2: 23 MMOL/L (ref 21–32)
CREAT SERPL-MCNC: 0.8 MG/DL (ref 0.6–1.2)
EOSINOPHILS ABSOLUTE: 0 K/UL (ref 0–0.6)
EOSINOPHILS RELATIVE PERCENT: 0 %
GFR AFRICAN AMERICAN: >60
GFR NON-AFRICAN AMERICAN: >60
GLUCOSE BLD-MCNC: 149 MG/DL (ref 70–99)
HCT VFR BLD CALC: 41.9 % (ref 36–48)
HEMOGLOBIN: 13.5 G/DL (ref 12–16)
LYMPHOCYTES ABSOLUTE: 1.2 K/UL (ref 1–5.1)
LYMPHOCYTES RELATIVE PERCENT: 9.9 %
MCH RBC QN AUTO: 30.7 PG (ref 26–34)
MCHC RBC AUTO-ENTMCNC: 32.3 G/DL (ref 31–36)
MCV RBC AUTO: 95.2 FL (ref 80–100)
MONOCYTES ABSOLUTE: 0.3 K/UL (ref 0–1.3)
MONOCYTES RELATIVE PERCENT: 2.9 %
MRSA SCREEN RT-PCR: NORMAL
NEUTROPHILS ABSOLUTE: 10.2 K/UL (ref 1.7–7.7)
NEUTROPHILS RELATIVE PERCENT: 87 %
PDW BLD-RTO: 14.2 % (ref 12.4–15.4)
PLATELET # BLD: 197 K/UL (ref 135–450)
PMV BLD AUTO: 7.8 FL (ref 5–10.5)
POTASSIUM REFLEX MAGNESIUM: 4.6 MMOL/L (ref 3.5–5.1)
RBC # BLD: 4.4 M/UL (ref 4–5.2)
SODIUM BLD-SCNC: 138 MMOL/L (ref 136–145)
WBC # BLD: 11.7 K/UL (ref 4–11)

## 2019-01-06 PROCEDURE — 6360000002 HC RX W HCPCS: Performed by: INTERNAL MEDICINE

## 2019-01-06 PROCEDURE — 36415 COLL VENOUS BLD VENIPUNCTURE: CPT

## 2019-01-06 PROCEDURE — 1200000000 HC SEMI PRIVATE

## 2019-01-06 PROCEDURE — 2580000003 HC RX 258: Performed by: INTERNAL MEDICINE

## 2019-01-06 PROCEDURE — 6370000000 HC RX 637 (ALT 250 FOR IP): Performed by: INTERNAL MEDICINE

## 2019-01-06 PROCEDURE — 6370000000 HC RX 637 (ALT 250 FOR IP): Performed by: NURSE PRACTITIONER

## 2019-01-06 PROCEDURE — 80048 BASIC METABOLIC PNL TOTAL CA: CPT

## 2019-01-06 PROCEDURE — 99232 SBSQ HOSP IP/OBS MODERATE 35: CPT | Performed by: INTERNAL MEDICINE

## 2019-01-06 PROCEDURE — 85025 COMPLETE CBC W/AUTO DIFF WBC: CPT

## 2019-01-06 RX ORDER — CLONAZEPAM 0.5 MG/1
0.5 TABLET ORAL NIGHTLY
Status: DISCONTINUED | OUTPATIENT
Start: 2019-01-06 | End: 2019-01-08 | Stop reason: HOSPADM

## 2019-01-06 RX ORDER — METHYLPREDNISOLONE SODIUM SUCCINATE 40 MG/ML
40 INJECTION, POWDER, LYOPHILIZED, FOR SOLUTION INTRAMUSCULAR; INTRAVENOUS EVERY 12 HOURS
Status: DISCONTINUED | OUTPATIENT
Start: 2019-01-06 | End: 2019-01-07

## 2019-01-06 RX ORDER — BENZONATATE 100 MG/1
100 CAPSULE ORAL 3 TIMES DAILY PRN
Status: DISCONTINUED | OUTPATIENT
Start: 2019-01-06 | End: 2019-01-07

## 2019-01-06 RX ORDER — CLONAZEPAM 0.5 MG/1
0.25 TABLET ORAL NIGHTLY PRN
COMMUNITY
End: 2019-08-12

## 2019-01-06 RX ADMIN — METHYLPREDNISOLONE SODIUM SUCCINATE 40 MG: 40 INJECTION, POWDER, FOR SOLUTION INTRAMUSCULAR; INTRAVENOUS at 11:14

## 2019-01-06 RX ADMIN — BENZONATATE 100 MG: 100 CAPSULE ORAL at 21:50

## 2019-01-06 RX ADMIN — PANTOPRAZOLE SODIUM 40 MG: 40 TABLET, DELAYED RELEASE ORAL at 06:52

## 2019-01-06 RX ADMIN — MYCOPHENOLATE MOFETIL 500 MG: 250 CAPSULE ORAL at 21:42

## 2019-01-06 RX ADMIN — Medication 10 ML: at 21:44

## 2019-01-06 RX ADMIN — Medication 1 CAPSULE: at 17:44

## 2019-01-06 RX ADMIN — ASPIRIN 81 MG 81 MG: 81 TABLET ORAL at 08:20

## 2019-01-06 RX ADMIN — Medication 1 CAPSULE: at 08:21

## 2019-01-06 RX ADMIN — METHYLPREDNISOLONE SODIUM SUCCINATE 40 MG: 40 INJECTION, POWDER, FOR SOLUTION INTRAMUSCULAR; INTRAVENOUS at 05:34

## 2019-01-06 RX ADMIN — METHYLPREDNISOLONE SODIUM SUCCINATE 40 MG: 40 INJECTION, POWDER, FOR SOLUTION INTRAMUSCULAR; INTRAVENOUS at 00:27

## 2019-01-06 RX ADMIN — BENZONATATE 100 MG: 100 CAPSULE ORAL at 11:14

## 2019-01-06 RX ADMIN — SERTRALINE 100 MG: 100 TABLET, FILM COATED ORAL at 08:20

## 2019-01-06 RX ADMIN — BUPROPION HYDROCHLORIDE 150 MG: 150 TABLET, EXTENDED RELEASE ORAL at 08:20

## 2019-01-06 RX ADMIN — ENOXAPARIN SODIUM 40 MG: 40 INJECTION SUBCUTANEOUS at 08:20

## 2019-01-06 RX ADMIN — CEFTRIAXONE 1 G: 1 INJECTION, POWDER, FOR SOLUTION INTRAMUSCULAR; INTRAVENOUS at 00:28

## 2019-01-06 RX ADMIN — SALINE NASAL SPRAY 1 SPRAY: 1.5 SOLUTION NASAL at 08:23

## 2019-01-06 RX ADMIN — CLONAZEPAM 0.5 MG: 0.5 TABLET ORAL at 21:39

## 2019-01-06 RX ADMIN — Medication 10 ML: at 08:24

## 2019-01-06 RX ADMIN — BUPROPION HYDROCHLORIDE 150 MG: 150 TABLET, EXTENDED RELEASE ORAL at 21:39

## 2019-01-06 RX ADMIN — MYCOPHENOLATE MOFETIL 500 MG: 250 CAPSULE ORAL at 08:23

## 2019-01-06 RX ADMIN — Medication 10 ML: at 05:33

## 2019-01-06 RX ADMIN — AZITHROMYCIN 250 MG: 250 TABLET, FILM COATED ORAL at 08:20

## 2019-01-07 LAB
ANION GAP SERPL CALCULATED.3IONS-SCNC: 10 MMOL/L (ref 3–16)
BASOPHILS ABSOLUTE: 0 K/UL (ref 0–0.2)
BASOPHILS RELATIVE PERCENT: 0.3 %
BUN BLDV-MCNC: 36 MG/DL (ref 7–20)
CALCIUM SERPL-MCNC: 10.4 MG/DL (ref 8.3–10.6)
CHLORIDE BLD-SCNC: 102 MMOL/L (ref 99–110)
CO2: 28 MMOL/L (ref 21–32)
CREAT SERPL-MCNC: 0.8 MG/DL (ref 0.6–1.2)
EKG ATRIAL RATE: 96 BPM
EKG DIAGNOSIS: NORMAL
EKG P AXIS: 51 DEGREES
EKG P-R INTERVAL: 166 MS
EKG Q-T INTERVAL: 334 MS
EKG QRS DURATION: 68 MS
EKG QTC CALCULATION (BAZETT): 421 MS
EKG R AXIS: -15 DEGREES
EKG T AXIS: 48 DEGREES
EKG VENTRICULAR RATE: 96 BPM
EOSINOPHILS ABSOLUTE: 0 K/UL (ref 0–0.6)
EOSINOPHILS RELATIVE PERCENT: 0.3 %
GFR AFRICAN AMERICAN: >60
GFR NON-AFRICAN AMERICAN: >60
GLUCOSE BLD-MCNC: 101 MG/DL (ref 70–99)
HCT VFR BLD CALC: 37.4 % (ref 36–48)
HEMOGLOBIN: 12.1 G/DL (ref 12–16)
LYMPHOCYTES ABSOLUTE: 2 K/UL (ref 1–5.1)
LYMPHOCYTES RELATIVE PERCENT: 17.8 %
MCH RBC QN AUTO: 30.1 PG (ref 26–34)
MCHC RBC AUTO-ENTMCNC: 32.3 G/DL (ref 31–36)
MCV RBC AUTO: 93.1 FL (ref 80–100)
MONOCYTES ABSOLUTE: 0.7 K/UL (ref 0–1.3)
MONOCYTES RELATIVE PERCENT: 5.8 %
NEUTROPHILS ABSOLUTE: 8.6 K/UL (ref 1.7–7.7)
NEUTROPHILS RELATIVE PERCENT: 75.8 %
PDW BLD-RTO: 14.2 % (ref 12.4–15.4)
PLATELET # BLD: 251 K/UL (ref 135–450)
PMV BLD AUTO: 7.3 FL (ref 5–10.5)
POTASSIUM REFLEX MAGNESIUM: 4.8 MMOL/L (ref 3.5–5.1)
RBC # BLD: 4.02 M/UL (ref 4–5.2)
SODIUM BLD-SCNC: 140 MMOL/L (ref 136–145)
WBC # BLD: 11.3 K/UL (ref 4–11)

## 2019-01-07 PROCEDURE — 36415 COLL VENOUS BLD VENIPUNCTURE: CPT

## 2019-01-07 PROCEDURE — 97605 NEG PRS WND THER DME<=50SQCM: CPT

## 2019-01-07 PROCEDURE — 6370000000 HC RX 637 (ALT 250 FOR IP): Performed by: INTERNAL MEDICINE

## 2019-01-07 PROCEDURE — 1200000000 HC SEMI PRIVATE

## 2019-01-07 PROCEDURE — 85025 COMPLETE CBC W/AUTO DIFF WBC: CPT

## 2019-01-07 PROCEDURE — 99231 SBSQ HOSP IP/OBS SF/LOW 25: CPT | Performed by: INTERNAL MEDICINE

## 2019-01-07 PROCEDURE — 2580000003 HC RX 258: Performed by: INTERNAL MEDICINE

## 2019-01-07 PROCEDURE — 80048 BASIC METABOLIC PNL TOTAL CA: CPT

## 2019-01-07 PROCEDURE — 6370000000 HC RX 637 (ALT 250 FOR IP): Performed by: NURSE PRACTITIONER

## 2019-01-07 PROCEDURE — 6360000002 HC RX W HCPCS: Performed by: INTERNAL MEDICINE

## 2019-01-07 RX ORDER — PREDNISONE 20 MG/1
40 TABLET ORAL DAILY
Status: DISCONTINUED | OUTPATIENT
Start: 2019-01-07 | End: 2019-01-08 | Stop reason: HOSPADM

## 2019-01-07 RX ORDER — BENZONATATE 100 MG/1
100 CAPSULE ORAL 3 TIMES DAILY
Status: DISCONTINUED | OUTPATIENT
Start: 2019-01-07 | End: 2019-01-08 | Stop reason: HOSPADM

## 2019-01-07 RX ORDER — GUAIFENESIN/DEXTROMETHORPHAN 100-10MG/5
5 SYRUP ORAL EVERY 6 HOURS PRN
Status: DISCONTINUED | OUTPATIENT
Start: 2019-01-07 | End: 2019-01-08 | Stop reason: HOSPADM

## 2019-01-07 RX ADMIN — BUPROPION HYDROCHLORIDE 150 MG: 150 TABLET, EXTENDED RELEASE ORAL at 21:38

## 2019-01-07 RX ADMIN — PANTOPRAZOLE SODIUM 40 MG: 40 TABLET, DELAYED RELEASE ORAL at 06:41

## 2019-01-07 RX ADMIN — PREDNISONE 40 MG: 20 TABLET ORAL at 09:51

## 2019-01-07 RX ADMIN — Medication 10 ML: at 21:38

## 2019-01-07 RX ADMIN — MYCOPHENOLATE MOFETIL 500 MG: 250 CAPSULE ORAL at 08:43

## 2019-01-07 RX ADMIN — ENOXAPARIN SODIUM 40 MG: 40 INJECTION SUBCUTANEOUS at 08:43

## 2019-01-07 RX ADMIN — BENZONATATE 100 MG: 100 CAPSULE ORAL at 21:38

## 2019-01-07 RX ADMIN — ASPIRIN 81 MG 81 MG: 81 TABLET ORAL at 08:43

## 2019-01-07 RX ADMIN — BUPROPION HYDROCHLORIDE 150 MG: 150 TABLET, EXTENDED RELEASE ORAL at 08:43

## 2019-01-07 RX ADMIN — Medication 1 CAPSULE: at 08:43

## 2019-01-07 RX ADMIN — METHYLPREDNISOLONE SODIUM SUCCINATE 40 MG: 40 INJECTION, POWDER, FOR SOLUTION INTRAMUSCULAR; INTRAVENOUS at 00:07

## 2019-01-07 RX ADMIN — SERTRALINE 100 MG: 100 TABLET, FILM COATED ORAL at 08:43

## 2019-01-07 RX ADMIN — CLONAZEPAM 0.5 MG: 0.5 TABLET ORAL at 21:38

## 2019-01-07 RX ADMIN — Medication 10 ML: at 08:44

## 2019-01-07 RX ADMIN — MYCOPHENOLATE MOFETIL 500 MG: 250 CAPSULE ORAL at 21:38

## 2019-01-08 ENCOUNTER — APPOINTMENT (OUTPATIENT)
Dept: GENERAL RADIOLOGY | Age: 79
DRG: 189 | End: 2019-01-08
Payer: MEDICARE

## 2019-01-08 ENCOUNTER — APPOINTMENT (OUTPATIENT)
Dept: CT IMAGING | Age: 79
DRG: 189 | End: 2019-01-08
Payer: MEDICARE

## 2019-01-08 VITALS
TEMPERATURE: 97.5 F | HEIGHT: 64 IN | OXYGEN SATURATION: 100 % | WEIGHT: 129.85 LBS | SYSTOLIC BLOOD PRESSURE: 156 MMHG | HEART RATE: 67 BPM | RESPIRATION RATE: 14 BRPM | DIASTOLIC BLOOD PRESSURE: 76 MMHG | BODY MASS INDEX: 22.17 KG/M2

## 2019-01-08 PROBLEM — B33.8 RSV (RESPIRATORY SYNCYTIAL VIRUS INFECTION): Status: ACTIVE | Noted: 2019-01-08

## 2019-01-08 LAB
ALBUMIN SERPL-MCNC: 3.6 G/DL (ref 3.4–5)
ALP BLD-CCNC: 88 U/L (ref 40–129)
ALT SERPL-CCNC: 46 U/L (ref 10–40)
AST SERPL-CCNC: 38 U/L (ref 15–37)
BILIRUB SERPL-MCNC: 0.3 MG/DL (ref 0–1)
BILIRUBIN DIRECT: <0.2 MG/DL (ref 0–0.3)
BILIRUBIN, INDIRECT: ABNORMAL MG/DL (ref 0–1)
LIPASE: 6 U/L (ref 13–60)
TOTAL PROTEIN: 6.7 G/DL (ref 6.4–8.2)

## 2019-01-08 PROCEDURE — 74018 RADEX ABDOMEN 1 VIEW: CPT

## 2019-01-08 PROCEDURE — 6360000002 HC RX W HCPCS: Performed by: INTERNAL MEDICINE

## 2019-01-08 PROCEDURE — 80076 HEPATIC FUNCTION PANEL: CPT

## 2019-01-08 PROCEDURE — 2580000003 HC RX 258: Performed by: INTERNAL MEDICINE

## 2019-01-08 PROCEDURE — 36415 COLL VENOUS BLD VENIPUNCTURE: CPT

## 2019-01-08 PROCEDURE — 6370000000 HC RX 637 (ALT 250 FOR IP): Performed by: INTERNAL MEDICINE

## 2019-01-08 PROCEDURE — 97530 THERAPEUTIC ACTIVITIES: CPT

## 2019-01-08 PROCEDURE — 97166 OT EVAL MOD COMPLEX 45 MIN: CPT

## 2019-01-08 PROCEDURE — 99231 SBSQ HOSP IP/OBS SF/LOW 25: CPT | Performed by: INTERNAL MEDICINE

## 2019-01-08 PROCEDURE — 83690 ASSAY OF LIPASE: CPT

## 2019-01-08 PROCEDURE — 97116 GAIT TRAINING THERAPY: CPT

## 2019-01-08 PROCEDURE — 74150 CT ABDOMEN W/O CONTRAST: CPT

## 2019-01-08 PROCEDURE — G8978 MOBILITY CURRENT STATUS: HCPCS

## 2019-01-08 PROCEDURE — 97535 SELF CARE MNGMENT TRAINING: CPT

## 2019-01-08 PROCEDURE — 97162 PT EVAL MOD COMPLEX 30 MIN: CPT

## 2019-01-08 PROCEDURE — G8988 SELF CARE GOAL STATUS: HCPCS

## 2019-01-08 PROCEDURE — 6370000000 HC RX 637 (ALT 250 FOR IP): Performed by: NURSE PRACTITIONER

## 2019-01-08 PROCEDURE — G8979 MOBILITY GOAL STATUS: HCPCS

## 2019-01-08 PROCEDURE — G8987 SELF CARE CURRENT STATUS: HCPCS

## 2019-01-08 RX ORDER — PREDNISONE 20 MG/1
40 TABLET ORAL DAILY
Qty: 10 TABLET | Refills: 0 | Status: SHIPPED | OUTPATIENT
Start: 2019-01-09 | End: 2019-01-14

## 2019-01-08 RX ORDER — BENZONATATE 100 MG/1
100 CAPSULE ORAL 3 TIMES DAILY
Qty: 21 CAPSULE | Refills: 0 | Status: SHIPPED | OUTPATIENT
Start: 2019-01-08 | End: 2019-01-15

## 2019-01-08 RX ORDER — IPRATROPIUM BROMIDE AND ALBUTEROL SULFATE 2.5; .5 MG/3ML; MG/3ML
1 SOLUTION RESPIRATORY (INHALATION) EVERY 6 HOURS PRN
Qty: 360 ML | Refills: 0 | Status: SHIPPED | OUTPATIENT
Start: 2019-01-08

## 2019-01-08 RX ADMIN — ACETAMINOPHEN 650 MG: 325 TABLET, FILM COATED ORAL at 17:37

## 2019-01-08 RX ADMIN — MYCOPHENOLATE MOFETIL 500 MG: 250 CAPSULE ORAL at 09:02

## 2019-01-08 RX ADMIN — BENZONATATE 100 MG: 100 CAPSULE ORAL at 14:41

## 2019-01-08 RX ADMIN — ENOXAPARIN SODIUM 40 MG: 40 INJECTION SUBCUTANEOUS at 09:02

## 2019-01-08 RX ADMIN — ASPIRIN 81 MG 81 MG: 81 TABLET ORAL at 09:01

## 2019-01-08 RX ADMIN — Medication 10 ML: at 09:02

## 2019-01-08 RX ADMIN — BENZONATATE 100 MG: 100 CAPSULE ORAL at 09:02

## 2019-01-08 RX ADMIN — PANTOPRAZOLE SODIUM 40 MG: 40 TABLET, DELAYED RELEASE ORAL at 06:34

## 2019-01-08 RX ADMIN — PREDNISONE 40 MG: 20 TABLET ORAL at 09:02

## 2019-01-08 RX ADMIN — SERTRALINE 100 MG: 100 TABLET, FILM COATED ORAL at 09:02

## 2019-01-08 RX ADMIN — GUAIFENESIN AND DEXTROMETHORPHAN 5 ML: 100; 10 SYRUP ORAL at 17:36

## 2019-01-08 RX ADMIN — BUPROPION HYDROCHLORIDE 150 MG: 150 TABLET, EXTENDED RELEASE ORAL at 09:01

## 2019-01-08 ASSESSMENT — PAIN SCALES - GENERAL
PAINLEVEL_OUTOF10: 9
PAINLEVEL_OUTOF10: 8
PAINLEVEL_OUTOF10: 5
PAINLEVEL_OUTOF10: 5

## 2019-01-08 ASSESSMENT — PAIN DESCRIPTION - LOCATION
LOCATION: ABDOMEN
LOCATION: ABDOMEN

## 2019-01-08 ASSESSMENT — PAIN DESCRIPTION - PAIN TYPE
TYPE: ACUTE PAIN
TYPE: ACUTE PAIN

## 2019-01-08 ASSESSMENT — PAIN DESCRIPTION - ORIENTATION
ORIENTATION: LEFT;LOWER
ORIENTATION: LEFT;LOWER

## 2019-01-09 ENCOUNTER — CARE COORDINATION (OUTPATIENT)
Dept: CASE MANAGEMENT | Age: 79
End: 2019-01-09

## 2019-01-14 ENCOUNTER — HOSPITAL ENCOUNTER (OUTPATIENT)
Dept: WOUND CARE | Age: 79
Discharge: HOME | End: 2019-01-14
Payer: MEDICARE

## 2019-01-14 ENCOUNTER — TELEPHONE (OUTPATIENT)
Dept: FAMILY MEDICINE CLINIC | Age: 79
End: 2019-01-14

## 2019-01-14 VITALS
HEART RATE: 85 BPM | TEMPERATURE: 97.6 F | DIASTOLIC BLOOD PRESSURE: 87 MMHG | RESPIRATION RATE: 18 BRPM | SYSTOLIC BLOOD PRESSURE: 158 MMHG

## 2019-01-14 DIAGNOSIS — E44.0 MODERATE PROTEIN-CALORIE MALNUTRITION (HCC): ICD-10-CM

## 2019-01-14 DIAGNOSIS — S71.002A: Primary | ICD-10-CM

## 2019-01-14 PROCEDURE — 97605 NEG PRS WND THER DME<=50SQCM: CPT

## 2019-01-14 PROCEDURE — 11043 DBRDMT MUSC&/FSCA 1ST 20/<: CPT

## 2019-01-14 RX ORDER — LIDOCAINE HYDROCHLORIDE 40 MG/ML
SOLUTION TOPICAL PRN
Status: DISCONTINUED | OUTPATIENT
Start: 2019-01-14 | End: 2019-01-15 | Stop reason: HOSPADM

## 2019-01-14 RX ORDER — DOXYCYCLINE HYCLATE 50 MG/1
100 CAPSULE ORAL 2 TIMES DAILY
COMMUNITY
End: 2019-02-26

## 2019-01-15 ENCOUNTER — TELEPHONE (OUTPATIENT)
Dept: FAMILY MEDICINE CLINIC | Age: 79
End: 2019-01-15

## 2019-01-16 RX ORDER — OMEPRAZOLE 40 MG/1
CAPSULE, DELAYED RELEASE ORAL
Qty: 180 CAPSULE | Refills: 0 | Status: SHIPPED | OUTPATIENT
Start: 2019-01-16 | End: 2019-04-15 | Stop reason: SDUPTHER

## 2019-01-21 ENCOUNTER — TELEPHONE (OUTPATIENT)
Dept: FAMILY MEDICINE CLINIC | Age: 79
End: 2019-01-21

## 2019-01-23 ENCOUNTER — CARE COORDINATION (OUTPATIENT)
Dept: CASE MANAGEMENT | Age: 79
End: 2019-01-23

## 2019-01-28 ENCOUNTER — HOSPITAL ENCOUNTER (OUTPATIENT)
Dept: WOUND CARE | Age: 79
Discharge: HOME | End: 2019-01-28
Payer: MEDICARE

## 2019-01-28 VITALS
HEART RATE: 88 BPM | DIASTOLIC BLOOD PRESSURE: 73 MMHG | SYSTOLIC BLOOD PRESSURE: 170 MMHG | RESPIRATION RATE: 18 BRPM | TEMPERATURE: 97.6 F

## 2019-01-28 DIAGNOSIS — E44.0 MODERATE PROTEIN-CALORIE MALNUTRITION (HCC): ICD-10-CM

## 2019-01-28 DIAGNOSIS — S71.002A: Primary | ICD-10-CM

## 2019-01-28 PROCEDURE — 97605 NEG PRS WND THER DME<=50SQCM: CPT

## 2019-01-28 RX ORDER — LIDOCAINE HYDROCHLORIDE 40 MG/ML
SOLUTION TOPICAL PRN
Status: DISCONTINUED | OUTPATIENT
Start: 2019-01-28 | End: 2019-01-29 | Stop reason: HOSPADM

## 2019-01-28 ASSESSMENT — PAIN SCALES - GENERAL: PAINLEVEL_OUTOF10: 0

## 2019-01-30 ENCOUNTER — OFFICE VISIT (OUTPATIENT)
Dept: FAMILY MEDICINE CLINIC | Age: 79
End: 2019-01-30
Payer: MEDICARE

## 2019-01-30 VITALS — HEIGHT: 64 IN | BODY MASS INDEX: 22.29 KG/M2 | DIASTOLIC BLOOD PRESSURE: 68 MMHG | SYSTOLIC BLOOD PRESSURE: 128 MMHG

## 2019-01-30 DIAGNOSIS — N30.00 ACUTE CYSTITIS WITHOUT HEMATURIA: ICD-10-CM

## 2019-01-30 DIAGNOSIS — J40 BRONCHITIS: ICD-10-CM

## 2019-01-30 DIAGNOSIS — S20.211A CONTUSION OF RIB ON RIGHT SIDE, INITIAL ENCOUNTER: Primary | ICD-10-CM

## 2019-01-30 PROCEDURE — G8427 DOCREV CUR MEDS BY ELIG CLIN: HCPCS | Performed by: FAMILY MEDICINE

## 2019-01-30 PROCEDURE — G8510 SCR DEP NEG, NO PLAN REQD: HCPCS | Performed by: FAMILY MEDICINE

## 2019-01-30 PROCEDURE — 1123F ACP DISCUSS/DSCN MKR DOCD: CPT | Performed by: FAMILY MEDICINE

## 2019-01-30 PROCEDURE — 1111F DSCHRG MED/CURRENT MED MERGE: CPT | Performed by: FAMILY MEDICINE

## 2019-01-30 PROCEDURE — 1101F PT FALLS ASSESS-DOCD LE1/YR: CPT | Performed by: FAMILY MEDICINE

## 2019-01-30 PROCEDURE — 99214 OFFICE O/P EST MOD 30 MIN: CPT | Performed by: FAMILY MEDICINE

## 2019-01-30 PROCEDURE — 1036F TOBACCO NON-USER: CPT | Performed by: FAMILY MEDICINE

## 2019-01-30 PROCEDURE — G8420 CALC BMI NORM PARAMETERS: HCPCS | Performed by: FAMILY MEDICINE

## 2019-01-30 PROCEDURE — 1090F PRES/ABSN URINE INCON ASSESS: CPT | Performed by: FAMILY MEDICINE

## 2019-01-30 PROCEDURE — G8400 PT W/DXA NO RESULTS DOC: HCPCS | Performed by: FAMILY MEDICINE

## 2019-01-30 PROCEDURE — G8482 FLU IMMUNIZE ORDER/ADMIN: HCPCS | Performed by: FAMILY MEDICINE

## 2019-01-30 PROCEDURE — 4040F PNEUMOC VAC/ADMIN/RCVD: CPT | Performed by: FAMILY MEDICINE

## 2019-01-30 RX ORDER — DOXYCYCLINE HYCLATE 100 MG/1
100 CAPSULE ORAL 2 TIMES DAILY
Qty: 20 CAPSULE | Refills: 0 | Status: SHIPPED | OUTPATIENT
Start: 2019-01-30 | End: 2019-02-07 | Stop reason: ALTCHOICE

## 2019-01-30 ASSESSMENT — PATIENT HEALTH QUESTIONNAIRE - PHQ9
SUM OF ALL RESPONSES TO PHQ QUESTIONS 1-9: 2
1. LITTLE INTEREST OR PLEASURE IN DOING THINGS: 1
2. FEELING DOWN, DEPRESSED OR HOPELESS: 1
SUM OF ALL RESPONSES TO PHQ QUESTIONS 1-9: 2
SUM OF ALL RESPONSES TO PHQ9 QUESTIONS 1 & 2: 2

## 2019-01-30 ASSESSMENT — ENCOUNTER SYMPTOMS
COUGH: 1
BACK PAIN: 1
ABDOMINAL PAIN: 0
NAUSEA: 0
SHORTNESS OF BREATH: 1
BOWEL INCONTINENCE: 0
CHEST TIGHTNESS: 1

## 2019-02-01 ENCOUNTER — HOSPITAL ENCOUNTER (OUTPATIENT)
Dept: GENERAL RADIOLOGY | Age: 79
Discharge: HOME | End: 2019-02-01
Payer: MEDICARE

## 2019-02-01 ENCOUNTER — HOSPITAL ENCOUNTER (OUTPATIENT)
Age: 79
Discharge: HOME | End: 2019-02-01
Payer: MEDICARE

## 2019-02-01 DIAGNOSIS — S20.211A CONTUSION OF RIB ON RIGHT SIDE, INITIAL ENCOUNTER: ICD-10-CM

## 2019-02-01 PROCEDURE — 71100 X-RAY EXAM RIBS UNI 2 VIEWS: CPT

## 2019-02-04 ENCOUNTER — TELEPHONE (OUTPATIENT)
Dept: FAMILY MEDICINE CLINIC | Age: 79
End: 2019-02-04

## 2019-02-04 DIAGNOSIS — S20.211D CONTUSION OF RIB ON RIGHT SIDE, SUBSEQUENT ENCOUNTER: Primary | ICD-10-CM

## 2019-02-04 RX ORDER — ACETAMINOPHEN AND CODEINE PHOSPHATE 60; 300 MG/1; MG/1
1 TABLET ORAL EVERY 6 HOURS PRN
Qty: 120 TABLET | Refills: 0 | Status: SHIPPED | OUTPATIENT
Start: 2019-02-04 | End: 2019-03-06

## 2019-02-07 ENCOUNTER — OFFICE VISIT (OUTPATIENT)
Dept: PULMONOLOGY | Age: 79
End: 2019-02-07
Payer: MEDICARE

## 2019-02-07 VITALS
OXYGEN SATURATION: 91 % | RESPIRATION RATE: 20 BRPM | TEMPERATURE: 97.1 F | HEART RATE: 96 BPM | DIASTOLIC BLOOD PRESSURE: 75 MMHG | SYSTOLIC BLOOD PRESSURE: 138 MMHG | HEIGHT: 64 IN | BODY MASS INDEX: 22.29 KG/M2

## 2019-02-07 DIAGNOSIS — J96.11 CHRONIC RESPIRATORY FAILURE WITH HYPOXIA (HCC): ICD-10-CM

## 2019-02-07 DIAGNOSIS — J31.0 RHINOSINUSITIS: ICD-10-CM

## 2019-02-07 DIAGNOSIS — J84.9 CHRONIC INTERSTITIAL LUNG DISEASE (HCC): ICD-10-CM

## 2019-02-07 DIAGNOSIS — J32.9 RHINOSINUSITIS: ICD-10-CM

## 2019-02-07 DIAGNOSIS — J84.9 ILD (INTERSTITIAL LUNG DISEASE) (HCC): Primary | ICD-10-CM

## 2019-02-07 DIAGNOSIS — R76.8 SCL-70 ANTIBODY POSITIVE: ICD-10-CM

## 2019-02-07 PROCEDURE — 1036F TOBACCO NON-USER: CPT | Performed by: INTERNAL MEDICINE

## 2019-02-07 PROCEDURE — 1101F PT FALLS ASSESS-DOCD LE1/YR: CPT | Performed by: INTERNAL MEDICINE

## 2019-02-07 PROCEDURE — G8420 CALC BMI NORM PARAMETERS: HCPCS | Performed by: INTERNAL MEDICINE

## 2019-02-07 PROCEDURE — G8482 FLU IMMUNIZE ORDER/ADMIN: HCPCS | Performed by: INTERNAL MEDICINE

## 2019-02-07 PROCEDURE — G8427 DOCREV CUR MEDS BY ELIG CLIN: HCPCS | Performed by: INTERNAL MEDICINE

## 2019-02-07 PROCEDURE — 4040F PNEUMOC VAC/ADMIN/RCVD: CPT | Performed by: INTERNAL MEDICINE

## 2019-02-07 PROCEDURE — 1090F PRES/ABSN URINE INCON ASSESS: CPT | Performed by: INTERNAL MEDICINE

## 2019-02-07 PROCEDURE — 1123F ACP DISCUSS/DSCN MKR DOCD: CPT | Performed by: INTERNAL MEDICINE

## 2019-02-07 PROCEDURE — 99214 OFFICE O/P EST MOD 30 MIN: CPT | Performed by: INTERNAL MEDICINE

## 2019-02-07 PROCEDURE — G8400 PT W/DXA NO RESULTS DOC: HCPCS | Performed by: INTERNAL MEDICINE

## 2019-02-07 PROCEDURE — 1111F DSCHRG MED/CURRENT MED MERGE: CPT | Performed by: INTERNAL MEDICINE

## 2019-02-07 RX ORDER — IPRATROPIUM BROMIDE 21 UG/1
2 SPRAY, METERED NASAL 4 TIMES DAILY
Qty: 1 BOTTLE | Refills: 3 | Status: SHIPPED | OUTPATIENT
Start: 2019-02-07 | End: 2019-07-18 | Stop reason: ALTCHOICE

## 2019-02-07 RX ORDER — PREDNISONE 1 MG/1
5 TABLET ORAL DAILY
Qty: 30 TABLET | Refills: 4 | Status: SHIPPED | OUTPATIENT
Start: 2019-02-07 | End: 2019-07-24

## 2019-02-08 ENCOUNTER — TELEPHONE (OUTPATIENT)
Dept: FAMILY MEDICINE CLINIC | Age: 79
End: 2019-02-08

## 2019-02-12 ENCOUNTER — TELEPHONE (OUTPATIENT)
Dept: FAMILY MEDICINE CLINIC | Age: 79
End: 2019-02-12

## 2019-02-12 NOTE — TELEPHONE ENCOUNTER
Panda Anchors the PT with [facility] called to let Dr Ashleigh Alva know that they are going to continue the physical therapy twice a week for four weeks.  Please give General Senna a call if any questions [phone]

## 2019-02-18 ENCOUNTER — HOSPITAL ENCOUNTER (OUTPATIENT)
Dept: WOUND CARE | Age: 79
Discharge: HOME | End: 2019-02-18
Payer: MEDICARE

## 2019-02-18 VITALS
RESPIRATION RATE: 20 BRPM | DIASTOLIC BLOOD PRESSURE: 67 MMHG | SYSTOLIC BLOOD PRESSURE: 147 MMHG | HEART RATE: 67 BPM | TEMPERATURE: 97.4 F

## 2019-02-18 DIAGNOSIS — E44.0 MODERATE PROTEIN-CALORIE MALNUTRITION (HCC): ICD-10-CM

## 2019-02-18 DIAGNOSIS — S71.002A: Primary | ICD-10-CM

## 2019-02-18 PROCEDURE — 11042 DBRDMT SUBQ TIS 1ST 20SQCM/<: CPT

## 2019-02-18 PROCEDURE — 97605 NEG PRS WND THER DME<=50SQCM: CPT

## 2019-02-18 RX ORDER — LIDOCAINE HYDROCHLORIDE 40 MG/ML
SOLUTION TOPICAL PRN
Status: DISCONTINUED | OUTPATIENT
Start: 2019-02-18 | End: 2019-02-19 | Stop reason: HOSPADM

## 2019-02-19 RX ORDER — ALENDRONATE SODIUM 70 MG/1
TABLET ORAL
Qty: 12 TABLET | Refills: 0 | Status: ON HOLD | OUTPATIENT
Start: 2019-02-19 | End: 2019-08-24 | Stop reason: SDUPTHER

## 2019-02-26 ENCOUNTER — APPOINTMENT (OUTPATIENT)
Dept: GENERAL RADIOLOGY | Age: 79
DRG: 065 | End: 2019-02-26
Payer: MEDICARE

## 2019-02-26 ENCOUNTER — HOSPITAL ENCOUNTER (INPATIENT)
Age: 79
LOS: 4 days | Discharge: NURSING, REHAB OR LONG-TERM CARE | DRG: 065 | End: 2019-03-02
Attending: EMERGENCY MEDICINE | Admitting: INTERNAL MEDICINE
Payer: MEDICARE

## 2019-02-26 ENCOUNTER — APPOINTMENT (OUTPATIENT)
Dept: CT IMAGING | Age: 79
DRG: 065 | End: 2019-02-26
Payer: MEDICARE

## 2019-02-26 DIAGNOSIS — R41.82 ALTERED MENTAL STATUS, UNSPECIFIED ALTERED MENTAL STATUS TYPE: Primary | ICD-10-CM

## 2019-02-26 DIAGNOSIS — I63.9 CEREBROVASCULAR ACCIDENT (CVA), UNSPECIFIED MECHANISM (HCC): ICD-10-CM

## 2019-02-26 DIAGNOSIS — I63.9 ACUTE CVA (CEREBROVASCULAR ACCIDENT) (HCC): ICD-10-CM

## 2019-02-26 LAB
A/G RATIO: 1.4 (ref 1.1–2.2)
ALBUMIN SERPL-MCNC: 3.9 G/DL (ref 3.4–5)
ALP BLD-CCNC: 94 U/L (ref 40–129)
ALT SERPL-CCNC: 10 U/L (ref 10–40)
ANION GAP SERPL CALCULATED.3IONS-SCNC: 13 MMOL/L (ref 3–16)
AST SERPL-CCNC: 16 U/L (ref 15–37)
BASOPHILS ABSOLUTE: 0.1 K/UL (ref 0–0.2)
BASOPHILS RELATIVE PERCENT: 0.8 %
BILIRUB SERPL-MCNC: 0.4 MG/DL (ref 0–1)
BILIRUBIN URINE: NEGATIVE
BLOOD, URINE: NEGATIVE
BUN BLDV-MCNC: 16 MG/DL (ref 7–20)
CALCIUM SERPL-MCNC: 11 MG/DL (ref 8.3–10.6)
CHLORIDE BLD-SCNC: 101 MMOL/L (ref 99–110)
CLARITY: CLEAR
CO2: 27 MMOL/L (ref 21–32)
COLOR: YELLOW
CREAT SERPL-MCNC: 0.9 MG/DL (ref 0.6–1.2)
EOSINOPHILS ABSOLUTE: 0.2 K/UL (ref 0–0.6)
EOSINOPHILS RELATIVE PERCENT: 2.6 %
GFR AFRICAN AMERICAN: >60
GFR NON-AFRICAN AMERICAN: >60
GLOBULIN: 2.8 G/DL
GLUCOSE BLD-MCNC: 90 MG/DL (ref 70–99)
GLUCOSE URINE: NEGATIVE MG/DL
HCT VFR BLD CALC: 39.4 % (ref 36–48)
HEMOGLOBIN: 12.9 G/DL (ref 12–16)
KETONES, URINE: ABNORMAL MG/DL
LACTIC ACID: 1.5 MMOL/L (ref 0.4–2)
LEUKOCYTE ESTERASE, URINE: NEGATIVE
LYMPHOCYTES ABSOLUTE: 1.2 K/UL (ref 1–5.1)
LYMPHOCYTES RELATIVE PERCENT: 13.8 %
MCH RBC QN AUTO: 30.3 PG (ref 26–34)
MCHC RBC AUTO-ENTMCNC: 32.7 G/DL (ref 31–36)
MCV RBC AUTO: 92.7 FL (ref 80–100)
MICROSCOPIC EXAMINATION: ABNORMAL
MONOCYTES ABSOLUTE: 0.5 K/UL (ref 0–1.3)
MONOCYTES RELATIVE PERCENT: 5.3 %
NEUTROPHILS ABSOLUTE: 7 K/UL (ref 1.7–7.7)
NEUTROPHILS RELATIVE PERCENT: 77.5 %
NITRITE, URINE: NEGATIVE
PDW BLD-RTO: 15 % (ref 12.4–15.4)
PH UA: 6.5
PLATELET # BLD: 255 K/UL (ref 135–450)
PMV BLD AUTO: 7.4 FL (ref 5–10.5)
POTASSIUM SERPL-SCNC: 4.3 MMOL/L (ref 3.5–5.1)
PROTEIN UA: NEGATIVE MG/DL
RBC # BLD: 4.24 M/UL (ref 4–5.2)
SODIUM BLD-SCNC: 141 MMOL/L (ref 136–145)
SPECIFIC GRAVITY UA: 1.02
TOTAL PROTEIN: 6.7 G/DL (ref 6.4–8.2)
URINE REFLEX TO CULTURE: ABNORMAL
URINE TYPE: ABNORMAL
UROBILINOGEN, URINE: 1 E.U./DL
WBC # BLD: 9.1 K/UL (ref 4–11)

## 2019-02-26 PROCEDURE — 80053 COMPREHEN METABOLIC PANEL: CPT

## 2019-02-26 PROCEDURE — 99285 EMERGENCY DEPT VISIT HI MDM: CPT

## 2019-02-26 PROCEDURE — 85025 COMPLETE CBC W/AUTO DIFF WBC: CPT

## 2019-02-26 PROCEDURE — 2580000003 HC RX 258: Performed by: INTERNAL MEDICINE

## 2019-02-26 PROCEDURE — 70450 CT HEAD/BRAIN W/O DYE: CPT

## 2019-02-26 PROCEDURE — 1200000000 HC SEMI PRIVATE

## 2019-02-26 PROCEDURE — 6370000000 HC RX 637 (ALT 250 FOR IP): Performed by: PHYSICIAN ASSISTANT

## 2019-02-26 PROCEDURE — 94760 N-INVAS EAR/PLS OXIMETRY 1: CPT

## 2019-02-26 PROCEDURE — 81003 URINALYSIS AUTO W/O SCOPE: CPT

## 2019-02-26 PROCEDURE — 2700000000 HC OXYGEN THERAPY PER DAY

## 2019-02-26 PROCEDURE — 6370000000 HC RX 637 (ALT 250 FOR IP): Performed by: INTERNAL MEDICINE

## 2019-02-26 PROCEDURE — 83605 ASSAY OF LACTIC ACID: CPT

## 2019-02-26 PROCEDURE — 87040 BLOOD CULTURE FOR BACTERIA: CPT

## 2019-02-26 PROCEDURE — 93005 ELECTROCARDIOGRAM TRACING: CPT | Performed by: EMERGENCY MEDICINE

## 2019-02-26 PROCEDURE — 93010 ELECTROCARDIOGRAM REPORT: CPT | Performed by: INTERNAL MEDICINE

## 2019-02-26 PROCEDURE — 71045 X-RAY EXAM CHEST 1 VIEW: CPT

## 2019-02-26 PROCEDURE — 6360000002 HC RX W HCPCS: Performed by: INTERNAL MEDICINE

## 2019-02-26 RX ORDER — POTASSIUM CHLORIDE 20 MEQ/1
40 TABLET, EXTENDED RELEASE ORAL PRN
Status: DISCONTINUED | OUTPATIENT
Start: 2019-02-26 | End: 2019-03-02 | Stop reason: HOSPADM

## 2019-02-26 RX ORDER — SODIUM CHLORIDE 0.9 % (FLUSH) 0.9 %
10 SYRINGE (ML) INJECTION EVERY 12 HOURS SCHEDULED
Status: DISCONTINUED | OUTPATIENT
Start: 2019-02-26 | End: 2019-03-02 | Stop reason: HOSPADM

## 2019-02-26 RX ORDER — PANTOPRAZOLE SODIUM 40 MG/1
40 TABLET, DELAYED RELEASE ORAL
Status: DISCONTINUED | OUTPATIENT
Start: 2019-02-27 | End: 2019-03-02 | Stop reason: HOSPADM

## 2019-02-26 RX ORDER — SENNA AND DOCUSATE SODIUM 50; 8.6 MG/1; MG/1
1 TABLET, FILM COATED ORAL DAILY
Status: DISCONTINUED | OUTPATIENT
Start: 2019-02-26 | End: 2019-03-02 | Stop reason: HOSPADM

## 2019-02-26 RX ORDER — ATORVASTATIN CALCIUM 40 MG/1
40 TABLET, FILM COATED ORAL NIGHTLY
Status: DISCONTINUED | OUTPATIENT
Start: 2019-02-26 | End: 2019-03-02 | Stop reason: HOSPADM

## 2019-02-26 RX ORDER — MORPHINE SULFATE 2 MG/ML
2 INJECTION, SOLUTION INTRAMUSCULAR; INTRAVENOUS
Status: DISCONTINUED | OUTPATIENT
Start: 2019-02-26 | End: 2019-03-02 | Stop reason: HOSPADM

## 2019-02-26 RX ORDER — LABETALOL HYDROCHLORIDE 5 MG/ML
10 INJECTION, SOLUTION INTRAVENOUS EVERY 10 MIN PRN
Status: DISCONTINUED | OUTPATIENT
Start: 2019-02-26 | End: 2019-03-02 | Stop reason: HOSPADM

## 2019-02-26 RX ORDER — SODIUM CHLORIDE 0.9 % (FLUSH) 0.9 %
10 SYRINGE (ML) INJECTION PRN
Status: DISCONTINUED | OUTPATIENT
Start: 2019-02-26 | End: 2019-03-02 | Stop reason: HOSPADM

## 2019-02-26 RX ORDER — CLONAZEPAM 0.5 MG/1
0.25 TABLET ORAL NIGHTLY PRN
Status: DISCONTINUED | OUTPATIENT
Start: 2019-02-26 | End: 2019-03-02 | Stop reason: HOSPADM

## 2019-02-26 RX ORDER — POTASSIUM CHLORIDE 7.45 MG/ML
10 INJECTION INTRAVENOUS PRN
Status: DISCONTINUED | OUTPATIENT
Start: 2019-02-26 | End: 2019-03-02 | Stop reason: HOSPADM

## 2019-02-26 RX ORDER — MYCOPHENOLATE MOFETIL 250 MG/1
500 CAPSULE ORAL 2 TIMES DAILY
Status: DISCONTINUED | OUTPATIENT
Start: 2019-02-26 | End: 2019-03-02 | Stop reason: HOSPADM

## 2019-02-26 RX ORDER — ACETAMINOPHEN 650 MG/1
650 SUPPOSITORY RECTAL EVERY 4 HOURS PRN
Status: DISCONTINUED | OUTPATIENT
Start: 2019-02-26 | End: 2019-03-02 | Stop reason: HOSPADM

## 2019-02-26 RX ORDER — IPRATROPIUM BROMIDE AND ALBUTEROL SULFATE 2.5; .5 MG/3ML; MG/3ML
1 SOLUTION RESPIRATORY (INHALATION) EVERY 6 HOURS PRN
Status: DISCONTINUED | OUTPATIENT
Start: 2019-02-26 | End: 2019-03-02 | Stop reason: HOSPADM

## 2019-02-26 RX ORDER — ONDANSETRON 2 MG/ML
4 INJECTION INTRAMUSCULAR; INTRAVENOUS EVERY 6 HOURS PRN
Status: DISCONTINUED | OUTPATIENT
Start: 2019-02-26 | End: 2019-03-02 | Stop reason: HOSPADM

## 2019-02-26 RX ORDER — HYDROCODONE BITARTRATE AND ACETAMINOPHEN 5; 325 MG/1; MG/1
2 TABLET ORAL EVERY 4 HOURS PRN
Status: DISCONTINUED | OUTPATIENT
Start: 2019-02-26 | End: 2019-03-02 | Stop reason: HOSPADM

## 2019-02-26 RX ORDER — SODIUM CHLORIDE 9 MG/ML
INJECTION, SOLUTION INTRAVENOUS CONTINUOUS
Status: DISCONTINUED | OUTPATIENT
Start: 2019-02-26 | End: 2019-02-27

## 2019-02-26 RX ORDER — CLOPIDOGREL BISULFATE 75 MG/1
75 TABLET ORAL DAILY
Status: DISCONTINUED | OUTPATIENT
Start: 2019-02-26 | End: 2019-03-02 | Stop reason: HOSPADM

## 2019-02-26 RX ORDER — OMEPRAZOLE 20 MG/1
40 CAPSULE, DELAYED RELEASE ORAL
Status: DISCONTINUED | OUTPATIENT
Start: 2019-02-27 | End: 2019-02-26 | Stop reason: CLARIF

## 2019-02-26 RX ORDER — MAGNESIUM SULFATE 1 G/100ML
1 INJECTION INTRAVENOUS PRN
Status: DISCONTINUED | OUTPATIENT
Start: 2019-02-26 | End: 2019-03-02 | Stop reason: HOSPADM

## 2019-02-26 RX ORDER — NICOTINE 21 MG/24HR
1 PATCH, TRANSDERMAL 24 HOURS TRANSDERMAL DAILY PRN
Status: DISCONTINUED | OUTPATIENT
Start: 2019-02-26 | End: 2019-03-02 | Stop reason: HOSPADM

## 2019-02-26 RX ORDER — ASPIRIN 81 MG/1
324 TABLET, CHEWABLE ORAL ONCE
Status: COMPLETED | OUTPATIENT
Start: 2019-02-26 | End: 2019-02-26

## 2019-02-26 RX ORDER — PREDNISONE 1 MG/1
5 TABLET ORAL DAILY
Status: DISCONTINUED | OUTPATIENT
Start: 2019-02-26 | End: 2019-03-02 | Stop reason: HOSPADM

## 2019-02-26 RX ORDER — SERTRALINE HYDROCHLORIDE 100 MG/1
100 TABLET, FILM COATED ORAL DAILY
Status: DISCONTINUED | OUTPATIENT
Start: 2019-02-26 | End: 2019-03-02 | Stop reason: HOSPADM

## 2019-02-26 RX ORDER — HYDROCODONE BITARTRATE AND ACETAMINOPHEN 5; 325 MG/1; MG/1
1 TABLET ORAL EVERY 4 HOURS PRN
Status: DISCONTINUED | OUTPATIENT
Start: 2019-02-26 | End: 2019-03-02 | Stop reason: HOSPADM

## 2019-02-26 RX ORDER — BUPROPION HYDROCHLORIDE 150 MG/1
150 TABLET, EXTENDED RELEASE ORAL 2 TIMES DAILY
Status: DISCONTINUED | OUTPATIENT
Start: 2019-02-26 | End: 2019-03-02 | Stop reason: HOSPADM

## 2019-02-26 RX ADMIN — SERTRALINE 100 MG: 100 TABLET, FILM COATED ORAL at 20:37

## 2019-02-26 RX ADMIN — BUPROPION HYDROCHLORIDE 150 MG: 150 TABLET, EXTENDED RELEASE ORAL at 20:37

## 2019-02-26 RX ADMIN — MYCOPHENOLATE MOFETIL 500 MG: 250 CAPSULE ORAL at 22:08

## 2019-02-26 RX ADMIN — SODIUM CHLORIDE: 9 INJECTION, SOLUTION INTRAVENOUS at 20:37

## 2019-02-26 RX ADMIN — SENNOSIDES AND DOCUSATE SODIUM 1 TABLET: 8.6; 5 TABLET ORAL at 20:37

## 2019-02-26 RX ADMIN — PREDNISONE 5 MG: 5 TABLET ORAL at 20:37

## 2019-02-26 RX ADMIN — CLOPIDOGREL BISULFATE 75 MG: 75 TABLET ORAL at 20:37

## 2019-02-26 RX ADMIN — Medication 10 ML: at 20:37

## 2019-02-26 RX ADMIN — ATORVASTATIN CALCIUM 40 MG: 40 TABLET, FILM COATED ORAL at 20:37

## 2019-02-26 RX ADMIN — ASPIRIN 81 MG 324 MG: 81 TABLET ORAL at 17:42

## 2019-02-26 ASSESSMENT — ENCOUNTER SYMPTOMS
VOMITING: 0
NAUSEA: 0
DIARRHEA: 0
ABDOMINAL PAIN: 0
RHINORRHEA: 0
COUGH: 0

## 2019-02-26 ASSESSMENT — PAIN SCALES - GENERAL
PAINLEVEL_OUTOF10: 0
PAINLEVEL_OUTOF10: 0

## 2019-02-27 ENCOUNTER — APPOINTMENT (OUTPATIENT)
Dept: MRI IMAGING | Age: 79
DRG: 065 | End: 2019-02-27
Payer: MEDICARE

## 2019-02-27 ENCOUNTER — APPOINTMENT (OUTPATIENT)
Dept: CT IMAGING | Age: 79
DRG: 065 | End: 2019-02-27
Payer: MEDICARE

## 2019-02-27 LAB
ALBUMIN SERPL-MCNC: 3.3 G/DL (ref 3.4–5)
ANION GAP SERPL CALCULATED.3IONS-SCNC: 9 MMOL/L (ref 3–16)
BASOPHILS ABSOLUTE: 0 K/UL (ref 0–0.2)
BASOPHILS RELATIVE PERCENT: 0.4 %
BUN BLDV-MCNC: 18 MG/DL (ref 7–20)
CALCIUM SERPL-MCNC: 10.6 MG/DL (ref 8.3–10.6)
CHLORIDE BLD-SCNC: 106 MMOL/L (ref 99–110)
CHOLESTEROL, TOTAL: 176 MG/DL (ref 0–199)
CO2: 28 MMOL/L (ref 21–32)
CREAT SERPL-MCNC: 0.8 MG/DL (ref 0.6–1.2)
EOSINOPHILS ABSOLUTE: 0.2 K/UL (ref 0–0.6)
EOSINOPHILS RELATIVE PERCENT: 2.8 %
ESTIMATED AVERAGE GLUCOSE: 105.4 MG/DL
GFR AFRICAN AMERICAN: >60
GFR NON-AFRICAN AMERICAN: >60
GLUCOSE BLD-MCNC: 110 MG/DL (ref 70–99)
GLUCOSE BLD-MCNC: 127 MG/DL (ref 70–99)
HBA1C MFR BLD: 5.3 %
HCT VFR BLD CALC: 34.9 % (ref 36–48)
HDLC SERPL-MCNC: 64 MG/DL (ref 40–60)
HEMOGLOBIN: 11.4 G/DL (ref 12–16)
LDL CHOLESTEROL CALCULATED: 94 MG/DL
LV EF: 58 %
LVEF MODALITY: NORMAL
LYMPHOCYTES ABSOLUTE: 1.1 K/UL (ref 1–5.1)
LYMPHOCYTES RELATIVE PERCENT: 16.5 %
MAGNESIUM: 2.3 MG/DL (ref 1.8–2.4)
MCH RBC QN AUTO: 30.5 PG (ref 26–34)
MCHC RBC AUTO-ENTMCNC: 32.6 G/DL (ref 31–36)
MCV RBC AUTO: 93.6 FL (ref 80–100)
MONOCYTES ABSOLUTE: 0.4 K/UL (ref 0–1.3)
MONOCYTES RELATIVE PERCENT: 5.9 %
NEUTROPHILS ABSOLUTE: 5.1 K/UL (ref 1.7–7.7)
NEUTROPHILS RELATIVE PERCENT: 74.4 %
PDW BLD-RTO: 14.3 % (ref 12.4–15.4)
PERFORMED ON: ABNORMAL
PHOSPHORUS: 3.2 MG/DL (ref 2.5–4.9)
PLATELET # BLD: 222 K/UL (ref 135–450)
PMV BLD AUTO: 7.7 FL (ref 5–10.5)
POTASSIUM SERPL-SCNC: 4.8 MMOL/L (ref 3.5–5.1)
RBC # BLD: 3.73 M/UL (ref 4–5.2)
SODIUM BLD-SCNC: 143 MMOL/L (ref 136–145)
TRIGL SERPL-MCNC: 89 MG/DL (ref 0–150)
VLDLC SERPL CALC-MCNC: 18 MG/DL
WBC # BLD: 6.8 K/UL (ref 4–11)

## 2019-02-27 PROCEDURE — 93306 TTE W/DOPPLER COMPLETE: CPT

## 2019-02-27 PROCEDURE — 2580000003 HC RX 258: Performed by: INTERNAL MEDICINE

## 2019-02-27 PROCEDURE — 93880 EXTRACRANIAL BILAT STUDY: CPT

## 2019-02-27 PROCEDURE — 1200000000 HC SEMI PRIVATE

## 2019-02-27 PROCEDURE — 94761 N-INVAS EAR/PLS OXIMETRY MLT: CPT

## 2019-02-27 PROCEDURE — 70498 CT ANGIOGRAPHY NECK: CPT

## 2019-02-27 PROCEDURE — 6360000004 HC RX CONTRAST MEDICATION: Performed by: INTERNAL MEDICINE

## 2019-02-27 PROCEDURE — 85025 COMPLETE CBC W/AUTO DIFF WBC: CPT

## 2019-02-27 PROCEDURE — 97116 GAIT TRAINING THERAPY: CPT | Performed by: PHYSICAL THERAPIST

## 2019-02-27 PROCEDURE — 36415 COLL VENOUS BLD VENIPUNCTURE: CPT

## 2019-02-27 PROCEDURE — 70496 CT ANGIOGRAPHY HEAD: CPT

## 2019-02-27 PROCEDURE — 83735 ASSAY OF MAGNESIUM: CPT

## 2019-02-27 PROCEDURE — 70551 MRI BRAIN STEM W/O DYE: CPT

## 2019-02-27 PROCEDURE — 97162 PT EVAL MOD COMPLEX 30 MIN: CPT | Performed by: PHYSICAL THERAPIST

## 2019-02-27 PROCEDURE — 80069 RENAL FUNCTION PANEL: CPT

## 2019-02-27 PROCEDURE — 94760 N-INVAS EAR/PLS OXIMETRY 1: CPT

## 2019-02-27 PROCEDURE — 70544 MR ANGIOGRAPHY HEAD W/O DYE: CPT

## 2019-02-27 PROCEDURE — 70450 CT HEAD/BRAIN W/O DYE: CPT

## 2019-02-27 PROCEDURE — 97166 OT EVAL MOD COMPLEX 45 MIN: CPT

## 2019-02-27 PROCEDURE — 83036 HEMOGLOBIN GLYCOSYLATED A1C: CPT

## 2019-02-27 PROCEDURE — 6370000000 HC RX 637 (ALT 250 FOR IP): Performed by: INTERNAL MEDICINE

## 2019-02-27 PROCEDURE — 97530 THERAPEUTIC ACTIVITIES: CPT

## 2019-02-27 PROCEDURE — 97530 THERAPEUTIC ACTIVITIES: CPT | Performed by: PHYSICAL THERAPIST

## 2019-02-27 PROCEDURE — 6360000002 HC RX W HCPCS: Performed by: INTERNAL MEDICINE

## 2019-02-27 PROCEDURE — 2700000000 HC OXYGEN THERAPY PER DAY

## 2019-02-27 PROCEDURE — 92610 EVALUATE SWALLOWING FUNCTION: CPT

## 2019-02-27 PROCEDURE — 97605 NEG PRS WND THER DME<=50SQCM: CPT

## 2019-02-27 PROCEDURE — 80061 LIPID PANEL: CPT

## 2019-02-27 RX ORDER — ACETAMINOPHEN 325 MG/1
650 TABLET ORAL EVERY 4 HOURS PRN
Status: DISCONTINUED | OUTPATIENT
Start: 2019-02-27 | End: 2019-03-02 | Stop reason: HOSPADM

## 2019-02-27 RX ORDER — 0.9 % SODIUM CHLORIDE 0.9 %
1000 INTRAVENOUS SOLUTION INTRAVENOUS ONCE
Status: COMPLETED | OUTPATIENT
Start: 2019-02-28 | End: 2019-02-28

## 2019-02-27 RX ORDER — AMLODIPINE BESYLATE 5 MG/1
5 TABLET ORAL DAILY
Status: DISCONTINUED | OUTPATIENT
Start: 2019-02-27 | End: 2019-02-28

## 2019-02-27 RX ORDER — 0.9 % SODIUM CHLORIDE 0.9 %
1000 INTRAVENOUS SOLUTION INTRAVENOUS ONCE
Status: COMPLETED | OUTPATIENT
Start: 2019-02-27 | End: 2019-02-27

## 2019-02-27 RX ADMIN — BUPROPION HYDROCHLORIDE 150 MG: 150 TABLET, EXTENDED RELEASE ORAL at 20:52

## 2019-02-27 RX ADMIN — MYCOPHENOLATE MOFETIL 500 MG: 250 CAPSULE ORAL at 11:20

## 2019-02-27 RX ADMIN — CLOPIDOGREL BISULFATE 75 MG: 75 TABLET ORAL at 11:20

## 2019-02-27 RX ADMIN — Medication 10 ML: at 12:56

## 2019-02-27 RX ADMIN — PANTOPRAZOLE SODIUM 40 MG: 40 TABLET, DELAYED RELEASE ORAL at 18:03

## 2019-02-27 RX ADMIN — SODIUM CHLORIDE 1000 ML: 9 INJECTION, SOLUTION INTRAVENOUS at 19:53

## 2019-02-27 RX ADMIN — AMLODIPINE BESYLATE 5 MG: 5 TABLET ORAL at 11:21

## 2019-02-27 RX ADMIN — Medication 10 ML: at 20:52

## 2019-02-27 RX ADMIN — SENNOSIDES AND DOCUSATE SODIUM 1 TABLET: 8.6; 5 TABLET ORAL at 11:21

## 2019-02-27 RX ADMIN — BUPROPION HYDROCHLORIDE 150 MG: 150 TABLET, EXTENDED RELEASE ORAL at 11:20

## 2019-02-27 RX ADMIN — IOPAMIDOL 75 ML: 755 INJECTION, SOLUTION INTRAVENOUS at 18:59

## 2019-02-27 RX ADMIN — ATORVASTATIN CALCIUM 40 MG: 40 TABLET, FILM COATED ORAL at 20:52

## 2019-02-27 RX ADMIN — ENOXAPARIN SODIUM 40 MG: 40 INJECTION SUBCUTANEOUS at 11:20

## 2019-02-27 RX ADMIN — SERTRALINE 100 MG: 100 TABLET, FILM COATED ORAL at 11:21

## 2019-02-27 RX ADMIN — MYCOPHENOLATE MOFETIL 500 MG: 250 CAPSULE ORAL at 20:52

## 2019-02-27 RX ADMIN — PREDNISONE 5 MG: 5 TABLET ORAL at 11:20

## 2019-02-27 ASSESSMENT — PAIN SCALES - GENERAL
PAINLEVEL_OUTOF10: 0

## 2019-02-28 LAB
ALBUMIN SERPL-MCNC: 3.1 G/DL (ref 3.4–5)
ANION GAP SERPL CALCULATED.3IONS-SCNC: 9 MMOL/L (ref 3–16)
BASOPHILS ABSOLUTE: 0 K/UL (ref 0–0.2)
BASOPHILS RELATIVE PERCENT: 0.2 %
BUN BLDV-MCNC: 13 MG/DL (ref 7–20)
CALCIUM SERPL-MCNC: 9.8 MG/DL (ref 8.3–10.6)
CHLORIDE BLD-SCNC: 110 MMOL/L (ref 99–110)
CO2: 25 MMOL/L (ref 21–32)
CREAT SERPL-MCNC: 0.6 MG/DL (ref 0.6–1.2)
EOSINOPHILS ABSOLUTE: 0.3 K/UL (ref 0–0.6)
EOSINOPHILS RELATIVE PERCENT: 4.9 %
GFR AFRICAN AMERICAN: >60
GFR NON-AFRICAN AMERICAN: >60
GLUCOSE BLD-MCNC: 101 MG/DL (ref 70–99)
HCT VFR BLD CALC: 32.8 % (ref 36–48)
HEMOGLOBIN: 10.8 G/DL (ref 12–16)
LYMPHOCYTES ABSOLUTE: 1.8 K/UL (ref 1–5.1)
LYMPHOCYTES RELATIVE PERCENT: 27.5 %
MAGNESIUM: 1.8 MG/DL (ref 1.8–2.4)
MCH RBC QN AUTO: 30.5 PG (ref 26–34)
MCHC RBC AUTO-ENTMCNC: 32.9 G/DL (ref 31–36)
MCV RBC AUTO: 93 FL (ref 80–100)
MONOCYTES ABSOLUTE: 0.5 K/UL (ref 0–1.3)
MONOCYTES RELATIVE PERCENT: 8.3 %
NEUTROPHILS ABSOLUTE: 3.9 K/UL (ref 1.7–7.7)
NEUTROPHILS RELATIVE PERCENT: 59.1 %
PDW BLD-RTO: 14.5 % (ref 12.4–15.4)
PHOSPHORUS: 2.1 MG/DL (ref 2.5–4.9)
PLATELET # BLD: 195 K/UL (ref 135–450)
PMV BLD AUTO: 7.5 FL (ref 5–10.5)
POTASSIUM SERPL-SCNC: 4.1 MMOL/L (ref 3.5–5.1)
RBC # BLD: 3.53 M/UL (ref 4–5.2)
SODIUM BLD-SCNC: 144 MMOL/L (ref 136–145)
WBC # BLD: 6.5 K/UL (ref 4–11)

## 2019-02-28 PROCEDURE — 92523 SPEECH SOUND LANG COMPREHEN: CPT

## 2019-02-28 PROCEDURE — 2580000003 HC RX 258: Performed by: INTERNAL MEDICINE

## 2019-02-28 PROCEDURE — 85025 COMPLETE CBC W/AUTO DIFF WBC: CPT

## 2019-02-28 PROCEDURE — 99223 1ST HOSP IP/OBS HIGH 75: CPT | Performed by: INTERNAL MEDICINE

## 2019-02-28 PROCEDURE — 6370000000 HC RX 637 (ALT 250 FOR IP): Performed by: INTERNAL MEDICINE

## 2019-02-28 PROCEDURE — 80069 RENAL FUNCTION PANEL: CPT

## 2019-02-28 PROCEDURE — 97110 THERAPEUTIC EXERCISES: CPT

## 2019-02-28 PROCEDURE — 97530 THERAPEUTIC ACTIVITIES: CPT

## 2019-02-28 PROCEDURE — 2580000003 HC RX 258: Performed by: PHYSICIAN ASSISTANT

## 2019-02-28 PROCEDURE — 2700000000 HC OXYGEN THERAPY PER DAY

## 2019-02-28 PROCEDURE — 1200000000 HC SEMI PRIVATE

## 2019-02-28 PROCEDURE — 6360000002 HC RX W HCPCS: Performed by: INTERNAL MEDICINE

## 2019-02-28 PROCEDURE — 36415 COLL VENOUS BLD VENIPUNCTURE: CPT

## 2019-02-28 PROCEDURE — 6370000000 HC RX 637 (ALT 250 FOR IP): Performed by: PHYSICIAN ASSISTANT

## 2019-02-28 PROCEDURE — 97127 HC SP THER IVNTJ W/FOCUS COG FUNCJ: CPT

## 2019-02-28 PROCEDURE — 94761 N-INVAS EAR/PLS OXIMETRY MLT: CPT

## 2019-02-28 PROCEDURE — 92526 ORAL FUNCTION THERAPY: CPT

## 2019-02-28 PROCEDURE — 83735 ASSAY OF MAGNESIUM: CPT

## 2019-02-28 RX ORDER — AMLODIPINE BESYLATE 10 MG/1
10 TABLET ORAL DAILY
Status: DISCONTINUED | OUTPATIENT
Start: 2019-03-01 | End: 2019-03-02 | Stop reason: HOSPADM

## 2019-02-28 RX ORDER — SODIUM CHLORIDE 0.9 % (FLUSH) 0.9 %
10 SYRINGE (ML) INJECTION EVERY 12 HOURS SCHEDULED
Status: CANCELLED | OUTPATIENT
Start: 2019-02-28

## 2019-02-28 RX ORDER — SODIUM CHLORIDE 0.9 % (FLUSH) 0.9 %
10 SYRINGE (ML) INJECTION PRN
Status: CANCELLED | OUTPATIENT
Start: 2019-02-28

## 2019-02-28 RX ORDER — SODIUM CHLORIDE 9 MG/ML
INJECTION, SOLUTION INTRAVENOUS CONTINUOUS
Status: CANCELLED | OUTPATIENT
Start: 2019-02-28

## 2019-02-28 RX ADMIN — ENOXAPARIN SODIUM 40 MG: 40 INJECTION SUBCUTANEOUS at 09:02

## 2019-02-28 RX ADMIN — Medication 10 ML: at 22:17

## 2019-02-28 RX ADMIN — ACETAMINOPHEN 650 MG: 325 TABLET, FILM COATED ORAL at 00:06

## 2019-02-28 RX ADMIN — BUPROPION HYDROCHLORIDE 150 MG: 150 TABLET, EXTENDED RELEASE ORAL at 21:06

## 2019-02-28 RX ADMIN — HYDROCODONE BITARTRATE AND ACETAMINOPHEN 2 TABLET: 5; 325 TABLET ORAL at 21:09

## 2019-02-28 RX ADMIN — HYDROCODONE BITARTRATE AND ACETAMINOPHEN 1 TABLET: 5; 325 TABLET ORAL at 21:07

## 2019-02-28 RX ADMIN — ATORVASTATIN CALCIUM 40 MG: 40 TABLET, FILM COATED ORAL at 21:07

## 2019-02-28 RX ADMIN — CLOPIDOGREL BISULFATE 75 MG: 75 TABLET ORAL at 09:03

## 2019-02-28 RX ADMIN — Medication 10 ML: at 09:10

## 2019-02-28 RX ADMIN — PREDNISONE 5 MG: 5 TABLET ORAL at 09:02

## 2019-02-28 RX ADMIN — CLONAZEPAM 0.25 MG: 0.5 TABLET ORAL at 21:06

## 2019-02-28 RX ADMIN — SODIUM CHLORIDE 1000 ML: 9 INJECTION, SOLUTION INTRAVENOUS at 00:01

## 2019-02-28 RX ADMIN — SERTRALINE 100 MG: 100 TABLET, FILM COATED ORAL at 09:02

## 2019-02-28 RX ADMIN — MYCOPHENOLATE MOFETIL 500 MG: 250 CAPSULE ORAL at 22:17

## 2019-02-28 RX ADMIN — MYCOPHENOLATE MOFETIL 500 MG: 250 CAPSULE ORAL at 09:09

## 2019-02-28 RX ADMIN — PANTOPRAZOLE SODIUM 40 MG: 40 TABLET, DELAYED RELEASE ORAL at 17:03

## 2019-02-28 RX ADMIN — SENNOSIDES AND DOCUSATE SODIUM 1 TABLET: 8.6; 5 TABLET ORAL at 09:03

## 2019-02-28 RX ADMIN — PANTOPRAZOLE SODIUM 40 MG: 40 TABLET, DELAYED RELEASE ORAL at 06:02

## 2019-02-28 RX ADMIN — BUPROPION HYDROCHLORIDE 150 MG: 150 TABLET, EXTENDED RELEASE ORAL at 09:02

## 2019-02-28 ASSESSMENT — PAIN DESCRIPTION - ORIENTATION
ORIENTATION: LEFT
ORIENTATION: RIGHT;LEFT

## 2019-02-28 ASSESSMENT — PAIN SCALES - WONG BAKER
WONGBAKER_NUMERICALRESPONSE: 0

## 2019-02-28 ASSESSMENT — PAIN SCALES - GENERAL
PAINLEVEL_OUTOF10: 4
PAINLEVEL_OUTOF10: 4
PAINLEVEL_OUTOF10: 2
PAINLEVEL_OUTOF10: 0
PAINLEVEL_OUTOF10: 3

## 2019-02-28 ASSESSMENT — PAIN DESCRIPTION - LOCATION
LOCATION: LEG;HIP
LOCATION: LEG

## 2019-02-28 ASSESSMENT — PAIN DESCRIPTION - PAIN TYPE
TYPE: CHRONIC PAIN
TYPE: CHRONIC PAIN

## 2019-02-28 ASSESSMENT — PAIN DESCRIPTION - DESCRIPTORS: DESCRIPTORS: ACHING;DISCOMFORT

## 2019-03-01 ENCOUNTER — ANESTHESIA EVENT (OUTPATIENT)
Dept: CARDIAC CATH/INVASIVE PROCEDURES | Age: 79
DRG: 065 | End: 2019-03-01
Payer: MEDICARE

## 2019-03-01 ENCOUNTER — ANESTHESIA (OUTPATIENT)
Dept: CARDIAC CATH/INVASIVE PROCEDURES | Age: 79
DRG: 065 | End: 2019-03-01
Payer: MEDICARE

## 2019-03-01 ENCOUNTER — HOSPITAL ENCOUNTER (INPATIENT)
Dept: CARDIAC CATH/INVASIVE PROCEDURES | Age: 79
Discharge: HOME | DRG: 065 | End: 2019-03-01
Payer: MEDICARE

## 2019-03-01 VITALS — DIASTOLIC BLOOD PRESSURE: 99 MMHG | SYSTOLIC BLOOD PRESSURE: 140 MMHG | OXYGEN SATURATION: 100 %

## 2019-03-01 LAB
ALBUMIN SERPL-MCNC: 3.1 G/DL (ref 3.4–5)
ANION GAP SERPL CALCULATED.3IONS-SCNC: 11 MMOL/L (ref 3–16)
BASOPHILS ABSOLUTE: 0.1 K/UL (ref 0–0.2)
BASOPHILS RELATIVE PERCENT: 0.8 %
BUN BLDV-MCNC: 15 MG/DL (ref 7–20)
CALCIUM SERPL-MCNC: 10.1 MG/DL (ref 8.3–10.6)
CHLORIDE BLD-SCNC: 108 MMOL/L (ref 99–110)
CO2: 27 MMOL/L (ref 21–32)
CREAT SERPL-MCNC: 0.8 MG/DL (ref 0.6–1.2)
EKG ATRIAL RATE: 63 BPM
EKG DIAGNOSIS: NORMAL
EKG P AXIS: 28 DEGREES
EKG P-R INTERVAL: 154 MS
EKG Q-T INTERVAL: 404 MS
EKG QRS DURATION: 70 MS
EKG QTC CALCULATION (BAZETT): 413 MS
EKG R AXIS: -12 DEGREES
EKG T AXIS: 48 DEGREES
EKG VENTRICULAR RATE: 63 BPM
EOSINOPHILS ABSOLUTE: 0.3 K/UL (ref 0–0.6)
EOSINOPHILS RELATIVE PERCENT: 4.4 %
GFR AFRICAN AMERICAN: >60
GFR NON-AFRICAN AMERICAN: >60
GLUCOSE BLD-MCNC: 110 MG/DL (ref 70–99)
HCT VFR BLD CALC: 34.3 % (ref 36–48)
HEMOGLOBIN: 11.2 G/DL (ref 12–16)
LYMPHOCYTES ABSOLUTE: 1.8 K/UL (ref 1–5.1)
LYMPHOCYTES RELATIVE PERCENT: 26.3 %
MAGNESIUM: 2.1 MG/DL (ref 1.8–2.4)
MCH RBC QN AUTO: 30.4 PG (ref 26–34)
MCHC RBC AUTO-ENTMCNC: 32.7 G/DL (ref 31–36)
MCV RBC AUTO: 93 FL (ref 80–100)
MONOCYTES ABSOLUTE: 0.5 K/UL (ref 0–1.3)
MONOCYTES RELATIVE PERCENT: 7.5 %
NEUTROPHILS ABSOLUTE: 4.2 K/UL (ref 1.7–7.7)
NEUTROPHILS RELATIVE PERCENT: 61 %
PDW BLD-RTO: 14.4 % (ref 12.4–15.4)
PHOSPHORUS: 2.3 MG/DL (ref 2.5–4.9)
PLATELET # BLD: 212 K/UL (ref 135–450)
PMV BLD AUTO: 7.3 FL (ref 5–10.5)
POTASSIUM SERPL-SCNC: 4 MMOL/L (ref 3.5–5.1)
RBC # BLD: 3.69 M/UL (ref 4–5.2)
SODIUM BLD-SCNC: 146 MMOL/L (ref 136–145)
WBC # BLD: 6.9 K/UL (ref 4–11)

## 2019-03-01 PROCEDURE — 97127 HC SP THER IVNTJ W/FOCUS COG FUNCJ: CPT

## 2019-03-01 PROCEDURE — 85025 COMPLETE CBC W/AUTO DIFF WBC: CPT

## 2019-03-01 PROCEDURE — 99232 SBSQ HOSP IP/OBS MODERATE 35: CPT | Performed by: INTERNAL MEDICINE

## 2019-03-01 PROCEDURE — 1200000000 HC SEMI PRIVATE

## 2019-03-01 PROCEDURE — 6370000000 HC RX 637 (ALT 250 FOR IP): Performed by: INTERNAL MEDICINE

## 2019-03-01 PROCEDURE — 6360000002 HC RX W HCPCS: Performed by: INTERNAL MEDICINE

## 2019-03-01 PROCEDURE — 2580000003 HC RX 258

## 2019-03-01 PROCEDURE — 6360000002 HC RX W HCPCS

## 2019-03-01 PROCEDURE — 93325 DOPPLER ECHO COLOR FLOW MAPG: CPT

## 2019-03-01 PROCEDURE — 80069 RENAL FUNCTION PANEL: CPT

## 2019-03-01 PROCEDURE — 3700000000 HC ANESTHESIA ATTENDED CARE

## 2019-03-01 PROCEDURE — 83735 ASSAY OF MAGNESIUM: CPT

## 2019-03-01 PROCEDURE — 3700000001 HC ADD 15 MINUTES (ANESTHESIA)

## 2019-03-01 PROCEDURE — 2500000003 HC RX 250 WO HCPCS

## 2019-03-01 PROCEDURE — 94761 N-INVAS EAR/PLS OXIMETRY MLT: CPT

## 2019-03-01 PROCEDURE — B246ZZ4 ULTRASONOGRAPHY OF RIGHT AND LEFT HEART, TRANSESOPHAGEAL: ICD-10-PCS | Performed by: ANESTHESIOLOGY

## 2019-03-01 PROCEDURE — 93320 DOPPLER ECHO COMPLETE: CPT

## 2019-03-01 PROCEDURE — 97605 NEG PRS WND THER DME<=50SQCM: CPT

## 2019-03-01 PROCEDURE — 2700000000 HC OXYGEN THERAPY PER DAY

## 2019-03-01 PROCEDURE — 93312 ECHO TRANSESOPHAGEAL: CPT

## 2019-03-01 PROCEDURE — 2580000003 HC RX 258: Performed by: INTERNAL MEDICINE

## 2019-03-01 RX ORDER — MIDAZOLAM HYDROCHLORIDE 1 MG/ML
INJECTION INTRAMUSCULAR; INTRAVENOUS PRN
Status: DISCONTINUED | OUTPATIENT
Start: 2019-03-01 | End: 2019-03-01 | Stop reason: SDUPTHER

## 2019-03-01 RX ORDER — KETAMINE HCL IN NACL, ISO-OSM 100MG/10ML
SYRINGE (ML) INJECTION PRN
Status: DISCONTINUED | OUTPATIENT
Start: 2019-03-01 | End: 2019-03-01 | Stop reason: SDUPTHER

## 2019-03-01 RX ORDER — SODIUM CHLORIDE 9 MG/ML
INJECTION, SOLUTION INTRAVENOUS CONTINUOUS PRN
Status: DISCONTINUED | OUTPATIENT
Start: 2019-03-01 | End: 2019-03-01 | Stop reason: SDUPTHER

## 2019-03-01 RX ORDER — PROPOFOL 10 MG/ML
INJECTION, EMULSION INTRAVENOUS PRN
Status: DISCONTINUED | OUTPATIENT
Start: 2019-03-01 | End: 2019-03-01 | Stop reason: SDUPTHER

## 2019-03-01 RX ORDER — SERTRALINE HYDROCHLORIDE 100 MG/1
TABLET, FILM COATED ORAL
Qty: 90 TABLET | Refills: 0 | Status: SHIPPED | OUTPATIENT
Start: 2019-03-01 | End: 2019-07-28 | Stop reason: SDUPTHER

## 2019-03-01 RX ADMIN — AMLODIPINE BESYLATE 10 MG: 10 TABLET ORAL at 13:01

## 2019-03-01 RX ADMIN — CLOPIDOGREL BISULFATE 75 MG: 75 TABLET ORAL at 13:01

## 2019-03-01 RX ADMIN — BUPROPION HYDROCHLORIDE 150 MG: 150 TABLET, EXTENDED RELEASE ORAL at 13:01

## 2019-03-01 RX ADMIN — SERTRALINE 100 MG: 100 TABLET, FILM COATED ORAL at 13:00

## 2019-03-01 RX ADMIN — PANTOPRAZOLE SODIUM 40 MG: 40 TABLET, DELAYED RELEASE ORAL at 16:36

## 2019-03-01 RX ADMIN — ATORVASTATIN CALCIUM 40 MG: 40 TABLET, FILM COATED ORAL at 20:36

## 2019-03-01 RX ADMIN — Medication 10 ML: at 13:01

## 2019-03-01 RX ADMIN — BUPROPION HYDROCHLORIDE 150 MG: 150 TABLET, EXTENDED RELEASE ORAL at 20:36

## 2019-03-01 RX ADMIN — SENNOSIDES AND DOCUSATE SODIUM 1 TABLET: 8.6; 5 TABLET ORAL at 13:00

## 2019-03-01 RX ADMIN — Medication 10 ML: at 20:37

## 2019-03-01 RX ADMIN — ENOXAPARIN SODIUM 40 MG: 40 INJECTION SUBCUTANEOUS at 13:00

## 2019-03-01 RX ADMIN — Medication 20 MG: at 11:21

## 2019-03-01 RX ADMIN — MYCOPHENOLATE MOFETIL 500 MG: 250 CAPSULE ORAL at 20:36

## 2019-03-01 RX ADMIN — PREDNISONE 5 MG: 5 TABLET ORAL at 13:01

## 2019-03-01 RX ADMIN — PROPOFOL 50 MG: 10 INJECTION, EMULSION INTRAVENOUS at 11:21

## 2019-03-01 RX ADMIN — PANTOPRAZOLE SODIUM 40 MG: 40 TABLET, DELAYED RELEASE ORAL at 05:38

## 2019-03-01 RX ADMIN — MYCOPHENOLATE MOFETIL 500 MG: 250 CAPSULE ORAL at 13:01

## 2019-03-01 RX ADMIN — SODIUM CHLORIDE: 9 INJECTION, SOLUTION INTRAVENOUS at 10:15

## 2019-03-01 RX ADMIN — MIDAZOLAM 0.5 MG: 1 INJECTION INTRAMUSCULAR; INTRAVENOUS at 11:21

## 2019-03-01 ASSESSMENT — PAIN SCALES - WONG BAKER
WONGBAKER_NUMERICALRESPONSE: 0

## 2019-03-01 ASSESSMENT — ENCOUNTER SYMPTOMS
DYSPNEA ACTIVITY LEVEL: AFTER AMBULATING 1 FLIGHT OF STAIRS
SHORTNESS OF BREATH: 1

## 2019-03-01 ASSESSMENT — PAIN SCALES - GENERAL
PAINLEVEL_OUTOF10: 2
PAINLEVEL_OUTOF10: 0

## 2019-03-01 ASSESSMENT — LIFESTYLE VARIABLES: SMOKING_STATUS: 0

## 2019-03-01 ASSESSMENT — PAIN DESCRIPTION - PAIN TYPE: TYPE: CHRONIC PAIN

## 2019-03-01 ASSESSMENT — PAIN DESCRIPTION - LOCATION: LOCATION: LEG;HIP

## 2019-03-01 ASSESSMENT — PAIN DESCRIPTION - ORIENTATION: ORIENTATION: LEFT

## 2019-03-02 VITALS
TEMPERATURE: 98.1 F | RESPIRATION RATE: 18 BRPM | HEIGHT: 62 IN | WEIGHT: 129.63 LBS | OXYGEN SATURATION: 98 % | SYSTOLIC BLOOD PRESSURE: 133 MMHG | HEART RATE: 86 BPM | DIASTOLIC BLOOD PRESSURE: 70 MMHG | BODY MASS INDEX: 23.85 KG/M2

## 2019-03-02 LAB
ALBUMIN SERPL-MCNC: 3 G/DL (ref 3.4–5)
ANION GAP SERPL CALCULATED.3IONS-SCNC: 11 MMOL/L (ref 3–16)
BASOPHILS ABSOLUTE: 0 K/UL (ref 0–0.2)
BASOPHILS RELATIVE PERCENT: 0.6 %
BUN BLDV-MCNC: 14 MG/DL (ref 7–20)
CALCIUM SERPL-MCNC: 10.3 MG/DL (ref 8.3–10.6)
CHLORIDE BLD-SCNC: 104 MMOL/L (ref 99–110)
CO2: 26 MMOL/L (ref 21–32)
CREAT SERPL-MCNC: 0.7 MG/DL (ref 0.6–1.2)
EOSINOPHILS ABSOLUTE: 0.3 K/UL (ref 0–0.6)
EOSINOPHILS RELATIVE PERCENT: 3.9 %
GFR AFRICAN AMERICAN: >60
GFR NON-AFRICAN AMERICAN: >60
GLUCOSE BLD-MCNC: 94 MG/DL (ref 70–99)
HCT VFR BLD CALC: 35.2 % (ref 36–48)
HEMOGLOBIN: 11.7 G/DL (ref 12–16)
LYMPHOCYTES ABSOLUTE: 1.6 K/UL (ref 1–5.1)
LYMPHOCYTES RELATIVE PERCENT: 20.2 %
MAGNESIUM: 2.2 MG/DL (ref 1.8–2.4)
MCH RBC QN AUTO: 30.7 PG (ref 26–34)
MCHC RBC AUTO-ENTMCNC: 33.2 G/DL (ref 31–36)
MCV RBC AUTO: 92.6 FL (ref 80–100)
MONOCYTES ABSOLUTE: 0.6 K/UL (ref 0–1.3)
MONOCYTES RELATIVE PERCENT: 7.9 %
NEUTROPHILS ABSOLUTE: 5.3 K/UL (ref 1.7–7.7)
NEUTROPHILS RELATIVE PERCENT: 67.4 %
PDW BLD-RTO: 14.4 % (ref 12.4–15.4)
PHOSPHORUS: 2.1 MG/DL (ref 2.5–4.9)
PLATELET # BLD: 221 K/UL (ref 135–450)
PMV BLD AUTO: 7.3 FL (ref 5–10.5)
POTASSIUM SERPL-SCNC: 4.2 MMOL/L (ref 3.5–5.1)
RBC # BLD: 3.8 M/UL (ref 4–5.2)
SODIUM BLD-SCNC: 141 MMOL/L (ref 136–145)
WBC # BLD: 7.9 K/UL (ref 4–11)

## 2019-03-02 PROCEDURE — 2700000000 HC OXYGEN THERAPY PER DAY

## 2019-03-02 PROCEDURE — 36415 COLL VENOUS BLD VENIPUNCTURE: CPT

## 2019-03-02 PROCEDURE — 2580000003 HC RX 258: Performed by: INTERNAL MEDICINE

## 2019-03-02 PROCEDURE — 80069 RENAL FUNCTION PANEL: CPT

## 2019-03-02 PROCEDURE — 85025 COMPLETE CBC W/AUTO DIFF WBC: CPT

## 2019-03-02 PROCEDURE — 6370000000 HC RX 637 (ALT 250 FOR IP): Performed by: INTERNAL MEDICINE

## 2019-03-02 PROCEDURE — 83735 ASSAY OF MAGNESIUM: CPT

## 2019-03-02 PROCEDURE — 94760 N-INVAS EAR/PLS OXIMETRY 1: CPT

## 2019-03-02 PROCEDURE — 6360000002 HC RX W HCPCS: Performed by: INTERNAL MEDICINE

## 2019-03-02 RX ORDER — LOSARTAN POTASSIUM 50 MG/1
50 TABLET ORAL DAILY
Qty: 30 TABLET | Refills: 3 | Status: SHIPPED | OUTPATIENT
Start: 2019-03-02 | End: 2019-05-06 | Stop reason: ALTCHOICE

## 2019-03-02 RX ORDER — CLOPIDOGREL BISULFATE 75 MG/1
75 TABLET ORAL DAILY
Qty: 30 TABLET | Refills: 3 | Status: SHIPPED | OUTPATIENT
Start: 2019-03-02 | End: 2019-05-06 | Stop reason: ALTCHOICE

## 2019-03-02 RX ORDER — LOSARTAN POTASSIUM 50 MG/1
50 TABLET ORAL DAILY
Status: DISCONTINUED | OUTPATIENT
Start: 2019-03-02 | End: 2019-03-02 | Stop reason: HOSPADM

## 2019-03-02 RX ORDER — ATORVASTATIN CALCIUM 40 MG/1
40 TABLET, FILM COATED ORAL NIGHTLY
Qty: 30 TABLET | Refills: 3 | Status: SHIPPED | OUTPATIENT
Start: 2019-03-02 | End: 2019-05-06 | Stop reason: ALTCHOICE

## 2019-03-02 RX ORDER — AMLODIPINE BESYLATE 10 MG/1
10 TABLET ORAL DAILY
Qty: 30 TABLET | Refills: 1 | Status: SHIPPED | OUTPATIENT
Start: 2019-03-02 | End: 2019-07-18 | Stop reason: ALTCHOICE

## 2019-03-02 RX ADMIN — LOSARTAN POTASSIUM 50 MG: 50 TABLET ORAL at 09:27

## 2019-03-02 RX ADMIN — PANTOPRAZOLE SODIUM 40 MG: 40 TABLET, DELAYED RELEASE ORAL at 06:42

## 2019-03-02 RX ADMIN — AMLODIPINE BESYLATE 10 MG: 10 TABLET ORAL at 09:20

## 2019-03-02 RX ADMIN — MYCOPHENOLATE MOFETIL 500 MG: 250 CAPSULE ORAL at 09:26

## 2019-03-02 RX ADMIN — PANTOPRAZOLE SODIUM 40 MG: 40 TABLET, DELAYED RELEASE ORAL at 15:42

## 2019-03-02 RX ADMIN — SERTRALINE 100 MG: 100 TABLET, FILM COATED ORAL at 09:20

## 2019-03-02 RX ADMIN — PREDNISONE 5 MG: 5 TABLET ORAL at 09:20

## 2019-03-02 RX ADMIN — SENNOSIDES AND DOCUSATE SODIUM 1 TABLET: 8.6; 5 TABLET ORAL at 09:20

## 2019-03-02 RX ADMIN — Medication 10 ML: at 09:27

## 2019-03-02 RX ADMIN — CLOPIDOGREL BISULFATE 75 MG: 75 TABLET ORAL at 09:20

## 2019-03-02 RX ADMIN — BUPROPION HYDROCHLORIDE 150 MG: 150 TABLET, EXTENDED RELEASE ORAL at 09:20

## 2019-03-02 RX ADMIN — ENOXAPARIN SODIUM 40 MG: 40 INJECTION SUBCUTANEOUS at 09:20

## 2019-03-03 LAB
BLOOD CULTURE, ROUTINE: NORMAL
BLOOD CULTURE, ROUTINE: NORMAL

## 2019-03-04 ENCOUNTER — TELEPHONE (OUTPATIENT)
Dept: CARDIOLOGY CLINIC | Age: 79
End: 2019-03-04

## 2019-03-11 ENCOUNTER — HOSPITAL ENCOUNTER (OUTPATIENT)
Dept: WOUND CARE | Age: 79
Discharge: HOME | End: 2019-03-11
Payer: MEDICARE

## 2019-03-11 VITALS
HEART RATE: 81 BPM | TEMPERATURE: 97 F | DIASTOLIC BLOOD PRESSURE: 71 MMHG | RESPIRATION RATE: 18 BRPM | SYSTOLIC BLOOD PRESSURE: 154 MMHG

## 2019-03-11 DIAGNOSIS — S71.002A: Primary | ICD-10-CM

## 2019-03-11 PROCEDURE — 99213 OFFICE O/P EST LOW 20 MIN: CPT

## 2019-03-11 RX ORDER — NUT.TX.GLUCOSE INTOLERANCE,SOY
30 BAR ORAL 2 TIMES DAILY
COMMUNITY
End: 2019-05-06

## 2019-03-11 RX ORDER — LIDOCAINE HYDROCHLORIDE 40 MG/ML
SOLUTION TOPICAL PRN
Status: DISCONTINUED | OUTPATIENT
Start: 2019-03-11 | End: 2019-03-12 | Stop reason: HOSPADM

## 2019-03-11 RX ORDER — ACETAMINOPHEN AND CODEINE PHOSPHATE 60; 300 MG/1; MG/1
1 TABLET ORAL EVERY 4 HOURS PRN
COMMUNITY
End: 2019-08-12

## 2019-03-14 ENCOUNTER — TELEPHONE (OUTPATIENT)
Dept: CARDIOLOGY CLINIC | Age: 79
End: 2019-03-14

## 2019-03-20 ENCOUNTER — TELEPHONE (OUTPATIENT)
Dept: PULMONOLOGY | Age: 79
End: 2019-03-20

## 2019-03-24 DIAGNOSIS — M81.0 OSTEOPOROSIS: ICD-10-CM

## 2019-03-24 DIAGNOSIS — F41.1 GAD (GENERALIZED ANXIETY DISORDER): ICD-10-CM

## 2019-03-24 RX ORDER — CALCITRIOL 0.25 UG/1
0.25 CAPSULE, LIQUID FILLED ORAL DAILY
Qty: 90 CAPSULE | Refills: 0 | Status: SHIPPED | OUTPATIENT
Start: 2019-03-24 | End: 2019-04-22 | Stop reason: ALTCHOICE

## 2019-03-24 RX ORDER — BUPROPION HYDROCHLORIDE 150 MG/1
TABLET, EXTENDED RELEASE ORAL
Qty: 180 TABLET | Refills: 0 | Status: ON HOLD | OUTPATIENT
Start: 2019-03-24 | End: 2019-08-25 | Stop reason: HOSPADM

## 2019-03-29 ENCOUNTER — TELEPHONE (OUTPATIENT)
Dept: INTERNAL MEDICINE CLINIC | Age: 79
End: 2019-03-29

## 2019-03-31 PROCEDURE — 93228 REMOTE 30 DAY ECG REV/REPORT: CPT | Performed by: INTERNAL MEDICINE

## 2019-04-01 ENCOUNTER — HOSPITAL ENCOUNTER (OUTPATIENT)
Dept: WOUND CARE | Age: 79
Discharge: HOME | End: 2019-04-01
Payer: MEDICARE

## 2019-04-01 VITALS
TEMPERATURE: 96.8 F | HEART RATE: 83 BPM | DIASTOLIC BLOOD PRESSURE: 68 MMHG | SYSTOLIC BLOOD PRESSURE: 128 MMHG | RESPIRATION RATE: 18 BRPM

## 2019-04-01 PROCEDURE — 11042 DBRDMT SUBQ TIS 1ST 20SQCM/<: CPT

## 2019-04-01 RX ORDER — LIDOCAINE HYDROCHLORIDE 40 MG/ML
SOLUTION TOPICAL PRN
Status: DISCONTINUED | OUTPATIENT
Start: 2019-04-01 | End: 2019-04-02 | Stop reason: HOSPADM

## 2019-04-01 ASSESSMENT — PAIN SCALES - GENERAL: PAINLEVEL_OUTOF10: 0

## 2019-04-02 NOTE — PROGRESS NOTES
Alyssa Olivier 39   Progress Note and Procedure Note      Chet Collier [facility]  MEDICAL RECORD NUMBER:  [id number]  AGE: 70 y.o. GENDER: female  : 1940  EPISODE DATE:  2019    Subjective:     Chief Complaint   Patient presents with    Wound Check     Follow Up Wound Buttocks         HISTORY of PRESENT ILLNESS HPI    Chet New is a 70 y. o. female who presents today for wound/ulcer evaluation. History of Wound Context:  Pt presents with a wound in the lt buttock. The area started out as a skin dimpling causing pain. Pt has had hip fracture surgery by  and pt saw him who referred her to  who thought this could be bursitis and gave an injection and prescribed a compounded cream containing 4 different medications to be applied to the site. The area subsequently opened up becoming a wound. Pt saw Belen Sapp, her PCP who prescribed Doxycycline and discontinued the cream. Pt was advised to be seen at the [facility]. H/o Pulmonary fibrosis. O2 dependent. H/o scleroderma. On Cellcept ( Mycophenolate ) and Prednisone.   Wound/Ulcer Pain Timing/Severity: intermittent  Quality of pain: aching  Severity:  3 / 10   Modifying Factors: None  Associated Signs/Symptoms: erythema, drainage and pain     Ulcer Identification:  Ulcer Type: traumatic  Contributing Factors: decreased mobility, shear force and decreased tissue oxygenation, immune suppression      Interval History :  pt underwent repeat surgical revision per  18. Was recently admitted to hospital acute CVA with AMS. 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 No motor loss to the extremities.     Today : pt here for wound check. No f/ c/ n/ v/ d.         PAST MEDICAL HISTORY        Diagnosis Date    Anxiety and depression     Arthritis     Cancer of skin of leg     Chronic low back pain     GERD (gastroesophageal reflux disease)     Insomnia     Iron deficiency anemia     Kidney stone     PUD (peptic ulcer recall reaction    Sulfa Antibiotics Other (See Comments)     Unable to recall reaction    Penicillins Hives and Rash       MEDICATIONS    Current Outpatient Medications on File Prior to Encounter   Medication Sig Dispense Refill    buPROPion (WELLBUTRIN SR) 150 MG extended release tablet TAKE 1 TABLET BY MOUTH TWICE DAILY 180 tablet 0    calcitRIOL (ROCALTROL) 0.25 MCG capsule TAKE 1 CAPSULE BY MOUTH DAILY 90 capsule 0    acetaminophen-codeine (TYLENOL #4) 300-60 MG per tablet Take 1 tablet by mouth every 4 hours as needed for Pain.  Nutritional Supplements (PROMOD) LIQD Take 30 mLs by mouth 2 times daily      amLODIPine (NORVASC) 10 MG tablet Take 1 tablet by mouth daily 30 tablet 1    atorvastatin (LIPITOR) 40 MG tablet Take 1 tablet by mouth nightly 30 tablet 3    losartan (COZAAR) 50 MG tablet Take 1 tablet by mouth daily 30 tablet 3    clopidogrel (PLAVIX) 75 MG tablet Take 1 tablet by mouth daily 30 tablet 3    sertraline (ZOLOFT) 100 MG tablet TAKE 1 TABLET BY MOUTH EVERY DAY 90 tablet 0    alendronate (FOSAMAX) 70 MG tablet TAKE 1 TABLET BY MOUTH EVERY 7 DAYS 12 tablet 0    predniSONE (DELTASONE) 5 MG tablet Take 1 tablet by mouth daily 30 tablet 4    ipratropium (ATROVENT) 0.03 % nasal spray 2 sprays by Nasal route 4 times daily 1 Bottle 3    omeprazole (PRILOSEC) 40 MG delayed release capsule TAKE 1 CAPSULE BY MOUTH TWICE DAILY 180 capsule 0    ipratropium-albuterol (DUONEB) 0.5-2.5 (3) MG/3ML SOLN nebulizer solution Inhale 3 mLs into the lungs every 6 hours as needed for Shortness of Breath (wheezing) 360 mL 0    clonazePAM (KLONOPIN) 0.5 MG tablet Take 0.25 mg by mouth nightly as needed (restless leg syndrome). Vivek Bill ferrous sulfate 325 (65 Fe) MG tablet Take 325 mg by mouth daily (with breakfast)      senna-docusate (PERICOLACE) 8.6-50 MG per tablet Take 1 tablet by mouth daily      mycophenolate (CELLCEPT) 500 MG tablet Take 1 tablet by mouth 2 times daily 60 tablet 3    aspirin 81 MG tablet Take 81 mg by mouth daily      OXYGEN Inhale 4 L into the lungs continuous (Patient taking differently: Inhale 3 L into the lungs nightly ) 1 Container 1    Multiple Vitamins-Minerals (MULTIVITAMIN PO) Take 1 tablet by mouth daily. No current facility-administered medications on file prior to encounter. REVIEW OF SYSTEMS    A comprehensive review of systems was negative. Objective:      /68   Pulse 83   Temp 96.8 °F (36 °C) (Oral)   Resp 18     Wt Readings from Last 3 Encounters:   03/02/19 129 lb 10.1 oz (58.8 kg)   01/08/19 129 lb 13.6 oz (58.9 kg)   12/03/18 135 lb (61.2 kg)       PHYSICAL EXAM     lt hip wound as scribed. Undermining persists. No overt infection. Assessment:        Problem List Items Addressed This Visit     Open wound Left hip           Procedure Note  Indications:  Based on my examination of this patient's wound(s)/ulcer(s) today, debridement is required to promote healing and evaluate the wound base. Performed by: Coco Murillo MD    Consent obtained:  Yes    Time out taken:  Yes    Pain Control: Anesthetic  Anesthetic: 4% Lidocaine Liquid Topical(2.5mL's)       Debridement: Excisional Debridement    Using curette and forceps the wound(s)/ulcer(s) was/were sharply debrided down through and including the removal of epidermis, dermis and subcutaneous tissue.         Devitalized Tissue Debrided:  fibrin and slough    Pre Debridement Measurements:  Are located in the Wound/Ulcer Documentation Flow Sheet    Wound/Ulcer #: 5    Post Debridement Measurements:  Wound/Ulcer Descriptions are Pre Debridement except measurements:    Negative Pressure Wound Therapy Hip Left (Active)   $ Standard NPWT <=50 sq cm PER TX $ Yes 3/1/2019  4:22 PM   Wound Type Chronic 3/2/2019  9:18 AM   Unit Type KCI VAC 3/2/2019  9:18 AM   Dressing Type Black foam 3/2/2019  9:18 AM   Number of pieces used 2 3/1/2019  4:22 PM   Cycle Continuous 3/1/2019  8:00 PM   Target Pressure (mmHg) 125 3/1/2019  8:00 PM   Canister changed? No 3/1/2019  4:22 PM   Dressing Status Clean;Dry; Intact 3/2/2019  9:18 AM   Dressing Changed Changed/New 3/1/2019  4:22 PM   Drainage Amount Small 3/1/2019  4:22 PM   Drainage Description Serous;Tan;Yellow 3/1/2019  8:00 PM   Dressing Change Due 03/04/19 3/1/2019  4:22 PM   Wound Assessment GEOVANNY 3/1/2019  8:00 PM   Number of days: 33       Wound 12/03/18 Hip Left #5(acquired on 11/28/18-surgically created by Dr. Eleno Zambrano) (Active)   Wound Image   4/1/2019 11:04 AM   Wound Other 3/1/2019  4:22 PM   Dressing Status Old drainage 4/1/2019 11:04 AM   Dressing Changed Changed/New 4/1/2019 11:44 AM   Dressing/Treatment Vacuum dressing 3/2/2019  9:18 AM   Wound Cleansed Rinsed/Irrigated with saline 3/1/2019  4:22 PM   Dressing Change Due 03/04/19 3/2/2019  9:18 AM   Wound Length (cm) 0.5 cm 4/1/2019 11:04 AM   Wound Width (cm) 1.5 cm 4/1/2019 11:04 AM   Wound Depth (cm) 1.3 cm 4/1/2019 11:04 AM   Wound Surface Area (cm^2) 0.75 cm^2 4/1/2019 11:04 AM   Change in Wound Size % (l*w) 93.75 4/1/2019 11:04 AM   Wound Volume (cm^3) 0.98 cm^3 4/1/2019 11:04 AM   Wound Healing % 97 4/1/2019 11:04 AM   Post-Procedure Length (cm) 4.9 cm 4/1/2019 11:44 AM   Post-Procedure Width (cm) 3.7 cm 4/1/2019 11:44 AM   Post-Procedure Depth (cm) 1.5 cm 4/1/2019 11:44 AM   Post-Procedure Surface Area (cm^2) 18.13 cm^2 4/1/2019 11:44 AM   Post-Procedure Volume (cm^3) 27.2 cm^3 4/1/2019 11:44 AM   Distance Tunneling (cm) 2.3 cm 3/11/2019 10:45 AM   Tunneling Position ___ O'Clock 10 3/11/2019 10:45 AM   Undermining Starts ___ O'Clock 12 4/1/2019 11:04 AM   Undermining Ends___ O'Clock 12 4/1/2019 11:04 AM   Undermining Maxium Distance (cm) 2.2 4/1/2019 11:04 AM   Wound Assessment Granulation tissue;Slough 4/1/2019 11:04 AM   Drainage Amount Scant 4/1/2019 11:04 AM   Drainage Description Serosanguinous 4/1/2019 11:04 AM   Odor None 4/1/2019 11:04 AM   Margins Unattached edges 4/1/2019 11:04 AM   Exposed structure Fascia 2/28/2019  8:00 PM   Mirian-wound Assessment White 4/1/2019 11:04 AM   Non-staged Wound Description Full thickness 4/1/2019 11:04 AM   Snelling%Wound Bed 80 3/11/2019 10:45 AM   Red%Wound Bed 60 4/1/2019 11:04 AM   Yellow%Wound Bed 40 4/1/2019 11:04 AM   Number of days: 119          Percent of Wound(s)/Ulcer(s) Debrided: 100%    Total Surface Area Debrided:  18.13 sq cm     Diabetic/Pressure/Non Pressure Ulcers only:  Ulcer: Non-Pressure ulcer, fat layer exposed     Estimated Blood Loss:  Minimal    Hemostasis Achieved:  by pressure    Procedural Pain:  2  / 10     Post Procedural Pain:  0 / 10     Response to treatment:  Well tolerated by patient. Plan:     Treatment Note please see attached Discharge Instructions    Written patient dismissal instructions given to patient and signed by patient or POA. Discharge Instructions       Wound Clinic Physician Orders and Discharge Instructions  [place] [address], [facility]  Telephone: [phone]      FAX [phone]     NAME: Debbie Kwok OF BIRTH:  1940  MEDICAL RECORD NUMBER:  [id number]  DATE:  4/1/2019     Wound Cleansing:   Do not scrub or use excessive force. Cleanse wound prior to applying a clean dressing with:  [x] Normal Saline            [x] Keep Wound Dry in Shower    [] Wound Cleanser   [] Cleanse wound with Mild Soap & Water  [x] May Shower    [] Other:        Topical Treatments:  Do not apply lotions, creams, or ointments to wound bed unless directed. [] Apply moisturizing lotion to skin surrounding the wound prior to dressing change.  [] Apply antifungal ointment to skin surrounding the wound prior to dressing change. [x] Apply thin film of moisture barrier ointment to skin immediately around wound.   [x] Other: Apply Skin Prep to surrounding area around Buttock Wound        Dressings:                  Wound Location : LEFT   [] Wheelchair      [] Crutches              [] Surgical shoe    [] Podus Boot(s)   [] Foam Boot(s)  [] Roll About              [] Cast Boot       [] CROW Boot  [] Other:     Dietary:  [x] Diet as tolerated:        [] Calorie Diabetic Diet: [] No Added Salt:  [x] Increase Protein:        [] Other:     Activity:  [x] Activity as tolerated:  [] Patient has no activity restrictions     [] Strict Bedrest:            [] Remain off Work:     [] May return to full duty work: [phone] to work with [address]     Return Appointment:  [x] Wound and dressing supply provider: [facility]  [x] ECF or Home Healthcare: Karyn Babin / [facility]  [] Nurse visit: Yaquelin Rasmussen or MEHUL scheduled for Nurse Visit:   [x] Return Appointment: With DR QUINTON SUAZO [facility] ERIN Hess)   [] Ordered tests:      Nurse Case Manger: Kvngisidro Aquino     Electronically signed by Berlin Acuña RN on 4/1/2019 at 11:34 [address] Information: Should you experience any significant changes in your wound(s) or have questions about your wound care, please contact the [address] [place] [address] [phone] [facility] - THURSDAY 8:30 am - 4:30 pm and Friday 8:30 am - 1:00 pm.  If you need help with your wound outside these hours and cannot wait until we are again available, contact your PCP or go to the hospital emergency room.              PLEASE NOTE: IF YOU ARE UNABLE TO OBTAIN WOUND SUPPLIES, CONTINUE TO USE THE SUPPLIES YOU HAVE AVAILABLE UNTIL YOU ARE ABLE TO [facility].  IT IS MOST IMPORTANT TO KEEP THE WOUND COVERED AT ALL TIMES.     Physician Signature:_______________________     Date: ___________ Time:  ____________                    [] Dr Daniella Mullins    [] Dr David Zheng CNP               [] Dr Renetta Gamez  [] Dr Aubrei Burnette   [] Dr Percy Blas                [] Dr Papo Tran   [] Jason Garcia NP                        Electronically signed by Mena Churchill MD on 4/1/2019 at 9:50 PM

## 2019-04-09 ENCOUNTER — TELEPHONE (OUTPATIENT)
Dept: FAMILY MEDICINE CLINIC | Age: 79
End: 2019-04-09

## 2019-04-11 ENCOUNTER — OFFICE VISIT (OUTPATIENT)
Dept: FAMILY MEDICINE CLINIC | Age: 79
End: 2019-04-11
Payer: MEDICARE

## 2019-04-11 ENCOUNTER — TELEPHONE (OUTPATIENT)
Dept: CARDIOLOGY CLINIC | Age: 79
End: 2019-04-11

## 2019-04-11 VITALS
DIASTOLIC BLOOD PRESSURE: 60 MMHG | WEIGHT: 123 LBS | HEIGHT: 62 IN | BODY MASS INDEX: 22.63 KG/M2 | SYSTOLIC BLOOD PRESSURE: 116 MMHG

## 2019-04-11 DIAGNOSIS — J40 BRONCHITIS: ICD-10-CM

## 2019-04-11 DIAGNOSIS — I63.10 CEREBROVASCULAR ACCIDENT (CVA) DUE TO EMBOLISM OF PRECEREBRAL ARTERY (HCC): Primary | ICD-10-CM

## 2019-04-11 DIAGNOSIS — R47.82 FLUENCY DISORDER ASSOCIATED WITH UNDERLYING DISEASE: ICD-10-CM

## 2019-04-11 PROCEDURE — 1090F PRES/ABSN URINE INCON ASSESS: CPT | Performed by: FAMILY MEDICINE

## 2019-04-11 PROCEDURE — G8420 CALC BMI NORM PARAMETERS: HCPCS | Performed by: FAMILY MEDICINE

## 2019-04-11 PROCEDURE — 1123F ACP DISCUSS/DSCN MKR DOCD: CPT | Performed by: FAMILY MEDICINE

## 2019-04-11 PROCEDURE — G8427 DOCREV CUR MEDS BY ELIG CLIN: HCPCS | Performed by: FAMILY MEDICINE

## 2019-04-11 PROCEDURE — G8400 PT W/DXA NO RESULTS DOC: HCPCS | Performed by: FAMILY MEDICINE

## 2019-04-11 PROCEDURE — 1036F TOBACCO NON-USER: CPT | Performed by: FAMILY MEDICINE

## 2019-04-11 PROCEDURE — G8598 ASA/ANTIPLAT THER USED: HCPCS | Performed by: FAMILY MEDICINE

## 2019-04-11 PROCEDURE — 99214 OFFICE O/P EST MOD 30 MIN: CPT | Performed by: FAMILY MEDICINE

## 2019-04-11 PROCEDURE — 4040F PNEUMOC VAC/ADMIN/RCVD: CPT | Performed by: FAMILY MEDICINE

## 2019-04-11 RX ORDER — DOXYCYCLINE HYCLATE 100 MG/1
100 CAPSULE ORAL 2 TIMES DAILY
Qty: 20 CAPSULE | Refills: 0 | Status: SHIPPED | OUTPATIENT
Start: 2019-04-11 | End: 2019-04-25 | Stop reason: ALTCHOICE

## 2019-04-11 ASSESSMENT — ENCOUNTER SYMPTOMS
WHEEZING: 1
GASTROINTESTINAL NEGATIVE: 1
SHORTNESS OF BREATH: 1
CHEST TIGHTNESS: 1
COUGH: 1

## 2019-04-11 ASSESSMENT — PATIENT HEALTH QUESTIONNAIRE - PHQ9
1. LITTLE INTEREST OR PLEASURE IN DOING THINGS: 1
SUM OF ALL RESPONSES TO PHQ9 QUESTIONS 1 & 2: 2
SUM OF ALL RESPONSES TO PHQ QUESTIONS 1-9: 2
2. FEELING DOWN, DEPRESSED OR HOPELESS: 1
SUM OF ALL RESPONSES TO PHQ QUESTIONS 1-9: 2

## 2019-04-11 NOTE — TELEPHONE ENCOUNTER
returned called. Relayed result message to , [place] per HIPAA. Willi Roth verbalized and confirmed understanding. He will relay message to patient.

## 2019-04-11 NOTE — PROGRESS NOTES
Subjective:      Patient ID: Roseann Jernigan is a 70 y.o. female. HPI  cva  Pt went to hospital on 2/26/19  She was confused and her speech was not right   Her  thought she had a uti  But the urine was clear   She had a ct which showed a cva  She was admitted  Had work up for source of embolic stroke  Cardiac workup ok     She was discharged to [facility] to continue with her therapy and get her strength back  She is home now  Starting on home pt ot and speech  She continues to go to wound care  Has a wound vac  Healing slowly  Complicated by her meds for her lungs  Wants to review her extensive med list   Review of Systems   Constitutional: Positive for activity change and fatigue. Respiratory: Positive for cough, chest tightness, shortness of breath and wheezing. Cardiovascular: Negative. Gastrointestinal: Negative. Skin: Positive for wound. Neurological: Positive for speech difficulty. YOB: 1940    Date of Visit:  4/11/2019    Allergies   Allergen Reactions    Ampicillin Hives    Ciprofloxacin Other (See Comments)     Sores in mouth      Nitrofurantoin Other (See Comments)     Unable to recall reaction    Sulfa Antibiotics Other (See Comments)     Unable to recall reaction    Penicillins Hives and Rash       Outpatient Medications Marked as Taking for the 4/11/19 encounter (Office Visit) with Cony Zuniga, DO   Medication Sig Dispense Refill    buPROPion (WELLBUTRIN SR) 150 MG extended release tablet TAKE 1 TABLET BY MOUTH TWICE DAILY 180 tablet 0    calcitRIOL (ROCALTROL) 0.25 MCG capsule TAKE 1 CAPSULE BY MOUTH DAILY 90 capsule 0    acetaminophen-codeine (TYLENOL #4) 300-60 MG per tablet Take 1 tablet by mouth every 4 hours as needed for Pain.       Nutritional Supplements (PROMOD) LIQD Take 30 mLs by mouth 2 times daily      amLODIPine (NORVASC) 10 MG tablet Take 1 tablet by mouth daily 30 tablet 1    atorvastatin (LIPITOR) 40 MG tablet Take 1 tablet by

## 2019-04-15 DIAGNOSIS — I63.9 ACUTE CVA (CEREBROVASCULAR ACCIDENT) (HCC): ICD-10-CM

## 2019-04-15 RX ORDER — OMEPRAZOLE 40 MG/1
CAPSULE, DELAYED RELEASE ORAL
Qty: 180 CAPSULE | Refills: 0 | Status: ON HOLD | OUTPATIENT
Start: 2019-04-15 | End: 2019-10-25

## 2019-04-19 ENCOUNTER — TELEPHONE (OUTPATIENT)
Dept: FAMILY MEDICINE CLINIC | Age: 79
End: 2019-04-19

## 2019-04-22 ENCOUNTER — HOSPITAL ENCOUNTER (OUTPATIENT)
Dept: WOUND CARE | Age: 79
Discharge: HOME | End: 2019-04-22
Payer: MEDICARE

## 2019-04-22 VITALS
HEART RATE: 66 BPM | DIASTOLIC BLOOD PRESSURE: 69 MMHG | RESPIRATION RATE: 18 BRPM | SYSTOLIC BLOOD PRESSURE: 161 MMHG | TEMPERATURE: 97.2 F

## 2019-04-22 DIAGNOSIS — S71.002D: Primary | ICD-10-CM

## 2019-04-22 PROCEDURE — 97605 NEG PRS WND THER DME<=50SQCM: CPT

## 2019-04-22 PROCEDURE — 11043 DBRDMT MUSC&/FSCA 1ST 20/<: CPT

## 2019-04-22 PROCEDURE — 11043 DBRDMT MUSC&/FSCA 1ST 20/<: CPT | Performed by: NURSE PRACTITIONER

## 2019-04-22 RX ORDER — LIDOCAINE HYDROCHLORIDE 40 MG/ML
SOLUTION TOPICAL ONCE
Status: DISCONTINUED | OUTPATIENT
Start: 2019-04-22 | End: 2019-04-23 | Stop reason: HOSPADM

## 2019-04-22 ASSESSMENT — PAIN SCALES - GENERAL: PAINLEVEL_OUTOF10: 0

## 2019-04-22 NOTE — PLAN OF CARE
Negative Pressure    NAME:  Lobo Villatoro  YOB: 1940  MEDICAL RECORD NUMBER:  [id number]  DATE:  4/22/2019     Applied Negative Pressure to Left Hip wound.  [x] Applied skin barrier prep to haley-wound.  [x] Cut strips of plastic drape to picture frame wound so that haley-wound is     covered with the drape.  [x] If bridging dressing to less prominent site, cover any intact skin that will come in contact with the Negative Pressure Therapy sponge, gauze or channel drain with plastic drape. The sponge should never touch intact skin.  [x] Cut sponge, gauze or channel drain to size which will fit into the wound/ulcer bed without being forced.  [x] Be sure the sponge is large enough to hold the entire round plastic flange which is attached to the tubing. Never allow flange to be larger than the sponge or it will produce suction damaging intact skin.  Total number of individual pieces of foam used within the wound bed: 1     [x] If bridging the dressing away from the primary site, be sure the bridge leads to a piece of sponge large enough to hold the entire flange without allowing any of the flange to overlap onto intact skin.  [x] Covered sponge, gauze or channel drain with plastic drape.  [x] Cut a hole in this plastic drape directly over the sponge the same size as the plastic drain tubing.  [x] Removed plastic liner from flange and apply it directly over the hole you cut.  [x] Removed the plastic cover from the flange.  [x] Attached the tubing to the wound/ulcer Negative Pressure Therapy and turn it on to be sure a vacuum is created and that there are no leaks.  [x] If air leaks occur, use plastic drape to patch them.  [x] Secured Negative Pressure Therapy dressing with ace wrap loosely if located on an extremity. Maintain tubing outside of ace wrap. Tubing must not exert pressure on intact skin.     Applied per  Guidelines      Electronically signed by Joo Sanon RN on 4/22/2019 at 12:31 PM

## 2019-04-23 PROBLEM — S71.002D: Status: ACTIVE | Noted: 2018-07-02

## 2019-04-23 NOTE — PROGRESS NOTES
Nitrofurantoin Other (See Comments)     Unable to recall reaction    Sulfa Antibiotics Other (See Comments)     Unable to recall reaction    Penicillins Hives and Rash       MEDICATIONS    Current Outpatient Medications on File Prior to Encounter   Medication Sig Dispense Refill    omeprazole (PRILOSEC) 40 MG delayed release capsule TAKE 1 CAPSULE BY MOUTH TWICE DAILY 180 capsule 0    doxycycline hyclate (VIBRAMYCIN) 100 MG capsule Take 1 capsule by mouth 2 times daily 100 mg BID x 10 days starting 12/31/18 20 capsule 0    buPROPion (WELLBUTRIN SR) 150 MG extended release tablet TAKE 1 TABLET BY MOUTH TWICE DAILY 180 tablet 0    acetaminophen-codeine (TYLENOL #4) 300-60 MG per tablet Take 1 tablet by mouth every 4 hours as needed for Pain.  amLODIPine (NORVASC) 10 MG tablet Take 1 tablet by mouth daily 30 tablet 1    atorvastatin (LIPITOR) 40 MG tablet Take 1 tablet by mouth nightly 30 tablet 3    losartan (COZAAR) 50 MG tablet Take 1 tablet by mouth daily 30 tablet 3    clopidogrel (PLAVIX) 75 MG tablet Take 1 tablet by mouth daily 30 tablet 3    sertraline (ZOLOFT) 100 MG tablet TAKE 1 TABLET BY MOUTH EVERY DAY 90 tablet 0    alendronate (FOSAMAX) 70 MG tablet TAKE 1 TABLET BY MOUTH EVERY 7 DAYS 12 tablet 0    predniSONE (DELTASONE) 5 MG tablet Take 1 tablet by mouth daily 30 tablet 4    ipratropium (ATROVENT) 0.03 % nasal spray 2 sprays by Nasal route 4 times daily 1 Bottle 3    ipratropium-albuterol (DUONEB) 0.5-2.5 (3) MG/3ML SOLN nebulizer solution Inhale 3 mLs into the lungs every 6 hours as needed for Shortness of Breath (wheezing) 360 mL 0    clonazePAM (KLONOPIN) 0.5 MG tablet Take 0.25 mg by mouth nightly as needed (restless leg syndrome). [place] Kevin ferrous sulfate 325 (65 Fe) MG tablet Take 325 mg by mouth daily (with breakfast)      senna-docusate (PERICOLACE) 8.6-50 MG per tablet Take 1 tablet by mouth daily      mycophenolate (CELLCEPT) 500 MG tablet Take 1 tablet by mouth 2 times daily 60 tablet 3    OXYGEN Inhale 4 L into the lungs continuous (Patient taking differently: Inhale 3 L into the lungs nightly ) 1 Container 1    Multiple Vitamins-Minerals (MULTIVITAMIN PO) Take 1 tablet by mouth daily.  Nutritional Supplements (PROMOD) LIQD Take 30 mLs by mouth 2 times daily       No current facility-administered medications on file prior to encounter. REVIEW OF SYSTEMS    Pertinent items are noted in HPI. Objective:      BP (!) 161/69   Pulse 66   Temp 97.2 °F (36.2 °C) (Oral)   Resp 18     Wt Readings from Last 3 Encounters:   04/11/19 123 lb (55.8 kg)   03/02/19 129 lb 10.1 oz (58.8 kg)   01/08/19 129 lb 13.6 oz (58.9 kg)       PHYSICAL EXAM    General Appearance: alert and oriented to person, place and time, well-developed and well-nourished, in no acute distress  Skin: warm and dry  Head: normocephalic and atraumatic  Eyes: pupils equal, round, and reactive to light  Pulmonary/Chest: clear to auscultation bilaterally- no wheezes, rales or rhonchi, normal air movement, no respiratory distress  Cardiovascular: normal rate and regular rhythm  Wound: wound bed pale - no appreciable change in wound size. Will discontinue collagen dressing under NPWT which has been used since early December 2018. Will increase pressure to 150 mmHG continuously. Assessment:        Problem List Items Addressed This Visit     Open wound of hip, complicated, left, subsequent encounter - Primary           Procedure Note  Indications:  Based on my examination of this patient's wound(s)/ulcer(s) today, debridement is required to promote healing and evaluate the wound base.     Performed by: RONNY Parry - CNP    Consent obtained:  Yes    Time out taken:  Yes    Pain Control: Anesthetic  Anesthetic: 4% Lidocaine Liquid Topical(2.5ml)       Debridement: Excisional Debridement    Using curette the wound(s)/ulcer(s) was/were sharply debrided down through and including the removal of epidermis, dermis, subcutaneous tissue and muscle/fascia. Devitalized Tissue Debrided:  fibrin, biofilm and slough    Pre Debridement Measurements:  Are located in the [place]  Documentation Flow Sheet    Wound/Ulcer #: 5    Post Debridement Measurements:  Wound/Ulcer Descriptions are Pre Debridement except measurements:    Wound 12/03/18 Hip Left #5(acquired on 11/28/18-surgically created by Dr. Kwame Ritchie) (Active)   Wound Image   4/1/2019 11:04 AM   Wound Other 12/17/2018 11:01 AM   Dressing Status Intact; Old drainage 4/22/2019 11:01 AM   Dressing Changed Changed/New 4/22/2019 12:30 PM   Dressing/Treatment Vacuum dressing 4/22/2019 12:30 PM   Wound Length (cm) 0.5 cm 4/22/2019 11:01 AM   Wound Width (cm) 1.3 cm 4/22/2019 11:01 AM   Wound Depth (cm) 1.2 cm 4/22/2019 11:01 AM   Wound Surface Area (cm^2) 0.65 cm^2 4/22/2019 11:01 AM   Change in Wound Size % (l*w) 94.58 4/22/2019 11:01 AM   Wound Volume (cm^3) 0.78 cm^3 4/22/2019 11:01 AM   Wound Healing % 98 4/22/2019 11:01 AM   Post-Procedure Length (cm) 2.6 cm 4/22/2019 12:25 PM   Post-Procedure Width (cm) 3.4 cm 4/22/2019 12:25 PM   Post-Procedure Depth (cm) 1.3 cm 4/22/2019 12:25 PM   Post-Procedure Surface Area (cm^2) 8.84 cm^2 4/22/2019 12:25 PM   Post-Procedure Volume (cm^3) 11.49 cm^3 4/22/2019 12:25 PM   Distance Tunneling (cm) 2.3 cm 3/11/2019 10:45 AM   Tunneling Position ___ O'Clock 10 3/11/2019 10:45 AM   Undermining Starts ___ O'Clock 12 4/22/2019 11:01 AM   Undermining Ends___ O'Clock 12 4/22/2019 11:01 AM   Undermining Maxium Distance (cm) 2.1 4/22/2019 11:01 AM   Wound Assessment Granulation tissue;Pink;Yellow 4/22/2019 11:01 AM   Drainage Amount Moderate 4/22/2019 11:01 AM   Drainage Description Yellow; Tan 4/22/2019 11:01 AM   Odor None 4/22/2019 11:01 AM   Margins Undefined edges 4/22/2019 11:01 AM   Exposed structure Fascia 1/28/2019 11:26 AM   Mirian-wound Assessment Maceration;Pink 4/22/2019 11:01 AM   Non-staged Wound Description Full thickness 4/1/2019 11:04 AM   [place]%Wound Bed 5 4/22/2019 11:01 AM   Red%Wound Bed 60 4/1/2019 11:04 AM   Yellow%Wound Bed 95 4/22/2019 11:01 AM   Number of days: 140          Percent of Wound(s)/Ulcer(s) Debrided: 100%    Total Surface Area Debrided:  8.84 sq cm     Diabetic/Pressure/Non Pressure Ulcers only:  Ulcer: Non-Pressure ulcer, fat layer exposed     Estimated Blood Loss:  Minimal    Hemostasis Achieved:  by pressure    Procedural Pain:  2  / 10     Post Procedural Pain:  0 / 10     Response to treatment:  Well tolerated by patient. Plan:   Pt education per provider related to lack of progress of wound to healing. Reviewed pressure redistribution mattress at home and staying off left hip as much as possible. Reviewed change of wound care orders and questions answered. Treatment Note please see attached Discharge Instructions    Written patient dismissal instructions given to patient and signed by patient or POA. Discharge Instructions       Wound Clinic Physician Orders and Discharge Instructions  [address]   [address] [place] [address]  Telephone: [phone]      FAX [phone]     NAME: Jeannie Ink OF BIRTH:  1940  MEDICAL RECORD NUMBER:  [id number]  DATE:  4/22/2019     Wound Cleansing:   Do not scrub or use excessive force. Cleanse wound prior to applying a clean dressing with:  [x] Normal Saline            [x] Keep Wound Dry in Shower    [] Wound Cleanser   [] Cleanse wound with Mild Soap & Water  [x] May Shower    [] Other:        Topical Treatments:  Do not apply lotions, creams, or ointments to wound bed unless directed. [] Apply moisturizing lotion to skin surrounding the wound prior to dressing change.  [] Apply antifungal ointment to skin surrounding the wound prior to dressing change. [x] Apply thin film of moisture barrier ointment to skin immediately around wound.   [x] Other: Apply Skin Prep to surrounding area around Buttock Wound         Dressings:                  Wound Location :        [] Apply Primary Dressing:                                              []               [] Cover and Secure with:                       [] DRY Gauze    [] Daniela   [] Kerlix              [] Ace Wrap       [] Cover Roll Tape         [] ABD                              [] Other:               Avoid contact of tape with skin. [] Change dressing:       [] Daily              [] Every Other Day        [] Three times per week              [] Once a week  [] Do Not Change Dressing         [] Other:      Negative Pressure:           Wound Location: LEFT BUTTOCK   **KEEP DRAPE OFF EXCORIATED AREA**    [email or web address]        [X ] Black Foam - BE SURE TO GET INTO UNDERMINING FROM 12:00 TO 12:00 = 2.1CM ALSO (DEEPEST AT 12:00)  [] White Foam                                     [] Other:   [x]Change dressing:        [x]Three times per week             [x]Other: BRANDON RING TO INDENTED AREA ON MEDIAL ASPECT OF WOUND. VAC DRAPE MUST COVER BRANDON RING.        Pressure Relief:  [x] When sitting, shift position or do seat lifts every 15 minutes.  DO NOT SIT FOR MORE THAN 30 MINUTES AT A TIME  [] Wheelchair cushion   [x] Specialty Bed/Mattress - SUGGEST THICK FOAM OVERLAY 4\"  [x] Turn every 2 hours when in bed.  Avoid position directing pressure on wound site.  Limit side lying to 30 degree tilt.  Limit HOB elevation to 30 degrees.        Off-Loading:   [] Off-loading when        [] walking           [] in bed [] sitting  [] Total non-weight bearing  [] Right Leg  [] Left Leg          [x] Assistive Device         [x] Walker            [] Cane   [] Wheelchair      [] Crutches              [] Surgical shoe    [] Podus Boot(s)   [] Foam Boot(s)  [] Roll About              [] Cast Boot       [] CROW Boot  [] Other:     Dietary:  [x] Diet as tolerated:        [] Calorie Diabetic Diet: [] No Added Salt:  [x] Increase Protein:        [] Other:     Activity:  [x] Activity as tolerated:  [] Patient has no activity restrictions     [] Strict Bedrest:            [] Remain off Work:     [] May return to full duty work:                                       [] Return to work with [address]     Return Appointment:  [x] Wound and dressing supply provider: [facility]  [x] ECF or Home Healthcare:  [address] [place] [address]  [] Nurse visit:     [] Physician or NP scheduled for Nurse Visit:   [x] Return Appointment: With Rolo Saleem NP in  2 Week(s)   [] Ordered tests:      Nurse Case Manger: [employer]     Electronically signed by Alvaro Womack RN on 4/22/2019 at 12:12 PM    [facility] Information: Should you experience any significant changes in your wound(s) or have questions about your wound care, please contact the [address] [place] [address] [phone] [facility] THURSDAY 8:30 am - 4:30 pm and Friday 8:30 am - 1:00 pm.  If you need help with your wound outside these hours and cannot wait until we are again available, contact your PCP or go to the hospital emergency room.               PLEASE NOTE: IF YOU ARE UNABLE TO OBTAIN WOUND SUPPLIES, CONTINUE TO USE THE SUPPLIES YOU HAVE AVAILABLE UNTIL YOU ARE ABLE TO [facility].  IT IS MOST IMPORTANT TO KEEP THE WOUND COVERED AT ALL TIMES.     Physician Signature:_______________________     Date: ___________ Time:  ____________                    [] Dr Sultana Martel    [] Dr Christi Torres CNP               [] Dr Anam Rojas  [] Dr Homa Brown   [] Dr Babak Miller                [] Dr Jung Bourne   [] Jason Mendieta NP              Electronically signed by RONNY Cole CNP on 4/23/2019 at 12:21 PM

## 2019-04-24 NOTE — PROGRESS NOTES
[address]      Cardiology Consult    Yumikocarmina New  1940 April 25, 2019        CC: \"I think I am okay. \"    HPI:  The patient is 70 y.o. female with a past medical history significant for advanced pulmonary fibrosis, on chronic O2 therapy, chronic respiratory failure, depression, who came to the hospital with altered mental status. 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  She had similar symptoms in the past. Tasia Gray was seen by Neurology and her neurological workup is unremarkable. Cardiology consultation has been requested to rule out cardiac etiology of the patient's stroke.  Her symptoms have been thought to be cardioembolic or thromboembolic in nature.  She has no known history of atrial fibrillation and EKG as well as monitor shows no evidence of atrial fibrillation while she was in the hospital. Tasia Gray had no chest pain, tightness, pressure.  She does have shortness of breath due to her pulmonary fibrosis, on chronic oxygen therapy.      Today she is here for a hospital follow up. She was in the hospital from 2/26/19-3/2/19. She is here today with her family in a wheelchair with continuous oxygen therapy. She is currently in a nursing home. The family is concerned about her being on Plavix due to her bruising. Patient denies exertional chest pain/pressure, dyspnea at rest, SPIVEY, PND, orthopnea, palpitations, lightheadedness, weight changes, changes in LE edema, and syncope.       Past Medical History:   Diagnosis Date    Anxiety and depression     Arthritis     Cancer of skin of leg     Chronic low back pain     GERD (gastroesophageal reflux disease)     Insomnia     Iron deficiency anemia     Kidney stone     PUD (peptic ulcer disease)     Pulmonary fibrosis (HCC)     Dr. Yajaira Lam has a CT done every 6 months    Stomach ulcer     Ulcer - lesion MARCH 2010    UTI (urinary tract infection)     frequent     Past Surgical History:   Procedure Laterality Date    BACK SURGERY  5975,BAX 98, FEB 05    x4--fusions    BACK SURGERY  2000    laminectomy-lumbar    CARPAL TUNNEL RELEASE  11    Left    COLONOSCOPY      CYSTOSCOPY Left 2016    cysto left ureteral stent placement    CYSTOSCOPY  01/15/2018    Left Stent Insertion    CYSTOSCOPY  2018    cysto, left uretero with laser litho, left stent excahnge    EYE SURGERY Bilateral     cataract removed with lens implants    FRACTURE SURGERY Right 2016    femur fracture    FRACTURE SURGERY Left     femur fracture    HYSTERECTOMY  1993    HYSTEROSCOPY  1993    JOINT REPLACEMENT  2000    LEFT KNEE    JOINT REPLACEMENT  08    RIGHT KNEE    SD [address] W/BRNCL ALVEOLAR LAVAGE N/A 10/24/2018    BRONCHOSCOPY WITH BRONCHOALVEOLAR LAVAGE performed by April Rodriguez DO at [facility]. DEBRIDE NECROTIC SKIN/ TISSUE, GENIT & PERINM Left 2018    DEBRIDEMENT LEFT HIP SKIN AND SUBCUTANEOUS FASCIA performed by Christie Jack MD at [address] [place] [address]    For PUD     Family History   Problem Relation Age of Onset    Emphysema Mother          AGE 64    Depression Mother     Clotting Disorder Father             Stroke Father      Social History     Tobacco Use    Smoking status: Never Smoker    Smokeless tobacco: Never Used    Tobacco comment: encouraged to never smoke    Substance Use Topics    Alcohol use: No     Alcohol/week: 0.0 oz    Drug use: No       Allergies   Allergen Reactions    Ampicillin Hives    Ciprofloxacin Other (See Comments)     Sores in mouth      Nitrofurantoin Other (See Comments)     Unable to recall reaction    Sulfa Antibiotics Other (See Comments)     Unable to recall reaction    Penicillins Hives and Rash     Current Outpatient Medications   Medication Sig Dispense Refill    omeprazole (PRILOSEC) 40 MG delayed release capsule TAKE 1 CAPSULE BY MOUTH TWICE DAILY 180 capsule 0    buPROPion (WELLBUTRIN SR) 150 MG extended release tablet TAKE 1 TABLET BY MOUTH TWICE DAILY 180 tablet 0    acetaminophen-codeine (TYLENOL #4) 300-60 MG per tablet Take 1 tablet by mouth every 4 hours as needed for Pain.  Nutritional Supplements (PROMOD) LIQD Take 30 mLs by mouth 2 times daily      amLODIPine (NORVASC) 10 MG tablet Take 1 tablet by mouth daily 30 tablet 1    atorvastatin (LIPITOR) 40 MG tablet Take 1 tablet by mouth nightly 30 tablet 3    losartan (COZAAR) 50 MG tablet Take 1 tablet by mouth daily 30 tablet 3    clopidogrel (PLAVIX) 75 MG tablet Take 1 tablet by mouth daily 30 tablet 3    sertraline (ZOLOFT) 100 MG tablet TAKE 1 TABLET BY MOUTH EVERY DAY 90 tablet 0    alendronate (FOSAMAX) 70 MG tablet TAKE 1 TABLET BY MOUTH EVERY 7 DAYS 12 tablet 0    predniSONE (DELTASONE) 5 MG tablet Take 1 tablet by mouth daily 30 tablet 4    ipratropium (ATROVENT) 0.03 % nasal spray 2 sprays by Nasal route 4 times daily 1 Bottle 3    ipratropium-albuterol (DUONEB) 0.5-2.5 (3) MG/3ML SOLN nebulizer solution Inhale 3 mLs into the lungs every 6 hours as needed for Shortness of Breath (wheezing) 360 mL 0    clonazePAM (KLONOPIN) 0.5 MG tablet Take 0.25 mg by mouth nightly as needed (restless leg syndrome). [place] ferrous sulfate 325 (65 Fe) MG tablet Take 325 mg by mouth daily (with breakfast)      senna-docusate (PERICOLACE) 8.6-50 MG per tablet Take 1 tablet by mouth daily      mycophenolate (CELLCEPT) 500 MG tablet Take 1 tablet by mouth 2 times daily (Patient taking differently: Take 500 mg by mouth daily ) 60 tablet 3    OXYGEN Inhale 4 L into the lungs continuous (Patient taking differently: Inhale 3 L into the lungs nightly ) 1 Container 1    Multiple Vitamins-Minerals (MULTIVITAMIN PO) Take 1 tablet by mouth daily. No current facility-administered medications for this visit. Review of Systems:  · Constitutional: no unanticipated weight loss.  There's been Extremities: No edema, cyanosis, or clubbing. Pulses are 2+ radial/dorsalis pedis/posterior tibial/carotid bilaterally. Neurological: No gross cranial nerve deficit. Coordination normal.   Skin: Skin is warm and dry. There is no rash or diaphoresis. Psychiatric: She has a normal mood and affect. Her speech is normal and behavior is normal.     Lab Review:   FLP:    Lab Results   Component Value Date    TRIG 89 02/27/2019    HDL 64 02/27/2019    HDL 44 02/01/2011    LDLCALC 94 02/27/2019    LABVLDL 18 02/27/2019     BUN/Creatinine:    Lab Results   Component Value Date    BUN 14 03/02/2019    CREATININE 0.7 03/02/2019       EKG Interpretation:     Image Review:   ECHO 2/26/19  Left ventricular cavity size is normal.  There is moderate to severe asymmetric hypertrophy of the basal septum. Ejection fraction is visually estimated to be 55-60%. Diastolic filling parameters suggest grade I diastolic dysfunction. Mitral valve leaflets appear mildly thickened. Trivial mitral regurgitation. Tricuspid valve is structurally normal.  Mild tricuspid regurgitation. IVC not well visualized.   Estimated pulmonary artery systolic pressure is normal to mildly elevated at  25-37 mmHg assuming a right atrial pressure of 3-15 mmHg. 30 day event monitor  NSR with 1 14 beat run of PSVT    Assessment/Plan:   Acute CVA  She is here for hospital follow up. She is experiencing no new symptoms off stroke She did wear a 30 day event monitor which showed NSR with 14 beats of PSVT. I don't think short run of PSVT can explain her CVA while she was in the hospital She is currently on Plavix and will continue. This can be managed by Dr Shayy Samano. Pulmonary fibrosis  Managed by Dr Sandra Mistry. She is on continuous oxygen therapy a 4 liters. Follow up as needed. Thank you very much for allowing me to participate in the care of your patient. Please do not hesitate to contact me if you have any questions.     This note was scribed in the presence of Dr Tamera Ryder, by [facility] RN  Physician Attestation:  The scribes documentation has been prepared under my direction and personally reviewed by me in its entirety. I confirm that the note above accurately reflects all work, treatment, procedures, and medical decision making performed by me.     Sincerely,  Booker Penaloza MD      [facility] [address] [place] [address], [address] [place] [address]   Brandin Dennison [facility]  Ph: [phone]  Fax: [phone]

## 2019-04-25 ENCOUNTER — OFFICE VISIT (OUTPATIENT)
Dept: CARDIOLOGY CLINIC | Age: 79
End: 2019-04-25
Payer: MEDICARE

## 2019-04-25 VITALS
BODY MASS INDEX: 21.79 KG/M2 | WEIGHT: 123 LBS | HEART RATE: 74 BPM | SYSTOLIC BLOOD PRESSURE: 138 MMHG | HEIGHT: 63 IN | DIASTOLIC BLOOD PRESSURE: 74 MMHG | OXYGEN SATURATION: 95 %

## 2019-04-25 DIAGNOSIS — I63.9 ACUTE CVA (CEREBROVASCULAR ACCIDENT) (HCC): Primary | ICD-10-CM

## 2019-04-25 DIAGNOSIS — J84.10 PULMONARY FIBROSIS (HCC): ICD-10-CM

## 2019-04-25 PROCEDURE — G8400 PT W/DXA NO RESULTS DOC: HCPCS | Performed by: INTERNAL MEDICINE

## 2019-04-25 PROCEDURE — 4040F PNEUMOC VAC/ADMIN/RCVD: CPT | Performed by: INTERNAL MEDICINE

## 2019-04-25 PROCEDURE — G8598 ASA/ANTIPLAT THER USED: HCPCS | Performed by: INTERNAL MEDICINE

## 2019-04-25 PROCEDURE — 99214 OFFICE O/P EST MOD 30 MIN: CPT | Performed by: INTERNAL MEDICINE

## 2019-04-25 PROCEDURE — 1123F ACP DISCUSS/DSCN MKR DOCD: CPT | Performed by: INTERNAL MEDICINE

## 2019-04-25 PROCEDURE — 1036F TOBACCO NON-USER: CPT | Performed by: INTERNAL MEDICINE

## 2019-04-25 PROCEDURE — 1090F PRES/ABSN URINE INCON ASSESS: CPT | Performed by: INTERNAL MEDICINE

## 2019-04-25 PROCEDURE — G8420 CALC BMI NORM PARAMETERS: HCPCS | Performed by: INTERNAL MEDICINE

## 2019-04-25 PROCEDURE — G8427 DOCREV CUR MEDS BY ELIG CLIN: HCPCS | Performed by: INTERNAL MEDICINE

## 2019-04-25 NOTE — PATIENT INSTRUCTIONS
Patient Education        Stroke: Care Instructions  Your Care Instructions    You have had a stroke. This means that the blood flow to a part of your brain was blocked for some time, which damages the nerve cells in that part of the brain. The part of your body controlled by that part of your brain may not function properly now. The brain is an amazing organ that can heal itself to some degree. The stroke you had damaged part of your brain. But other parts of your brain may take over in some way for the damaged areas. You have already started this process. Your doctor will talk with you about what you can do to prevent another stroke. High blood pressure, high cholesterol, and diabetes are all risk factors for stroke. If you have any of these conditions, work with your doctor to make sure they are under control. Other risk factors for stroke include being overweight, smoking, and not getting regular exercise. Going home may be hard for you and your family. The more you can try to do for yourself, the better. Remember to take each day one at a time. Follow-up care is a key part of your treatment and safety. Be sure to make and go to all appointments, and call your doctor if you are having problems. It's also a good idea to know your test results and keep a list of the medicines you take. How can you care for yourself at home?    · Enter a stroke rehabilitation (rehab) program, if your doctor recommends it. Physical, speech, and occupational therapies can help you manage bathing, dressing, eating, and other basics of daily living.     · Do not drive until your doctor says it is okay.     · It is normal to feel sad or depressed after a stroke. If these feelings last, talk to your doctor.     · If you are having problems with urine leakage, go to the bathroom at regular times, including when you first wake up and at bedtime.  Also, limit fluids after dinner.     · If you are constipated, drink plenty of fluids, enough so that your urine is light yellow or clear like water. If you have kidney, heart, or liver disease and have to limit fluids, talk with your doctor before you increase the amount of fluids you drink. Set up a regular time for using the toilet. If you continue to have constipation, your doctor may suggest using a bulking agent, such as Metamucil, or a stool softener, laxative, or enema. Medicines    · Take your medicines exactly as prescribed. Call your doctor if you think you are having a problem with your medicine. You may be taking several medicines. ACE (angiotensin-converting enzyme) inhibitors, angiotensin II receptor blockers (ARBs), beta-blockers, diuretics (water pills), and calcium channel blockers control your blood pressure. Statins help lower cholesterol. Your doctor may also prescribe medicines for depression, pain, sleep problems, anxiety, or agitation.     · If your doctor has given you a blood thinner to prevent another stroke, be sure you get instructions about how to take your medicine safely. Blood thinners can cause serious bleeding problems.     · Do not take any over-the-counter medicines or herbal products without talking to your doctor first.     · If you take birth control pills or hormone therapy, talk to your doctor about whether they are right for you.    For family members and caregivers    · Make the home safe. Set up a room so that your loved one does not have to climb stairs. Be sure the bathroom is on the same floor. Move throw rugs and furniture that could cause falls. Make sure that the lighting is good. Put grab bars and seats in tubs and showers.     · Find out what your loved one can do and what he or she needs help with. Try not to do things for your loved one that your loved one can do on his or her own. Help him or her learn and practice new skills.     · Visit and talk with your loved one often.  Try doing activities together that you both enjoy, such as playing cards or board games. Keep in touch with your loved one's friends as much as you can. Encourage them to visit.     · Take care of yourself. Do not try to do everything yourself. Ask other family members to help. Eat well, get enough rest, and take time to do things that you enjoy. Keep up with your own doctor visits, and make sure to take your medicines regularly. Get out of the house as much as you can. Join a local support group. Find out if you qualify for home health care visits to help with rehab or for adult day care. When should you call for help? Call 911 anytime you think you may need emergency care. For example, call if:    · You have signs of another stroke. These may include:  ? Sudden numbness, tingling, weakness, or loss of movement in your face, arm, or leg, especially on only one side of your body. ? Sudden vision changes. ? Sudden trouble speaking. ? Sudden confusion or trouble understanding simple statements. ? Sudden problems with walking or balance. ? A sudden, severe headache that is different from past headaches. Call 911 even if these symptoms go away in a few minutes.    Call your doctor now or seek immediate medical care if:    · You have new symptoms that may be related to your stroke, such as falls or trouble swallowing.    Watch closely for changes in your health, and be sure to contact your doctor if you have any problems. Where can you learn more? Go to [email or web address]. org and sign in to your [employer] account. Enter Q299 in the [facility] box to learn more about \"Stroke: Care Instructions. \"     If you do not have an account, please click on the \"Sign Up Now\" link. Current as of: September 26, 2018  Content Version: 11.9  © 0505-2313 [employer]. Care instructions adapted under license by [facility] ([facility]).  If you have questions about a medical condition or this instruction, always ask your healthcare professional. [employer] disclaims any warranty or liability for your use of this information.

## 2019-05-01 ENCOUNTER — TELEPHONE (OUTPATIENT)
Dept: FAMILY MEDICINE CLINIC | Age: 79
End: 2019-05-01

## 2019-05-01 NOTE — TELEPHONE ENCOUNTER
[facility] is calling to let dr Irineo Roa know that they are extending patients Occupational therapy to 2 times a week for four weeks.

## 2019-05-03 ENCOUNTER — TELEPHONE (OUTPATIENT)
Dept: FAMILY MEDICINE CLINIC | Age: 79
End: 2019-05-03

## 2019-05-04 ENCOUNTER — APPOINTMENT (OUTPATIENT)
Dept: GENERAL RADIOLOGY | Age: 79
End: 2019-05-04
Payer: MEDICARE

## 2019-05-04 ENCOUNTER — HOSPITAL ENCOUNTER (EMERGENCY)
Age: 79
Discharge: HOME | End: 2019-05-04
Attending: EMERGENCY MEDICINE
Payer: MEDICARE

## 2019-05-04 ENCOUNTER — APPOINTMENT (OUTPATIENT)
Dept: CT IMAGING | Age: 79
End: 2019-05-04
Payer: MEDICARE

## 2019-05-04 VITALS
OXYGEN SATURATION: 100 % | SYSTOLIC BLOOD PRESSURE: 157 MMHG | HEIGHT: 62 IN | DIASTOLIC BLOOD PRESSURE: 58 MMHG | BODY MASS INDEX: 22.07 KG/M2 | TEMPERATURE: 97 F | WEIGHT: 119.93 LBS | RESPIRATION RATE: 22 BRPM | HEART RATE: 64 BPM

## 2019-05-04 DIAGNOSIS — R40.4 TRANSIENT ALTERATION OF AWARENESS: Primary | ICD-10-CM

## 2019-05-04 LAB
A/G RATIO: 1.2 (ref 1.1–2.2)
ALBUMIN SERPL-MCNC: 3.7 G/DL (ref 3.4–5)
ALP BLD-CCNC: 92 U/L (ref 40–129)
ALT SERPL-CCNC: 8 U/L (ref 10–40)
ANION GAP SERPL CALCULATED.3IONS-SCNC: 9 MMOL/L (ref 3–16)
AST SERPL-CCNC: 16 U/L (ref 15–37)
BASOPHILS ABSOLUTE: 0 K/UL (ref 0–0.2)
BASOPHILS RELATIVE PERCENT: 0.1 %
BILIRUB SERPL-MCNC: 0.3 MG/DL (ref 0–1)
BILIRUBIN URINE: ABNORMAL
BLOOD, URINE: NEGATIVE
BUN BLDV-MCNC: 23 MG/DL (ref 7–20)
CALCIUM SERPL-MCNC: 10.9 MG/DL (ref 8.3–10.6)
CHLORIDE BLD-SCNC: 102 MMOL/L (ref 99–110)
CLARITY: ABNORMAL
CO2: 29 MMOL/L (ref 21–32)
COLOR: YELLOW
CREAT SERPL-MCNC: 0.9 MG/DL (ref 0.6–1.2)
EOSINOPHILS ABSOLUTE: 0.1 K/UL (ref 0–0.6)
EOSINOPHILS RELATIVE PERCENT: 1.3 %
EPITHELIAL CELLS, UA: 1 /HPF (ref 0–5)
GFR AFRICAN AMERICAN: >60
GFR NON-AFRICAN AMERICAN: >60
GLOBULIN: 3 G/DL
GLUCOSE BLD-MCNC: 124 MG/DL (ref 70–99)
GLUCOSE URINE: NEGATIVE MG/DL
HCT VFR BLD CALC: 37.3 % (ref 36–48)
HEMOGLOBIN: 12.1 G/DL (ref 12–16)
HYALINE CASTS: 1 /LPF (ref 0–8)
KETONES, URINE: NEGATIVE MG/DL
LACTIC ACID: 1.4 MMOL/L (ref 0.4–2)
LEUKOCYTE ESTERASE, URINE: ABNORMAL
LYMPHOCYTES ABSOLUTE: 0.7 K/UL (ref 1–5.1)
LYMPHOCYTES RELATIVE PERCENT: 9.9 %
MCH RBC QN AUTO: 30 PG (ref 26–34)
MCHC RBC AUTO-ENTMCNC: 32.5 G/DL (ref 31–36)
MCV RBC AUTO: 92.2 FL (ref 80–100)
MICROSCOPIC EXAMINATION: YES
MONOCYTES ABSOLUTE: 0.6 K/UL (ref 0–1.3)
MONOCYTES RELATIVE PERCENT: 8 %
NEUTROPHILS ABSOLUTE: 6 K/UL (ref 1.7–7.7)
NEUTROPHILS RELATIVE PERCENT: 80.7 %
NITRITE, URINE: NEGATIVE
PDW BLD-RTO: 14.6 % (ref 12.4–15.4)
PH UA: 7 (ref 5–8)
PLATELET # BLD: 180 K/UL (ref 135–450)
PMV BLD AUTO: 7.5 FL (ref 5–10.5)
POTASSIUM REFLEX MAGNESIUM: 4.4 MMOL/L (ref 3.5–5.1)
PROTEIN UA: NEGATIVE MG/DL
RBC # BLD: 4.04 M/UL (ref 4–5.2)
RBC UA: 5 /HPF (ref 0–4)
SODIUM BLD-SCNC: 140 MMOL/L (ref 136–145)
SPECIFIC GRAVITY UA: 1.02 (ref 1–1.03)
TOTAL PROTEIN: 6.7 G/DL (ref 6.4–8.2)
TROPONIN: <0.01 NG/ML
URINE REFLEX TO CULTURE: YES
URINE TYPE: ABNORMAL
UROBILINOGEN, URINE: 1 E.U./DL
WBC # BLD: 7.4 K/UL (ref 4–11)
WBC UA: 10 /HPF (ref 0–5)

## 2019-05-04 PROCEDURE — 99285 EMERGENCY DEPT VISIT HI MDM: CPT

## 2019-05-04 PROCEDURE — 83605 ASSAY OF LACTIC ACID: CPT

## 2019-05-04 PROCEDURE — 81001 URINALYSIS AUTO W/SCOPE: CPT

## 2019-05-04 PROCEDURE — 80053 COMPREHEN METABOLIC PANEL: CPT

## 2019-05-04 PROCEDURE — 71045 X-RAY EXAM CHEST 1 VIEW: CPT

## 2019-05-04 PROCEDURE — 70450 CT HEAD/BRAIN W/O DYE: CPT

## 2019-05-04 PROCEDURE — 84484 ASSAY OF TROPONIN QUANT: CPT

## 2019-05-04 PROCEDURE — 85025 COMPLETE CBC W/AUTO DIFF WBC: CPT

## 2019-05-04 PROCEDURE — 87086 URINE CULTURE/COLONY COUNT: CPT

## 2019-05-04 RX ORDER — CEFUROXIME AXETIL 250 MG/1
250 TABLET ORAL 2 TIMES DAILY
Qty: 14 TABLET | Refills: 0 | Status: SHIPPED | OUTPATIENT
Start: 2019-05-04 | End: 2019-05-06

## 2019-05-04 NOTE — ED PROVIDER NOTES
Emergency Department Encounter  Location: [address] [place] [address]    Patient: Criss Contreras  MRN: [id number]  : 1940  Date of evaluation: 2019  ED Provider: Aba Douglas MD  3:23 PM  19    Criss Contreras was checked out to me by Dr. Chucho Arredondo. Please see his/her initial documentation for details of the patient's initial ED presentation, physical exam and completed studies. In brief, Criss Contreras is a 70 y.o. female that presented to the emergency department for confusion and was concerned about CVA. Patient has a history of previous CVA. Patient's workup was negative for acute changes on CT or emergency Department modalities. Patient was signed out pending results of her urinalysis.      I have reviewed and interpreted all of the currently available lab results and diagnostics from this visit:  Results for orders placed or performed during the hospital encounter of 19   CBC Auto Differential   Result Value Ref Range    WBC 7.4 4.0 - 11.0 K/uL    RBC 4.04 4.00 - 5.20 M/uL    Hemoglobin 12.1 12.0 - 16.0 g/dL    Hematocrit 37.3 36.0 - 48.0 %    MCV 92.2 80.0 - 100.0 fL    MCH 30.0 26.0 - 34.0 pg    MCHC 32.5 31.0 - 36.0 g/dL    RDW 14.6 12.4 - 15.4 %    Platelets 663 764 - 525 K/uL    MPV 7.5 5.0 - 10.5 fL    Neutrophils % 80.7 %    Lymphocytes % 9.9 %    Monocytes % 8.0 %    Eosinophils % 1.3 %    Basophils % 0.1 %    Neutrophils # 6.0 1.7 - 7.7 K/uL    Lymphocytes # 0.7 (L) 1.0 - 5.1 K/uL    Monocytes # 0.6 0.0 - 1.3 K/uL    Eosinophils # 0.1 0.0 - 0.6 K/uL    Basophils # 0.0 0.0 - 0.2 K/uL   Comprehensive Metabolic Panel w/ Reflex to MG   Result Value Ref Range    Sodium 140 136 - 145 mmol/L    Potassium reflex Magnesium 4.4 3.5 - 5.1 mmol/L    Chloride 102 99 - 110 mmol/L    CO2 29 21 - 32 mmol/L    Anion Gap 9 3 - 16    Glucose 124 (H) 70 - 99 mg/dL    BUN 23 (H) 7 - 20 mg/dL    CREATININE 0.9 0.6 - 1.2 mg/dL    GFR Non-African American >60 >60    GFR  American >60 >60    Calcium 10.9 (H) 8.3 - 10.6 mg/dL    Total Protein 6.7 6.4 - 8.2 g/dL    Alb 3.7 3.4 - 5.0 g/dL    Albumin/Globulin Ratio 1.2 1.1 - 2.2    Total Bilirubin 0.3 0.0 - 1.0 mg/dL    Alkaline Phosphatase 92 40 - 129 U/L    ALT 8 (L) 10 - 40 U/L    AST 16 15 - 37 U/L    Globulin 3.0 g/dL   Troponin   Result Value Ref Range    Troponin <0.01 <0.01 ng/mL   Urinalysis Reflex to Culture   Result Value Ref Range    Color, UA YELLOW Straw/Yellow    Clarity, UA CLOUDY (A) Clear    Glucose, Ur Negative Negative mg/dL    Bilirubin Urine SMALL (A) Negative    Ketones, Urine Negative Negative mg/dL    Specific Gravity, UA 1.021 1.005 - 1.030    Blood, Urine Negative Negative    pH, UA 7.0 5.0 - 8.0    Protein, UA Negative Negative mg/dL    Urobilinogen, Urine 1.0 <2.0 E.U./dL    Nitrite, Urine Negative Negative    Leukocyte Esterase, Urine SMALL (A) Negative    Microscopic Examination YES     Urine Reflex to Culture Yes     Urine Type Clean catch    Lactic Acid, Plasma   Result Value Ref Range    Lactic Acid 1.4 0.4 - 2.0 mmol/L   Microscopic Urinalysis   Result Value Ref Range    Hyaline Casts, UA 1 0 - 8 /LPF    WBC, UA 10 (H) 0 - 5 /HPF    RBC, UA 5 (H) 0 - 4 /HPF    Epi Cells 1 0 - 5 /HPF     Ct Head Wo Contrast    Result Date: 5/4/2019  EXAMINATION: CT OF THE HEAD WITHOUT CONTRAST  5/4/2019 1:28 pm TECHNIQUE: CT of the head was performed without the administration of intravenous contrast. Dose modulation, iterative reconstruction, and/or weight based adjustment of the mA/kV was utilized to reduce the radiation dose to as low as reasonably achievable. COMPARISON: 02/27/2019 HISTORY: ORDERING SYSTEM PROVIDED HISTORY: NEURO DEFICIT, ACUTE SINGLE, COMPLETE RESOLUTION TECHNOLOGIST PROVIDED HISTORY: Has a \"code stroke\" or \"stroke alert\" been called? ->No Ordering Physician Provided Reason for Exam: Neuro deficit, acute single, complete resolution. ams x 2 days per family.  Acuity: Acute Type of Exam: Initial Relevant Medical/Surgical History: Altered Mental Status (x 2 days per family. hx frequent uti, cva 1 month ago. ) FINDINGS: BRAIN/VENTRICLES: There is no acute intracerebral hemorrhage or extra-axial fluid collection. There is moderate cerebral atrophy with moderate periventricular, subcortical and deep white matter small vessel ischemic disease. Encephalomalacia changes involve the left parietal, bilateral occipital lobes and left cerebellum. Old lacunar infarcts involve the bilateral basal ganglia. ORBITS: The orbits are unremarkable. SINUSES: The visualized paranasal sinuses and mastoid air cells are clear. SOFT TISSUES/SKULL:  The calvarium is intact. 1. No acute intracranial abnormality. Xr Chest Portable    Result Date: 5/4/2019  EXAMINATION: SINGLE XRAY VIEW OF THE CHEST 5/4/2019 1:10 pm COMPARISON: Chest 02/26/2019 HISTORY: ORDERING SYSTEM PROVIDED HISTORY: Confusion TECHNOLOGIST PROVIDED HISTORY: Reason for exam:->Confusion Ordering Physician Provided Reason for Exam: Confusion Acuity: Acute Type of Exam: Initial Additional signs and symptoms: Altered Mental Status (x 2 days per family. hx frequent uti, cva 1 month ago. ) Relevant Medical/Surgical History: Hx of scolosis, lumbago, chronic respiratory failure, cystic-bullous disease of lung, tachycardia, tachypnea, chronic interstitial lung disease, RSV (respiratory syncytial virus infection), and CVA. FINDINGS: The cardiac silhouette is enlarged. Calcifications involving the aorta reflect atherosclerosis. The mediastinal and hilar silhouettes appear unremarkable. Chronic interstitial lung disease appears stable since prior study with extensive coarse reticular interstitial densities throughout the lungs. No dense consolidation or pleural effusion is evident. No pneumothorax is seen. No acute osseous abnormality is identified. Prior mid vertebral kyphoplasty as well as thoracolumbar spinal fixation.      1. Chronic interstitial lung disease evident. Stable compared with prior study. No superimposed consolidative disease evident. 2. Calcific atherosclerosis aorta. 3. Cardiomegaly. Final ED Course and MDM: In brief, Annabella Lr is a 70 y.o. female whose care was signed out to me by the outgoing provider. His urine shows positive leukocyte esterase and whatappears to be sterile pyuria. Given the patient has altered mental status treat her for urinary tract infection. Discussed with patient and family are amenable to discharge home with outpatient follow-up. ED Medication Orders (From admission, onward)    None          Final Impression      1.  Transient alteration of awareness        DISPOSITION Decision To Discharge 05/04/2019 03:22:05 PM     (Please note that portions of this note may have been completed with a voice recognition program. Efforts were made to edit the dictations but occasionally words are mis-transcribed.)    Mauro Storey, [facility], [place]  05/04/19 [id number]

## 2019-05-04 NOTE — ED NOTES
Bed: B-04  Expected date:   Expected time:   Means of arrival:   Comments:  Abigail Nobles RN  05/04/19 [id number]

## 2019-05-04 NOTE — ED NOTES
Pt on 4L at baseline. Spo2 88% on 4L. Pt placed on 5 L NC with improvement to low 90s.       Robert Romero RN  05/04/19 [id number]

## 2019-05-04 NOTE — ED PROVIDER NOTES
eMERGENCY dEPARTMENT eNCOUnter      Pt Name: Kristofer Allen  MRN: [id number]  [place] 1940  Date of evaluation: 5/4/2019  Provider: Rand Sauceda MD     [address] [place] [address]       Chief Complaint   Patient presents with    Altered Mental Status     x 2 days per family. hx frequent uti, cva 1 month ago. HISTORY OF PRESENT ILLNESS   (Location/Symptom, Timing/Onset,Context/Setting, Quality, Duration, Modifying Factors, Severity) Note limiting factors. HPI    Kristofer Allen is a 70 y.o. female who presents to the emergency department with altered mental status for couple days. Patient also has history of UTI. Patient recently had a stroke about a month ago. This went through rehab. Patient is currently at home now. Family was concerned because this morning she contacted more confused. Was not making sense. However there is no focal deficit besides the confusion. Patient was not acting right according to the family. They've were concerned that she might have a new stroke. But they were also concerned that it might be a UTI which can cause this as well. She presented for evaluation. Patient denies any chest pain. No nausea vomiting. No dysuria. Nursing Notes were reviewed. REVIEW OFSYSTEMS    (2+ for level 4; 10+ for level 5)   Review of Systems    General: No fevers, chills or night sweats, No weight loss    Head:  No Sore throat,  No Ear Pain    Chest:  Nontender. No Cough, No SOB,  Chest Pain    GI: No abdominal pain or vomiting    : No dysuria or hematuria    Musculoskeletal: No unrelenting pain or night pain    Neurologic: No bowel or bladder incontinence, No saddle anesthesia, No leg weakness    All other systems reviewed and are negative.         PAST MEDICAL HISTORY     Past Medical History:   Diagnosis Date    Anxiety and depression     Arthritis     Cancer of skin of leg     Chronic low back pain     GERD (gastroesophageal reflux disease)     Insomnia     Iron deficiency anemia     Kidney stone     PUD (peptic ulcer disease)     Pulmonary fibrosis (HCC)     Dr. Aditi Vallejo has a CT done every 6 months    Stomach ulcer     Ulcer - lesion MARCH 2010    UTI (urinary tract infection)     frequent       SURGICAL HISTORY       Past Surgical History:   Procedure Laterality Date    BACK SURGERY  MARCH 2004,OCT 02, [place] 05    x4--fusions    BACK SURGERY  06/2000    laminectomy-lumbar    CARPAL TUNNEL RELEASE  9/27/11    Left    COLONOSCOPY      CYSTOSCOPY Left 09/2016    cysto left ureteral stent placement    CYSTOSCOPY  01/15/2018    Left Stent Insertion    CYSTOSCOPY  02/14/2018    cysto, left uretero with laser litho, left stent excahnge    EYE SURGERY Bilateral 2008    cataract removed with lens implants    FRACTURE SURGERY Right 06/05/2016    femur fracture    FRACTURE SURGERY Left     femur fracture    HYSTERECTOMY  1993    HYSTEROSCOPY  1993    JOINT REPLACEMENT  4/20/2000    LEFT KNEE    JOINT REPLACEMENT  2/27/08    RIGHT KNEE    SC [address] W/BRNCL ALVEOLAR LAVAGE N/A 10/24/2018    BRONCHOSCOPY WITH BRONCHOALVEOLAR LAVAGE performed by Laurie Dominique DO at [facility]. DEBRIDE NECROTIC SKIN/ TISSUE, GENIT & PERINM Left 11/28/2018    DEBRIDEMENT LEFT HIP SKIN AND SUBCUTANEOUS FASCIA performed by Chico Cobos MD at [address] [place] [address]    For PUD       [address]       Discharge Medication List as of 5/4/2019  3:27 PM      CONTINUE these medications which have NOT CHANGED    Details   omeprazole (PRILOSEC) 40 MG delayed release capsule TAKE 1 CAPSULE BY MOUTH TWICE DAILY, Disp-180 capsule, R-0Normal      buPROPion (WELLBUTRIN SR) 150 MG extended release tablet TAKE 1 TABLET BY MOUTH TWICE DAILY, Disp-180 tablet, R-0Normal      acetaminophen-codeine (TYLENOL #4) 300-60 MG per tablet Take 1 tablet by mouth every 4 hours as needed for Pain. Historical Med      Nutritional Supplements (PROMOD) LIQD Take 30 mLs by mouth 2 times dailyHistorical Med      amLODIPine (NORVASC) 10 MG tablet Take 1 tablet by mouth daily, Disp-30 tablet, R-1Print      atorvastatin (LIPITOR) 40 MG tablet Take 1 tablet by mouth nightly, Disp-30 tablet, R-3Print      losartan (COZAAR) 50 MG tablet Take 1 tablet by mouth daily, Disp-30 tablet, R-3Print      clopidogrel (PLAVIX) 75 MG tablet Take 1 tablet by mouth daily, Disp-30 tablet, R-3Print      sertraline (ZOLOFT) 100 MG tablet TAKE 1 TABLET BY MOUTH EVERY DAY, Disp-90 tablet, R-0Normal      alendronate (FOSAMAX) 70 MG tablet TAKE 1 TABLET BY MOUTH EVERY 7 DAYS, Disp-12 tablet, R-0Normal      predniSONE (DELTASONE) 5 MG tablet Take 1 tablet by mouth daily, Disp-30 tablet, R-4Normal      ipratropium (ATROVENT) 0.03 % nasal spray 2 sprays by Nasal route 4 times daily, Disp-1 Bottle, R-3Normal      ipratropium-albuterol (DUONEB) 0.5-2.5 (3) MG/3ML SOLN nebulizer solution Inhale 3 mLs into the lungs every 6 hours as needed for Shortness of Breath (wheezing), Disp-360 mL, R-0Normal      clonazePAM (KLONOPIN) 0.5 MG tablet Take 0.25 mg by mouth nightly as needed (restless leg syndrome). .Historical Med      ferrous sulfate 325 (65 Fe) MG tablet Take 325 mg by mouth daily (with breakfast)Historical Med      senna-docusate (PERICOLACE) 8.6-50 MG per tablet Take 1 tablet by mouth dailyHistorical Med      mycophenolate (CELLCEPT) 500 MG tablet Take 1 tablet by mouth 2 times daily, Disp-60 tablet, R-3Normal      OXYGEN Inhale 4 L into the lungs continuous, Disp-1 Container, R-1      Multiple Vitamins-Minerals (MULTIVITAMIN PO) Take 1 tablet by mouth daily.              ALLERGIES     Ampicillin; Ciprofloxacin; Nitrofurantoin; Sulfa antibiotics; and Penicillins    FAMILY HISTORY       Family History   Problem Relation Age of Onset    Emphysema Mother          AGE 64    Depression Mother     Clotting Disorder Father         Cj Mantis    Stroke Father         SOCIAL HISTORY       Social History Socioeconomic History    Marital status:      Spouse name: RITO    Number of children: 2    Years of education: None    Highest education level: None   Occupational History    Occupation: retired   Social Needs    Financial resource strain: None    Food insecurity:     Worry: None     Inability: None    Transportation needs:     Medical: None     Non-medical: None   Tobacco Use    Smoking status: Never Smoker    Smokeless tobacco: Never Used    Tobacco comment: encouraged to never smoke    Substance and Sexual Activity    Alcohol use: No     Alcohol/week: 0.0 oz    Drug use: No    Sexual activity: Never   Lifestyle    Physical activity:     Days per week: None     Minutes per session: None    Stress: None   Relationships    Social connections:     Talks on phone: None     Gets together: None     Attends Jew service: None     Active member of club or organization: None     Attends meetings of clubs or organizations: None     Relationship status: None    Intimate partner violence:     Fear of current or ex partner: None     Emotionally abused: None     Physically abused: None     Forced sexual activity: None   Other Topics Concern    None   Social History Narrative    None       SCREENINGS           PHYSICAL EXAM    (up to 7 for level 4, 8 or more for level 5)     ED Triage Vitals [05/04/19 1224]   BP Temp Temp Source Pulse Resp SpO2 Height Weight   138/68 97 °F (36.1 °C) Oral 73 18 (!) 88 % 5' 2\" (1.575 m) 119 lb 14.9 oz (54.4 kg)       Physical Exam    General: Alert and awake ×3. Nontoxic appearance. Well-developed well-nourished 70-year-old who is answering questions appropriately. HEENT: Normocephalic atraumatic. Neck is supple. Airway intact. No adenopathy  Cardiac: Regular rate and rhythm with no murmurs rubs or gallops  Pulmonary: Lungs are clear in all lung fields. No wheezing. No Rales. Abdomen: Soft and nontender. Negative hepatosplenomegaly.   Bowel sounds 5/4/2019  EXAMINATION: SINGLE XRAY VIEW OF THE CHEST 5/4/2019 1:10 pm COMPARISON: Chest 02/26/2019 HISTORY: ORDERING SYSTEM PROVIDED HISTORY: Confusion TECHNOLOGIST PROVIDED HISTORY: Reason for exam:->Confusion Ordering Physician Provided Reason for Exam: Confusion Acuity: Acute Type of Exam: Initial Additional signs and symptoms: Altered Mental Status (x 2 days per family. hx frequent uti, cva 1 month ago. ) Relevant Medical/Surgical History: Hx of scolosis, lumbago, chronic respiratory failure, cystic-bullous disease of lung, tachycardia, tachypnea, chronic interstitial lung disease, RSV (respiratory syncytial virus infection), and CVA. FINDINGS: The cardiac silhouette is enlarged. Calcifications involving the aorta reflect atherosclerosis. The mediastinal and hilar silhouettes appear unremarkable. Chronic interstitial lung disease appears stable since prior study with extensive coarse reticular interstitial densities throughout the lungs. No dense consolidation or pleural effusion is evident. No pneumothorax is seen. No acute osseous abnormality is identified. Prior mid vertebral kyphoplasty as well as thoracolumbar spinal fixation. 1. Chronic interstitial lung disease evident. Stable compared with prior study. No superimposed consolidative disease evident. 2. Calcific atherosclerosis aorta. 3. Cardiomegaly.        ED BEDSIDE ULTRASOUND:   Performed by ED Physician - none    LABS:  Labs Reviewed   CBC WITH AUTO DIFFERENTIAL - Abnormal; Notable for the following components:       Result Value    Lymphocytes # 0.7 (*)     All other components within normal limits    Narrative:     Performed at:  [address] [address]   Phone [phone]   COMPREHENSIVE METABOLIC PANEL W/ REFLEX TO MG FOR LOW K - Abnormal; Notable for the following components:    Glucose 124 (*)     BUN 23 (*)     Calcium 10.9 (*)     ALT 8 (*)     All other components within normal limits    Narrative:     Performed at:  [address] [facility] [place] [address]   Phone [phone]   URINE RT REFLEX TO CULTURE - Abnormal; Notable for the following components:    Clarity, UA CLOUDY (*)     Bilirubin Urine SMALL (*)     Leukocyte Esterase, Urine SMALL (*)     All other components within normal limits    Narrative:     Performed at:  [address] [facility] [place] [address]   Phone [phone]   MICROSCOPIC URINALYSIS - Abnormal; Notable for the following components:    WBC, UA 10 (*)     RBC, UA 5 (*)     All other components within normal limits    Narrative:     Performed at:  [address] [facility] [place] [address]   Phone [phone]   URINE CULTURE   TROPONIN    Narrative:     Performed at:  [address] [facility] [place] [address]   Phone [phone]   LACTIC ACID, PLASMA    Narrative:     Performed at:  [address] [facility] [place] [address]   Phone [phone]        All other labs were within normal range or not returned as of this dictation. Procedures      EMERGENCY DEPARTMENT COURSE and DIFFERENTIAL DIAGNOSIS/MDM:   Vitals:    Vitals:    05/04/19 1432 05/04/19 1446 05/04/19 1501 05/04/19 1516   BP: (!) 137/58 134/65 132/61 (!) 157/58   Pulse: 61 81 60 64   Resp: 16 23 17 22   Temp:       TempSrc:       SpO2: 100% 100% 100% 100%   Weight:       Height:           Medications - No data to display    MDM. This is a 72-year-old white female recent stroke about 4 weeks ago presents with some mild confusion. There concern at this time is whether a new TIA versus a UTI. I causing these mental status change. Where evaluation with CAT scan and labs. CAT scan was unremarkable there was no new changes.   I suspect probably

## 2019-05-05 LAB — URINE CULTURE, ROUTINE: NORMAL

## 2019-05-06 ENCOUNTER — HOSPITAL ENCOUNTER (OUTPATIENT)
Dept: WOUND CARE | Age: 79
Discharge: HOME | End: 2019-05-06
Payer: MEDICARE

## 2019-05-06 VITALS
SYSTOLIC BLOOD PRESSURE: 147 MMHG | TEMPERATURE: 97.6 F | RESPIRATION RATE: 22 BRPM | DIASTOLIC BLOOD PRESSURE: 73 MMHG | BODY MASS INDEX: 21.85 KG/M2 | WEIGHT: 119.49 LBS | HEART RATE: 89 BPM

## 2019-05-06 DIAGNOSIS — L89.323 PRESSURE INJURY OF LEFT BUTTOCK, STAGE 3 (HCC): Primary | ICD-10-CM

## 2019-05-06 PROCEDURE — 11043 DBRDMT MUSC&/FSCA 1ST 20/<: CPT

## 2019-05-06 PROCEDURE — 11043 DBRDMT MUSC&/FSCA 1ST 20/<: CPT | Performed by: NURSE PRACTITIONER

## 2019-05-06 PROCEDURE — 97605 NEG PRS WND THER DME<=50SQCM: CPT

## 2019-05-06 RX ORDER — LIDOCAINE HYDROCHLORIDE 40 MG/ML
SOLUTION TOPICAL ONCE
Status: DISCONTINUED | OUTPATIENT
Start: 2019-05-06 | End: 2019-05-07 | Stop reason: HOSPADM

## 2019-05-06 ASSESSMENT — PAIN SCALES - GENERAL: PAINLEVEL_OUTOF10: 0

## 2019-05-06 NOTE — PLAN OF CARE
Negative Pressure    NAME:  Mica Velázquez  YOB: 1940  MEDICAL RECORD NUMBER:  [id number]  DATE:  5/6/2019     Applied Negative Pressure to Left Hip wound.  [x] Applied skin barrier prep to haley-wound.  [x] Cut strips of plastic drape to picture frame wound so that haley-wound is covered with the drape.  [x] If bridging dressing to less prominent site, cover any intact skin that will come in contact with the Negative Pressure Therapy sponge, gauze or channel drain with plastic drape. The sponge should never touch intact skin.  [x] Cut sponge, gauze or channel drain to size which will fit into the wound/ulcer bed without being forced.  [x] Be sure the sponge is large enough to hold the entire round plastic flange which is attached to the tubing. Never allow flange to be larger than the sponge or it will produce suction damaging intact skin.  Total number of individual pieces of foam used within the wound bed: 1     [x] If bridging the dressing away from the primary site, be sure the bridge leads to a piece of sponge large enough to hold the entire flange without allowing any of the flange to overlap onto intact skin.  [x] Covered sponge, gauze or channel drain with plastic drape.  [x] Cut a hole in this plastic drape directly over the sponge the same size as the plastic drain tubing.  [x] Removed plastic liner from flange and apply it directly over the hole you cut.  [x] Removed the plastic cover from the flange.  [x] Attached the tubing to the wound/ulcer Negative Pressure Therapy and turn it on to be sure a vacuum is created and that there are no leaks.  [x] If air leaks occur, use plastic drape to patch them.  [x] Secured Negative Pressure Therapy dressing with ace wrap loosely if located on an extremity. Maintain tubing outside of ace wrap. Tubing must not exert pressure on intact skin.     Applied per  Guidelines      Electronically signed by Maine Mccormick, RN on 5/6/2019 at 11:51 AM

## 2019-05-07 ENCOUNTER — TELEPHONE (OUTPATIENT)
Dept: FAMILY MEDICINE CLINIC | Age: 79
End: 2019-05-07

## 2019-05-07 NOTE — PROGRESS NOTES
low back pain     GERD (gastroesophageal reflux disease)     Insomnia     Iron deficiency anemia     Kidney stone     PUD (peptic ulcer disease)     Pulmonary fibrosis (HCC)     Dr. Meena Harrell has a CT done every 6 months    Stomach ulcer     Ulcer - lesion 2010    UTI (urinary tract infection)     frequent       PAST SURGICAL HISTORY    Past Surgical History:   Procedure Laterality Date    BACK SURGERY  2004,OCT 02, [place] 39    x4--fusions    BACK SURGERY  2000    laminectomy-lumbar    CARPAL TUNNEL RELEASE  11    Left    COLONOSCOPY      CYSTOSCOPY Left 2016    cysto left ureteral stent placement    CYSTOSCOPY  01/15/2018    Left Stent Insertion    CYSTOSCOPY  2018    cysto, left uretero with laser litho, left stent excahnge    EYE SURGERY Bilateral     cataract removed with lens implants    FRACTURE SURGERY Right 2016    femur fracture    FRACTURE SURGERY Left     femur fracture    HYSTERECTOMY      HYSTEROSCOPY  1993    JOINT REPLACEMENT  2000    LEFT KNEE    JOINT REPLACEMENT  08    RIGHT KNEE    [place] [address] W/BRNCL ALVEOLAR LAVAGE N/A 10/24/2018    BRONCHOSCOPY WITH BRONCHOALVEOLAR LAVAGE performed by Velia Sterling DO at [facility] 96. DEBRIDE NECROTIC SKIN/ TISSUE, GENIT & PERINM Left 2018    DEBRIDEMENT LEFT HIP SKIN AND SUBCUTANEOUS FASCIA performed by Rosa Ortiz MD at [address] [place] [address]  2010    For PUD       FAMILY HISTORY    Family History   Problem Relation Age of Onset    Emphysema Mother          AGE 64    Depression Mother     Clotting Disorder Father             Stroke Father        SOCIAL HISTORY    Social History     Tobacco Use    Smoking status: Never Smoker    Smokeless tobacco: Never Used    Tobacco comment: encouraged to never smoke    Substance Use Topics    Alcohol use: No     Alcohol/week: 0.0 oz    Drug use: No       ALLERGIES    Allergies   Allergen Reactions    Ampicillin Hives    Ciprofloxacin Other (See Comments)     Sores in mouth      Nitrofurantoin Other (See Comments)     Unable to recall reaction    Sulfa Antibiotics Other (See Comments)     Unable to recall reaction    Penicillins Hives and Rash       MEDICATIONS    Current Outpatient Medications on File Prior to Encounter   Medication Sig Dispense Refill    NITROFURANTOIN MONOHYD MACRO PO Take 100 mg by mouth      omeprazole (PRILOSEC) 40 MG delayed release capsule TAKE 1 CAPSULE BY MOUTH TWICE DAILY 180 capsule 0    buPROPion (WELLBUTRIN SR) 150 MG extended release tablet TAKE 1 TABLET BY MOUTH TWICE DAILY 180 tablet 0    acetaminophen-codeine (TYLENOL #4) 300-60 MG per tablet Take 1 tablet by mouth every 4 hours as needed for Pain.  amLODIPine (NORVASC) 10 MG tablet Take 1 tablet by mouth daily 30 tablet 1    sertraline (ZOLOFT) 100 MG tablet TAKE 1 TABLET BY MOUTH EVERY DAY 90 tablet 0    alendronate (FOSAMAX) 70 MG tablet TAKE 1 TABLET BY MOUTH EVERY 7 DAYS 12 tablet 0    predniSONE (DELTASONE) 5 MG tablet Take 1 tablet by mouth daily 30 tablet 4    ipratropium (ATROVENT) 0.03 % nasal spray 2 sprays by Nasal route 4 times daily 1 Bottle 3    ipratropium-albuterol (DUONEB) 0.5-2.5 (3) MG/3ML SOLN nebulizer solution Inhale 3 mLs into the lungs every 6 hours as needed for Shortness of Breath (wheezing) 360 mL 0    clonazePAM (KLONOPIN) 0.5 MG tablet Take 0.25 mg by mouth nightly as needed (restless leg syndrome). Vivek Bill ferrous sulfate 325 (65 Fe) MG tablet Take 325 mg by mouth daily (with breakfast)      senna-docusate (PERICOLACE) 8.6-50 MG per tablet Take 1 tablet by mouth daily      mycophenolate (CELLCEPT) 500 MG tablet Take 1 tablet by mouth 2 times daily (Patient taking differently: Take 500 mg by mouth daily ) 60 tablet 3    OXYGEN Inhale 4 L into the lungs continuous (Patient taking differently: Inhale 3 L into the lungs nightly ) 1 Container 1    Multiple Vitamins-Minerals (MULTIVITAMIN PO) Take 1 tablet by mouth daily. No current facility-administered medications on file prior to encounter. REVIEW OF SYSTEMS    Pertinent items are noted in HPI. Objective:      BP (!) 147/73   Pulse 89   Temp 97.6 °F (36.4 °C) (Oral)   Resp 22   Wt 119 lb 7.8 oz (54.2 kg)   BMI 21.85 kg/m²     Wt Readings from Last 3 Encounters:   05/06/19 119 lb 7.8 oz (54.2 kg)   05/04/19 119 lb 14.9 oz (54.4 kg)   04/25/19 123 lb (55.8 kg)       PHYSICAL EXAM    General Appearance: alert and oriented to person, place and time, well-developed and well-nourished, in no acute distress  Skin: warm and dry, no rash or erythema  Head: normocephalic and atraumatic  Eyes: pupils equal, round, and reactive to light  Pulmonary/Chest:  normal air movement, no respiratory distress  Cardiovascular: normal rate and regular rhythm  Extremities: no cyanosis and no clubbing  Wound:  No granulation tissue noted in wound base. Only a small piece of black foam removed from wound base which is much smaller than wound size from previous NPWT placement by home care. Area of undermining can easily fit black foam and pressure increased to 150 mmHG. Assessment:        Problem List Items Addressed This Visit     None           Procedure Note  Indications:  Based on my examination of this patient's wound(s)/ulcer(s) today, debridement is required to promote healing and evaluate the wound base. Performed by: RONNY Palomino CNP    Consent obtained:  Yes    Time out taken:  Yes    Pain Control: Anesthetic  Anesthetic: 4% Lidocaine Liquid Topical(2.5mL's)       Debridement: Excisional Debridement    Using curette the wound(s)/ulcer(s) was/were sharply debrided down through and including the removal of epidermis, dermis, subcutaneous tissue and muscle/fascia.         Devitalized Tissue Debrided:  fibrin, biofilm and slough    Pre Debridement Measurements:  Are located in the [place]  Surface Area Debrided:  1.05 sq cm     Diabetic/Pressure/Non Pressure Ulcers only:  Ulcer: Pressure ulcer, Stage 3     Estimated Blood Loss:  Minimal    Hemostasis Achieved:  by pressure    Procedural Pain:  0  / 10     Post Procedural Pain:  0 / 10     Response to treatment:  Well tolerated by patient. Plan:   Pt/ education per provider related to lack of progress of wound to healing and idea to include more black dana into wound in contact with all areas of wound bed and to increase setting to 150 mm HG and we can re-evaluate next week. Pt and  in agreement with plan of care and questions answered. Treatment Note please see attached Discharge Instructions    Written patient dismissal instructions given to patient and signed by patient or POA. Discharge Instructions       Wound Clinic Physician Orders and Discharge Instructions  [place] [id number], [facility]  Telephone: [phone]      FAX [phone]     NAME: Violet Repress OF BIRTH:  1940  MEDICAL RECORD NUMBER:  [id number]  DATE:  5/6/2019     Wound Cleansing:   Do not scrub or use excessive force. Cleanse wound prior to applying a clean dressing with:  [x] Normal Saline            [x] Keep Wound Dry in Shower    [] Wound Cleanser   [] Cleanse wound with Mild Soap & Water  [x] May Shower    [] Other:        Topical Treatments:  Do not apply lotions, creams, or ointments to wound bed unless directed. [] Apply moisturizing lotion to skin surrounding the wound prior to dressing change.  [] Apply antifungal ointment to skin surrounding the wound prior to dressing change. [x] Apply thin film of moisture barrier ointment to skin immediately around wound.   [x] Other: Apply Skin Prep to surrounding area around Buttock Wound         Dressings:                  Wound Location :        [] Apply Primary Dressing:                                              []               [] Cover and Secure with:                       [] DRY Gauze    [] Daniela   [] Kerlix              [] Ace Wrap       [] Cover Roll Tape         [] ABD                              [] Other:               Avoid contact of tape with skin. [] Change dressing:       [] Daily              [] Every Other Day        [] Three times per week              [] Once a week  [] Do Not Change Dressing         [] Other:      Negative Pressure:           Wound Location: LEFT BUTTOCK   **KEEP DRAPE OFF EXCORIATED AREA**    [email or web address]        [X ] Black Foam - BE SURE TO GET INTO UNDERMINING FROM 12:00 TO 12:00 = 2.1CM ALSO (DEEPEST AT 11:00)  [] White Foam                                     [] Other:   [x]Change dressing:        [x]Three times per week             [x]Other: BRANDON RING TO INDENTED AREA ON MEDIAL ASPECT OF WOUND. VAC DRAPE MUST COVER BRANDON RING.        Pressure Relief:  [x] When sitting, shift position or do seat lifts every 15 minutes.  DO NOT SIT FOR MORE THAN 30 MINUTES AT A TIME  [] Wheelchair cushion   [x] Specialty Bed/Mattress - SUGGEST THICK FOAM OVERLAY 4\"  [x] Turn every 2 hours when in bed.  Avoid position directing pressure on wound site.  Limit side lying to 30 degree tilt.  Limit HOB elevation to 30 degrees.        Off-Loading:   [] Off-loading when        [] walking           [] in bed [] sitting  [] Total non-weight bearing  [] Right Leg  [] Left Leg          [x] Assistive Device         [x] Walker            [] Cane   [] Wheelchair      [] Crutches              [] Surgical shoe    [] Podus Boot(s)   [] Foam Boot(s)  [] Roll About              [] Cast Boot       [] CROW Boot  [] Other:     Dietary:  [x] Diet as tolerated:        [] Calorie Diabetic Diet: [] No Added Salt:  [x] Increase Protein:        [] Other:     Activity:  [x] Activity as tolerated:  [] Patient has no activity restrictions     [] Strict Bedrest:            [] Remain off Work:     [] May return to full duty work: [phone] to work with [address]     Return Appointment:  [x] Wound and dressing supply provider: [facility]  [x] ECF or Home Healthcare:  [address] [place] [address]  [] Nurse visit:     [] Physician or NP scheduled for Nurse Visit:   [x] Return Appointment: With Travis Calero NP in  1 Week(s)   [] Ordered tests:      Nurse Case Manger: Digna Valdez     Electronically signed by Velma Lawson RN on 5/6/2019 at 11:32 AM    [id number] [facility]  N Information: Should you experience any significant changes in your wound(s) or have questions about your wound care, please contact the [address] [place] [address] [phone] [facility] - THURSDAY 8:30 am - 4:30 pm and Friday 8:30 am - 1:00 pm.  If you need help with your wound outside these hours and cannot wait until we are again available, contact your PCP or go to the hospital emergency room.         PLEASE NOTE: IF YOU ARE UNABLE TO OBTAIN WOUND SUPPLIES, CONTINUE TO USE THE SUPPLIES YOU HAVE AVAILABLE UNTIL YOU ARE ABLE TO [facility].  IT IS MOST IMPORTANT TO KEEP THE WOUND COVERED AT ALL TIMES.     Physician Signature:_______________________     Date: ___________ Time:  ____________                    [] Dr Ignacio Callaway    [] Dr Darcy Reveles CNP               [] Dr Bob Alex  [] Dr Glenn Chin   [] Dr Fuentes Alfaro                [] Dr Candie Santso   [] Jason Calderón NP              Electronically signed by RONNY Jha CNP on 5/7/2019 at 1:38 PM

## 2019-05-08 ENCOUNTER — OFFICE VISIT (OUTPATIENT)
Dept: PULMONOLOGY | Age: 79
End: 2019-05-08
Payer: MEDICARE

## 2019-05-08 VITALS
BODY MASS INDEX: 21.85 KG/M2 | OXYGEN SATURATION: 92 % | HEART RATE: 72 BPM | DIASTOLIC BLOOD PRESSURE: 69 MMHG | TEMPERATURE: 97 F | SYSTOLIC BLOOD PRESSURE: 114 MMHG | RESPIRATION RATE: 20 BRPM | HEIGHT: 62 IN

## 2019-05-08 DIAGNOSIS — J84.9 ILD (INTERSTITIAL LUNG DISEASE) (HCC): Primary | ICD-10-CM

## 2019-05-08 DIAGNOSIS — R76.8 SCL-70 ANTIBODY POSITIVE: ICD-10-CM

## 2019-05-08 DIAGNOSIS — J96.11 CHRONIC RESPIRATORY FAILURE WITH HYPOXIA (HCC): ICD-10-CM

## 2019-05-08 PROCEDURE — 1123F ACP DISCUSS/DSCN MKR DOCD: CPT | Performed by: INTERNAL MEDICINE

## 2019-05-08 PROCEDURE — 4040F PNEUMOC VAC/ADMIN/RCVD: CPT | Performed by: INTERNAL MEDICINE

## 2019-05-08 PROCEDURE — 99214 OFFICE O/P EST MOD 30 MIN: CPT | Performed by: INTERNAL MEDICINE

## 2019-05-08 PROCEDURE — G8400 PT W/DXA NO RESULTS DOC: HCPCS | Performed by: INTERNAL MEDICINE

## 2019-05-08 PROCEDURE — 1036F TOBACCO NON-USER: CPT | Performed by: INTERNAL MEDICINE

## 2019-05-08 PROCEDURE — G8427 DOCREV CUR MEDS BY ELIG CLIN: HCPCS | Performed by: INTERNAL MEDICINE

## 2019-05-08 PROCEDURE — G8598 ASA/ANTIPLAT THER USED: HCPCS | Performed by: INTERNAL MEDICINE

## 2019-05-08 PROCEDURE — G8420 CALC BMI NORM PARAMETERS: HCPCS | Performed by: INTERNAL MEDICINE

## 2019-05-08 PROCEDURE — 1090F PRES/ABSN URINE INCON ASSESS: CPT | Performed by: INTERNAL MEDICINE

## 2019-05-08 RX ORDER — CEFUROXIME AXETIL 250 MG/1
250 TABLET ORAL 2 TIMES DAILY
COMMUNITY
End: 2019-06-05 | Stop reason: ALTCHOICE

## 2019-05-08 NOTE — PROGRESS NOTES
Chief complaint  This is a 70y.o. 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 She was on prednisone initially with good response but I have always been worried about the side effects. So I added cellcept and then weaned that. We have always been worried about these drugs due to delayed wound healing since she still has a wound vac. She would like to come off of everything due to delayed wound healing. Her breathing is about at baseline. She has been using 4 liters and continues to do so. She did have subjective improvement to prednisone when I placed her on the 20 mg dosing earlier in the year.     Past Medical History:   Diagnosis Date    Anxiety and depression     Arthritis     Cancer of skin of leg     Chronic low back pain     GERD (gastroesophageal reflux disease)     Insomnia     Iron deficiency anemia     Kidney stone     PUD (peptic ulcer disease)     Pulmonary fibrosis (HCC)     Dr. Lita Pimentel has a CT done every 6 months    Stomach ulcer     Ulcer - lesion MARCH 2010    UTI (urinary tract infection)     frequent       Past Surgical History:   Procedure Laterality Date   [facility] SURGERY  MARCH 2004,OCT 02, [place] 48    x4--fusions    BACK SURGERY  06/2000    laminectomy-lumbar    CARPAL TUNNEL RELEASE  9/27/11    Left    COLONOSCOPY      CYSTOSCOPY Left 09/2016    cysto left ureteral stent placement    CYSTOSCOPY  01/15/2018    Left Stent Insertion    CYSTOSCOPY  02/14/2018    cysto, left uretero with laser litho, left stent excahnge    EYE SURGERY Bilateral 2008    cataract removed with lens implants    FRACTURE SURGERY Right 06/05/2016    femur fracture    FRACTURE SURGERY Left     femur fracture   [address] JOINT REPLACEMENT  4/20/2000    LEFT KNEE    JOINT REPLACEMENT  2/27/08    RIGHT KNEE    [place] [address] W/BRNCL ALVEOLAR LAVAGE N/A 10/24/2018    BRONCHOSCOPY WITH BRONCHOALVEOLAR LAVAGE performed by Miquel Horan DO at [facility] 96. DEBRIDE NECROTIC SKIN/ TISSUE, GENIT & PERINM Left 11/28/2018    DEBRIDEMENT LEFT HIP SKIN AND SUBCUTANEOUS FASCIA performed by Stanton Trujillo MD at [address] [place] [address]    For PUD           Current Outpatient Medications   Medication Sig Dispense Refill    cefUROXime (CEFTIN) 250 MG tablet Take 250 mg by mouth 2 times daily      omeprazole (PRILOSEC) 40 MG delayed release capsule TAKE 1 CAPSULE BY MOUTH TWICE DAILY 180 capsule 0    buPROPion (WELLBUTRIN SR) 150 MG extended release tablet TAKE 1 TABLET BY MOUTH TWICE DAILY 180 tablet 0    acetaminophen-codeine (TYLENOL #4) 300-60 MG per tablet Take 1 tablet by mouth every 4 hours as needed for Pain.  amLODIPine (NORVASC) 10 MG tablet Take 1 tablet by mouth daily 30 tablet 1    sertraline (ZOLOFT) 100 MG tablet TAKE 1 TABLET BY MOUTH EVERY DAY 90 tablet 0    alendronate (FOSAMAX) 70 MG tablet TAKE 1 TABLET BY MOUTH EVERY 7 DAYS 12 tablet 0    predniSONE (DELTASONE) 5 MG tablet Take 1 tablet by mouth daily 30 tablet 4    ipratropium (ATROVENT) 0.03 % nasal spray 2 sprays by Nasal route 4 times daily 1 Bottle 3    ipratropium-albuterol (DUONEB) 0.5-2.5 (3) MG/3ML SOLN nebulizer solution Inhale 3 mLs into the lungs every 6 hours as needed for Shortness of Breath (wheezing) 360 mL 0    clonazePAM (KLONOPIN) 0.5 MG tablet Take 0.25 mg by mouth nightly as needed (restless leg syndrome). Leanora Care ferrous sulfate 325 (65 Fe) MG tablet Take 325 mg by mouth daily (with breakfast)      senna-docusate (PERICOLACE) 8.6-50 MG per tablet Take 1 tablet by mouth daily      mycophenolate (CELLCEPT) 500 MG tablet Take 1 tablet by mouth 2 times daily (Patient taking differently: Take 500 mg by mouth daily ) 60 tablet 3    OXYGEN Inhale 4 L into the lungs continuous (Patient taking differently: Inhale 3 L into the lungs nightly ) 1 Container 1    Multiple Vitamins-Minerals (MULTIVITAMIN PO) Take 1 tablet by mouth daily. No current facility-administered medications for this visit. ROS:  GENERAL:  Recent admission for CVA, memory issues   HEENT:  Sinus congestion   HEART:  No chest pain, no palpitations  LUNGS: Still SOB  ABDOMEN:  No abdominal pains, no changes in stools  GENITOURINARY:  No increased frequency, no blood in urine  EXTREMITIES:  No swelling, no lesions  NEURO:  Recent stroke and admission   SKIN: Poor wound healing of leg  LYMPH:  No masses, no swelling neck, armpits, or groin    PHYSICAL EXAM:  Vitals:    05/08/19 1352   BP: 114/69   Pulse: 72   Resp: 20   Temp: 97 °F (36.1 °C)   SpO2: 92%       GENERAL:  Chronically ill, alert, appears stated age, frail, pale  HEENT:  No scleral icterus, no conjunctival irritation. Dry mucus membranes   NECK:  No thyromegaly, no bruits  LYMPH:  No cervical or supraclavicular adenopathy  HEART:  Regular rate and rhythm, no murmurs  LUNGS:  Few crackles, diminished   ABDOMEN:  No distention, no organomegaly  EXTREMITIES:  No edema, no digital clubbing  NEURO:  No localizing deficits, CN II-XII intact    Pulmonary Function Testing (outside records)  Restricted lung with reduced diffusing capacity    Chest imaging from 10/1/2019 is reviewed. My interpretation is continued cystic lung disease with likely fibrosis     ECHO 3/2018  Normal LV size and systolic function. Estimated ejection fraction is 60%. Grade I diastolic dysfunction. Mild mitral regurgitation is present. There is an aneurysmal interatrial septum with no evidence of shunting by color flow doppler. Normal right ventricular size and function. There is mild tricuspid regurgitation with RVSP estimated at 34 mmHg. Trivial pulmonic regurgitation present.      ADEOLA  + at 1:40  ANCA changes are predominantly in the lower lung fields and there are significant variation in the size of cysts.    LIP is also in the differential

## 2019-05-13 ENCOUNTER — HOSPITAL ENCOUNTER (OUTPATIENT)
Dept: WOUND CARE | Age: 79
Discharge: HOME | End: 2019-05-13
Payer: MEDICARE

## 2019-05-13 VITALS
HEART RATE: 81 BPM | TEMPERATURE: 97.5 F | DIASTOLIC BLOOD PRESSURE: 73 MMHG | RESPIRATION RATE: 20 BRPM | SYSTOLIC BLOOD PRESSURE: 151 MMHG

## 2019-05-13 DIAGNOSIS — L89.323 PRESSURE INJURY OF LEFT BUTTOCK, STAGE 3 (HCC): Primary | ICD-10-CM

## 2019-05-13 PROCEDURE — 11043 DBRDMT MUSC&/FSCA 1ST 20/<: CPT | Performed by: NURSE PRACTITIONER

## 2019-05-13 PROCEDURE — 97605 NEG PRS WND THER DME<=50SQCM: CPT

## 2019-05-13 PROCEDURE — 11043 DBRDMT MUSC&/FSCA 1ST 20/<: CPT

## 2019-05-13 RX ORDER — LIDOCAINE HYDROCHLORIDE 40 MG/ML
SOLUTION TOPICAL ONCE
Status: DISCONTINUED | OUTPATIENT
Start: 2019-05-13 | End: 2019-05-14 | Stop reason: HOSPADM

## 2019-05-13 ASSESSMENT — PAIN DESCRIPTION - ONSET: ONSET: ON-GOING

## 2019-05-13 ASSESSMENT — PAIN DESCRIPTION - PROGRESSION: CLINICAL_PROGRESSION: NOT CHANGED

## 2019-05-13 ASSESSMENT — PAIN DESCRIPTION - DESCRIPTORS: DESCRIPTORS: BURNING

## 2019-05-13 ASSESSMENT — PAIN SCALES - GENERAL
PAINLEVEL_OUTOF10: 0
PAINLEVEL_OUTOF10: 6

## 2019-05-13 ASSESSMENT — PAIN DESCRIPTION - ORIENTATION: ORIENTATION: LEFT

## 2019-05-13 ASSESSMENT — PAIN DESCRIPTION - PAIN TYPE: TYPE: ACUTE PAIN

## 2019-05-13 ASSESSMENT — PAIN DESCRIPTION - LOCATION: LOCATION: HIP

## 2019-05-13 ASSESSMENT — PAIN DESCRIPTION - FREQUENCY: FREQUENCY: INTERMITTENT

## 2019-05-13 NOTE — PROGRESS NOTES
Alyssa Olivier 39   Progress Note and Procedure Note      Emiliano Obando [facility]  MEDICAL RECORD NUMBER:  [id number]  AGE: 70 y.o. GENDER: female  : 1940  EPISODE DATE:  2019    Subjective:     Chief Complaint   Patient presents with    Wound Check     Follow-up visit for a wound to the let hip. HISTORY of PRESENT ILLNESS HPI  Emiliano New is a 70 y. o. female who presents today for wound/ulcer evaluation. History of Wound Context:  Pt presents with a wound in the Left buttock. The area started out as a skin dimpling causing pain. Pt has had hip fracture surgery by  and pt saw him who referred her to  who thought this could be bursitis and gave an injection and prescribed a compounded cream containing 4 different medications to be applied to the site. The area subsequently opened up becoming a wound. Pt saw Rafael Renee, her PCP who prescribed Doxycycline and discontinued the cream. Pt was advised to be seen at the [facility].    Pt had surgical revision of wound by Dr. Verona Pérez on 18 to open up area.    Recently in hospital for acute CVA with AMS.  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  to talk with pt's physicians to see 2 of her medicines could be interfering with wound healing,namely; Cellcept and steroid. Denies consitutional issues and NPWT used to treat wound changed 3x/week @ 125mmHg continuously. 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 O2 dependent 4l/min continuously. On Cellcept,and Prednisone. Saw Dr. Evan Alvarenga, pulmonology this past week and he discontinued Cellcept and is being    weaned (temporarily) for Prednisone.   Wound/Ulcer Pain Timing/Severity: intermittent  Quality of pain: aching  Severity:  3 / 10   Modifying Factors: None  Associated Signs/Symptoms: erythema, drainage and pain     Ulcer Identification:  Ulcer Type: traumatic  Contributing Factors: decreased mobility, shear force and decreased tissue oxygenation, immune suppression             PAST MEDICAL HISTORY        Diagnosis Date    Anxiety and depression     Arthritis     Cancer of skin of leg     Chronic low back pain     GERD (gastroesophageal reflux disease)     Insomnia     Iron deficiency anemia     Kidney stone     PUD (peptic ulcer disease)     Pulmonary fibrosis ([facility] [address]     Dr. Mirela Deal has a CT done every 6 months    Stomach ulcer     Ulcer - lesion 2010    UTI (urinary tract infection)     frequent       PAST SURGICAL HISTORY    Past Surgical History:   Procedure Laterality Date    BACK SURGERY  2004,OCT 02, [place] 18    x4--fusions    BACK SURGERY  2000    laminectomy-lumbar    CARPAL TUNNEL RELEASE  11    Left    COLONOSCOPY      CYSTOSCOPY Left 2016    cysto left ureteral stent placement    CYSTOSCOPY  01/15/2018    Left Stent Insertion    CYSTOSCOPY  2018    cysto, left uretero with laser litho, left stent excahnge    EYE SURGERY Bilateral     cataract removed with lens implants    FRACTURE SURGERY Right 2016    femur fracture    FRACTURE SURGERY Left     femur fracture    HYSTERECTOMY  1993    HYSTEROSCOPY  1993    JOINT REPLACEMENT  2000    LEFT KNEE    JOINT REPLACEMENT  08    RIGHT KNEE    [place] [address] W/BRNCL ALVEOLAR LAVAGE N/A 10/24/2018    BRONCHOSCOPY WITH BRONCHOALVEOLAR LAVAGE performed by Justino Ann DO at [facility] 96. DEBRIDE NECROTIC SKIN/ TISSUE, GENIT & PERINM Left 2018    DEBRIDEMENT LEFT HIP SKIN AND SUBCUTANEOUS FASCIA performed by Princess Gomez MD at [address] [place] [address]    For PUD       FAMILY HISTORY    Family History   Problem Relation Age of Onset    Emphysema Mother          AGE 64    Depression Mother     Clotting Disorder Father             Stroke Father        SOCIAL HISTORY    Social History     Tobacco Use    Smoking status: Never Smoker    Smokeless tobacco: Never Used    Tobacco comment: encouraged to never smoke    Substance Use Topics    Alcohol use: No     Alcohol/week: 0.0 oz    Drug use: No       ALLERGIES    Allergies   Allergen Reactions    Ampicillin Hives    Ciprofloxacin Other (See Comments)     Sores in mouth      Nitrofurantoin Other (See Comments)     Unable to recall reaction    Sulfa Antibiotics Other (See Comments)     Unable to recall reaction    Penicillins Hives and Rash       MEDICATIONS    Current Outpatient Medications on File Prior to Encounter   Medication Sig Dispense Refill    cefUROXime (CEFTIN) 250 MG tablet Take 250 mg by mouth 2 times daily      omeprazole (PRILOSEC) 40 MG delayed release capsule TAKE 1 CAPSULE BY MOUTH TWICE DAILY 180 capsule 0    buPROPion (WELLBUTRIN SR) 150 MG extended release tablet TAKE 1 TABLET BY MOUTH TWICE DAILY 180 tablet 0    acetaminophen-codeine (TYLENOL #4) 300-60 MG per tablet Take 1 tablet by mouth every 4 hours as needed for Pain.  amLODIPine (NORVASC) 10 MG tablet Take 1 tablet by mouth daily 30 tablet 1    sertraline (ZOLOFT) 100 MG tablet TAKE 1 TABLET BY MOUTH EVERY DAY 90 tablet 0    predniSONE (DELTASONE) 5 MG tablet Take 1 tablet by mouth daily 30 tablet 4    ipratropium (ATROVENT) 0.03 % nasal spray 2 sprays by Nasal route 4 times daily 1 Bottle 3    ipratropium-albuterol (DUONEB) 0.5-2.5 (3) MG/3ML SOLN nebulizer solution Inhale 3 mLs into the lungs every 6 hours as needed for Shortness of Breath (wheezing) 360 mL 0    clonazePAM (KLONOPIN) 0.5 MG tablet Take 0.25 mg by mouth nightly as needed (restless leg syndrome). Jose Reddy ferrous sulfate 325 (65 Fe) MG tablet Take 325 mg by mouth daily (with breakfast)      senna-docusate (PERICOLACE) 8.6-50 MG per tablet Take 1 tablet by mouth daily      OXYGEN Inhale 4 L into the lungs continuous (Patient taking differently: Inhale 3 L into the lungs nightly ) 1 Container 1    Multiple Vitamins-Minerals (MULTIVITAMIN PO) Take 1 tablet by mouth daily.      alendronate (FOSAMAX) 70 MG tablet TAKE 1 TABLET BY MOUTH EVERY 7 DAYS 12 tablet 0     No current facility-administered medications on file prior to encounter. REVIEW OF SYSTEMS    Pertinent items are noted in HPI. Objective:      BP (!) 151/73   Pulse 81   Temp 97.5 °F (36.4 °C) (Oral)   Resp 20     Wt Readings from Last 3 Encounters:   05/06/19 119 lb 7.8 oz (54.2 kg)   05/04/19 119 lb 14.9 oz (54.4 kg)   04/25/19 123 lb (55.8 kg)       PHYSICAL EXAM    General Appearance: alert and oriented to person, place and time and frail-appearing  Skin: warm and dry  Head: normocephalic and atraumatic  Eyes: pupils equal, round, and reactive to light  Pulmonary/Chest: normal air movement, no respiratory distress  Cardiovascular: normal rate and regular rhythm  Extremities: no cyanosis and no clubbing  Wound: Nurse manager from [facility] was asked to evaluate how NPWT dressing was being applied at home and after this was done a new nurse has been applying NPWT with a more appropriate size of black foam removed from wound today,  3/4 wound measurements improved this week. Assessment:        Problem List Items Addressed This Visit     Pressure injury of skin of left buttock - Primary           Procedure Note  Indications:  Based on my examination of this patient's wound(s)/ulcer(s) today, debridement is required to promote healing and evaluate the wound base. Performed by: RONNY Lei CNP    Consent obtained:  Yes    Time out taken:  Yes    Pain Control: Anesthetic  Anesthetic: 4% Lidocaine Liquid Topical(2.5 ml)       Debridement: Excisional Debridement    Using curette the wound(s)/ulcer(s) was/were sharply debrided down through and including the removal of epidermis, dermis, subcutaneous tissue and muscle/fascia.         Devitalized Tissue Debrided:  fibrin, biofilm and slough    Pre Debridement Measurements:  Are located in the [facility]  Documentation Flow Sheet    Wound/Ulcer #: 5    Post Debridement Measurements:  Wound/Ulcer Descriptions are Pre Debridement except measurements:    Wound 12/03/18 Hip Left #5(acquired on 11/28/18-surgically created by Dr. Gabriel Hernandes) (Active)   Wound Image   5/6/2019 11:48 AM   Wound Other 12/17/2018 11:01 AM   Dressing Status Old drainage 5/13/2019 11:06 AM   Dressing Changed Changed/New 5/13/2019 11:34 AM   Dressing/Treatment Vacuum dressing 5/13/2019 11:34 AM   Wound Length (cm) 0.5 cm 5/13/2019 11:06 AM   Wound Width (cm) 1 cm 5/13/2019 11:06 AM   Wound Depth (cm) 1.4 cm 5/13/2019 11:06 AM   Wound Surface Area (cm^2) 0.5 cm^2 5/13/2019 11:06 AM   Change in Wound Size % (l*w) 95.83 5/13/2019 11:06 AM   Wound Volume (cm^3) 0.7 cm^3 5/13/2019 11:06 AM   Wound Healing % 98 5/13/2019 11:06 AM   Post-Procedure Length (cm) 0.6 cm 5/13/2019 11:18 AM   Post-Procedure Width (cm) 1.1 cm 5/13/2019 11:18 AM   Post-Procedure Depth (cm) 1.4 cm 5/13/2019 11:18 AM   Post-Procedure Surface Area (cm^2) 0.66 cm^2 5/13/2019 11:18 AM   Post-Procedure Volume (cm^3) 0.92 cm^3 5/13/2019 11:18 AM   Distance Tunneling (cm) 2.3 cm 3/11/2019 10:45 AM   Tunneling Position ___ O'Clock 10 3/11/2019 10:45 AM   Undermining Starts ___ O'Clock 12 5/13/2019 11:06 AM   Undermining Ends___ O'Clock 12 5/13/2019 11:06 AM   Undermining Maxium Distance (cm) 2.4 5/13/2019 11:06 AM   Wound Assessment Granulation tissue;Red;Slough; Yellow 5/13/2019 11:06 AM   Drainage Amount Moderate 5/13/2019 11:06 AM   Drainage Description Brown;Serosanguinous 5/13/2019 11:06 AM   Odor None 5/13/2019 11:06 AM   Margins Undefined edges 5/13/2019 11:06 AM   Exposed structure Fascia 1/28/2019 11:26 AM   Mirian-wound Assessment White 5/13/2019 11:06 AM   Non-staged Wound Description Full thickness 4/1/2019 11:04 AM   McCleary%Wound Bed 20 5/6/2019 10:58 AM   Red%Wound Bed 80% 5/13/2019 11:06 AM   Yellow%Wound Bed 20% 5/13/2019 11:06 AM   Number of days: 160          Percent of Wound(s)/Ulcer(s) Debrided: 100%    Total Surface Area Debrided:  0.66 sq cm     Diabetic/Pressure/Non Pressure Ulcers only:  Ulcer: Pressure ulcer, Stage 3     Estimated Blood Loss:  Minimal    Hemostasis Achieved:  by pressure    Procedural Pain:  5  / 10     Post Procedural Pain:  2 / 10     Response to treatment:  Well tolerated by patient. Plan:   Pt education per provider related to improvement in wound measurements and current plan of care. Pt/ agreeable to continue same plan of care and questions answered. Treatment Note please see attached Discharge Instructions    Written patient dismissal instructions given to patient and signed by patient or POA. Discharge Instructions       Wound Clinic Physician Orders and Discharge Instructions  [address], [facility]  Telephone: [phone]      FAX [phone]     NAME: Garrison Pollack OF BIRTH:  1940  MEDICAL RECORD NUMBER:  [id number]  DATE:  5/13/2019     Wound Cleansing:   Do not scrub or use excessive force. Cleanse wound prior to applying a clean dressing with:  [x] Normal Saline            [x] Keep Wound Dry in Shower    [] Wound Cleanser   [] Cleanse wound with Mild Soap & Water  [x] May Shower    [] Other:        Topical Treatments:  Do not apply lotions, creams, or ointments to wound bed unless directed. [] Apply moisturizing lotion to skin surrounding the wound prior to dressing change.  [] Apply antifungal ointment to skin surrounding the wound prior to dressing change. [x] Apply thin film of moisture barrier ointment to skin immediately around wound.   [x] Other: Apply Skin Prep to surrounding area around Buttock Wound         Dressings:                  Wound Location :        [] Apply Primary Dressing:                                              []               [] Cover and Secure with:                       [] DRY Gauze    [] Daniela   [] Kerlix              [] Ace Wrap       [] Cover Roll Tape         [] ABD                              [] Other:               Avoid contact of tape with skin. [] Change dressing:       [] Daily              [] Every Other Day        [] Three times per week              [] Once a week  [] Do Not Change Dressing         [] Other:      Negative Pressure:           Wound Location: LEFT BUTTOCK   **KEEP DRAPE OFF EXCORIATED AREA**    [email or web address]        [X ] Black Foam - BE SURE TO GET INTO UNDERMINING FROM 12:00 TO 12:00 = 2.4 CM ALSO (DEEPEST AT 10:00)  [] White Foam                                     [] Other:   [x]Change dressing:        [x]Three times per week             [x]Other: BRANDON RING TO INDENTED AREA ON MEDIAL ASPECT OF WOUND. VAC DRAPE MUST COVER BRANDON RING.        Pressure Relief:  [x] When sitting, shift position or do seat lifts every 15 minutes.  DO NOT SIT FOR MORE THAN 30 MINUTES AT A TIME  [] Wheelchair cushion   [x] Specialty Bed/Mattress - SUGGEST THICK FOAM OVERLAY 4\"  [x] Turn every 2 hours when in bed.  Avoid position directing pressure on wound site.  Limit side lying to 30 degree tilt.  Limit HOB elevation to 30 degrees.        Off-Loading:   [] Off-loading when        [] walking           [] in bed [] sitting  [] Total non-weight bearing  [] Right Leg  [] Left Leg          [x] Assistive Device         [x] Walker            [] Cane   [] Wheelchair      [] Crutches              [] Surgical shoe    [] Podus Boot(s)   [] Foam Boot(s)  [] Roll About              [] Cast Boot       [] CROW Boot  [] Other:     Dietary:  [x] Diet as tolerated:        [] Calorie Diabetic Diet: [] No Added Salt:  [x] Increase Protein:        [] Other:     Activity:  [x] Activity as tolerated:  [] Patient has no activity restrictions     [] Strict Bedrest:            [] Remain off Work:     [] May return to full duty work: [phone] to work with restrictions:                 Return Appointment:  [x] Wound and dressing supply provider: [facility]  [x] ECF or Home Healthcare: [id number] [facility]  [] Nurse visit: Carmen Rubio or NP scheduled for Nurse Visit:   [x] Return Appointment: With DR WERNER [facility]  1 Week(s)   [] Ordered tests:      Nurse Case Manger: Dorys Alex     Electronically signed by Anna Prajapati RN on 5/13/2019 at 11:20 [address] Information: Should you experience any significant changes in your wound(s) or have questions about your wound care, please contact the [address] [place] [address] [phone] [facility] - THURSDAY 8:30 am - 4:30 pm and Friday 8:30 am - 1:00 pm.  If you need help with your wound outside these hours and cannot wait until we are again available, contact your PCP or go to the hospital emergency room.         PLEASE NOTE: IF YOU ARE UNABLE TO OBTAIN WOUND SUPPLIES, CONTINUE TO USE THE SUPPLIES YOU HAVE AVAILABLE UNTIL YOU ARE ABLE TO [facility].  IT IS MOST IMPORTANT TO KEEP THE WOUND COVERED AT ALL TIMES.     Physician Signature:_______________________     Date: ___________ Time:  ____________                    [] Dr Aba Salgado    [] Dr Flaquito Yadav CNP               [] Dr Rossana Mas  [] Dr Holly Mendoza   [] Dr Daniele BRENNAN             [] ALEN Chairez                [] Dr Talha Peter   [] Jason Spears NP              Electronically signed by RONNY Mcclellan CNP on 5/13/2019 at 12:04 PM

## 2019-05-13 NOTE — PLAN OF CARE
Negative Pressure    NAME:  Karime Glez  YOB: 1940  MEDICAL RECORD NUMBER:  [id number]  DATE:  5/13/2019     Applied Negative Pressure to Left Hip wound.  [x] Applied skin barrier prep to haley-wound.  [x] Cut strips of plastic drape to picture frame wound so that haley-wound is     covered with the drape.  [x] If bridging dressing to less prominent site, cover any intact skin that will come in contact with the Negative Pressure Therapy sponge, gauze or channel drain with plastic drape. The sponge should never touch intact skin.  [x] Cut sponge, gauze or channel drain to size which will fit into the wound/ulcer bed without being forced.  [x] Be sure the sponge is large enough to hold the entire round plastic flange which is attached to the tubing. Never allow flange to be larger than the sponge or it will produce suction damaging intact skin.  Total number of individual pieces of foam used within the wound bed: 1     [x] If bridging the dressing away from the primary site, be sure the bridge leads to a piece of sponge large enough to hold the entire flange without allowing any of the flange to overlap onto intact skin.  [x] Covered sponge, gauze or channel drain with plastic drape.  [x] Cut a hole in this plastic drape directly over the sponge the same size as the plastic drain tubing.  [x] Removed plastic liner from flange and apply it directly over the hole you cut.  [x] Removed the plastic cover from the flange.  [x] Attached the tubing to the wound/ulcer Negative Pressure Therapy and turn it on to be sure a vacuum is created and that there are no leaks.  [x] If air leaks occur, use plastic drape to patch them.  [x] Secured Negative Pressure Therapy dressing with ace wrap loosely if located on an extremity. Maintain tubing outside of ace wrap. Tubing must not exert pressure on intact skin.     Applied per  Guidelines      Electronically signed by Chantell Huerta RN on 5/13/2019 at 11:34 AM

## 2019-05-20 ENCOUNTER — HOSPITAL ENCOUNTER (OUTPATIENT)
Dept: WOUND CARE | Age: 79
Discharge: HOME | End: 2019-05-20
Payer: MEDICARE

## 2019-05-20 ENCOUNTER — TELEPHONE (OUTPATIENT)
Dept: PULMONOLOGY | Age: 79
End: 2019-05-20

## 2019-05-20 VITALS
DIASTOLIC BLOOD PRESSURE: 33 MMHG | RESPIRATION RATE: 28 BRPM | TEMPERATURE: 97.6 F | SYSTOLIC BLOOD PRESSURE: 163 MMHG | HEART RATE: 89 BPM

## 2019-05-20 DIAGNOSIS — J84.9 ILD (INTERSTITIAL LUNG DISEASE) (HCC): Primary | ICD-10-CM

## 2019-05-20 PROCEDURE — 97605 NEG PRS WND THER DME<=50SQCM: CPT

## 2019-05-20 RX ORDER — PREDNISONE 1 MG/1
5 TABLET ORAL DAILY
Qty: 30 TABLET | Refills: 6 | Status: SHIPPED | OUTPATIENT
Start: 2019-05-20 | End: 2019-06-05 | Stop reason: SDUPTHER

## 2019-05-20 RX ORDER — LIDOCAINE HYDROCHLORIDE 40 MG/ML
SOLUTION TOPICAL PRN
Status: DISCONTINUED | OUTPATIENT
Start: 2019-05-20 | End: 2019-05-21 | Stop reason: HOSPADM

## 2019-05-20 ASSESSMENT — PAIN SCALES - GENERAL
PAINLEVEL_OUTOF10: 0
PAINLEVEL_OUTOF10: 0

## 2019-05-20 NOTE — PLAN OF CARE
Negative Pressure    NAME:  Aureliano Roy  YOB: 1940  MEDICAL RECORD NUMBER:  [id number]  DATE:  5/20/2019     Applied Negative Pressure to the left hip wound(s)/ulcer(s).  [x] Applied skin barrier prep to haley-wound.  [x] Cut strips of plastic drape to picture frame wound so that haley-wound is     covered with the drape.  [x] If bridging dressing to less prominent site, cover any intact skin that will come in contact with the Negative Pressure Therapy sponge, gauze or channel drain with plastic drape. The sponge should never touch intact skin.  [x] Cut sponge, gauze or channel drain to size which will fit into the wound/ulcer bed without being forced.  [x] Be sure the sponge is large enough to hold the entire round plastic flange which is attached to the tubing. Never allow flange to be larger than the sponge or it will produce suction damaging intact skin.  Total number of individual pieces of foam used within the wound bed: 1     [x] If bridging the dressing away from the primary site, be sure the bridge leads to a piece of sponge large enough to hold the entire flange without allowing any of the flange to overlap onto intact skin.  [x] Covered sponge, gauze or channel drain with plastic drape.  [x] Cut a hole in this plastic drape directly over the sponge the same size as the plastic drain tubing.  [x] Removed plastic liner from flange and apply it directly over the hole you cut.  [x] Removed the plastic cover from the flange.  [x] Attached the tubing to the wound/ulcer Negative Pressure Therapy and turn it on to be sure a vacuum is created and that there are no leaks.  [x] If air leaks occur, use plastic drape to patch them.  [x] Secured Negative Pressure Therapy dressing with ace wrap loosely if located on an extremity. Maintain tubing outside of ace wrap. Tubing must not exert pressure on intact skin.     Applied per Yunier Patel Guidelines      Electronically signed by Zane Vela RN on 5/20/2019 at 1:33 PM

## 2019-05-20 NOTE — TELEPHONE ENCOUNTER
She believes she should be on them chronically she is having a hard time catching her breath. She says her breathing gets a little harder each day.

## 2019-05-20 NOTE — TELEPHONE ENCOUNTER
Pt called saying she is having trouble breathing again. Wound doctor they saw referred them to ask you to send in another RX for prednisone. [facility] in [address] [place] [address] Rd.

## 2019-05-20 NOTE — PROGRESS NOTES
Tobacco Use    Smoking status: Never Smoker    Smokeless tobacco: Never Used    Tobacco comment: encouraged to never smoke    Substance Use Topics    Alcohol use: No     Alcohol/week: 0.0 oz    Drug use: No       ALLERGIES    Allergies   Allergen Reactions    Ampicillin Hives    Ciprofloxacin Other (See Comments)     Sores in mouth      Nitrofurantoin Other (See Comments)     Unable to recall reaction    Sulfa Antibiotics Other (See Comments)     Unable to recall reaction    Penicillins Hives and Rash       MEDICATIONS    Current Outpatient Medications on File Prior to Encounter   Medication Sig Dispense Refill    cefUROXime (CEFTIN) 250 MG tablet Take 250 mg by mouth 2 times daily      omeprazole (PRILOSEC) 40 MG delayed release capsule TAKE 1 CAPSULE BY MOUTH TWICE DAILY 180 capsule 0    buPROPion (WELLBUTRIN SR) 150 MG extended release tablet TAKE 1 TABLET BY MOUTH TWICE DAILY 180 tablet 0    acetaminophen-codeine (TYLENOL #4) 300-60 MG per tablet Take 1 tablet by mouth every 4 hours as needed for Pain.  amLODIPine (NORVASC) 10 MG tablet Take 1 tablet by mouth daily 30 tablet 1    sertraline (ZOLOFT) 100 MG tablet TAKE 1 TABLET BY MOUTH EVERY DAY 90 tablet 0    alendronate (FOSAMAX) 70 MG tablet TAKE 1 TABLET BY MOUTH EVERY 7 DAYS 12 tablet 0    predniSONE (DELTASONE) 5 MG tablet Take 1 tablet by mouth daily 30 tablet 4    ipratropium (ATROVENT) 0.03 % nasal spray 2 sprays by Nasal route 4 times daily 1 Bottle 3    ipratropium-albuterol (DUONEB) 0.5-2.5 (3) MG/3ML SOLN nebulizer solution Inhale 3 mLs into the lungs every 6 hours as needed for Shortness of Breath (wheezing) 360 mL 0    clonazePAM (KLONOPIN) 0.5 MG tablet Take 0.25 mg by mouth nightly as needed (restless leg syndrome). Monse Downy ferrous sulfate 325 (65 Fe) MG tablet Take 325 mg by mouth daily (with breakfast)      senna-docusate (PERICOLACE) 8.6-50 MG per tablet Take 1 tablet by mouth daily      OXYGEN Inhale 4 L into the lungs continuous (Patient taking differently: Inhale 3 L into the lungs nightly ) 1 Container 1    Multiple Vitamins-Minerals (MULTIVITAMIN PO) Take 1 tablet by mouth daily. No current facility-administered medications on file prior to encounter. REVIEW OF SYSTEMS    Pertinent items are noted in HPI. Objective:      BP (!) 163/33   Pulse 89   Temp 97.6 °F (36.4 °C) (Oral)   Resp 28     Wt Readings from Last 3 Encounters:   05/06/19 119 lb 7.8 oz (54.2 kg)   05/04/19 119 lb 14.9 oz (54.4 kg)   04/25/19 123 lb (55.8 kg)       PHYSICAL EXAM    General Appearance: alert and oriented to person, place and time, well developed and poorly - nourished, in no acute distress. Looks anxious. Skin: warm and dry, no rash or erythema  Head: normocephalic and atraumatic  Eyes: pupils equal, round, and reactive to light, extraocular eye movements intact, conjunctivae normal  ENT:  external ear and ear canal normal bilaterally, nose without deformity,   Neck: supple and non-tender without mass, no thyromegaly or thyroid nodules, no cervical lymphadenopathy  Pulmonary/Chest: clear to auscultation bilaterally- no wheezes, rales or rhonchi, normal air movement, no respiratory distress  Cardiovascular: normal rate, regular rhythm, normal S1 and S2, no murmurs, rubs, clicks, or gallops,  Abdomen: soft, non-tender, non-distended, normal bowel sounds, no masses or organomegaly  Extremities: no cyanosis, clubbing or edema. Lt hip wound as scribed. No overt infection.   Musculoskeletal: normal range of motion, no joint swelling, deformity or tenderness  Neurologic: no cranial nerve deficit, gait, coordination and speech normal      Assessment:        Problem List Items Addressed This Visit     Lt hip wound  Malnutrition  Immune suppression           Procedure Note : none           Wound 12/03/18 Hip Left #5(acquired on 11/28/18-surgically created by Dr. Maximiliano Madison) (Active)   Wound Image   5/6/2019 11:48 AM   Wound Other Suite [facility] [address]  Telephone: [phone]      FAX [phone]     NAME: Freddie Buenrostro  DATE OF BIRTH:  1940  MEDICAL RECORD NUMBER:  [id number]  DATE:  5/20/2019     Wound Cleansing:   Do not scrub or use excessive force. Cleanse wound prior to applying a clean dressing with:  [x] Normal Saline            [x] Keep Wound Dry in Shower    [] Wound Cleanser   [] Cleanse wound with Mild Soap & Water  [x] May Shower    [] Other:        Topical Treatments:  Do not apply lotions, creams, or ointments to wound bed unless directed. [] Apply moisturizing lotion to skin surrounding the wound prior to dressing change.  [] Apply antifungal ointment to skin surrounding the wound prior to dressing change. [x] Apply thin film of moisture barrier ointment to skin immediately around wound. [x] Other: Apply Skin Prep to surrounding area around Buttock Wound         Dressings:                  Wound Location :        [] Apply Primary Dressing:                                              []               [] Cover and Secure with:                       [] DRY Gauze    [] Daniela   [] Kerlix              [] Ace Wrap       [] Cover Roll Tape         [] ABD                              [] Other:               Avoid contact of tape with skin. [] Change dressing:       [] Daily              [] Every Other Day        [] Three times per week              [] Once a week  [] Do Not Change Dressing         [] Other:      Negative Pressure:           Wound Location: LEFT BUTTOCK   **KEEP DRAPE OFF EXCORIATED AREA**    [email or web address]        [X ] Black Foam - BE SURE TO GET INTO UNDERMINING FROM 12:00 TO 12:00 = 2.3 CM ALSO (DEEPEST AT 10:00)  [] White Foam                                     [] Other:   [x]Change dressing:        [x]Three times per week             [x]Other: BRANDON RING TO INDENTED AREA ON MEDIAL ASPECT OF WOUND.  [address] [place] [address] emergency room.         PLEASE NOTE: IF YOU ARE UNABLE TO OBTAIN WOUND SUPPLIES, CONTINUE TO USE THE SUPPLIES YOU HAVE AVAILABLE UNTIL YOU ARE ABLE TO REACH US.  IT IS MOST IMPORTANT TO KEEP THE WOUND COVERED AT ALL TIMES.     Physician Signature:_______________________     Date: ___________ Time:  ____________                    [] Dr Alan Simons    [] Dr Jae Bedoya CNP               [] Dr Jose Lawrence  [] Dr Jazlyn Coles   [] Dr Lala Severance                [] Dr Troy Hernandes   [] Jason Cohen NP              Electronically signed by Jay Jay Griffiths MD on 5/20/2019 at 11:44 AM

## 2019-05-29 ENCOUNTER — TELEPHONE (OUTPATIENT)
Dept: FAMILY MEDICINE CLINIC | Age: 79
End: 2019-05-29

## 2019-05-29 NOTE — TELEPHONE ENCOUNTER
Patient is being discharged from Occupational Therapy today. Patient has reached full maximum potential at this time.  EUGENIAI

## 2019-06-03 ENCOUNTER — TELEPHONE (OUTPATIENT)
Dept: FAMILY MEDICINE CLINIC | Age: 79
End: 2019-06-03

## 2019-06-03 NOTE — TELEPHONE ENCOUNTER
Echo Dodd the speech therapist with [facility] called o get orders to continue speech therapy. She will continue for twice a week for 3 weeks.  Please give Storm Mccoy a call [phone]

## 2019-06-04 ENCOUNTER — TELEPHONE (OUTPATIENT)
Dept: FAMILY MEDICINE CLINIC | Age: 79
End: 2019-06-04

## 2019-06-04 NOTE — TELEPHONE ENCOUNTER
Laura Clifford from [facility] is calling for Verbal orders for re evaluate for speech therapy cognitive, please call Laura Clifford at [phone]

## 2019-06-05 ENCOUNTER — HOSPITAL ENCOUNTER (OUTPATIENT)
Dept: WOUND CARE | Age: 79
Discharge: HOME | End: 2019-06-05
Payer: MEDICARE

## 2019-06-05 VITALS
BODY MASS INDEX: 22.98 KG/M2 | HEART RATE: 80 BPM | DIASTOLIC BLOOD PRESSURE: 74 MMHG | TEMPERATURE: 97.4 F | RESPIRATION RATE: 26 BRPM | SYSTOLIC BLOOD PRESSURE: 155 MMHG | WEIGHT: 125.66 LBS

## 2019-06-05 PROCEDURE — 99212 OFFICE O/P EST SF 10 MIN: CPT

## 2019-06-05 PROCEDURE — 97605 NEG PRS WND THER DME<=50SQCM: CPT

## 2019-06-05 RX ORDER — LIDOCAINE HYDROCHLORIDE 40 MG/ML
SOLUTION TOPICAL PRN
Status: DISCONTINUED | OUTPATIENT
Start: 2019-06-05 | End: 2019-06-06 | Stop reason: HOSPADM

## 2019-06-05 ASSESSMENT — PAIN DESCRIPTION - ORIENTATION
ORIENTATION: LEFT
ORIENTATION: LEFT

## 2019-06-05 ASSESSMENT — PAIN DESCRIPTION - PAIN TYPE
TYPE: OTHER (COMMENT)
TYPE: ACUTE PAIN

## 2019-06-05 ASSESSMENT — PAIN DESCRIPTION - ONSET
ONSET: ON-GOING
ONSET: ON-GOING

## 2019-06-05 ASSESSMENT — PAIN - FUNCTIONAL ASSESSMENT
PAIN_FUNCTIONAL_ASSESSMENT: ACTIVITIES ARE NOT PREVENTED
PAIN_FUNCTIONAL_ASSESSMENT: ACTIVITIES ARE NOT PREVENTED

## 2019-06-05 ASSESSMENT — PAIN SCALES - GENERAL
PAINLEVEL_OUTOF10: 2
PAINLEVEL_OUTOF10: 1

## 2019-06-05 ASSESSMENT — PAIN DESCRIPTION - FREQUENCY
FREQUENCY: INTERMITTENT
FREQUENCY: INTERMITTENT

## 2019-06-05 ASSESSMENT — PAIN DESCRIPTION - LOCATION
LOCATION: HIP
LOCATION: BUTTOCKS

## 2019-06-05 ASSESSMENT — PAIN DESCRIPTION - DESCRIPTORS
DESCRIPTORS: ACHING
DESCRIPTORS: ACHING

## 2019-06-05 ASSESSMENT — PAIN DESCRIPTION - PROGRESSION: CLINICAL_PROGRESSION: NOT CHANGED

## 2019-06-05 NOTE — PLAN OF CARE
Negative Pressure    NAME:  Zeeshan Overton  YOB: 1940  MEDICAL RECORD NUMBER:  [id number]  DATE:  6/5/2019     Applied Negative Pressure to LEFT HIP wound(s)/ulcer(s).  [x] Applied skin barrier prep to haley-wound.  [x] Cut strips of plastic drape to picture frame wound so that haley-wound is     covered with the drape.  [x] If bridging dressing to less prominent site, cover any intact skin that will come in contact with the Negative Pressure Therapy sponge, gauze or channel drain with plastic drape. The sponge should never touch intact skin.  [x] Cut sponge, gauze or channel drain to size which will fit into the wound/ulcer bed without being forced.  [x] Be sure the sponge is large enough to hold the entire round plastic flange which is attached to the tubing. Never allow flange to be larger than the sponge or it will produce suction damaging intact skin.  Total number of individual pieces of foam used within the wound bed: 1     [x] If bridging the dressing away from the primary site, be sure the bridge leads to a piece of sponge large enough to hold the entire flange without allowing any of the flange to overlap onto intact skin.  [x] Covered sponge, gauze or channel drain with plastic drape.  [x] Cut a hole in this plastic drape directly over the sponge the same size as the plastic drain tubing.  [x] Removed plastic liner from flange and apply it directly over the hole you cut.  [x] Removed the plastic cover from the flange.  [x] Attached the tubing to the wound/ulcer Negative Pressure Therapy and turn it on to be sure a vacuum is created and that there are no leaks.  [] If air leaks occur, use plastic drape to patch them.  [] Secured Negative Pressure Therapy dressing with ace wrap loosely if located on an extremity. Maintain tubing outside of ace wrap. Tubing must not exert pressure on intact skin.     Applied per  Guidelines      Electronically signed by Eddie Kelly RN on 6/5/2019 at 11:41 AM

## 2019-06-06 NOTE — PROGRESS NOTES
[facility] 39   Progress Note and Procedure Note      Juve Christensen [facility]  MEDICAL RECORD NUMBER:  [id number]  AGE: 70 y.o. GENDER: female  : 1940  EPISODE DATE:  2019    Subjective:     Chief Complaint   Patient presents with    Wound Check     f/u left hip wound         HISTORY of PRESENT ILLNESS HPI    Juve New is a 70 y. o. female who presents today for wound/ulcer evaluation. History of Wound Context:  Pt presents with a wound in the Left buttock. The area started out as a skin dimpling causing pain. Pt has had hip fracture surgery by  and pt saw him who referred her to  who thought this could be bursitis and gave an injection and prescribed a compounded cream containing 4 different medications to be applied to the site. The area subsequently opened up becoming a wound. Pt saw Libia Arauz, her PCP who prescribed Doxycycline and discontinued the cream. Pt was advised to be seen at the [facility].    Pt had surgical revision of wound by Dr. Cecelia Flores on 18 to open up area.    Recently in hospital for acute CVA with AMS.  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  to talk with pt's physicians to see 2 of her medicines could be interfering with wound healing,namely; Cellcept and steroid. Denies consitutional issues and NPWT used to treat wound changed 3x/week @ 125mmHg continuously.    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 O2 dependent 4l/min continuously. On Cellcept,and Prednisone. Saw Dr. Filiberto Angel, pulmonology this past week and he discontinued Cellcept and is being [facility] (temporarily) for Prednisone.   Wound/Ulcer Pain Timing/Severity: intermittent  Quality of pain: aching  Severity:  3 / 10   Modifying Factors: None  Associated Signs/Symptoms: erythema, drainage and pain     Ulcer Identification:  Ulcer Type: traumatic  Contributing Factors: decreased mobility, shear force and decreased tissue oxygenation, immune suppression        19 : pt is off of both Prednisone and cellcept. She looks quite anxious and slightly short of breath.               PAST MEDICAL HISTORY        Diagnosis Date    Anxiety and depression     Arthritis     Cancer of skin of leg     Chronic low back pain     GERD (gastroesophageal reflux disease)     Insomnia     Iron deficiency anemia     Kidney stone     PUD (peptic ulcer disease)     Pulmonary fibrosis (HCC)     Dr. Renea Perez has a CT done every 6 months    Stomach ulcer     Ulcer - lesion 2010    UTI (urinary tract infection)     frequent       PAST SURGICAL HISTORY    Past Surgical History:   Procedure Laterality Date    BACK SURGERY  2004,OCT 02, [place] 70    x4--fusions    BACK SURGERY  2000    laminectomy-lumbar    CARPAL TUNNEL RELEASE  11    Left    COLONOSCOPY      CYSTOSCOPY Left 2016    cysto left ureteral stent placement    CYSTOSCOPY  01/15/2018    Left Stent Insertion    CYSTOSCOPY  2018    cysto, left uretero with laser litho, left stent excahnge    EYE SURGERY Bilateral     cataract removed with lens implants    FRACTURE SURGERY Right 2016    femur fracture    FRACTURE SURGERY Left     femur fracture    HYSTERECTOMY  1993    HYSTEROSCOPY  1993    JOINT REPLACEMENT  2000    LEFT KNEE    JOINT REPLACEMENT  08    RIGHT KNEE    MD [address] W/BRNCL ALVEOLAR LAVAGE N/A 10/24/2018    BRONCHOSCOPY WITH BRONCHOALVEOLAR LAVAGE performed by Ibrahima Barker DO at [facility]. DEBRIDE NECROTIC SKIN/ TISSUE, GENIT & PERINM Left 2018    DEBRIDEMENT LEFT HIP SKIN AND SUBCUTANEOUS FASCIA performed by Estela Lesches, MD at [address] [place] [address]  2010    For PUD       FAMILY HISTORY    Family History   Problem Relation Age of Onset    Emphysema Mother          AGE 64    Depression Mother     Clotting Disorder Father         Brenda Mems    Stroke Father        SOCIAL HISTORY    Social History     Tobacco Use    Smoking status: Never Smoker    Smokeless tobacco: Never Used    Tobacco comment: encouraged to never smoke    Substance Use Topics    Alcohol use: No     Alcohol/week: 0.0 oz    Drug use: No       ALLERGIES    Allergies   Allergen Reactions    Ampicillin Hives    Ciprofloxacin Other (See Comments)     Sores in mouth      Nitrofurantoin Other (See Comments)     Unable to recall reaction    Sulfa Antibiotics Other (See Comments)     Unable to recall reaction    Penicillins Hives and Rash       MEDICATIONS    Current Outpatient Medications on File Prior to Encounter   Medication Sig Dispense Refill    omeprazole (PRILOSEC) 40 MG delayed release capsule TAKE 1 CAPSULE BY MOUTH TWICE DAILY 180 capsule 0    buPROPion (WELLBUTRIN SR) 150 MG extended release tablet TAKE 1 TABLET BY MOUTH TWICE DAILY 180 tablet 0    acetaminophen-codeine (TYLENOL #4) 300-60 MG per tablet Take 1 tablet by mouth every 4 hours as needed for Pain.  amLODIPine (NORVASC) 10 MG tablet Take 1 tablet by mouth daily 30 tablet 1    sertraline (ZOLOFT) 100 MG tablet TAKE 1 TABLET BY MOUTH EVERY DAY 90 tablet 0    alendronate (FOSAMAX) 70 MG tablet TAKE 1 TABLET BY MOUTH EVERY 7 DAYS 12 tablet 0    predniSONE (DELTASONE) 5 MG tablet Take 1 tablet by mouth daily 30 tablet 4    ipratropium (ATROVENT) 0.03 % nasal spray 2 sprays by Nasal route 4 times daily 1 Bottle 3    ipratropium-albuterol (DUONEB) 0.5-2.5 (3) MG/3ML SOLN nebulizer solution Inhale 3 mLs into the lungs every 6 hours as needed for Shortness of Breath (wheezing) 360 mL 0    clonazePAM (KLONOPIN) 0.5 MG tablet Take 0.25 mg by mouth nightly as needed (restless leg syndrome). Cj Carrion ferrous sulfate 325 (65 Fe) MG tablet Take 325 mg by mouth daily (with breakfast)      senna-docusate (PERICOLACE) 8.6-50 MG per tablet Take 1 tablet by mouth daily      OXYGEN Inhale 4 L into the lungs continuous (Patient taking differently: Inhale 3 L into the lungs nightly ) 1 Container 1    Multiple Vitamins-Minerals (MULTIVITAMIN PO) Take 1 tablet by mouth daily. No current facility-administered medications on file prior to encounter. REVIEW OF SYSTEMS    A comprehensive review of systems was negative. Objective:      BP (!) 155/74   Pulse 80   Temp 97.4 °F (36.3 °C) (Oral)   Resp 26   Wt 125 lb 10.6 oz (57 kg)   BMI 22.98 kg/m²     Wt Readings from Last 3 Encounters:   06/05/19 125 lb 10.6 oz (57 kg)   05/06/19 119 lb 7.8 oz (54.2 kg)   05/04/19 119 lb 14.9 oz (54.4 kg)       PHYSICAL EXAM    General Appearance: alert and oriented to person, place and time, well developed and fairly - nourished, in no acute distress  Skin: warm and dry, no rash or erythema  Head: normocephalic and atraumatic  Eyes: pupils equal, round, and reactive to light, extraocular eye movements intact, conjunctivae normal  ENT:  external ear and ear canal normal bilaterally, nose without deformity  Neck: supple and non-tender without mass, no thyromegaly or thyroid nodules, no cervical lymphadenopathy  Pulmonary/Chest: clear to auscultation bilaterally- no wheezes, rales or rhonchi, normal air movement, no respiratory distress  Cardiovascular: normal rate, regular rhythm, normal S1 and S2, no murmurs, rubs, clicks, or gallops  Abdomen: soft, non-tender, non-distended, normal bowel sounds, no masses or organomegaly  Extremities: no cyanosis, clubbing or edema. Lt hip wound as scribed. Looks smaller.  No overt infection  Musculoskeletal: normal range of motion, no joint swelling, deformity or tenderness  Neurologic:  no cranial nerve deficit, coordination and speech normal      Assessment:        Problem List Items Addressed This Visit     Left hip wound             Procedure Note : none         Wound 12/03/18 Hip Left #5(acquired on 11/28/18-surgically created by Dr. Anna Boateng) (Active)   Wound Image   6/5/2019 10:58 AM   Wound Other 12/17/2018 11:01 AM   Dressing Status Intact; Old drainage 6/5/2019 10:58 AM   Dressing Changed Changed/New 6/5/2019 11:32 AM   Dressing/Treatment Vacuum dressing 6/5/2019 11:32 AM   Wound Length (cm) 0.6 cm 6/5/2019 10:58 AM   Wound Width (cm) 0.9 cm 6/5/2019 10:58 AM   Wound Depth (cm) 1.7 cm 6/5/2019 10:58 AM   Wound Surface Area (cm^2) 0.54 cm^2 6/5/2019 10:58 AM   Change in Wound Size % (l*w) 95.5 6/5/2019 10:58 AM   Wound Volume (cm^3) 0.92 cm^3 6/5/2019 10:58 AM   Wound Healing % 98 6/5/2019 10:58 AM   Post-Procedure Length (cm) 0.6 cm 6/5/2019 11:25 AM   Post-Procedure Width (cm) 0.9 cm 6/5/2019 11:25 AM   Post-Procedure Depth (cm) 1.7 cm 6/5/2019 11:25 AM   Post-Procedure Surface Area (cm^2) 0.54 cm^2 6/5/2019 11:25 AM   Post-Procedure Volume (cm^3) 0.92 cm^3 6/5/2019 11:25 AM   Distance Tunneling (cm) 2.3 cm 3/11/2019 10:45 AM   Tunneling Position ___ O'Clock 10 3/11/2019 10:45 AM   Undermining Starts ___ O'Clock 12 6/5/2019 10:58 AM   Undermining Ends___ O'Clock 6 6/5/2019 10:58 AM   Undermining Maxium Distance (cm) 2 6/5/2019 10:58 AM   Wound Assessment Granulation tissue 6/5/2019 10:58 AM   Drainage Amount Moderate 6/5/2019 10:58 AM   Drainage Description Brown;Serosanguinous 6/5/2019 10:58 AM   Odor None 6/5/2019 10:58 AM   Margins Defined edges 6/5/2019 10:58 AM   Exposed structure Fascia 1/28/2019 11:26 AM   Mirian-wound Assessment Fragile; Maceration 6/5/2019 10:58 AM   Non-staged Wound Description Full thickness 6/5/2019 10:58 AM   Hatteras%Wound Bed 95 6/5/2019 10:58 AM   Red%Wound Bed 40 5/20/2019 10:58 AM   Yellow%Wound Bed 5 6/5/2019 10:58 AM   Number of days: 184               Plan:     Treatment Note please see attached Discharge Instructions    Written patient dismissal instructions given to patient and signed by patient or POA.          Discharge Instructions       Wound Clinic Physician Orders and Discharge Instructions  [place] [address], [facility] [address]  Telephone: [phone]      FAX [phone]     NAME:  Kasey New  DATE OF BIRTH:  1940  MEDICAL RECORD NUMBER:  [id number]  DATE:  6/5/2019     Wound Cleansing:   Do not scrub or use excessive force. Cleanse wound prior to applying a clean dressing with:  [x] Normal Saline            [x] Keep Wound Dry in Shower            Topical Treatments:  Do not apply lotions, creams, or ointments to wound bed unless directed. [] Apply moisturizing lotion to skin surrounding the wound prior to dressing change.  [] Apply antifungal ointment to skin surrounding the wound prior to dressing change. [x] Apply thin film of moisture barrier ointment to skin immediately around wound. [x] Other: Apply Skin Prep to surrounding area around Buttock Wound         Dressings:                  Wound Location :        [] Apply Primary Dressing:                                              []               [] Cover and Secure with:                       [] DRY Gauze    [] Daniela   [] Kerlix              [] Ace Wrap       [] Cover Roll Tape         [] ABD                              [] Other:               Avoid contact of tape with skin. [] Change dressing:       [] Daily              [] Every Other Day        [] Three times per week              [] Once a week  [] Do Not Change Dressing         [] Other:          Negative Pressure:           Wound Location: LEFT BUTTOCK   **KEEP DRAPE OFF EXCORIATED AREA**    [email or web address]        [X ] Black Foam - BE SURE TO GET INTO UNDERMINING FROM 12:00 TO 6:00 = 2 CM  (DEEPEST AT 3:00)  [] White Foam                                       [] Other:   [x]Change dressing:        [x]Three times per week             [x]Other: BRANDON RING TO INDENTED AREA ON MEDIAL ASPECT OF WOUND. VAC DRAPE MUST COVER BRANDON RING.        Pressure Relief:  [x] When sitting, shift position or do seat lifts every 15 minutes.  DO NOT SIT FOR MORE THAN 30 MINUTES AT A TIME  [] Wheelchair cushion   [x] Specialty Bed/Mattress - SUGGEST THICK FOAM OVERLAY 4\"  [x] Turn every 2 hours when in bed.  Avoid position directing pressure on wound site.  Limit side lying to 30 degree tilt.  Limit HOB elevation to 30 degrees.        Off-Loading:          [x] Assistive Device         [x] Walker            [] Cane   [] Wheelchair      [] Crutches                   Dietary:  [x] Diet as tolerated:        [] Calorie Diabetic Diet: [] No Added Salt:  [x] Increase Protein:        [] Other:     Activity:  [x] Activity as tolerated:                Return Appointment:  [x] Wound and dressing supply provider: [facility]  [x] ECF or Home Healthcare: [address]  [] Nurse visit: Yaquelin Rasmussen or NP scheduled for Nurse Visit:   [x] Return Appointment: With DR WERNER [facility]   [] Ordered tests:      Nurse Case Manger: Aaron Aquino     Electronically signed by Lyssa Curiel RN on 6/5/2019 at 11:20 [address] Information: Should you experience any significant changes in your wound(s) or have questions about your wound care, please contact the [address] [place] [address] [phone] [facility] - THURSDAY 8:30 am - 4:30 pm and Friday 8:30 am - 1:00 pm.  If you need help with your wound outside these hours and cannot wait until we are again available, contact your PCP or go to the hospital emergency room.         PLEASE NOTE: IF YOU ARE UNABLE TO OBTAIN WOUND SUPPLIES, CONTINUE TO USE THE SUPPLIES YOU HAVE AVAILABLE UNTIL YOU ARE ABLE TO [facility].  IT IS MOST IMPORTANT TO KEEP THE WOUND COVERED AT ALL TIMES.     Physician Signature:_______________________     Date: ___________ Time:  ____________                                 [x] Dr Madonna Shane                             Electronically signed by Mena Churchill MD on 6/5/2019 at 10:12 PM

## 2019-06-12 ENCOUNTER — HOSPITAL ENCOUNTER (OUTPATIENT)
Dept: WOUND CARE | Age: 79
Discharge: HOME | End: 2019-06-12
Payer: MEDICARE

## 2019-06-19 ENCOUNTER — TELEPHONE (OUTPATIENT)
Dept: FAMILY MEDICINE CLINIC | Age: 79
End: 2019-06-19

## 2019-06-19 ENCOUNTER — HOSPITAL ENCOUNTER (OUTPATIENT)
Dept: WOUND CARE | Age: 79
Discharge: HOME | End: 2019-06-19
Payer: MEDICARE

## 2019-06-19 ENCOUNTER — NURSE ONLY (OUTPATIENT)
Dept: FAMILY MEDICINE CLINIC | Age: 79
End: 2019-06-19
Payer: MEDICARE

## 2019-06-19 VITALS
HEART RATE: 65 BPM | TEMPERATURE: 97.6 F | DIASTOLIC BLOOD PRESSURE: 66 MMHG | SYSTOLIC BLOOD PRESSURE: 135 MMHG | RESPIRATION RATE: 24 BRPM

## 2019-06-19 DIAGNOSIS — M34.9 SCLERODERMA (HCC): Primary | ICD-10-CM

## 2019-06-19 DIAGNOSIS — N39.0 URINARY TRACT INFECTION WITH HEMATURIA, SITE UNSPECIFIED: Primary | ICD-10-CM

## 2019-06-19 DIAGNOSIS — R82.90 ABNORMAL URINE ODOR: Primary | ICD-10-CM

## 2019-06-19 DIAGNOSIS — R31.9 URINARY TRACT INFECTION WITH HEMATURIA, SITE UNSPECIFIED: Primary | ICD-10-CM

## 2019-06-19 DIAGNOSIS — S71.002A COMPLICATED OPEN WOUND OF HIP, LEFT, INITIAL ENCOUNTER: ICD-10-CM

## 2019-06-19 DIAGNOSIS — J84.9 CHRONIC INTERSTITIAL LUNG DISEASE (HCC): ICD-10-CM

## 2019-06-19 LAB
BILIRUBIN, POC: ABNORMAL
BLOOD URINE, POC: ABNORMAL
CLARITY, POC: CLEAR
COLOR, POC: ABNORMAL
GLUCOSE URINE, POC: ABNORMAL
KETONES, POC: ABNORMAL
LEUKOCYTE EST, POC: ABNORMAL
NITRITE, POC: ABNORMAL
PH, POC: 5.5
PROTEIN, POC: 30
SPECIFIC GRAVITY, POC: 1.02
UROBILINOGEN, POC: 0.2

## 2019-06-19 PROCEDURE — 81002 URINALYSIS NONAUTO W/O SCOPE: CPT | Performed by: FAMILY MEDICINE

## 2019-06-19 PROCEDURE — 97605 NEG PRS WND THER DME<=50SQCM: CPT

## 2019-06-19 PROCEDURE — 11042 DBRDMT SUBQ TIS 1ST 20SQCM/<: CPT

## 2019-06-19 RX ORDER — DOXYCYCLINE HYCLATE 100 MG
100 TABLET ORAL 2 TIMES DAILY
Qty: 20 TABLET | Refills: 0 | Status: SHIPPED | OUTPATIENT
Start: 2019-06-19 | End: 2019-06-29

## 2019-06-19 RX ORDER — LIDOCAINE HYDROCHLORIDE 40 MG/ML
SOLUTION TOPICAL PRN
Status: DISCONTINUED | OUTPATIENT
Start: 2019-06-19 | End: 2019-06-20 | Stop reason: HOSPADM

## 2019-06-19 ASSESSMENT — PAIN - FUNCTIONAL ASSESSMENT: PAIN_FUNCTIONAL_ASSESSMENT: ACTIVITIES ARE NOT PREVENTED

## 2019-06-19 ASSESSMENT — PAIN DESCRIPTION - ORIENTATION: ORIENTATION: LEFT

## 2019-06-19 ASSESSMENT — PAIN DESCRIPTION - ONSET: ONSET: ON-GOING

## 2019-06-19 ASSESSMENT — PAIN SCALES - GENERAL
PAINLEVEL_OUTOF10: 5
PAINLEVEL_OUTOF10: 0

## 2019-06-19 ASSESSMENT — PAIN DESCRIPTION - PROGRESSION: CLINICAL_PROGRESSION: NOT CHANGED

## 2019-06-19 ASSESSMENT — PAIN DESCRIPTION - DESCRIPTORS: DESCRIPTORS: SORE

## 2019-06-19 ASSESSMENT — PAIN DESCRIPTION - FREQUENCY: FREQUENCY: INTERMITTENT

## 2019-06-19 ASSESSMENT — PAIN DESCRIPTION - LOCATION: LOCATION: HIP

## 2019-06-19 ASSESSMENT — PAIN DESCRIPTION - PAIN TYPE: TYPE: ACUTE PAIN

## 2019-06-19 NOTE — PLAN OF CARE
Negative Pressure    NAME:  Mica Velázquez  YOB: 1940  MEDICAL RECORD NUMBER:  [id number]  DATE:  6/19/2019     Applied Negative Pressure to 1 wound on left buttock.  [x] Applied skin barrier prep and Viji ring to haley-wound.  [x] Cut strips of plastic drape to picture frame wound so that haley-wound is covered with the drape.  [x] Cut sponge to size which will fit into the wound/ulcer bed without being forced. Applied Normal Saline moistened Collagen with silver to wound bed.  [x] Be sure the sponge is large enough to hold the entire round plastic flange which is attached to the tubing. Never allow flange to be larger than the sponge or it will produce suction damaging intact skin.  Total number of individual pieces of foam used within the wound bed: 1        [x] Covered sponge with plastic drape.  [x] Cut a hole in this plastic drape directly over the sponge the same size as the plastic drain tubing.  [x] Removed plastic liner from flange and apply it directly over the hole you cut.  [x] Removed the plastic cover from the flange.  [x] Attached the tubing to the wound/ulcer Negative Pressure Therapy and turn it on to be sure a vacuum is created and that there are no leaks.  [x] If air leaks occur, use plastic drape to patch them.     Applied per  Guidelines      Electronically signed by Brandy Elena RN on 6/19/2019 at 11:57 AM

## 2019-06-20 PROBLEM — S71.002A: Status: ACTIVE | Noted: 2019-06-20

## 2019-06-20 PROBLEM — S71.002D: Status: RESOLVED | Noted: 2018-07-02 | Resolved: 2019-06-20

## 2019-06-20 NOTE — PROGRESS NOTES
Alyssa Olivier 39   Progress Note and Procedure Note      Clive Pickens [facility]  MEDICAL RECORD NUMBER:  [id number]  AGE: 70 y.o. GENDER: female  : 1940  EPISODE DATE:  2019    Subjective:     Chief Complaint   Patient presents with    Wound Check     Follow Up Wound Left Hip         HISTORY of PRESENT ILLNESS HPI      Clive New is a 70 y. o. female who presents today for wound/ulcer evaluation. History of Wound Context:  Pt presents with a wound in the Left buttock. The area started out as a skin dimpling causing pain. Pt has had hip fracture surgery by  and pt saw him who referred her to  who thought this could be bursitis and gave an injection and prescribed a compounded cream containing 4 different medications to be applied to the site. The area subsequently opened up becoming a wound. Pt saw Rene Reza, her PCP who prescribed Doxycycline and discontinued the cream. Pt was advised to be seen at the [address] [place] [address],[address].    Pt had surgical revision of wound by Dr. Moira Solares on 18 to open up area.    Recently in hospital for acute CVA with AMS.  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  to talk with pt's physicians to see 2 of her medicines could be interfering with wound healing,namely; Cellcept and steroid. Denies consitutional issues and NPWT used to treat wound changed 3x/week @ 125mmHg continuously.    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 O2 dependent 4l/min continuously. On Cellcept,and Prednisone. Saw Dr. Marvin Marquez, pulmonology this past week and he discontinued Cellcept and is being [facility] (temporarily) for Prednisone.   Wound/Ulcer Pain Timing/Severity: intermittent  Quality of pain: aching  Severity:  3 / 10   Modifying Factors: None  Associated Signs/Symptoms: erythema, drainage and pain     Ulcer Identification:  Ulcer Type: traumatic  Contributing Factors: decreased mobility, shear force and decreased tissue oxygenation, immune suppression         Today  : pt is off of cellcept. On Prednisone. No f/ c/ n/ v/ d.             PAST MEDICAL HISTORY        Diagnosis Date    Anxiety and depression     Arthritis     Cancer of skin of leg     Chronic low back pain     GERD (gastroesophageal reflux disease)     Insomnia     Iron deficiency anemia     Kidney stone     PUD (peptic ulcer disease)     Pulmonary fibrosis (HCC)     Dr. Yajaira Lam has a CT done every 6 months    Stomach ulcer     Ulcer - lesion 2010    UTI (urinary tract infection)     frequent       PAST SURGICAL HISTORY    Past Surgical History:   Procedure Laterality Date    BACK SURGERY  2004,OCT 02, [place]    x4--fusions    BACK SURGERY  2000    laminectomy-lumbar    CARPAL TUNNEL RELEASE  11    Left    COLONOSCOPY      CYSTOSCOPY Left 2016    cysto left ureteral stent placement    CYSTOSCOPY  01/15/2018    Left Stent Insertion    CYSTOSCOPY  2018    cysto, left uretero with laser litho, left stent excahnge    EYE SURGERY Bilateral     cataract removed with lens implants    FRACTURE SURGERY Right 2016    femur fracture    FRACTURE SURGERY Left     femur fracture    HYSTERECTOMY  1993    HYSTEROSCOPY  1993    JOINT REPLACEMENT  2000    LEFT KNEE    JOINT REPLACEMENT  08    RIGHT KNEE    [place] [address] W/BRNCL ALVEOLAR LAVAGE N/A 10/24/2018    BRONCHOSCOPY WITH BRONCHOALVEOLAR LAVAGE performed by Martín Ardon DO at [facility]. DEBRIDE NECROTIC SKIN/ TISSUE, GENIT & PERINM Left 2018    DEBRIDEMENT LEFT HIP SKIN AND SUBCUTANEOUS FASCIA performed by Khang Yancey MD at [address] [place] [address]  2010    For PUD       FAMILY HISTORY    Family History   Problem Relation Age of Onset    Emphysema Mother          AGE 64    Depression Mother     Clotting Disorder Father             Stroke Father        SOCIAL HISTORY    Social History     Tobacco Use    Smoking Multiple Vitamins-Minerals (MULTIVITAMIN PO) Take 1 tablet by mouth daily. No current facility-administered medications on file prior to encounter. REVIEW OF SYSTEMS    Pertinent items are noted in HPI. Objective:      /66   Pulse 65   Temp 97.6 °F (36.4 °C) (Oral)   Resp 24     Wt Readings from Last 3 Encounters:   06/05/19 125 lb 10.6 oz (57 kg)   05/06/19 119 lb 7.8 oz (54.2 kg)   05/04/19 119 lb 14.9 oz (54.4 kg)       PHYSICAL EXAM    Lt hip ulcer overall getting smaller except for tunneling at 12' o clock. No overt infection. Assessment:        Problem List Items Addressed This Visit     Scleroderma ([facility] - Primary    Chronic interstitial lung disease ([facility]    Complicated open wound of hip, left, initial encounter           Procedure Note   Indications:  Based on my examination of this patient's wound(s)/ulcer(s) today, debridement is required to promote healing and evaluate the wound base. Performed by: Cristhian Calderón MD    Consent obtained:  Yes    Time out taken:  Yes    Pain Control: Anesthetic  Anesthetic: 4% Lidocaine Liquid Topical(2.5mL's)       Debridement: Excisional Debridement    Using curette and forceps the wound(s)/ulcer(s) was/were sharply debrided down through and including the removal of epidermis, dermis and subcutaneous tissue.         Devitalized Tissue Debrided:  fibrin and slough    Pre Debridement Measurements:  Are located in the [place]  Documentation Flow Sheet    Wound/Ulcer #: 5    Post Debridement Measurements:  Wound/Ulcer Descriptions are Pre Debridement except measurements:    Wound 12/03/18 Hip Left #5(acquired on 11/28/18-surgically created by Dr. Bethany Hyman) (Active)   Wound Image   6/5/2019 10:58 AM   Wound Other 12/17/2018 11:01 AM   Dressing Status Old drainage 6/19/2019 11:03 AM   Dressing Changed Changed/New 6/19/2019 11:54 AM   Dressing/Treatment Vacuum dressing;Collagen with Ag 6/19/2019 11:54 AM   Wound Length (cm) 0.6 cm Scleroderma and Pulmonary fibrosis and need for immune suppression. Fortunately she has recently been taken off of cellcept which would be helpful. Pt needs NPWT now more than ever and highly recommend continuation to help with wound healing which will be slow but no other option will be as effective and may cause the wound to get worse with attendant morbid complications resulting in hospital admissions and even death. Written patient dismissal instructions given to patient and signed by patient or POA. Discharge Instructions       Wound Clinic Physician Orders and Discharge Instructions  [address]   [address], [place] [address]  Telephone: [phone]      FAX [phone]     NAME: Carol Ann Talbot OF BIRTH:  1940  MEDICAL RECORD NUMBER:  [id number]  DATE:  6/19/2019     Wound Cleansing:   Do not scrub or use excessive force. Cleanse wound prior to applying a clean dressing with:  [x] Normal Saline            [x] Keep Wound Dry in Shower            Topical Treatments:  Do not apply lotions, creams, or ointments to wound bed unless directed. [] Apply moisturizing lotion to skin surrounding the wound prior to dressing change.  [] Apply antifungal ointment to skin surrounding the wound prior to dressing change. [x] Apply thin film of moisture barrier ointment to skin immediately around wound.   [x] Other: Apply Skin Prep to surrounding area around Buttock Wound         Dressings:                  Wound Location :   LEFT BUTTOCK     [x] Apply Primary Dressing:                                              [x]COLLAGEN WITH SILVER SLIGHTLY MOISTENED WITH SALINE PACKED TO BOTTOM OF WOUND WITH EACH WOUND VAC CHANGE               [] Cover and Secure with:                       [] DRY Gauze    [] Daniela   [] Kerlix              [] Ace Wrap       [] Cover Roll Tape         [] ABD                              [] Other:               Avoid contact of tape with skin. [x] Change dressing:       [] Daily              [] Every Other Day        [x] Three times per week WITH VAC CHANGES              [] Once a week  [] Do Not Change Dressing         [] Other:           Negative Pressure:           Wound Location: LEFT BUTTOCK   **KEEP DRAPE OFF EXCORIATED AREA**    [email or web address]        [X ] Black Foam - BE SURE TO GET INTO UNDERMINING FROM 9:00 TO 12:00 = 2 CM  (DEEPEST AT 12:00)  [] White Foam                                       [] Other:   [x]Change dressing:        [x]Three times per week             [x]Other: BRANDON RING TO INDENTED AREA ON MEDIAL ASPECT OF WOUND. VAC DRAPE MUST COVER BRANDON RING.        Pressure Relief:  [x] When sitting, shift position or do seat lifts every 15 minutes.  DO NOT SIT FOR MORE THAN 30 MINUTES AT A TIME  [] Wheelchair cushion   [x] Specialty Bed/Mattress - SUGGEST THICK FOAM OVERLAY 4\"  [x] Turn every 2 hours when in bed.  Avoid position directing pressure on wound site.  Limit side lying to 30 degree tilt.  Limit HOB elevation to 30 degrees.        Off-Loading:          [x] Assistive Device         [x] Walker            [] Cane   [] Wheelchair      [] Crutches                   Dietary:  [x] Diet as tolerated:        [] Calorie Diabetic Diet: [] No Added Salt:  [x] Increase Protein:        [] Other:     Activity:  [x] Activity as tolerated:                Return Appointment:  [x] Wound and dressing supply provider: [facility]  [x] ECF or Home Healthcare: [address]  [] Nurse visit:     [] Physician or NP scheduled for Nurse Visit:   [x] Return Appointment: With DR WERNER [facility]   [] Ordered tests:      Nurse Case Manger: Mary Carmen Patel     Electronically signed by Mackenzie Solo RN on 6/19/2019 at 11:43 AM    [address]  N Information: Should you experience any significant changes in your wound(s) or have questions about your wound care, please contact the [address] [employer] [address] [facility] [phone] XVVQTZ - THURSDAY 8:30 am - 4:30 pm and Friday 8:30 am - 1:00 pm.  If you need help with your wound outside these hours and cannot wait until we are again available, contact your PCP or go to the hospital emergency room.         PLEASE NOTE: IF YOU ARE [facility], CONTINUE TO USE THE SUPPLIES YOU HAVE AVAILABLE UNTIL YOU ARE ABLE TO [facility].  IT IS MOST IMPORTANT TO KEEP THE WOUND COVERED AT ALL TIMES.     Physician Signature:_______________________     Date: ___________ Time:  ____________                                 [x] Dr Denisse Bruce              Electronically signed by Winston Rincon MD on 6/20/2019 at 10:27 AM

## 2019-06-21 LAB
ORGANISM: ABNORMAL
URINE CULTURE, ROUTINE: ABNORMAL
URINE CULTURE, ROUTINE: ABNORMAL

## 2019-06-28 ENCOUNTER — TELEPHONE (OUTPATIENT)
Dept: FAMILY MEDICINE CLINIC | Age: 79
End: 2019-06-28

## 2019-07-10 ENCOUNTER — HOSPITAL ENCOUNTER (OUTPATIENT)
Dept: WOUND CARE | Age: 79
Discharge: HOME | End: 2019-07-10
Payer: MEDICARE

## 2019-07-10 VITALS
RESPIRATION RATE: 20 BRPM | HEART RATE: 67 BPM | SYSTOLIC BLOOD PRESSURE: 157 MMHG | DIASTOLIC BLOOD PRESSURE: 78 MMHG | TEMPERATURE: 97.6 F

## 2019-07-10 DIAGNOSIS — J84.112 IDIOPATHIC PULMONARY FIBROSIS (HCC): ICD-10-CM

## 2019-07-10 DIAGNOSIS — E44.0 MODERATE PROTEIN-CALORIE MALNUTRITION (HCC): ICD-10-CM

## 2019-07-10 DIAGNOSIS — S71.002A COMPLICATED OPEN WOUND OF HIP, LEFT, INITIAL ENCOUNTER: Primary | ICD-10-CM

## 2019-07-10 PROCEDURE — 97605 NEG PRS WND THER DME<=50SQCM: CPT

## 2019-07-10 PROCEDURE — 11042 DBRDMT SUBQ TIS 1ST 20SQCM/<: CPT

## 2019-07-10 RX ORDER — LIDOCAINE HYDROCHLORIDE 40 MG/ML
SOLUTION TOPICAL PRN
Status: DISCONTINUED | OUTPATIENT
Start: 2019-07-10 | End: 2019-07-11 | Stop reason: HOSPADM

## 2019-07-10 ASSESSMENT — PAIN SCALES - GENERAL
PAINLEVEL_OUTOF10: 0
PAINLEVEL_OUTOF10: 0

## 2019-07-10 NOTE — PLAN OF CARE
Negative Pressure    NAME:  Annabella Leon  YOB: 1940  MEDICAL RECORD NUMBER:  [id number]  DATE:  7/10/2019   Applied Negative Pressure to Left Hip wound.  [x] Applied skin barrier prep to haley-wound.  [x] Cut strips of plastic drape to picture frame wound so that haley-wound is     covered with the drape.  [x] If bridging dressing to less prominent site, cover any intact skin that will come in contact with the Negative Pressure Therapy sponge, gauze or channel drain with plastic drape. The sponge should never touch intact skin.  [x] Cut sponge, gauze or channel drain to size which will fit into the wound/ulcer bed without being forced.  [x] Be sure the sponge is large enough to hold the entire round plastic flange which is attached to the tubing. Never allow flange to be larger than the sponge or it will produce suction damaging intact skin.  Total number of individual pieces of foam used within the wound bed: 1(1 White; 1 Black)     [x] If bridging the dressing away from the primary site, be sure the bridge leads to a piece of sponge large enough to hold the entire flange without allowing any of the flange to overlap onto intact skin.  [x] Covered sponge, gauze or channel drain with plastic drape.  [x] Cut a hole in this plastic drape directly over the sponge the same size as the plastic drain tubing.  [x] Removed plastic liner from flange and apply it directly over the hole you cut.  [x] Removed the plastic cover from the flange.  [x] Attached the tubing to the wound/ulcer Negative Pressure Therapy and turn it on to be sure a vacuum is created and that there are no leaks.  [x] If air leaks occur, use plastic drape to patch them.  [x] Secured Negative Pressure Therapy dressing with ace wrap loosely if located on an extremity. Maintain tubing outside of ace wrap. Tubing must not exert pressure on intact skin.     Applied per Yunier Patel

## 2019-07-11 NOTE — PROGRESS NOTES
Alyssa Olivier 37   Progress Note and Procedure Note      Rosaura Cameron [facility]  MEDICAL RECORD NUMBER:  [id number]  AGE: 70 y.o. GENDER: female  : 1940  EPISODE DATE:  7/10/2019    Subjective:     Chief Complaint   Patient presents with    Wound Check     Follow up for wound on left hip         HISTORY of PRESENT ILLNESS HPI    Rosaura New is a 70 y. o. female who presents today for wound/ulcer evaluation. History of Wound Context:  Pt presents with a wound in the Left buttock. The area started out as a skin dimpling causing pain. Pt has had hip fracture surgery by  and pt saw him who referred her to  who thought this could be bursitis and gave an injection and prescribed a compounded cream containing 4 different medications to be applied to the site. The area subsequently opened up becoming a wound. Pt saw Phyllis Shaw, her PCP who prescribed Doxycycline and discontinued the cream. Pt was advised to be seen at the [facility].    Pt had surgical revision of wound by Dr. Saumya Amaro on 18 to open up area.    Recently in hospital for acute CVA with AMS.  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  to talk with pt's physicians to see 2 of her medicines could be interfering with wound healing,namely; Cellcept and steroid. Denies consitutional issues and NPWT used to treat wound changed 3x/week @ 125mmHg continuously.    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 O2 dependent 4l/min continuously. On Cellcept,and Prednisone. Saw Dr. Jenna Poon, pulmonology this past week and he discontinued Cellcept and is being [facility] (temporarily) for Prednisone.   Wound/Ulcer Pain Timing/Severity: intermittent  Quality of pain: aching  Severity:  3 / 10   Modifying Factors: None  Associated Signs/Symptoms: erythema, drainage and pain     Ulcer Identification:  Ulcer Type: traumatic  Contributing Factors: decreased mobility, shear force and decreased tissue oxygenation, status: Never Smoker    Smokeless tobacco: Never Used    Tobacco comment: encouraged to never smoke    Substance Use Topics    Alcohol use: No     Alcohol/week: 0.0 oz    Drug use: No       ALLERGIES    Allergies   Allergen Reactions    Ampicillin Hives    Ciprofloxacin Other (See Comments)     Sores in mouth      Nitrofurantoin Other (See Comments)     Unable to recall reaction    Sulfa Antibiotics Other (See Comments)     Unable to recall reaction    Penicillins Hives and Rash       MEDICATIONS    Current Outpatient Medications on File Prior to Encounter   Medication Sig Dispense Refill    omeprazole (PRILOSEC) 40 MG delayed release capsule TAKE 1 CAPSULE BY MOUTH TWICE DAILY 180 capsule 0    buPROPion (WELLBUTRIN SR) 150 MG extended release tablet TAKE 1 TABLET BY MOUTH TWICE DAILY 180 tablet 0    acetaminophen-codeine (TYLENOL #4) 300-60 MG per tablet Take 1 tablet by mouth every 4 hours as needed for Pain.  amLODIPine (NORVASC) 10 MG tablet Take 1 tablet by mouth daily 30 tablet 1    sertraline (ZOLOFT) 100 MG tablet TAKE 1 TABLET BY MOUTH EVERY DAY 90 tablet 0    alendronate (FOSAMAX) 70 MG tablet TAKE 1 TABLET BY MOUTH EVERY 7 DAYS 12 tablet 0    predniSONE (DELTASONE) 5 MG tablet Take 1 tablet by mouth daily 30 tablet 4    ipratropium (ATROVENT) 0.03 % nasal spray 2 sprays by Nasal route 4 times daily 1 Bottle 3    ipratropium-albuterol (DUONEB) 0.5-2.5 (3) MG/3ML SOLN nebulizer solution Inhale 3 mLs into the lungs every 6 hours as needed for Shortness of Breath (wheezing) 360 mL 0    clonazePAM (KLONOPIN) 0.5 MG tablet Take 0.25 mg by mouth nightly as needed (restless leg syndrome). Loralyedison Rattler ferrous sulfate 325 (65 Fe) MG tablet Take 325 mg by mouth daily (with breakfast)      senna-docusate (PERICOLACE) 8.6-50 MG per tablet Take 1 tablet by mouth daily      OXYGEN Inhale 4 L into the lungs continuous (Patient taking differently: Inhale 3 L into the lungs nightly ) 1 Container 1    Multiple Vitamins-Minerals (MULTIVITAMIN PO) Take 1 tablet by mouth daily. No current facility-administered medications on file prior to encounter. REVIEW OF SYSTEMS    A comprehensive review of systems was negative. Objective:      BP (!) 157/78   Pulse 67   Temp 97.6 °F (36.4 °C) (Oral)   Resp 20     Wt Readings from Last 3 Encounters:   06/05/19 125 lb 10.6 oz (57 kg)   05/06/19 119 lb 7.8 oz (54.2 kg)   05/04/19 119 lb 14.9 oz (54.4 kg)       PHYSICAL EXAM    Lt hip wound as scribed. Slow improvement which is expected. No overt infection. Assessment:        Problem List Items Addressed This Visit     Idiopathic pulmonary fibrosis ([address]    Moderate protein-calorie malnutrition ([address]    Complicated open wound of hip, left, initial encounter - Primary           Procedure Note  Indications:  Based on my examination of this patient's wound(s)/ulcer(s) today, debridement is required to promote healing and evaluate the wound base. Performed by: Leif Young MD    Consent obtained:  Yes    Time out taken:  Yes    Pain Control: Anesthetic  Anesthetic: 4% Lidocaine Liquid Topical(2.5ml)       Debridement: Excisional Debridement    Using curette and forceps the wound(s)/ulcer(s) was/were sharply debrided down through and including the removal of epidermis, dermis and subcutaneous tissue. Devitalized Tissue Debrided:  fibrin and slough    Pre Debridement Measurements:  Are located in the [place]  Documentation Flow Sheet    Wound/Ulcer #: 5    Post Debridement Measurements:  Wound/Ulcer Descriptions are Pre Debridement except measurements:    Wound 12/03/18 Hip Left #5(acquired on 11/28/18-surgically created by Dr. Su Mcghee) (Active)   Wound Image   7/10/2019 11:09 AM   Wound Other 7/10/2019 11:09 AM   Dressing Status Intact; Old drainage 7/10/2019 11:09 AM   Dressing Changed Changed/New 7/10/2019 11:59 AM   Dressing/Treatment Vacuum dressing;Collagen with Ag 7/10/2019 11:59 REACH US.  IT IS MOST IMPORTANT TO KEEP THE WOUND COVERED AT ALL TIMES.     Physician Signature:_______________________     Date: ___________ Time:  ____________                                 [x] Dr Pierce Bruce              Electronically signed by Rajani Sigala MD on 7/11/2019 at 8:57 AM

## 2019-07-18 ENCOUNTER — OFFICE VISIT (OUTPATIENT)
Dept: PULMONOLOGY | Age: 79
End: 2019-07-18
Payer: MEDICARE

## 2019-07-18 VITALS
BODY MASS INDEX: 22.98 KG/M2 | HEART RATE: 77 BPM | TEMPERATURE: 97 F | RESPIRATION RATE: 20 BRPM | HEIGHT: 62 IN | DIASTOLIC BLOOD PRESSURE: 80 MMHG | SYSTOLIC BLOOD PRESSURE: 135 MMHG | OXYGEN SATURATION: 90 %

## 2019-07-18 DIAGNOSIS — J84.9 ILD (INTERSTITIAL LUNG DISEASE) (HCC): Primary | ICD-10-CM

## 2019-07-18 DIAGNOSIS — R76.8 SCL-70 ANTIBODY POSITIVE: ICD-10-CM

## 2019-07-18 DIAGNOSIS — J96.11 CHRONIC RESPIRATORY FAILURE WITH HYPOXIA (HCC): ICD-10-CM

## 2019-07-18 PROCEDURE — G8420 CALC BMI NORM PARAMETERS: HCPCS | Performed by: INTERNAL MEDICINE

## 2019-07-18 PROCEDURE — G8427 DOCREV CUR MEDS BY ELIG CLIN: HCPCS | Performed by: INTERNAL MEDICINE

## 2019-07-18 PROCEDURE — 1090F PRES/ABSN URINE INCON ASSESS: CPT | Performed by: INTERNAL MEDICINE

## 2019-07-18 PROCEDURE — 1123F ACP DISCUSS/DSCN MKR DOCD: CPT | Performed by: INTERNAL MEDICINE

## 2019-07-18 PROCEDURE — 4040F PNEUMOC VAC/ADMIN/RCVD: CPT | Performed by: INTERNAL MEDICINE

## 2019-07-18 PROCEDURE — G8400 PT W/DXA NO RESULTS DOC: HCPCS | Performed by: INTERNAL MEDICINE

## 2019-07-18 PROCEDURE — 99214 OFFICE O/P EST MOD 30 MIN: CPT | Performed by: INTERNAL MEDICINE

## 2019-07-18 PROCEDURE — 1036F TOBACCO NON-USER: CPT | Performed by: INTERNAL MEDICINE

## 2019-07-18 PROCEDURE — G8598 ASA/ANTIPLAT THER USED: HCPCS | Performed by: INTERNAL MEDICINE

## 2019-07-18 RX ORDER — PREDNISONE 10 MG/1
10 TABLET ORAL 3 TIMES DAILY
Qty: 90 TABLET | Refills: 1 | Status: SHIPPED | OUTPATIENT
Start: 2019-07-18 | End: 2019-08-17

## 2019-07-18 NOTE — PROGRESS NOTES
Negative  CD4:CD8:  13:1    I personally reviewed all the blood work above  Lab Results   Component Value Date    WBC 7.4 05/04/2019    HGB 12.1 05/04/2019    HCT 37.3 05/04/2019    MCV 92.2 05/04/2019     05/04/2019       Lab Results   Component Value Date    BNP 63.0 03/04/2010       Lab Results   Component Value Date    CREATININE 0.9 05/04/2019    BUN 23 (H) 05/04/2019     05/04/2019    K 4.4 05/04/2019     05/04/2019    CO2 29 05/04/2019       Assessment\Plan:  1. ILD (interstitial lung disease) ([facility]  Possibly LIP with fibrosis. Could be related to scleroderma without stigmata of scleroderma. Will try 40 mg of prednisone for 5 days then increase baseline prednisone to 10 mg.  IT is hard to give her higher doses of prednisone due to bone health, wound healing and overall frailty   - predniSONE (DELTASONE) 10 MG tablet; Take 1 tablet by mouth 3 times daily  Dispense: 90 tablet; Refill: 1    2. Chronic respiratory failure with hypoxia (HCC)  Uses 3-4 liters all of the time. She may need to increase to constant flow of 5 liters with exertion. Need to keep saturations 88% at minimum    3. Scl-70 antibody positive  Has been positive on two occasions. Could be driving lung disease without systemic signs of scleroderma     Follow up in 2 months    Once again I continue to disagree with Radiology interpretation. I feel this represents a cystic lung process with possible fibrosis. I don't think UIP is the predominant pattern. I also dont think this is emphysema as the cystic changes are predominantly in the lower lung fields and there are significant variation in the size of cysts.    LIP is also in the differential

## 2019-07-24 ENCOUNTER — HOSPITAL ENCOUNTER (OUTPATIENT)
Dept: WOUND CARE | Age: 79
Discharge: HOME | End: 2019-07-24
Payer: MEDICARE

## 2019-07-24 VITALS
TEMPERATURE: 97.3 F | SYSTOLIC BLOOD PRESSURE: 138 MMHG | DIASTOLIC BLOOD PRESSURE: 69 MMHG | RESPIRATION RATE: 22 BRPM | HEART RATE: 69 BPM

## 2019-07-24 DIAGNOSIS — S71.002A COMPLICATED OPEN WOUND OF HIP, LEFT, INITIAL ENCOUNTER: Primary | ICD-10-CM

## 2019-07-24 DIAGNOSIS — E44.0 MODERATE PROTEIN-CALORIE MALNUTRITION (HCC): ICD-10-CM

## 2019-07-24 DIAGNOSIS — L89.150 PRESSURE ULCER OF SACRAL REGION, UNSTAGEABLE (HCC): ICD-10-CM

## 2019-07-24 PROCEDURE — 97605 NEG PRS WND THER DME<=50SQCM: CPT

## 2019-07-24 RX ORDER — LIDOCAINE HYDROCHLORIDE 40 MG/ML
SOLUTION TOPICAL PRN
Status: DISCONTINUED | OUTPATIENT
Start: 2019-07-24 | End: 2019-07-25 | Stop reason: HOSPADM

## 2019-07-24 RX ORDER — PYRIDOXINE HCL (VITAMIN B6) 50 MG
100 TABLET ORAL DAILY
COMMUNITY

## 2019-07-24 ASSESSMENT — PAIN DESCRIPTION - FREQUENCY: FREQUENCY: INTERMITTENT

## 2019-07-24 ASSESSMENT — PAIN SCALES - GENERAL
PAINLEVEL_OUTOF10: 5
PAINLEVEL_OUTOF10: 0

## 2019-07-24 ASSESSMENT — PAIN DESCRIPTION - ORIENTATION: ORIENTATION: LEFT

## 2019-07-24 ASSESSMENT — PAIN DESCRIPTION - LOCATION: LOCATION: HIP;BUTTOCKS

## 2019-07-24 ASSESSMENT — PAIN - FUNCTIONAL ASSESSMENT: PAIN_FUNCTIONAL_ASSESSMENT: PREVENTS OR INTERFERES SOME ACTIVE ACTIVITIES AND ADLS

## 2019-07-24 ASSESSMENT — PAIN DESCRIPTION - PROGRESSION: CLINICAL_PROGRESSION: NOT CHANGED

## 2019-07-24 ASSESSMENT — PAIN DESCRIPTION - PAIN TYPE: TYPE: ACUTE PAIN

## 2019-07-24 ASSESSMENT — PAIN DESCRIPTION - DESCRIPTORS: DESCRIPTORS: SORE

## 2019-07-24 ASSESSMENT — PAIN DESCRIPTION - ONSET: ONSET: ON-GOING

## 2019-07-25 NOTE — PROGRESS NOTES
Alyssa Olivier 39   Progress Note and Procedure Note      Jani Chandler [facility]  MEDICAL RECORD NUMBER:  [id number]  AGE: 70 y.o. GENDER: female  : 1940  EPISODE DATE:  2019    Subjective:     Chief Complaint   Patient presents with    Wound Check     Follow Up Wound Left Hip         HISTORY of PRESENT ILLNESS HPI      Jani New is a 70 y. o. female who presents today for wound/ulcer evaluation. History of Wound Context:  Pt presents with a wound in the Left buttock. The area started out as a skin dimpling causing pain. Pt has had hip fracture surgery by  and pt saw him who referred her to  who thought this could be bursitis and gave an injection and prescribed a compounded cream containing 4 different medications to be applied to the site. The area subsequently opened up becoming a wound. Pt saw Ralf Barrera, her PCP who prescribed Doxycycline and discontinued the cream. Pt was advised to be seen at the [facility].    Pt had surgical revision of wound by Dr. Shelbi Camacho on 18 to open up area.    Recently in hospital for acute CVA with AMS.  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  to talk with pt's physicians to see 2 of her medicines could be interfering with wound healing,namely; Cellcept and steroid. Denies consitutional issues and NPWT used to treat wound changed 3x/week @ 125mmHg continuously.    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 O2 dependent 4l/min continuously. On Cellcept,and Prednisone. Saw Dr. Lucille Sánchez, pulmonology this past week and he discontinued Cellcept and is being [facility] (temporarily) for Prednisone.   Wound/Ulcer Pain Timing/Severity: intermittent  Quality of pain: aching  Severity:  3 / 10   Modifying Factors: None  Associated Signs/Symptoms: erythema, drainage and pain     Ulcer Identification:  Ulcer Type: traumatic  Contributing Factors: decreased mobility, shear force and decreased tissue oxygenation, HISTORY    Social History     Tobacco Use    Smoking status: Never Smoker    Smokeless tobacco: Never Used    Tobacco comment: encouraged to never smoke    Substance Use Topics    Alcohol use: No     Alcohol/week: 0.0 standard drinks    Drug use: No       ALLERGIES    Allergies   Allergen Reactions    Ampicillin Hives    Ciprofloxacin Other (See Comments)     Sores in mouth      Nitrofurantoin Other (See Comments)     Unable to recall reaction    Sulfa Antibiotics Other (See Comments)     Unable to recall reaction    Penicillins Hives and Rash       MEDICATIONS    Current Outpatient Medications on File Prior to Encounter   Medication Sig Dispense Refill    Cyanocobalamin (B-12) 100 MCG TABS Take by mouth daily      aspirin 81 MG tablet Take 81 mg by mouth daily      predniSONE (DELTASONE) 10 MG tablet Take 1 tablet by mouth 3 times daily 90 tablet 1    omeprazole (PRILOSEC) 40 MG delayed release capsule TAKE 1 CAPSULE BY MOUTH TWICE DAILY 180 capsule 0    buPROPion (WELLBUTRIN SR) 150 MG extended release tablet TAKE 1 TABLET BY MOUTH TWICE DAILY 180 tablet 0    acetaminophen-codeine (TYLENOL #4) 300-60 MG per tablet Take 1 tablet by mouth every 4 hours as needed for Pain.  sertraline (ZOLOFT) 100 MG tablet TAKE 1 TABLET BY MOUTH EVERY DAY 90 tablet 0    alendronate (FOSAMAX) 70 MG tablet TAKE 1 TABLET BY MOUTH EVERY 7 DAYS 12 tablet 0    ipratropium-albuterol (DUONEB) 0.5-2.5 (3) MG/3ML SOLN nebulizer solution Inhale 3 mLs into the lungs every 6 hours as needed for Shortness of Breath (wheezing) 360 mL 0    clonazePAM (KLONOPIN) 0.5 MG tablet Take 0.25 mg by mouth nightly as needed (restless leg syndrome). Cathy Enciso ferrous sulfate 325 (65 Fe) MG tablet Take 325 mg by mouth daily (with breakfast)      senna-docusate (PERICOLACE) 8.6-50 MG per tablet Take 1 tablet by mouth daily      OXYGEN Inhale 4 L into the lungs continuous (Patient taking differently: Inhale 3 L into the lungs nightly ) 1 Container 1    Multiple Vitamins-Minerals (MULTIVITAMIN PO) Take 1 tablet by mouth daily. No current facility-administered medications on file prior to encounter. REVIEW OF SYSTEMS    Pertinent items are noted in HPI. Objective:      /69   Pulse 69   Temp 97.3 °F (36.3 °C) (Oral)   Resp 22     Wt Readings from Last 3 Encounters:   06/05/19 125 lb 10.6 oz (57 kg)   05/06/19 119 lb 7.8 oz (54.2 kg)   05/04/19 119 lb 14.9 oz (54.4 kg)       PHYSICAL EXAM    General Appearance: alert and oriented to person, place and time, well developed and somewhat mal - nourished, in no acute distress  Skin: warm and dry, no rash or erythema  Head: normocephalic and atraumatic  Eyes: pupils equal, round, and reactive to light, extraocular eye movements intact, conjunctivae normal  ENT:  external ear and ear canal normal bilaterally, nose without deformity  Neck: supple and non-tender without mass, no thyromegaly or thyroid nodules, no cervical lymphadenopathy  Pulmonary/Chest: clear to auscultation bilaterally- no wheezes, rales or rhonchi, normal air movement, no respiratory distress  Cardiovascular: normal rate, regular rhythm, normal S1 and S2, no murmurs, rubs, clicks, or gallops  Abdomen: soft, non-tender, non-distended, normal bowel sounds, no masses or organomegaly  Extremities: no cyanosis, clubbing or edema lt hip wound basically unchanged. No overt infection. unstageable  rt sacral area pressure ulcer as scribed which is new.   Neurologic:  no cranial nerve deficit, coordination and speech normal      Assessment:        Problem List Items Addressed This Visit     Moderate protein-calorie malnutrition ([address]    Complicated open wound of hip, left, initial encounter - Primary    Pressure ulcer of sacral region, unstageable [facility])               Procedure Note : none         Wound 12/03/18 Hip Left #5(acquired on 11/28/18-surgically created by Dr. Jeremy Gunter) (Active)   Wound Image   7/10/2019 11:09 AM

## 2019-07-26 ENCOUNTER — TELEPHONE (OUTPATIENT)
Dept: FAMILY MEDICINE CLINIC | Age: 79
End: 2019-07-26

## 2019-07-28 RX ORDER — SERTRALINE HYDROCHLORIDE 100 MG/1
TABLET, FILM COATED ORAL
Qty: 90 TABLET | Refills: 0 | Status: SHIPPED | OUTPATIENT
Start: 2019-07-28

## 2019-07-29 DIAGNOSIS — F41.1 GAD (GENERALIZED ANXIETY DISORDER): Primary | ICD-10-CM

## 2019-07-29 RX ORDER — CLONAZEPAM 1 MG/1
TABLET ORAL
Qty: 180 TABLET | Refills: 0 | Status: ON HOLD | OUTPATIENT
Start: 2019-07-29 | End: 2019-08-12

## 2019-07-31 ENCOUNTER — HOSPITAL ENCOUNTER (OUTPATIENT)
Dept: WOUND CARE | Age: 79
Discharge: HOME | End: 2019-07-31
Payer: MEDICARE

## 2019-07-31 VITALS
HEART RATE: 64 BPM | SYSTOLIC BLOOD PRESSURE: 149 MMHG | TEMPERATURE: 98 F | RESPIRATION RATE: 20 BRPM | DIASTOLIC BLOOD PRESSURE: 71 MMHG

## 2019-07-31 DIAGNOSIS — M34.9 SCLERODERMA (HCC): Primary | ICD-10-CM

## 2019-07-31 DIAGNOSIS — S71.002A COMPLICATED OPEN WOUND OF HIP, LEFT, INITIAL ENCOUNTER: ICD-10-CM

## 2019-07-31 DIAGNOSIS — L89.150 PRESSURE ULCER OF SACRAL REGION, UNSTAGEABLE (HCC): ICD-10-CM

## 2019-07-31 PROCEDURE — 97605 NEG PRS WND THER DME<=50SQCM: CPT

## 2019-07-31 RX ORDER — LIDOCAINE HYDROCHLORIDE 40 MG/ML
SOLUTION TOPICAL PRN
Status: DISCONTINUED | OUTPATIENT
Start: 2019-07-31 | End: 2019-08-01 | Stop reason: HOSPADM

## 2019-07-31 ASSESSMENT — PAIN SCALES - GENERAL
PAINLEVEL_OUTOF10: 0
PAINLEVEL_OUTOF10: 0

## 2019-08-01 NOTE — PROGRESS NOTES
pt is off of cellcept. On Prednisone. No f/ c/ n/ v/ d.  Pt has a new  pressure injury right sacral area.               PAST MEDICAL HISTORY        Diagnosis Date    Anxiety and depression     Arthritis     Cancer of skin of leg     Chronic low back pain     GERD (gastroesophageal reflux disease)     Insomnia     Iron deficiency anemia     Kidney stone     PUD (peptic ulcer disease)     Pulmonary fibrosis (HCC)     Dr. Irving Martinez has a CT done every 6 months    Stomach ulcer     Ulcer - lesion 2010    UTI (urinary tract infection)     frequent       PAST SURGICAL HISTORY    Past Surgical History:   Procedure Laterality Date    BACK SURGERY  2004,OCT 02, [place] 04    x4--fusions    BACK SURGERY  2000    laminectomy-lumbar    CARPAL TUNNEL RELEASE  11    Left    COLONOSCOPY      CYSTOSCOPY Left 2016    cysto left ureteral stent placement    CYSTOSCOPY  01/15/2018    Left Stent Insertion    CYSTOSCOPY  2018    cysto, left uretero with laser litho, left stent excahnge    EYE SURGERY Bilateral     cataract removed with lens implants    FRACTURE SURGERY Right 2016    femur fracture    FRACTURE SURGERY Left     femur fracture    HYSTERECTOMY  1993    HYSTEROSCOPY  1993    JOINT REPLACEMENT  2000    LEFT KNEE    JOINT REPLACEMENT  08    RIGHT KNEE    [place] [address] W/BRNCL ALVEOLAR LAVAGE N/A 10/24/2018    BRONCHOSCOPY WITH BRONCHOALVEOLAR LAVAGE performed by Juju Eastman DO at [facility]. DEBRIDE NECROTIC SKIN/ TISSUE, GENIT & PERINM Left 2018    DEBRIDEMENT LEFT HIP SKIN AND SUBCUTANEOUS FASCIA performed by Melody Vallejo MD at [address] [place] [address]  2010    For PUD       FAMILY HISTORY    Family History   Problem Relation Age of Onset    Emphysema Mother          AGE 64    Depression Mother     Clotting Disorder Father             Stroke Father        SOCIAL HISTORY    Social History     Tobacco Use daily      OXYGEN Inhale 4 L into the lungs continuous (Patient taking differently: Inhale 3 L into the lungs nightly ) 1 Container 1    Multiple Vitamins-Minerals (MULTIVITAMIN PO) Take 1 tablet by mouth daily. No current facility-administered medications on file prior to encounter. REVIEW OF SYSTEMS    Pertinent items are noted in HPI. Objective:      BP (!) 149/71   Pulse 64   Temp 98 °F (36.7 °C) (Oral)   Resp 20     Wt Readings from Last 3 Encounters:   06/05/19 125 lb 10.6 oz (57 kg)   05/06/19 119 lb 7.8 oz (54.2 kg)   05/04/19 119 lb 14.9 oz (54.4 kg)       PHYSICAL EXAM    General Appearance: alert and oriented to person, place and time, well developed and well- nourished, in no acute distress  Skin: warm and dry, no rash or erythema  Head: normocephalic and atraumatic  Eyes: pupils equal, round, and reactive to light, extraocular eye movements intact, conjunctivae normal  ENT:  external ear and ear canal normal bilaterally, nose without deformity  Neck: supple and non-tender without mass, no thyromegaly or thyroid nodules, no cervical lymphadenopathy  Pulmonary/Chest: clear to auscultation bilaterally- no wheezes, rales or rhonchi, normal air movement, no respiratory distress  Cardiovascular: normal rate, regular rhythm, normal S1 and S2, no murmurs, rubs, clicks, or gallops, distal pulses intact  Abdomen: soft, non-tender, non-distended, normal bowel sounds, no masses or organomegaly  Extremities: no cyanosis, clubbing or edema. Lt hip wound as scribed. No significant progress. Rt para sacral pressure ulcer as scribed. No erythema or drainage.   Neurologic:  no cranial nerve deficit, coordination and speech normal      Assessment:        Problem List Items Addressed This Visit     Scleroderma ([facility] - Primary    Complicated open wound of hip, left, initial encounter    Pressure ulcer of sacral region, unstageable [facility])           Procedure Note : none          Wound 12/03/18 Hip Left Teo          Electronically signed by Janeth Quevedo MD on 8/1/2019 at 10:39 AM

## 2019-08-07 ENCOUNTER — HOSPITAL ENCOUNTER (OUTPATIENT)
Dept: WOUND CARE | Age: 79
Discharge: HOME | End: 2019-08-07
Payer: MEDICARE

## 2019-08-11 ENCOUNTER — APPOINTMENT (OUTPATIENT)
Dept: GENERAL RADIOLOGY | Age: 79
DRG: 689 | End: 2019-08-11
Payer: MEDICARE

## 2019-08-11 ENCOUNTER — HOSPITAL ENCOUNTER (INPATIENT)
Age: 79
LOS: 2 days | Discharge: HOME HEALTH | DRG: 689 | End: 2019-08-14
Attending: EMERGENCY MEDICINE | Admitting: INTERNAL MEDICINE
Payer: MEDICARE

## 2019-08-11 DIAGNOSIS — R77.8 ELEVATED TROPONIN: ICD-10-CM

## 2019-08-11 DIAGNOSIS — J96.01 ACUTE RESPIRATORY FAILURE WITH HYPOXIA AND HYPERCAPNIA (HCC): Primary | ICD-10-CM

## 2019-08-11 DIAGNOSIS — D72.823 LEUKEMOID REACTION: ICD-10-CM

## 2019-08-11 DIAGNOSIS — J96.02 ACUTE RESPIRATORY FAILURE WITH HYPOXIA AND HYPERCAPNIA (HCC): Primary | ICD-10-CM

## 2019-08-11 PROCEDURE — 84443 ASSAY THYROID STIM HORMONE: CPT

## 2019-08-11 PROCEDURE — 83880 ASSAY OF NATRIURETIC PEPTIDE: CPT

## 2019-08-11 PROCEDURE — 84484 ASSAY OF TROPONIN QUANT: CPT

## 2019-08-11 PROCEDURE — 85025 COMPLETE CBC W/AUTO DIFF WBC: CPT

## 2019-08-11 PROCEDURE — 80048 BASIC METABOLIC PNL TOTAL CA: CPT

## 2019-08-11 PROCEDURE — 87040 BLOOD CULTURE FOR BACTERIA: CPT

## 2019-08-11 PROCEDURE — 83605 ASSAY OF LACTIC ACID: CPT

## 2019-08-11 PROCEDURE — 99285 EMERGENCY DEPT VISIT HI MDM: CPT

## 2019-08-11 PROCEDURE — 71045 X-RAY EXAM CHEST 1 VIEW: CPT

## 2019-08-11 PROCEDURE — 82803 BLOOD GASES ANY COMBINATION: CPT

## 2019-08-11 RX ORDER — IPRATROPIUM BROMIDE AND ALBUTEROL SULFATE 2.5; .5 MG/3ML; MG/3ML
1 SOLUTION RESPIRATORY (INHALATION) ONCE
Status: COMPLETED | OUTPATIENT
Start: 2019-08-11 | End: 2019-08-12

## 2019-08-11 RX ORDER — METHYLPREDNISOLONE SODIUM SUCCINATE 125 MG/2ML
125 INJECTION, POWDER, LYOPHILIZED, FOR SOLUTION INTRAMUSCULAR; INTRAVENOUS ONCE
Status: COMPLETED | OUTPATIENT
Start: 2019-08-11 | End: 2019-08-12

## 2019-08-12 ENCOUNTER — HOSPITAL ENCOUNTER (OUTPATIENT)
Dept: WOUND CARE | Age: 79
Discharge: HOME | End: 2019-08-12
Payer: MEDICARE

## 2019-08-12 LAB
ANION GAP SERPL CALCULATED.3IONS-SCNC: 15 MMOL/L (ref 3–16)
BACTERIA: ABNORMAL /HPF
BASE EXCESS VENOUS: 1.8 MMOL/L
BASE EXCESS VENOUS: 1.8 MMOL/L
BASOPHILS ABSOLUTE: 0 K/UL (ref 0–0.2)
BASOPHILS RELATIVE PERCENT: 0.4 %
BILIRUBIN URINE: NEGATIVE
BLOOD, URINE: ABNORMAL
BUN BLDV-MCNC: 27 MG/DL (ref 7–20)
CALCIUM SERPL-MCNC: 11.6 MG/DL (ref 8.3–10.6)
CARBOXYHEMOGLOBIN: 1.6 %
CARBOXYHEMOGLOBIN: 2 %
CHLORIDE BLD-SCNC: 99 MMOL/L (ref 99–110)
CLARITY: ABNORMAL
CO2: 26 MMOL/L (ref 21–32)
COLOR: YELLOW
CREAT SERPL-MCNC: 1 MG/DL (ref 0.6–1.2)
EKG ATRIAL RATE: 71 BPM
EKG DIAGNOSIS: NORMAL
EKG P AXIS: 38 DEGREES
EKG P-R INTERVAL: 152 MS
EKG Q-T INTERVAL: 390 MS
EKG QRS DURATION: 88 MS
EKG QTC CALCULATION (BAZETT): 423 MS
EKG R AXIS: -18 DEGREES
EKG T AXIS: 21 DEGREES
EKG VENTRICULAR RATE: 71 BPM
EOSINOPHILS ABSOLUTE: 0.2 K/UL (ref 0–0.6)
EOSINOPHILS RELATIVE PERCENT: 1.6 %
EPITHELIAL CELLS, UA: 0 /HPF (ref 0–5)
GFR AFRICAN AMERICAN: >60
GFR NON-AFRICAN AMERICAN: 54
GLUCOSE BLD-MCNC: 112 MG/DL (ref 70–99)
GLUCOSE URINE: NEGATIVE MG/DL
HCO3 VENOUS: 28 MMOL/L (ref 23–29)
HCO3 VENOUS: 28 MMOL/L (ref 23–29)
HCT VFR BLD CALC: 37.1 % (ref 36–48)
HEMOGLOBIN: 12.1 G/DL (ref 12–16)
HYALINE CASTS: 10 /LPF (ref 0–8)
KETONES, URINE: NEGATIVE MG/DL
LACTIC ACID: 1.6 MMOL/L (ref 0.4–2)
LEUKOCYTE ESTERASE, URINE: ABNORMAL
LYMPHOCYTES ABSOLUTE: 1.3 K/UL (ref 1–5.1)
LYMPHOCYTES RELATIVE PERCENT: 10.3 %
MCH RBC QN AUTO: 29.7 PG (ref 26–34)
MCHC RBC AUTO-ENTMCNC: 32.7 G/DL (ref 31–36)
MCV RBC AUTO: 90.9 FL (ref 80–100)
METHEMOGLOBIN VENOUS: 1.2 %
METHEMOGLOBIN VENOUS: 1.6 %
MICROSCOPIC EXAMINATION: YES
MONOCYTES ABSOLUTE: 0.6 K/UL (ref 0–1.3)
MONOCYTES RELATIVE PERCENT: 4.9 %
NEUTROPHILS ABSOLUTE: 10.4 K/UL (ref 1.7–7.7)
NEUTROPHILS RELATIVE PERCENT: 82.8 %
NITRITE, URINE: POSITIVE
O2 CONTENT, VEN: 12 ML/DL
O2 CONTENT, VEN: 13 ML/DL
O2 SAT, VEN: 78 %
O2 SAT, VEN: 95 %
O2 THERAPY: ABNORMAL
O2 THERAPY: ABNORMAL
PCO2, VEN: 56.5 MMHG (ref 40–50)
PCO2, VEN: 56.6 MMHG (ref 40–50)
PDW BLD-RTO: 16.2 % (ref 12.4–15.4)
PH UA: 5 (ref 5–8)
PH VENOUS: 7.32 (ref 7.35–7.45)
PH VENOUS: 7.32 (ref 7.35–7.45)
PLATELET # BLD: 324 K/UL (ref 135–450)
PMV BLD AUTO: 7.5 FL (ref 5–10.5)
PO2, VEN: 46 MMHG
PO2, VEN: 81 MMHG
POTASSIUM REFLEX MAGNESIUM: 4.5 MMOL/L (ref 3.5–5.1)
PRO-BNP: 348 PG/ML (ref 0–449)
PROTEIN UA: ABNORMAL MG/DL
RBC # BLD: 4.08 M/UL (ref 4–5.2)
RBC UA: 2 /HPF (ref 0–4)
REPORT: NORMAL
RESPIRATORY PANEL PCR: NORMAL
SODIUM BLD-SCNC: 140 MMOL/L (ref 136–145)
SPECIFIC GRAVITY UA: 1.02 (ref 1–1.03)
TCO2 CALC VENOUS: 30 MMOL/L
TCO2 CALC VENOUS: 30 MMOL/L
TROPONIN: 0.06 NG/ML
TSH REFLEX: 2.39 UIU/ML (ref 0.27–4.2)
URINE REFLEX TO CULTURE: YES
URINE TYPE: ABNORMAL
UROBILINOGEN, URINE: 1 E.U./DL
WBC # BLD: 12.5 K/UL (ref 4–11)
WBC UA: 45 /HPF (ref 0–5)

## 2019-08-12 PROCEDURE — 6370000000 HC RX 637 (ALT 250 FOR IP): Performed by: INTERNAL MEDICINE

## 2019-08-12 PROCEDURE — 87581 M.PNEUMON DNA AMP PROBE: CPT

## 2019-08-12 PROCEDURE — 84166 PROTEIN E-PHORESIS/URINE/CSF: CPT

## 2019-08-12 PROCEDURE — 99223 1ST HOSP IP/OBS HIGH 75: CPT | Performed by: INTERNAL MEDICINE

## 2019-08-12 PROCEDURE — 6360000002 HC RX W HCPCS: Performed by: INTERNAL MEDICINE

## 2019-08-12 PROCEDURE — 84155 ASSAY OF PROTEIN SERUM: CPT

## 2019-08-12 PROCEDURE — 87798 DETECT AGENT NOS DNA AMP: CPT

## 2019-08-12 PROCEDURE — 87633 RESP VIRUS 12-25 TARGETS: CPT

## 2019-08-12 PROCEDURE — 87486 CHLMYD PNEUM DNA AMP PROBE: CPT

## 2019-08-12 PROCEDURE — 87186 SC STD MICRODIL/AGAR DIL: CPT

## 2019-08-12 PROCEDURE — 36415 COLL VENOUS BLD VENIPUNCTURE: CPT

## 2019-08-12 PROCEDURE — 93005 ELECTROCARDIOGRAM TRACING: CPT | Performed by: EMERGENCY MEDICINE

## 2019-08-12 PROCEDURE — 6370000000 HC RX 637 (ALT 250 FOR IP): Performed by: EMERGENCY MEDICINE

## 2019-08-12 PROCEDURE — 96365 THER/PROPH/DIAG IV INF INIT: CPT

## 2019-08-12 PROCEDURE — 93010 ELECTROCARDIOGRAM REPORT: CPT | Performed by: INTERNAL MEDICINE

## 2019-08-12 PROCEDURE — 2580000003 HC RX 258: Performed by: INTERNAL MEDICINE

## 2019-08-12 PROCEDURE — 81001 URINALYSIS AUTO W/SCOPE: CPT

## 2019-08-12 PROCEDURE — 94640 AIRWAY INHALATION TREATMENT: CPT

## 2019-08-12 PROCEDURE — 84156 ASSAY OF PROTEIN URINE: CPT

## 2019-08-12 PROCEDURE — 84165 PROTEIN E-PHORESIS SERUM: CPT

## 2019-08-12 PROCEDURE — 1200000000 HC SEMI PRIVATE

## 2019-08-12 PROCEDURE — 2700000000 HC OXYGEN THERAPY PER DAY

## 2019-08-12 PROCEDURE — 87086 URINE CULTURE/COLONY COUNT: CPT

## 2019-08-12 PROCEDURE — 94760 N-INVAS EAR/PLS OXIMETRY 1: CPT

## 2019-08-12 PROCEDURE — 87449 NOS EACH ORGANISM AG IA: CPT

## 2019-08-12 PROCEDURE — 96361 HYDRATE IV INFUSION ADD-ON: CPT

## 2019-08-12 PROCEDURE — 51701 INSERT BLADDER CATHETER: CPT

## 2019-08-12 PROCEDURE — 82803 BLOOD GASES ANY COMBINATION: CPT

## 2019-08-12 PROCEDURE — 2580000003 HC RX 258: Performed by: EMERGENCY MEDICINE

## 2019-08-12 PROCEDURE — 96375 TX/PRO/DX INJ NEW DRUG ADDON: CPT

## 2019-08-12 PROCEDURE — 6360000002 HC RX W HCPCS: Performed by: EMERGENCY MEDICINE

## 2019-08-12 PROCEDURE — 87077 CULTURE AEROBIC IDENTIFY: CPT

## 2019-08-12 PROCEDURE — 87040 BLOOD CULTURE FOR BACTERIA: CPT

## 2019-08-12 RX ORDER — SODIUM CHLORIDE 0.9 % (FLUSH) 0.9 %
10 SYRINGE (ML) INJECTION PRN
Status: DISCONTINUED | OUTPATIENT
Start: 2019-08-12 | End: 2019-08-14 | Stop reason: HOSPADM

## 2019-08-12 RX ORDER — BUPROPION HYDROCHLORIDE 150 MG/1
150 TABLET, EXTENDED RELEASE ORAL 2 TIMES DAILY
Status: DISCONTINUED | OUTPATIENT
Start: 2019-08-12 | End: 2019-08-14 | Stop reason: HOSPADM

## 2019-08-12 RX ORDER — ACETAMINOPHEN 325 MG/1
650 TABLET ORAL NIGHTLY PRN
COMMUNITY

## 2019-08-12 RX ORDER — SERTRALINE HYDROCHLORIDE 100 MG/1
100 TABLET, FILM COATED ORAL DAILY
Status: DISCONTINUED | OUTPATIENT
Start: 2019-08-12 | End: 2019-08-14 | Stop reason: HOSPADM

## 2019-08-12 RX ORDER — IPRATROPIUM BROMIDE AND ALBUTEROL SULFATE 2.5; .5 MG/3ML; MG/3ML
1 SOLUTION RESPIRATORY (INHALATION) EVERY 6 HOURS PRN
Status: DISCONTINUED | OUTPATIENT
Start: 2019-08-12 | End: 2019-08-14 | Stop reason: HOSPADM

## 2019-08-12 RX ORDER — ASPIRIN 81 MG/1
81 TABLET, CHEWABLE ORAL DAILY
Status: DISCONTINUED | OUTPATIENT
Start: 2019-08-12 | End: 2019-08-14 | Stop reason: HOSPADM

## 2019-08-12 RX ORDER — DOCUSATE SODIUM 100 MG/1
100 CAPSULE, LIQUID FILLED ORAL NIGHTLY
COMMUNITY

## 2019-08-12 RX ORDER — SODIUM CHLORIDE 0.9 % (FLUSH) 0.9 %
10 SYRINGE (ML) INJECTION EVERY 12 HOURS SCHEDULED
Status: DISCONTINUED | OUTPATIENT
Start: 2019-08-12 | End: 2019-08-14 | Stop reason: HOSPADM

## 2019-08-12 RX ORDER — 0.9 % SODIUM CHLORIDE 0.9 %
1000 INTRAVENOUS SOLUTION INTRAVENOUS ONCE
Status: COMPLETED | OUTPATIENT
Start: 2019-08-12 | End: 2019-08-12

## 2019-08-12 RX ORDER — IPRATROPIUM BROMIDE AND ALBUTEROL SULFATE 2.5; .5 MG/3ML; MG/3ML
1 SOLUTION RESPIRATORY (INHALATION) EVERY 8 HOURS
Status: DISCONTINUED | OUTPATIENT
Start: 2019-08-12 | End: 2019-08-14 | Stop reason: HOSPADM

## 2019-08-12 RX ORDER — ONDANSETRON 2 MG/ML
4 INJECTION INTRAMUSCULAR; INTRAVENOUS EVERY 6 HOURS PRN
Status: DISCONTINUED | OUTPATIENT
Start: 2019-08-12 | End: 2019-08-14 | Stop reason: HOSPADM

## 2019-08-12 RX ORDER — CLONAZEPAM 0.5 MG/1
0.25 TABLET ORAL NIGHTLY PRN
Status: DISCONTINUED | OUTPATIENT
Start: 2019-08-12 | End: 2019-08-14 | Stop reason: HOSPADM

## 2019-08-12 RX ORDER — CLONAZEPAM 0.5 MG/1
0.25 TABLET ORAL NIGHTLY PRN
Status: ON HOLD | COMMUNITY
End: 2019-08-21

## 2019-08-12 RX ADMIN — BUPROPION HYDROCHLORIDE 150 MG: 150 TABLET, EXTENDED RELEASE ORAL at 21:45

## 2019-08-12 RX ADMIN — SODIUM CHLORIDE 1000 ML: 9 INJECTION, SOLUTION INTRAVENOUS at 01:16

## 2019-08-12 RX ADMIN — CEFEPIME HYDROCHLORIDE 1 G: 1 INJECTION, POWDER, FOR SOLUTION INTRAMUSCULAR; INTRAVENOUS at 01:16

## 2019-08-12 RX ADMIN — ENOXAPARIN SODIUM 40 MG: 40 INJECTION SUBCUTANEOUS at 08:48

## 2019-08-12 RX ADMIN — SERTRALINE 100 MG: 100 TABLET, FILM COATED ORAL at 08:48

## 2019-08-12 RX ADMIN — ASPIRIN 325 MG: 325 TABLET, DELAYED RELEASE ORAL at 00:55

## 2019-08-12 RX ADMIN — CLONAZEPAM 0.25 MG: 0.5 TABLET ORAL at 21:46

## 2019-08-12 RX ADMIN — ASPIRIN 81 MG 81 MG: 81 TABLET ORAL at 08:49

## 2019-08-12 RX ADMIN — Medication 10 ML: at 08:50

## 2019-08-12 RX ADMIN — BUPROPION HYDROCHLORIDE 150 MG: 150 TABLET, EXTENDED RELEASE ORAL at 08:48

## 2019-08-12 RX ADMIN — IPRATROPIUM BROMIDE AND ALBUTEROL SULFATE 1 AMPULE: .5; 3 SOLUTION RESPIRATORY (INHALATION) at 16:22

## 2019-08-12 RX ADMIN — Medication: at 18:41

## 2019-08-12 RX ADMIN — METHYLPREDNISOLONE SODIUM SUCCINATE 125 MG: 125 INJECTION, POWDER, FOR SOLUTION INTRAMUSCULAR; INTRAVENOUS at 00:56

## 2019-08-12 RX ADMIN — AZITHROMYCIN MONOHYDRATE 500 MG: 500 INJECTION, POWDER, LYOPHILIZED, FOR SOLUTION INTRAVENOUS at 14:43

## 2019-08-12 RX ADMIN — CEFTRIAXONE 1 G: 1 INJECTION, POWDER, FOR SOLUTION INTRAMUSCULAR; INTRAVENOUS at 14:52

## 2019-08-12 RX ADMIN — IPRATROPIUM BROMIDE AND ALBUTEROL SULFATE 1 AMPULE: .5; 3 SOLUTION RESPIRATORY (INHALATION) at 00:08

## 2019-08-12 ASSESSMENT — ENCOUNTER SYMPTOMS
CHEST TIGHTNESS: 0
EYE PAIN: 0
EYE REDNESS: 0
PHOTOPHOBIA: 0
ABDOMINAL DISTENTION: 0
APNEA: 0
EYE DISCHARGE: 0
WHEEZING: 0
CHOKING: 0
COUGH: 0
EYE ITCHING: 0
STRIDOR: 0
SHORTNESS OF BREATH: 1

## 2019-08-12 ASSESSMENT — PAIN SCALES - GENERAL
PAINLEVEL_OUTOF10: 4
PAINLEVEL_OUTOF10: 0
PAINLEVEL_OUTOF10: 0

## 2019-08-12 ASSESSMENT — PAIN DESCRIPTION - ORIENTATION: ORIENTATION: LEFT

## 2019-08-12 NOTE — CONSULTS
left stent excahnge    EYE SURGERY Bilateral 2008    cataract removed with lens implants    FRACTURE SURGERY Right 06/05/2016    femur fracture    FRACTURE SURGERY Left     femur fracture    HYSTERECTOMY  1993    HYSTEROSCOPY  1993    JOINT REPLACEMENT  4/20/2000    LEFT KNEE    JOINT REPLACEMENT  2/27/08    RIGHT KNEE    [place] [address] W/BRNCL ALVEOLAR LAVAGE N/A 10/24/2018    BRONCHOSCOPY WITH BRONCHOALVEOLAR LAVAGE performed by Alexei San DO at [facility] 96. DEBRIDE NECROTIC SKIN/ TISSUE, GENIT & PERINM Left 11/28/2018    DEBRIDEMENT LEFT HIP SKIN AND SUBCUTANEOUS FASCIA performed by Yasemin Charles MD at [address] [place] [address]    For PUD         MEDICATIONS:  Current Facility-Administered Medications: aspirin chewable tablet 81 mg, 81 mg, Oral, Daily  buPROPion [facility]) extended release tablet 150 mg, 150 mg, Oral, BID  ipratropium-albuterol (DUONEB) nebulizer solution 3 mL, 1 vial, Inhalation, Q6H PRN  sertraline (ZOLOFT) tablet 100 mg, 100 mg, Oral, Daily  clonazePAM (KLONOPIN) tablet 0.25 mg, 0.25 mg, Oral, Nightly PRN  sodium chloride flush 0.9 % injection 10 mL, 10 mL, Intravenous, 2 times per day  sodium chloride flush 0.9 % injection 10 mL, 10 mL, Intravenous, PRN  magnesium hydroxide (MILK OF MAGNESIA) 400 MG/5ML suspension 30 mL, 30 mL, Oral, Daily PRN  ondansetron (ZOFRAN) injection 4 mg, 4 mg, Intravenous, Q6H PRN  enoxaparin (LOVENOX) injection 40 mg, 40 mg, Subcutaneous, Daily  cefepime (MAXIPIME) 1 g IVPB minibag, 1 g, Intravenous, Q12H      ALLERGIES:  Patient is allergic to ampicillin; ciprofloxacin; nitrofurantoin; sulfa antibiotics; and penicillins. FAMILY HISTORY:  family history includes Clotting Disorder in her father; Depression in her mother; Emphysema in her mother; Stroke in her father.     SOCIAL HISTORY:  Social History     Socioeconomic History    Marital status:      Spouse name: RITO    Number of children: 2    Years of education: Not on file    Highest education level: Not on file   Occupational History    Occupation: retired   Social Needs    Financial resource strain: Not on file    Food insecurity:     Worry: Not on file     Inability: Not on file   Wengo needs:     Medical: Not on file     Non-medical: Not on file   Tobacco Use    Smoking status: Never Smoker    Smokeless tobacco: Never Used    Tobacco comment: encouraged to never smoke    Substance and Sexual Activity    Alcohol use: No     Alcohol/week: 0.0 standard drinks    Drug use: No    Sexual activity: Never   Lifestyle    Physical activity:     Days per week: Not on file     Minutes per session: Not on file    Stress: Not on file   Relationships    Social connections:     Talks on phone: Not on file     Gets together: Not on file     Attends Alevism service: Not on file     Active member of club or organization: Not on file     Attends meetings of clubs or organizations: Not on file     Relationship status: Not on file    Intimate partner violence:     Fear of current or ex partner: Not on file     Emotionally abused: Not on file     Physically abused: Not on file     Forced sexual activity: Not on file   Other Topics Concern    Not on file   Social History Narrative    Not on file      reports that she has never smoked.  She has never used smokeless tobacco.    REVIEW OF SYSTEMS:  Constitutional: Negative for fever  HENT: Negative for sore throat  Eyes: Negative for redness   Respiratory: + for dyspnea, +cough  Cardiovascular: Negative for chest pain  Gastrointestinal: Negative for vomiting, diarrhea   Genitourinary: Negative for hematuria   Musculoskeletal: Negative for arthralgias   Skin: Negative for rash  Neurological: Negative for syncope  Hematological: Negative for adenopathy  Psychiatric/Behavorial: Negative for anxiety    Objective:   PHYSICAL EXAM:  Blood pressure 135/73, pulse 83, temperature 98.1 °F (36.7 °C), temperature source Oral, resp. rate 16, height 5' 3\" (1.6 m), weight 124 lb 12.5 oz (56.6 kg), SpO2 99 %, not currently breastfeeding. on 3L NC    Gen: Well developed; well nourished  Eyes: No scleral icterus. No conjunctival injection. ENT:  Oropharynx clear. External appearance of ears and nose normal.  Neck: Trachea midline. No obvious mass. No visible thyroid enlargement    Respiratory: Crackles over left lower lung zone, no accessory muscle use  Cardiovascular: Regular rate and rhythm, no appreciable murmurs. No edema. Gastrointestinal: Soft, non-tender. No hernia  Skin: Warm and dry. No rashes or ulcers on visible areas. Normal texture and turgor  Lymphatic: No cervical LAD. No supraclavicular LAD. Musculoskeletal: No cyanosis, clubbing or joint deformity. Psychiatric: Normal mood and affect; exhibits normal insight and judgement       Data Reviewed:   LABS:  CBC:   Recent Labs     19  2345   WBC 12.5*   HGB 12.1   HCT 37.1   MCV 90.9        BMP:   Recent Labs     19  2344      K 4.5   CL 99   CO2 26   BUN 27*   CREATININE 1.0     LIVER PROFILE: No results for input(s): AST, ALT, LIPASE, BILIDIR, BILITOT, ALKPHOS in the last 72 hours. Invalid input(s): AMYLASE,  ALB  PT/INR: No results for input(s): PROTIME, INR in the last 72 hours. APTT: No results for input(s): APTT in the last 72 hours. VB/11- 7.32/57/46    Images and reports from chest imaging were reviewed by me. My interpretation is:  CXR (19): Diffusely increased interstitial markings (stable compared to at least 2019)      ECHO (19)  Summary   Left ventricular cavity size is normal.   There is moderate to severe asymmetric hypertrophy of the basal septum.   Ejection fraction is visually estimated to be 55-60%.    Diastolic filling parameters suggest grade I diastolic dysfunction.   Mitral valve leaflets appear mildly thickened.   Trivial mitral regurgitation.   Tricuspid valve is structurally normal.   Mild tricuspid regurgitation. [address] not well visualized.   Estimated pulmonary artery systolic pressure is normal to mildly elevated at   25-37 mmHg assuming a right atrial pressure of 3-15 mmHg. Assessment:     Shortness of breath  Acute metabolic encephalopathy  Cystic lung disease  Acute on chronic hypercarbic respiratory failure  Chronic hypoxic respiratory failure (on 4L NC)    Plan:      Shortness of breath  -In the setting of cystic lung disease. Cannot exclude acute bronchitis or developing pneumonia  -Change cefepime to ceftriaxone and azithromycin  -Send urine strep and Legionella antigens  -Send respiratory viral panel    Acute metabolic encephalopathy  -Seems to be due to infection  -Antibiotics as above    Cystic lung disease  -Send serum and urine protein electrophoresis  -Duonebs every 8 hours    Acute on chronic hypercarbic respiratory failure  -Repeat VBG    Chronic hypoxic respiratory failure (on 4L NC)  -Continue supplemental oxygen to keep saturation greater than 90%      Thank you for allowing me to participate in the care of this patient. Will follow.      Goldie Cortez MD  [facility]

## 2019-08-12 NOTE — PROGRESS NOTES
Patient brought by stretcher from ED to room 4258. Patient had wound vac on left hip area and a sacral wound per pt and  - will evaluate. Confirmed monitor with [facility] - sinus 73    Scattered bruises and abrasions. Patient has a wound Vac on posterior hip area - family wishes to not have this removed tonight to be replaced with a wet to dry. Wound on right sacral area 8 x 6 red and indurated with inner 4 x 3 black area - placed a mepilex border sacrum over this area after cleansing with saline spray. Note sent to Dr. Tanja Isidro, regarding wound vac as per pt and  request as they have bee dealing with this for over a year. Dr. Tanja Isidro responded - nursing communication to leave wound vac on order for wound care consultation. [id number] - Specialty mattress ordered.

## 2019-08-12 NOTE — PROGRESS NOTES
Pharmacy Medication Reconciliation Note     List of medications patient is currently taking is complete. Source of information:   1. Bedside conversation with pt and her spouse  2. Fill history and Epic notes    Allergies   Allergen Reactions    Ampicillin Hives    Ciprofloxacin Other (See Comments)     Sores in mouth      Nitrofurantoin Other (See Comments)     Unable to recall reaction    Sulfa Antibiotics Other (See Comments)     Unable to recall reaction    Penicillins Hives and Rash       Notes regarding home medications:   1. Prednisone dose is 10 mg daily, not TID as listed  2.  Clonazepam dose is 0.25 mg at bedtime as needed     Thuy Oglesby, [address] [place] [address]  8/12/2019  11:50 AM

## 2019-08-12 NOTE — PLAN OF CARE
Problem: Nutrition  Goal: Optimal nutrition therapy  Outcome: Ongoing   Nutrition Problem: Increased nutrient needs  Intervention: Food and/or Nutrient Delivery: Continue current diet, Start ONS  Nutritional Goals: Pt will consume >/= 50% of meals this admission.

## 2019-08-12 NOTE — ED PROVIDER NOTES
[facility]  eMERGENCY dEPARTMENT eNCOUnter      Pt Name: Alonzo Collins  MRN: [id number]  [place] 1940  Date of evaluation: 8/11/2019  Provider: Shant Horne MD    CHIEF COMPLAINT       Chief Complaint   Patient presents with    Altered Mental Status     cpta - family brought patient in for evaluation. + altered mental status, hx of UTIs. On home o2, o2 sats 77% 4 L NC       HISTORY OF PRESENT ILLNESS    Alonzo Collins is a 70 y.o. female who presents to the emergency department with AMS and SOB. Patinet brought in for evaluation by family for confusion last few days. Per famiyl whenever she acts like this has UTI. Patient has pulmonary fibrosis and is on 4L NC. On arrival on 4L and O2 saturation in 70s. Placed on NRB. Patient endorses some SOB but no chest pain. No abdominal pain. No other associated symptoms. Nursing Notes were reviewed. REVIEW OF SYSTEMS       Review of Systems   Constitutional: Positive for fatigue. Negative for activity change, appetite change, chills, diaphoresis, fever and unexpected weight change. HENT: Negative for congestion, dental problem, drooling and ear discharge. Eyes: Negative for photophobia, pain, discharge, redness and itching. Respiratory: Positive for shortness of breath. Negative for apnea, cough, choking, chest tightness, wheezing and stridor. Cardiovascular: Negative for chest pain and leg swelling. Gastrointestinal: Negative for abdominal distention. Endocrine: Negative for polyphagia and polyuria. Genitourinary: Negative for vaginal bleeding, vaginal discharge and vaginal pain. Musculoskeletal: Negative for arthralgias. Neurological: Negative for dizziness, facial asymmetry and headaches. Hematological: Negative for adenopathy. Does not bruise/bleed easily.    Psychiatric/Behavioral: Negative for agitation, behavioral problems, confusion, decreased concentration, dysphoric mood, hallucinations, self-injury, sleep disturbance and suicidal ideas. The patient is not nervous/anxious and is not hyperactive. Except as noted above the remainder of the review of systems was reviewed and negative.      PAST MEDICAL HISTORY     Past Medical History:   Diagnosis Date    Anxiety and depression     Arthritis     Cancer of skin of leg     Chronic low back pain     GERD (gastroesophageal reflux disease)     Insomnia     Iron deficiency anemia     Kidney stone     PUD (peptic ulcer disease)     Pulmonary fibrosis (HCC)     Dr. Lai Kern has a CT done every 6 months    Stomach ulcer     Ulcer - lesion MARCH 2010    UTI (urinary tract infection)     frequent       SURGICAL HISTORY       Past Surgical History:   Procedure Laterality Date    BACK SURGERY  MARCH 2004,OCT 02, [place] 89    x4--fusions    BACK SURGERY  06/2000    laminectomy-lumbar    CARPAL TUNNEL RELEASE  9/27/11    Left    COLONOSCOPY      CYSTOSCOPY Left 09/2016    cysto left ureteral stent placement    CYSTOSCOPY  01/15/2018    Left Stent Insertion    CYSTOSCOPY  02/14/2018    cysto, left uretero with laser litho, left stent excahnge    EYE SURGERY Bilateral 2008    cataract removed with lens implants    FRACTURE SURGERY Right 06/05/2016    femur fracture    FRACTURE SURGERY Left     femur fracture    HYSTERECTOMY  1993    HYSTEROSCOPY  1993    JOINT REPLACEMENT  4/20/2000    LEFT KNEE    JOINT REPLACEMENT  2/27/08    RIGHT KNEE    MD [address] W/BRNCL ALVEOLAR LAVAGE N/A 10/24/2018    BRONCHOSCOPY WITH BRONCHOALVEOLAR LAVAGE performed by Kat Cespedes DO at [facility]. DEBRIDE NECROTIC SKIN/ TISSUE, GENIT & PERINM Left 11/28/2018    DEBRIDEMENT LEFT HIP SKIN AND SUBCUTANEOUS FASCIA performed by Saud Piper MD at [address] [place] [address]  2010    For PUD       CURRENT MEDICATIONS       Previous Medications    ACETAMINOPHEN-CODEINE (TYLENOL #4) 300-60 MG PER TABLET    Take 1 tablet by mouth every 4 hours as needed for Pain. ALENDRONATE (FOSAMAX) 70 MG TABLET    TAKE 1 TABLET BY MOUTH EVERY 7 DAYS    ASPIRIN 81 MG TABLET    Take 81 mg by mouth daily    BUPROPION (WELLBUTRIN SR) 150 MG EXTENDED RELEASE TABLET    TAKE 1 TABLET BY MOUTH TWICE DAILY    CLONAZEPAM (KLONOPIN) 0.5 MG TABLET    Take 0.25 mg by mouth nightly as needed (restless leg syndrome). .    CLONAZEPAM (KLONOPIN) 1 MG TABLET    TAKE 1/2 TABLET BY MOUTH IN THE MORNING, 1/2 TABLET IN THE AFTERNOON, AND 1 TABLET AT BEDTIME AS NEEDED FOR ANXIETY    CYANOCOBALAMIN (B-12) 100 MCG TABS    Take by mouth daily    FERROUS SULFATE 325 (65 FE) MG TABLET    Take 325 mg by mouth daily (with breakfast)    IPRATROPIUM-ALBUTEROL (DUONEB) 0.5-2.5 (3) MG/3ML SOLN NEBULIZER SOLUTION    Inhale 3 mLs into the lungs every 6 hours as needed for Shortness of Breath (wheezing)    MULTIPLE VITAMINS-MINERALS (MULTIVITAMIN PO)    Take 1 tablet by mouth daily.     OMEPRAZOLE (PRILOSEC) 40 MG DELAYED RELEASE CAPSULE    TAKE 1 CAPSULE BY MOUTH TWICE DAILY    OXYGEN    Inhale 4 L into the lungs continuous    PREDNISONE (DELTASONE) 10 MG TABLET    Take 1 tablet by mouth 3 times daily    SENNA-DOCUSATE (PERICOLACE) 8.6-50 MG PER TABLET    Take 1 tablet by mouth daily    SERTRALINE (ZOLOFT) 100 MG TABLET    TAKE 1 TABLET BY MOUTH EVERY DAY       ALLERGIES     Ampicillin; Ciprofloxacin; Nitrofurantoin; Sulfa antibiotics; and Penicillins    FAMILY HISTORY        Family History   Problem Relation Age of Onset    Emphysema Mother          AGE 64    Depression Mother     Clotting Disorder Father             Stroke Father        SOCIAL HISTORY       Social History     Socioeconomic History    Marital status:      Spouse name: RITO    Number of children: 2    Years of education: Not on file    Highest education level: Not on file   Occupational History    Occupation: retired   Social Needs    Financial resource strain: Not on file    Food insecurity: tenderness. There is no rebound and no guarding. Musculoskeletal: Normal range of motion. She exhibits no edema or deformity. Neurological: She is alert. Mildly disoriented    Skin: Skin is warm. No rash noted. She is not diaphoretic. No erythema. Psychiatric: She has a normal mood and affect.  Her behavior is normal. Thought content normal.       DIAGNOSTIC RESULTS     EKG: All EKG's are interpreted by the Emergency Department Physician who either signs or Co-signs this chart in the absence of acardiologist.    EKG shows NSR LAD no ectopy no acute st changes     RADIOLOGY:   Non-plain film images such as CT, Ultrasoundand MRI are read by the radiologist. Jessica Stable radiographic images are visualized and preliminarily interpreted by the emergency physician with the below findings:    CXR interstitial infiltrates     ED BEDSIDE ULTRASOUND:   Performed by ED Physician - none    LABS:  Labs Reviewed   CBC WITH AUTO DIFFERENTIAL - Abnormal; Notable for the following components:       Result Value    WBC 12.5 (*)     RDW 16.2 (*)     Neutrophils # 10.4 (*)     All other components within normal limits    Narrative:     Performed at:  [facility]  [address] [facility] [place]   Phone [phone]   BASIC METABOLIC PANEL W/ REFLEX TO MG FOR LOW K - Abnormal; Notable for the following components:    Glucose 112 (*)     BUN 27 (*)     GFR Non-African American 54 (*)     Calcium 11.6 (*)     All other components within normal limits    Narrative:     Performed at:  [facility]  [address] [facility] [place]   Phone [phone]   TROPONIN - Abnormal; Notable for the following components:    Troponin 0.06 (*)     All other components within normal limits    Narrative:     Performed at:  [facility]  [address] [facility] [place]   Phone [phone]   URINE RT REFLEX TO CULTURE - given    Admission to hospitalist     CRITICAL CARE TIME   Total Critical Caretime was 39 minutes, excluding separately reportable procedures. There was a high probability of clinically significant/life threatening deterioration in the patient's condition which required my urgent intervention. PROCEDURES:  Unlessotherwise noted below, none    FINAL IMPRESSION      1. Acute respiratory failure with hypoxia and hypercapnia (HCC)    2. Elevated troponin    3.  Leukemoid reaction          DISPOSITION/PLAN   DISPOSITION      Admission    (Please note that portions ofthis note were completed with a voice recognition program.  Efforts were made to edit the dictations but occasionally words are mis-transcribed.)    Ulises Henderson MD(electronically signed)  Attending Emergency Physician        Ulises Henderson MD  08/12/19 [id number]

## 2019-08-12 NOTE — PROGRESS NOTES
Nutrition Assessment    Type and Reason for Visit: Initial, Positive Nutrition Screen(wounds)    Nutrition Recommendations:   · Continue Low Na diet per MD  · Start Magic cup BID  · Encourage protein sources and po intakes  · Monitor nutrition adequacy, pertinent labs, bowel habits, wt changes, and clinical progress    Nutrition Assessment: Pt is nutritionally compromised AEB wounds. Spouse present at time of interview and pt ordered breakfast. Pt voices eating small amounts of meals but has a fair appetite. Pt has tried Ensure/brendon before and is not favorable. Recommend magic cup ONS for nutritional support. Encouraged protein sources for wound healing such as eggs, lean meats, and yogurt. Pt denies wt loss. Pt at risk for decline d/t increased needs and variable po intakes. Will continue to monitor for changes in nutritional status. Malnutrition Assessment:  · Malnutrition Status: At risk for malnutrition    Nutrition Risk Level: Moderate    Nutrient Needs:  · Estimated Daily Total Kcal: 3255-5188 (25-30 kcal/kg CBW)  · Estimated Daily Protein (g): 68-80 (1.2-1.4 gm/kg CBW)  · Estimated Daily Total Fluid (ml/day): 1 ml/kcal or per MD    Nutrition Diagnosis:   · Problem: Increased nutrient needs  · Etiology: related to Increased demand for energy/nutrients(Protein)     Signs and symptoms:  as evidenced by Presence of wounds    Objective Information:  · Nutrition-Focused Physical Findings: BM 8/11  · Wound Type: Unstageable, Surgical Wound  · Current Nutrition Therapies:  · Oral Diet Orders: 2gm Sodium   · Oral Diet intake: Unable to assess  · Oral Nutrition Supplement (ONS) Orders: None  · ONS intake: Unable to assess  · Anthropometric Measures:  · Ht: 5' 3\" (160 cm)   · Current Body Wt: 124 lb 12.5 oz (56.6 kg)  · Admission Body Wt: 131 lb (59.4 kg)  · Usual Body Wt: (120-135 lbs)  · % Weight Change:   wt 120-135 x 1 yr.   · Ideal Body Wt: 115 lb (52.2 kg),  · BMI Classification: BMI 18.5 - 24.9 Normal

## 2019-08-12 NOTE — CARE COORDINATION
INITIAL CASE MANAGEMENT ASSESSMENT    Reviewed chart, met with patient to assess possible discharge needs. Explained Case Management role/services. Living Situation: lives w/ her  in [address] [facility] w/ stair lift    ADLs:  assists in all care     DME: rollator, RW, std walker, TTB, BSC, w/c, transport chair, Oxygen provided by [employer] that she wears cont at 4 liters, has portability    PT/OT Recs: not currently ordered     Active Services: [facility] for SN only for wound care. [facility], [facility] wound [facility]     Transportation:  can transport at [facility]     Medications: confirmed Medicare as primary and [employer] as supplement, rx are covered and obtained at [employer] on [address]    PCP: confirmed Rod Jennings      HD/PD: n/a    PLAN/COMMENTS:   Has her own VAC on  [facility] connections of admission    SW/CM provided contact information for patient or family to call with any questions. SW/CM will follow and assist as needed.     Electronically signed by Hernan Mishra RN on 8/12/2019 at 11:10 AM

## 2019-08-12 NOTE — H&P
Hospital Medicine History & Physical      PCP: Marysol Thakur DO    Date of Admission: 8/11/2019    Date of Service: Pt seen/examined on 8/12/19 and Admitted to Inpatient     Chief Complaint: Confusion    History Of Present Illness: The patient is a 70 y.o. female who presents to [facility] with confusion. Patient was admitted from home after family stated the patient was altered and confused. They state she is normally like this when she has a urinary tract infection. In the emergency department her urinalysis did show white blood cells, bacteria, nitrite and leukocyte esterase. Patient was started on broad-spectrum antibiotics due to recent hospitalization and nursing home stay. Patient denies any fever chills nausea vomiting diarrhea constipation chest pain or shortness of breath.    at bedside states that he will bring in supplies for patient's wound VAC so it can be changed on Wednesday    Past Medical History:        Diagnosis Date    Anxiety and depression     Arthritis     Cancer of skin of leg     Chronic low back pain     GERD (gastroesophageal reflux disease)     Insomnia     Iron deficiency anemia     Kidney stone     PUD (peptic ulcer disease)     Pulmonary fibrosis (HCC)     Dr. Sandra Reza has a CT done every 6 months    Stomach ulcer     Ulcer - lesion MARCH 2010    UTI (urinary tract infection)     frequent       Past Surgical History:        Procedure Laterality Date   [facility] SURGERY  MARCH 2004,OCT 02, [place] 56    x4--fusions    BACK SURGERY  06/2000    laminectomy-lumbar    CARPAL TUNNEL RELEASE  9/27/11    Left    COLONOSCOPY      CYSTOSCOPY Left 09/2016    cysto left ureteral stent placement    CYSTOSCOPY  01/15/2018    Left Stent Insertion    CYSTOSCOPY  02/14/2018    cysto, left uretero with laser litho, left stent excahnge    EYE SURGERY Bilateral 2008    cataract removed with lens implants    FRACTURE SURGERY Right 06/05/2016    femur fracture    FRACTURE SURGERY Left     femur fracture    HYSTERECTOMY  1993    HYSTEROSCOPY  1993    JOINT REPLACEMENT  4/20/2000    LEFT KNEE    JOINT REPLACEMENT  2/27/08    RIGHT KNEE    [place] [address] W/BRNCL ALVEOLAR LAVAGE N/A 10/24/2018    BRONCHOSCOPY WITH BRONCHOALVEOLAR LAVAGE performed by Rao Keen DO at [facility]. DEBRIDE NECROTIC SKIN/ TISSUE, GENIT & PERINM Left 11/28/2018    DEBRIDEMENT LEFT HIP SKIN AND SUBCUTANEOUS FASCIA performed by Dede Phalen, MD at [address] [place] [address]    For PUD       Medications Prior to Admission:    Prior to Admission medications    Medication Sig Start Date End Date Taking?  Authorizing Provider   acetaminophen (TYLENOL) 325 MG tablet Take 650 mg by mouth nightly as needed for Pain   Yes Historical Provider, MD   docusate sodium (COLACE) 100 MG capsule Take 100 mg by mouth nightly   Yes Historical Provider, MD   sertraline (ZOLOFT) 100 MG tablet TAKE 1 TABLET BY MOUTH EVERY DAY 7/28/19  Yes Oni Pulido DO   Cyanocobalamin (B-12) 100 MCG TABS Take by mouth daily   Yes Historical Provider, MD   aspirin 81 MG tablet Take 81 mg by mouth daily   Yes Historical Provider, MD   predniSONE (DELTASONE) 10 MG tablet Take 1 tablet by mouth 3 times daily 7/18/19 8/17/19 Yes Rao Keen DO   omeprazole (PRILOSEC) 40 MG delayed release capsule TAKE 1 CAPSULE BY MOUTH TWICE DAILY  Patient taking differently: 40 mg daily  4/15/19  Yes Oni Pulido DO   buPROPion (WELLBUTRIN SR) 150 MG extended release tablet TAKE 1 TABLET BY MOUTH TWICE DAILY 3/24/19  Yes Oni Puliod DO   alendronate (FOSAMAX) 70 MG tablet TAKE 1 TABLET BY MOUTH EVERY 7 DAYS 2/19/19  Yes Oni Pulido DO   ferrous sulfate 325 (65 Fe) MG tablet Take 325 mg by mouth daily (with breakfast)   Yes Historical Provider, MD   Multiple auscultation, bilaterally without Rales/Wheezes/Rhonchi with good respiratory effort. Heart: Regular rate and rhythm with Normal S1/S2 without murmurs, rubs or gallops, point of maximum impulse non-displaced  Abdomen: Soft, non-tender or non-distended without rigidity or guarding and positive bowel sounds all four quadrants. Extremities: No clubbing, cyanosis, or edema bilaterally. Full range of motion without deformity and normal gait intact. Skin: Left buttocks ulcer with wound VAC in place  Neurologic: Alert and oriented, neurovascularly intact with sensory/motor intact upper extremities/lower extremities, bilaterally. Cranial nerves: II-XII intact, grossly non-focal.  Mental status: Alert, oriented, thought content appropriate. Capillary Refill: Acceptable  < 3 seconds  Peripheral Pulses: +3 Easily felt, not easily obliterated with pressure      CXR:  I have reviewed the CXR with the following interpretation: Chronic lung findings      CBC   Recent Labs     08/11/19  2345   WBC 12.5*   HGB 12.1   HCT 37.1         RENAL  Recent Labs     08/11/19  2344      K 4.5   CL 99   CO2 26   BUN 27*   CREATININE 1.0     LFT'S  No results for input(s): AST, ALT, ALB, BILIDIR, BILITOT, ALKPHOS in the last 72 hours. COAG  No results for input(s): INR in the last 72 hours.   CARDIAC ENZYMES  Recent Labs     08/11/19  2344   TROPONINI 0.06*       U/A:    Lab Results   Component Value Date    NITRITE pos 06/19/2019    COLORU YELLOW 08/12/2019    WBCUA 45 08/12/2019    RBCUA 2 08/12/2019    BACTERIA 2+ 08/12/2019    CLARITYU CLOUDY 08/12/2019    SPECGRAV 1.025 08/12/2019    LEUKOCYTESUR MODERATE 08/12/2019    BLOODU MODERATE 08/12/2019    GLUCOSEU Negative 08/12/2019    GLUCOSEU NEGATIVE 03/04/2010       ABG    Lab Results   Component Value Date    XVZ6ZAO 23.2 10/05/2018    BEART -0.1 10/05/2018    I6NUVATY 96.3 10/05/2018    PHART 7.433 10/05/2018    MRB3WAG 35.3 10/05/2018    PO2ART 80.0 10/05/2018    JUN8DWZ 24.2 10/05/2018         PHYSICIANS CERTIFICATION:    I certify that Walter Rivers is expected to be hospitalized for more than 2 midnights based on the following assessment and plan:      ASSESSMENT/PLAN:    Acute metabolic encephalopathy  Likely secondary to urinary tract infection  Continue IV fluids and antibiotics    Urinary tract infection  Await culture and sensitivity  Continue  Rocephin    Chronic respiratory failure with hypoxia  Secondary to interstitial lung disease and scleroderma  Follows with Dr. Mk Davidson in the outpatient        DVT Prophylaxis: Lovenox  Diet: DIET LOW SODIUM 2 GM;  Code Status: Full Code  PT/OT Eval Status: Will order    Kenney Ruiz MD    Thank you Edna Wilson DO for the opportunity to be involved in this patient's care. If you have any questions or concerns please feel free to contact me at [phone].

## 2019-08-13 LAB
ANION GAP SERPL CALCULATED.3IONS-SCNC: 11 MMOL/L (ref 3–16)
BUN BLDV-MCNC: 29 MG/DL (ref 7–20)
CALCIUM SERPL-MCNC: 10.7 MG/DL (ref 8.3–10.6)
CHLORIDE BLD-SCNC: 101 MMOL/L (ref 99–110)
CO2: 27 MMOL/L (ref 21–32)
CREAT SERPL-MCNC: 0.8 MG/DL (ref 0.6–1.2)
GFR AFRICAN AMERICAN: >60
GFR NON-AFRICAN AMERICAN: >60
GLUCOSE BLD-MCNC: 106 MG/DL (ref 70–99)
HCT VFR BLD CALC: 30.9 % (ref 36–48)
HEMOGLOBIN: 10.2 G/DL (ref 12–16)
L. PNEUMOPHILA SEROGP 1 UR AG: NORMAL
MCH RBC QN AUTO: 30.1 PG (ref 26–34)
MCHC RBC AUTO-ENTMCNC: 33.1 G/DL (ref 31–36)
MCV RBC AUTO: 90.7 FL (ref 80–100)
PDW BLD-RTO: 15.9 % (ref 12.4–15.4)
PLATELET # BLD: 269 K/UL (ref 135–450)
PMV BLD AUTO: 7.3 FL (ref 5–10.5)
POTASSIUM REFLEX MAGNESIUM: 4.6 MMOL/L (ref 3.5–5.1)
PROTEIN PROTEIN: 0.04 G/DL
PROTEIN PROTEIN: 45 MG/DL
RBC # BLD: 3.4 M/UL (ref 4–5.2)
SODIUM BLD-SCNC: 139 MMOL/L (ref 136–145)
STREP PNEUMONIAE ANTIGEN, URINE: NORMAL
WBC # BLD: 10.8 K/UL (ref 4–11)

## 2019-08-13 PROCEDURE — 6370000000 HC RX 637 (ALT 250 FOR IP): Performed by: INTERNAL MEDICINE

## 2019-08-13 PROCEDURE — 80048 BASIC METABOLIC PNL TOTAL CA: CPT

## 2019-08-13 PROCEDURE — 97116 GAIT TRAINING THERAPY: CPT

## 2019-08-13 PROCEDURE — 36415 COLL VENOUS BLD VENIPUNCTURE: CPT

## 2019-08-13 PROCEDURE — 99232 SBSQ HOSP IP/OBS MODERATE 35: CPT | Performed by: INTERNAL MEDICINE

## 2019-08-13 PROCEDURE — 94640 AIRWAY INHALATION TREATMENT: CPT

## 2019-08-13 PROCEDURE — 2580000003 HC RX 258: Performed by: INTERNAL MEDICINE

## 2019-08-13 PROCEDURE — 97530 THERAPEUTIC ACTIVITIES: CPT

## 2019-08-13 PROCEDURE — 6360000002 HC RX W HCPCS: Performed by: INTERNAL MEDICINE

## 2019-08-13 PROCEDURE — 1200000000 HC SEMI PRIVATE

## 2019-08-13 PROCEDURE — 85027 COMPLETE CBC AUTOMATED: CPT

## 2019-08-13 PROCEDURE — 97162 PT EVAL MOD COMPLEX 30 MIN: CPT

## 2019-08-13 RX ADMIN — ENOXAPARIN SODIUM 40 MG: 40 INJECTION SUBCUTANEOUS at 08:50

## 2019-08-13 RX ADMIN — Medication 10 ML: at 08:50

## 2019-08-13 RX ADMIN — BUPROPION HYDROCHLORIDE 150 MG: 150 TABLET, EXTENDED RELEASE ORAL at 23:54

## 2019-08-13 RX ADMIN — SERTRALINE 100 MG: 100 TABLET, FILM COATED ORAL at 08:50

## 2019-08-13 RX ADMIN — IPRATROPIUM BROMIDE AND ALBUTEROL SULFATE 1 AMPULE: .5; 3 SOLUTION RESPIRATORY (INHALATION) at 09:38

## 2019-08-13 RX ADMIN — Medication: at 14:52

## 2019-08-13 RX ADMIN — ASPIRIN 81 MG 81 MG: 81 TABLET ORAL at 08:50

## 2019-08-13 RX ADMIN — BUPROPION HYDROCHLORIDE 150 MG: 150 TABLET, EXTENDED RELEASE ORAL at 08:50

## 2019-08-13 RX ADMIN — CEFTRIAXONE 1 G: 1 INJECTION, POWDER, FOR SOLUTION INTRAMUSCULAR; INTRAVENOUS at 14:50

## 2019-08-13 RX ADMIN — IPRATROPIUM BROMIDE AND ALBUTEROL SULFATE 1 AMPULE: .5; 3 SOLUTION RESPIRATORY (INHALATION) at 20:13

## 2019-08-13 RX ADMIN — IPRATROPIUM BROMIDE AND ALBUTEROL SULFATE 1 AMPULE: .5; 3 SOLUTION RESPIRATORY (INHALATION) at 00:57

## 2019-08-13 RX ADMIN — Medication 10 ML: at 20:36

## 2019-08-13 RX ADMIN — CLONAZEPAM 0.25 MG: 0.5 TABLET ORAL at 23:57

## 2019-08-13 RX ADMIN — AZITHROMYCIN MONOHYDRATE 250 MG: 500 INJECTION, POWDER, LYOPHILIZED, FOR SOLUTION INTRAVENOUS at 15:43

## 2019-08-13 ASSESSMENT — PAIN SCALES - GENERAL
PAINLEVEL_OUTOF10: 0

## 2019-08-13 NOTE — PROGRESS NOTES
Physical Therapy     Initial Assessment / Treatment Note    Room Number: M0Q-3884/6512-07  NAME: Lorri Stockton  : Medical Record Number: [id number]  MRN: [id number]    ASSESSMENT: Pia Cook is a 70 y.o. F admit 19 with confusion and family concern for UTI -- pt with a history of pulmonary fibrosis and a chronic history of R sacral wound. Prior to admit pt was living at home with her spouse in a [facility] with stair lifts on stairs; she was ambulating household distances with a rolling walker and supervision/SBA from her spouse. Today she required CGA for transfers and CGA/Min Assist for ambulation up to 25' with a walker. She does have significant dyspnea with walking per her baseline. She appears to be functioning slightly below her baseline and I anticipate would benefit from resumed home PT (level 1) to optimize her functional mobility. Discharge Recommendations: S Level 1, Patient would benefit from continued therapy after discharge, 24 hour supervision or assist   [address] [place] [address]: LEVEL 1 STANDARD   -Initial home health evaluation to occur within 24-48 hours, in patient home    -Home health agency to establish plan of care for patient over 60 day period    -Medication Reconciliation    -PCP Visit scheduled within seven days of discharge    -PT/OT to evaluate with goal of regaining prior level of functioning    -OT to evaluate if patient has [address] needs for personal care   Equipment Needs: Equipment Needed: No    Date of Service: 19       Patient Diagnosis(es): The primary encounter diagnosis was Acute respiratory failure with hypoxia and hypercapnia ([facility] [address] Diagnoses of Elevated troponin and Leukemoid reaction were also pertinent to this visit.   Past Medical History:  has a past medical history of Anxiety and depression, Arthritis, Cancer of skin of leg, Chronic low back pain, GERD (gastroesophageal reflux disease), Insomnia, Iron deficiency anemia, Kidney stone, PUD (peptic ulcer disease), Pulmonary fibrosis ([facility] [address] Stomach ulcer, Ulcer - lesion, and UTI (urinary tract infection). Past Surgical History:  has a past surgical history that includes hysteroscopy (1993); joint replacement (4/20/2000); joint replacement (2/27/08); Carpal tunnel release (9/27/11); Hysterectomy (1993); Stomach surgery (2010); Cystocopy (Left, 09/2016); Colonoscopy; Cystoscopy (01/15/2018); eye surgery (Bilateral, 2008); back surgery (MARCH 2004,OCT 02, FEB 05); back surgery (06/2000); fracture surgery (Right, 06/05/2016); fracture surgery (Left); Cystoscopy (02/14/2018); [facility] (N/A, 10/24/2018); and pr debride necrotic skin/ tissue, genit & perinm (Left, 11/28/2018). Restrictions  Restrictions/Precautions: Fall Risk(sacral wound with wound vac)             Vision/Hearing  Vision: Within Functional Limits  Vision Exceptions: Wears glasses at all times  Hearing: Exceptions to [facility]  Hearing Exceptions: Hard of hearing/hearing concerns(hasn't worn her hearing aids since needing oxygen after her stroke several months ago)    SUBJECTIVE:  Chart Reviewed: Yes  Patient assessed for rehabilitation services?: Yes  Additional Pertinent Hx: Johnna Balbuena is a 70 y.o. F admit 8/12/19 with confusion and family concern for UTI -- pt with a history of pulmonary fibrosis and a chronic history of R sacral wound. Referring Practitioner: Tejas Nguyen MD  Diagnosis: UTI, acute metabolic encephalopathy     Subjective: Pt semi-fowlers in bed, rolled to R side. Pt is pleasant and willing to mobilize. Spouse in room and encouraging mobility as well as asking about resuming home PT/OT.   Patient Currently in Pain: Denies  Pain Assessment: 0-10  Pain Level: 0  Patient's Stated Pain Goal: No pain  Pain Type: Acute pain  Pain Location: Hip, Buttocks  Pain Orientation: Left  Pain Descriptors: Sore     Orientation  Overall Orientation Status: Within Functional Limits  Orientation Level: Disoriented to time, Oriented to person, Oriented to place, Oriented to situation  Cognition  Exceptions(Decreased short term memory and problem solving)     Social/Functional History  Social/Functional History  Lives With: Spouse  Type of Home: (condo)  Home Layout: Two level(stair lift to 2nd floor)  Home Access: Stairs to enter with rails  Entrance Stairs - Number of Steps: 1 DANNY through garager, uses RW, spouse assists  Bathroom Shower/Tub: Tub/Shower unit  Bathroom Equipment: Tub transfer bench, 3-in-1 commode, Grab bars in shower, Grab bars around toilet, Toilet raiser(has comfort height toilet, has toilet safety frame -- does not use RTS)  Bathroom Accessibility: Walker accessible  Home Equipment: 4 wheeled walker, Rolling walker, Standard walker, Wheelchair-manual, Oxygen(4L/min oxygen supplement at all times, transport chair)  Receives Help From: Family(HHA and home PT/OT services stopped ~3 weeks ago)  ADL Assistance: Needs assistance(pt does her dressing unless time constraints are present (then spouse helps), independent with toileting, assist for bath transfer but pt does her own washing. Pt independent with grooming and feeding.)  Homemaking Assistance: Needs assistance(spouse does all cooking, cleaning, laundry)  Ambulation Assistance: Needs assistance(two-wheeled walker in house with supervision, SBA with 4-wheeled walker in community)  Transfer Assistance: Independent  Active : No  Patient's  Info:  drives  Additional Comments: Spouse denies patient has had any falls in last couple months.        OBJECTIVE:  OBSERVATIONS  Observation: Lateral shift of lumbar spine with visible surgical incision scar over thoracic spinous processes    ROM  RLE PROM: WFL     LLE PROM: [facility] 2[facility]       STRENGTH  Strength RLE: Exception  Comment: Not formally examined but hip extensor weakness (> than L side) observed in standing with hip flexion posture, hip flexor weakness with reduced R foot clearance in gait Strength LLE: Exception  Comment: Not formally examined but some slight hip extension weakness observed in gait with slight hip flexion posture       Bed mobility  Supine to Sit: Stand by assistance(HOB up, use of R bed rail)  Scooting: Stand by assistance(at EOB)    Transfers  Sit to Stand: Contact guard assistance(BUE support)  Stand to sit: Contact guard assistance(BUE support, cues to reach back for arm rests of recliner chair)    Ambulation  Ambulation  Ambulation?: Yes  Ambulation 1  Device: Rolling Walker  Other Apparatus: O2(3L/min via NC)  Assistance: Contact guard assistance, Minimal assistance  Quality of Gait: short steps, partial step-through pattern with R leading first, reduced foot clearance (R>L), hip flexion posture ambulating well behind walker base (able to ambulate further up in walker base with verbal cues), assist to maneuver walker in turns  Distance: 1 x 15', 1 x 25'  Comments: pt with moderate SPIVEY ambulating and required maximal verbal cues for pursed lip breathing    Stairs/Curb  Stairs/Curb  Stairs?: No     Balance  Balance  Sitting - Static: Good  Standing - Static: Good, -(CGA/Min Assist ambulating with walker)    Other Activities:  Comment: External catheter removed and pull-up briefs donned for patient. RN notified that catheter was removed. Treatment included transfer training, gait training, and patient education. This note also serves as a D/C Summary in the event that this patient is discharged prior to the next therapy session. Please refer to last PT note for goal status, discharge recommendations and functional status. ASSESSMENT:   Assessment: Mcneil Simmonds is a 70 y.o. F admit 8/12/19 with confusion and family concern for UTI -- pt with a history of pulmonary fibrosis and a chronic history of R sacral wound.  Prior to admit pt was living at home with her spouse in a [facility] with stair lifts on stairs; she was ambulating household distances with a rolling walker and supervision/SBA from her spouse. Today she required CGA for transfers and CGA/Min Assist for ambulation up to 25' with a walker. She does have significant dyspnea with walking per her baseline. She appears to be functioning slightly below her baseline and I anticipate would benefit from resumed home PT (level 1) to optimize her functional mobility. Treatment Diagnosis: Decreased activity tolerance, abnormal gait, leg weakness  Prognosis: Good  Decision Making: Medium Complexity  History: Anxiety and depression, Arthritis, Cancer of skin of leg, Chronic low back pain, GERD (gastroesophageal reflux disease), Insomnia, Iron deficiency anemia, Kidney stone, PUD (peptic ulcer disease), Pulmonary fibrosis ([facility] [address] Stomach ulcer, Ulcer - lesion [facility] 2010), and UTI (urinary tract infection). Exam: Decreased activity tolerance, abnormal gait, leg weakness  Clinical Presentation: Evolving activity tolerance  Barriers to Learning: Pt educated in pursed lip breathing, proper gait with walker. She verb limited understanding. REQUIRES PT FOLLOW UP: Yes  Discharge Recommendations: S Level 1, Patient would benefit from continued therapy after discharge, 24 hour supervision or assist    Asher Sorto scored a 18/24 on the AM-PAC short mobility form. Initial research suggests that an AM-PAC score of 18 or greater may be associated with a discharge to patient's home setting. However in this initial research, cut off scores do not perfectly predict discharge location: 25% of patients with a score of 18 or greater actually went to a rehab facility and 20% of people with a score less than 18 actually went home. Based on my clinical judgement I recommend that the patient have level 1 home Physical Therapy at d/c to increase the patients independence. Safety devices:   Type of devices:  All fall risk precautions in place, Call light within reach, Chair alarm in place, Nurse notified, Patient at risk for falls, Left in chair        PLAN:  Times per week: 3-5x/wk while in the hospital;    Current Treatment Recommendations: Functional Mobility Training     OutComes Score  How much difficulty turning over in bed?: None  How much difficulty sitting down on / standing up from a chair with arms?: A Little  How much difficulty moving from lying on back to sitting on side of bed?: A Little  How much help from another person moving to and from a bed to a chair?: A Little  How much help from another person needed to walk in hospital room?: A Little  How much help from another person for climbing 3-5 steps with a railing?: A Lot  AM-PAC Inpatient Mobility Raw Score : 18  AM-PAC Inpatient T-Scale Score : 43.63  Mobility Inpatient CMS 0-100% Score: 46.58  Mobility Inpatient CMS G-Code Modifier : CK       Goals  Patient Goals   Patient goals :  To be able to walk better  Time Frame for Short term goals:  upon d/c  Short term goal 1: sup<>sit Supervision   Short term goal 2: Sit<>stand Supervision   Short term goal 3: Amb 80' with walker and SBA              Therapy Time    Individual Concurrent Group Co-treatment   Time In 1137            Time Out 1237          Minutes 60             Timed Code Treatment Minutes: 39 Minutes      Raymond Nielson, PT

## 2019-08-13 NOTE — PLAN OF CARE
Problem: Falls - Risk of:  Goal: Will remain free from falls  Description  Will remain free from falls  Outcome: Ongoing  Goal: Absence of physical injury  Description  Absence of physical injury  Outcome: Ongoing     Problem: Risk for Impaired Skin Integrity  Goal: Tissue integrity - skin and mucous membranes  Description  Structural intactness and normal physiological function of skin and  mucous membranes. Outcome: Ongoing     Problem: Nutrition  Goal: Optimal nutrition therapy  Outcome: Ongoing     Problem: Infection:  Goal: Will remain free from infection  Description  Will remain free from infection  Outcome: Ongoing     Problem: Safety:  Goal: Free from accidental physical injury  Description  Free from accidental physical injury  Outcome: Ongoing  Goal: Free from intentional harm  Description  Free from intentional harm  Outcome: Ongoing     Problem: Daily Care:  Goal: Daily care needs are met  Description  Daily care needs are met  Outcome: Ongoing     Problem: Pain:  Goal: Patient's pain/discomfort is manageable  Description  Patient's pain/discomfort is manageable  Outcome: Ongoing     Problem: Skin Integrity:  Goal: Skin integrity will stabilize  Description  Skin integrity will stabilize  Outcome: Ongoing     Problem: Discharge Planning:  Goal: Patients continuum of care needs are met  Description  Patients continuum of care needs are met  Outcome: Ongoing    Patient remained free from falls this shift. Encouraged to turn self in bed. Remains incontinent of bladder and bowel.

## 2019-08-13 NOTE — PROGRESS NOTES
Pulmonary Progress Note    CC: Shortness of breath  Acute metabolic encephalopathy  Cystic lung disease  Acute on chronic hypercarbic respiratory failure  Chronic hypoxic respiratory failure (on 4L NC)    24 hr events:  She has not been out of bed. She denies cough. She is on supplemental oxygen. ROS:  +SOB  No cough  No vomiting      PHYSICAL EXAM:  Blood pressure 117/64, pulse 64, temperature 98.1 °F (36.7 °C), temperature source Oral, resp. rate 20, height 5' 3\" (1.6 m), weight 127 lb 10.3 oz (57.9 kg), SpO2 97 %, not currently breastfeeding. on 4L NC    Intake/Output Summary (Last 24 hours) at 8/13/2019 1406  Last data filed at 8/13/2019 1335  Gross per 24 hour   Intake 1090 ml   Output 450 ml   Net 640 ml       Gen: Well developed; well nourished  Eyes: No scleral icterus. No conjunctival injection. ENT: External appearance of ears and nose normal.  Neck: Trachea midline. No obvious mass. No visible thyroid enlargement    Respiratory:  Clear to auscultation bilaterally, no accessory muscle use  Cardiovascular: Regular rate and rhythm, no appreciable murmurs. No edema. Skin: Warm and dry. No rashes or ulcers on visible areas. Normal texture and turgor  Musculoskeletal: No cyanosis, clubbing or joint deformity.     Psychiatric: Normal mood and affect; exhibits normal insight and judgement     Medications:  Current Facility-Administered Medications: aspirin chewable tablet 81 mg, 81 mg, Oral, Daily  buPROPion [facility]) extended release tablet 150 mg, 150 mg, Oral, BID  ipratropium-albuterol (DUONEB) nebulizer solution 3 mL, 1 vial, Inhalation, Q6H PRN  sertraline (ZOLOFT) tablet 100 mg, 100 mg, Oral, Daily  clonazePAM (KLONOPIN) tablet 0.25 mg, 0.25 mg, Oral, Nightly PRN  sodium chloride flush 0.9 % injection 10 mL, 10 mL, Intravenous, 2 times per day  sodium chloride flush 0.9 % injection 10 mL, 10 mL, Intravenous, PRN  magnesium hydroxide (MILK OF MAGNESIA) 400 MG/5ML suspension 30 mL, 30 mL, Oral, Daily PRN  ondansetron (ZOFRAN) injection 4 mg, 4 mg, Intravenous, Q6H PRN  enoxaparin (LOVENOX) injection 40 mg, 40 mg, Subcutaneous, Daily  cefTRIAXone (ROCEPHIN) 1 g IVPB in 50 mL D5W minibag, 1 g, Intravenous, Q24H  azithromycin (ZITHROMAX) 250 mg in dextrose 5 % 250 mL IVPB, 250 mg, Intravenous, Q24H  ipratropium-albuterol (DUONEB) nebulizer solution 1 ampule, 1 ampule, Inhalation, Q8H  collagenase ointment, , Topical, Daily    Data reviewed:  Labs:  CBC:   Recent Labs     08/11/19  2345 08/13/19  0756   WBC 12.5* 10.8   HGB 12.1 10.2*   HCT 37.1 30.9*   MCV 90.9 90.7    269     BMP:   Recent Labs     08/11/19  2344 08/13/19  0757    139   K 4.5 4.6   CL 99 101   CO2 26 27   BUN 27* 29*   CREATININE 1.0 0.8     LIVER PROFILE: No results for input(s): AST, ALT, LIPASE, BILIDIR, BILITOT, ALKPHOS in the last 72 hours. Invalid input(s): AMYLASE,  ALB  PT/INR: No results for input(s): PROTIME, INR in the last 72 hours. APTT: No results for input(s): APTT in the last 72 hours. Cultures:   Blood culture (8/12): NGTD  Urine culture (8/12): E.coli  Respiratory viral panel: Negative  Urine strep and Legionella antigens: Negative      Films:  Chest images and reports were reviewed by me. My interpretation is:  No new images      Assessment:     Shortness of breath  Acute metabolic encephalopathy  Cystic lung disease  Acute on chronic hypercarbic respiratory failure  Chronic hypoxic respiratory failure (on 4L NC)      Plan:    Shortness of breath  -In the setting of cystic lung disease. Cannot exclude acute bronchitis or developing pneumonia  -Continue ceftriaxone and azithromycin (day #2)  -Check procalcitonin and CXR in a.m.     Acute metabolic encephalopathy  -Due to UTI.  Antibiotics as above     Cystic lung disease  -Follow up serum protein electrophoresis  -Duonebs every 8 hours     Acute on chronic hypercarbic respiratory failure  -Duonebs every 8 hours     Chronic hypoxic respiratory failure (on 4L NC)  -Continue supplemental oxygen to keep saturation greater than 90%        Thank you for allowing me to participate in the care of this patient. Will follow.      Luciana Medellin MD  [place] [facility]

## 2019-08-13 NOTE — PROGRESS NOTES
Hospitalist Progress Note      PCP: Yony Monk DO    Date of Admission: 8/11/2019    Chief Complaint: confusion    Hospital Course: Admitted from home with acute metabolic encephalopathy secondary to UTI    Subjective: Patient seen and examined. Patient appears to be mentally back to baseline. Still awaiting culture and sensitivity from urinary tract infection. Medications:  Reviewed    Infusion Medications   Scheduled Medications    aspirin  81 mg Oral Daily    buPROPion  150 mg Oral BID    sertraline  100 mg Oral Daily    sodium chloride flush  10 mL Intravenous 2 times per day    enoxaparin  40 mg Subcutaneous Daily    cefTRIAXone (ROCEPHIN) IV  1 g Intravenous Q24H    azithromycin  250 mg Intravenous Q24H    ipratropium-albuterol  1 ampule Inhalation Q8H    collagenase   Topical Daily     PRN Meds: ipratropium-albuterol, clonazePAM, sodium chloride flush, magnesium hydroxide, ondansetron      Intake/Output Summary (Last 24 hours) at 8/13/2019 0827  Last data filed at 8/12/2019 1720  Gross per 24 hour   Intake 1210 ml   Output 450 ml   Net 760 ml       Physical Exam Performed:    BP (!) 149/75   Pulse 70   Temp 97.6 °F (36.4 °C) (Oral)   Resp 19   Ht 5' 3\" (1.6 m)   Wt 127 lb 10.3 oz (57.9 kg)   SpO2 98%   BMI 22.61 kg/m²     General appearance: No apparent distress, appears stated age and cooperative. HEENT: Pupils equal, round, and reactive to light. Conjunctivae/corneas clear. Neck: Supple, with full range of motion. No jugular venous distention. Trachea midline. Respiratory:  Normal respiratory effort. Clear to auscultation, bilaterally without Rales/Wheezes/Rhonchi. Cardiovascular: Regular rate and rhythm with normal S1/S2 without murmurs, rubs or gallops. Abdomen: Soft, non-tender, non-distended with normal bowel sounds. Musculoskeletal: No clubbing, cyanosis or edema bilaterally. Full range of motion without deformity.   Skin: Skin color, texture, turgor normal.  No rashes or lesions. Neurologic:  Neurovascularly intact without any focal sensory/motor deficits. Cranial nerves: II-XII intact, grossly non-focal.  Psychiatric: Alert and oriented, thought content appropriate, normal insight  Capillary Refill: Brisk,< 3 seconds   Peripheral Pulses: +2 palpable, equal bilaterally       Labs:   Recent Labs     08/11/19  2345 08/13/19  0756   WBC 12.5* 10.8   HGB 12.1 10.2*   HCT 37.1 30.9*    269     Recent Labs     08/11/19  2344      K 4.5   CL 99   CO2 26   BUN 27*   CREATININE 1.0   CALCIUM 11.6*     No results for input(s): AST, ALT, BILIDIR, BILITOT, ALKPHOS in the last 72 hours. No results for input(s): INR in the last 72 hours.   Recent Labs     08/11/19  2344   TROPONINI 0.06*       Urinalysis:      Lab Results   Component Value Date    NITRU POSITIVE 08/12/2019    WBCUA 45 08/12/2019    BACTERIA 2+ 08/12/2019    RBCUA 2 08/12/2019    BLOODU MODERATE 08/12/2019    SPECGRAV 1.025 08/12/2019    GLUCOSEU Negative 08/12/2019    GLUCOSEU NEGATIVE 03/04/2010       Radiology:  XR CHEST PORTABLE   Final Result   Increased interstitial-reticular opacities in the right upper lobe which   could represent developing pneumonia or edema                 Assessment/Plan:    Acute metabolic encephalopathy  Likely secondary to urinary tract infection  Continue IV fluids and antibiotics     Urinary tract infection  Await culture and sensitivity: e coli, await sens  Continue  Rocephin     Chronic respiratory failure with hypoxia  Secondary to interstitial lung disease and scleroderma  Follows with Dr. Julissa Sawant in the outpatient    DVT Prophylaxis: lovenox  Diet: DIET LOW SODIUM 2 GM;  Dietary Nutrition Supplements: Frozen Oral Supplement  Code Status: Full Code    PT/OT Eval Status: Ordered    Dispo - Inpt, likely discharge tomorrow    Lisset Dominguez MD

## 2019-08-13 NOTE — PROGRESS NOTES
R buttock dressing changed per orders. Pt tolerated procedure well. No c/o pain. Will continue to monitor.

## 2019-08-13 NOTE — PLAN OF CARE
Problem: Falls - Risk of:  Goal: Will remain free from falls  Description  Will remain free from falls  8/13/2019 1105 by Eliseo Peterson RN  Outcome: Ongoing     Problem: Falls - Risk of:  Goal: Absence of physical injury  Description  Absence of physical injury  8/13/2019 1105 by Eliseo Peterson RN  Outcome: Ongoing     Problem: Risk for Impaired Skin Integrity  Goal: Tissue integrity - skin and mucous membranes  Description  Structural intactness and normal physiological function of skin and  mucous membranes.   8/13/2019 1105 by Eliseo Peterson RN  Outcome: Ongoing     Problem: Nutrition  Goal: Optimal nutrition therapy  8/13/2019 1105 by Eliseo Peterson RN  Outcome: Ongoing     Problem: Infection:  Goal: Will remain free from infection  Description  Will remain free from infection  8/13/2019 1105 by Eliseo Peterson RN  Outcome: Ongoing     Problem: Safety:  Goal: Free from accidental physical injury  Description  Free from accidental physical injury  8/13/2019 1105 by Eliseo Peterson RN  Outcome: Ongoing     Problem: Safety:  Goal: Free from intentional harm  Description  Free from intentional harm  8/13/2019 1105 by Eliseo Peterson RN  Outcome: Ongoing     Problem: Daily Care:  Goal: Daily care needs are met  Description  Daily care needs are met  8/13/2019 1105 by Eliseo Peterson RN  Outcome: Ongoing     Problem: Pain:  Goal: Patient's pain/discomfort is manageable  Description  Patient's pain/discomfort is manageable  8/13/2019 1105 by Eliseo Peterson RN  Outcome: Ongoing     Problem: Skin Integrity:  Goal: Skin integrity will stabilize  Description  Skin integrity will stabilize  8/13/2019 1105 by Eliseo Peterson RN  Outcome: Ongoing     Problem: Discharge Planning:  Goal: Patients continuum of care needs are met  Description  Patients continuum of care needs are met  8/13/2019 1105 by Eliseo Peterson RN  Outcome: Ongoing

## 2019-08-14 ENCOUNTER — APPOINTMENT (OUTPATIENT)
Dept: GENERAL RADIOLOGY | Age: 79
DRG: 689 | End: 2019-08-14
Payer: MEDICARE

## 2019-08-14 VITALS
DIASTOLIC BLOOD PRESSURE: 64 MMHG | SYSTOLIC BLOOD PRESSURE: 128 MMHG | TEMPERATURE: 97.3 F | HEIGHT: 63 IN | BODY MASS INDEX: 21.88 KG/M2 | HEART RATE: 65 BPM | WEIGHT: 123.46 LBS | RESPIRATION RATE: 16 BRPM | OXYGEN SATURATION: 97 %

## 2019-08-14 LAB
ALBUMIN SERPL-MCNC: 2.6 G/DL (ref 3.1–4.9)
ALPHA-1-GLOBULIN: 0.4 G/DL (ref 0.2–0.4)
ALPHA-2-GLOBULIN: 1 G/DL (ref 0.4–1.1)
BETA GLOBULIN: 1 G/DL (ref 0.9–1.6)
GAMMA GLOBULIN: 0.7 G/DL (ref 0.6–1.8)
ORGANISM: ABNORMAL
PROCALCITONIN: 0.1 NG/ML (ref 0–0.15)
SPE/IFE INTERPRETATION: NORMAL
TOTAL PROTEIN: 5.7 G/DL (ref 6.4–8.2)
URINE CULTURE, ROUTINE: ABNORMAL
URINE ELECTROPHORESIS INTERP: NORMAL

## 2019-08-14 PROCEDURE — 71045 X-RAY EXAM CHEST 1 VIEW: CPT

## 2019-08-14 PROCEDURE — 2700000000 HC OXYGEN THERAPY PER DAY

## 2019-08-14 PROCEDURE — 2580000003 HC RX 258: Performed by: INTERNAL MEDICINE

## 2019-08-14 PROCEDURE — 6360000002 HC RX W HCPCS: Performed by: INTERNAL MEDICINE

## 2019-08-14 PROCEDURE — 6370000000 HC RX 637 (ALT 250 FOR IP): Performed by: INTERNAL MEDICINE

## 2019-08-14 PROCEDURE — 97166 OT EVAL MOD COMPLEX 45 MIN: CPT

## 2019-08-14 PROCEDURE — 97530 THERAPEUTIC ACTIVITIES: CPT

## 2019-08-14 PROCEDURE — 94640 AIRWAY INHALATION TREATMENT: CPT

## 2019-08-14 PROCEDURE — 99232 SBSQ HOSP IP/OBS MODERATE 35: CPT | Performed by: INTERNAL MEDICINE

## 2019-08-14 PROCEDURE — 36415 COLL VENOUS BLD VENIPUNCTURE: CPT

## 2019-08-14 PROCEDURE — 97535 SELF CARE MNGMENT TRAINING: CPT

## 2019-08-14 PROCEDURE — 94761 N-INVAS EAR/PLS OXIMETRY MLT: CPT

## 2019-08-14 PROCEDURE — 97116 GAIT TRAINING THERAPY: CPT

## 2019-08-14 PROCEDURE — 84145 PROCALCITONIN (PCT): CPT

## 2019-08-14 RX ORDER — AZITHROMYCIN 250 MG/1
250 TABLET, FILM COATED ORAL DAILY
Status: DISCONTINUED | OUTPATIENT
Start: 2019-08-14 | End: 2019-08-14 | Stop reason: HOSPADM

## 2019-08-14 RX ORDER — CEFDINIR 300 MG/1
300 CAPSULE ORAL 2 TIMES DAILY
Qty: 14 CAPSULE | Refills: 0 | Status: ON HOLD | OUTPATIENT
Start: 2019-08-14 | End: 2019-08-25 | Stop reason: HOSPADM

## 2019-08-14 RX ADMIN — ENOXAPARIN SODIUM 40 MG: 40 INJECTION SUBCUTANEOUS at 09:35

## 2019-08-14 RX ADMIN — SERTRALINE 100 MG: 100 TABLET, FILM COATED ORAL at 09:35

## 2019-08-14 RX ADMIN — BUPROPION HYDROCHLORIDE 150 MG: 150 TABLET, EXTENDED RELEASE ORAL at 09:35

## 2019-08-14 RX ADMIN — Medication: at 12:50

## 2019-08-14 RX ADMIN — ASPIRIN 81 MG 81 MG: 81 TABLET ORAL at 09:35

## 2019-08-14 RX ADMIN — IPRATROPIUM BROMIDE AND ALBUTEROL SULFATE 1 AMPULE: .5; 3 SOLUTION RESPIRATORY (INHALATION) at 08:36

## 2019-08-14 RX ADMIN — Medication 10 ML: at 09:36

## 2019-08-14 NOTE — PROGRESS NOTES
Patient in bed resting comfortably. Respirations steady and unlabored. No signs of respiratory or cardiac distress. No complaints of pain at this time. No needs at this time. Call light in reach and bed alarm in place. Will continue to monitor.   Electronically signed by Patricia Nobles RN on 8/14/2019 at 2:19 AM

## 2019-08-14 NOTE — PROGRESS NOTES
Pt's R buttock dressing and L buttock wound vac changed per orders. Pt tolerated procedure well. No c/o pain. Dressings are clean, dry, and intact.

## 2019-08-14 NOTE — PLAN OF CARE
Problem: Falls - Risk of:  Goal: Will remain free from falls  Description  Will remain free from falls  8/14/2019 0810 by Chaya Patton RN  Outcome: Ongoing     Problem: Falls - Risk of:  Goal: Absence of physical injury  Description  Absence of physical injury  Outcome: Ongoing     Problem: Risk for Impaired Skin Integrity  Goal: Tissue integrity - skin and mucous membranes  Description  Structural intactness and normal physiological function of skin and  mucous membranes. Outcome: Ongoing     Problem: Nutrition  Goal: Optimal nutrition therapy  Outcome: Ongoing     Problem: Infection:  Goal: Will remain free from infection  Description  Will remain free from infection  Outcome: Ongoing     Problem: Safety:  Goal: Free from accidental physical injury  Description  Free from accidental physical injury  8/14/2019 0810 by Chaya Patton RN  Outcome: Ongoing     Problem: Safety:  Goal: Free from intentional harm  Description  Free from intentional harm  Outcome: Ongoing     Problem: Daily Care:  Goal: Daily care needs are met  Description  Daily care needs are met  Outcome: Ongoing     Problem: Pain:  Goal: Patient's pain/discomfort is manageable  Description  Patient's pain/discomfort is manageable  Outcome: Ongoing     Problem: Skin Integrity:  Goal: Skin integrity will stabilize  Description  Skin integrity will stabilize  Outcome: Ongoing     Problem: Discharge Planning:  Goal: Patients continuum of care needs are met  Description  Patients continuum of care needs are met  Outcome: Ongoing     Problem:  Activity:  Goal: Fatigue will decrease  Description  Fatigue will decrease  8/14/2019 0810 by Chaya Patton RN  Outcome: Ongoing     Problem: Cardiac:  Goal: Hemodynamic stability will improve  Description  Hemodynamic stability will improve  Outcome: Ongoing     Problem: Coping:  Goal: Level of anxiety will decrease  Description  Level of anxiety will decrease  Outcome: Ongoing     Problem: Coping:  Goal: Ability to cope will improve  Description  Ability to cope will improve  Outcome: Ongoing     Problem: Coping:  Goal: Ability to establish a method of communication will improve  Description  Ability to establish a method of communication will improve  Outcome: Ongoing     Problem: Respiratory:  Goal: Ability to maintain a clear airway will improve  Description  Ability to maintain a clear airway will improve  8/14/2019 0810 by Penny Mena RN  Outcome: Ongoing     Problem: Respiratory:  Goal: Complications related to the disease process, condition or treatment will be avoided or minimized  Description  Complications related to the disease process, condition or treatment will be avoided or minimized  Outcome: Ongoing

## 2019-08-14 NOTE — PROGRESS NOTES
Occupational Therapy   Occupational Therapy Initial Assessment  See last progress note for discharge status. Date: 2019   Patient Name: Flower Knowles  MRN: [id number]     : 1940    Date of Service: 2019    Discharge Recommendations:  Home with Home health OT, S Level 1       Assessment   Performance deficits / Impairments: Decreased functional mobility ; Decreased ADL status; Decreased cognition  Prognosis: Fair;Good  Decision Making: Medium Complexity  History: hx pulmonary fibrosis, CVA, sacral wound, with UTI/ encephalopathy  Exam: ADLs  Assistance / Modification: assist, cues  REQUIRES OT FOLLOW UP: Yes  Activity Tolerance  Activity Tolerance: Patient Tolerated treatment well  Safety Devices  Safety Devices in place: Yes  Type of devices: Nurse notified; Chair alarm in place;Call light within reach; Left in chair           Patient Diagnosis(es): The primary encounter diagnosis was Acute respiratory failure with hypoxia and hypercapnia ([address] Diagnoses of Elevated troponin and Leukemoid reaction were also pertinent to this visit. has a past medical history of Anxiety and depression, Arthritis, Cancer of skin of leg, Chronic low back pain, GERD (gastroesophageal reflux disease), Insomnia, Iron deficiency anemia, Kidney stone, PUD (peptic ulcer disease), Pulmonary fibrosis ([address] Stomach ulcer, Ulcer - lesion, and UTI (urinary tract infection). has a past surgical history that includes hysteroscopy (); joint replacement (2000); joint replacement (08); Carpal tunnel release (11); Hysterectomy (); Stomach surgery (); Cystocopy (Left, 2016); Colonoscopy; Cystoscopy (01/15/2018); eye surgery (Bilateral, ); back surgery (2004,OCT 02, ); back surgery (2000); fracture surgery (Right, 2016); fracture surgery (Left);  Cystoscopy (2018); pr brncc w/brncl alveolar lavage (N/A, 10/24/2018); and pr debride necrotic skin/ tissue, genit & perinm Exceptions to [facility]  Hearing Exceptions: Hard of hearing/hearing concerns(hasn't worn her hearing aids since needing oxygen after her stroke several months ago)       Observation/Palpation  Observation: wound vac intact to sacral area     ADL  Feeding: Independent;Setup  Grooming: Setup;Minimal assistance  UE Dressing: Minimal assistance  LE Dressing: Moderate assistance  Toileting: Moderate assistance(to change urine saturated Depends and clean up)        Bed mobility  Supine to Sit: Stand by assistance(HOB up, use of R bed rail)  Scooting: Stand by assistance  Comment: needed extra time for bed mobility  Transfers  Stand Step Transfers: Contact guard assistance(with rolling walker, for EOB to recliner chair)  Sit to stand: Contact guard assistance(from EOB)     Cognition  Overall Cognitive Status: Exceptions(Decreased short term memory and problem solving)        Sensation  Overall Sensation Status: Impaired(Pt reports numbness in entire R leg)                               Plan   Plan  Times per week: 3-5x  Current Treatment Recommendations: Functional Mobility Training, Equipment Evaluation, Education, & procurement, Patient/Caregiver Education & Training, Self-Care / ADL       OutComes Score    Patient Education: Call for needs and for assist to get up. AM-PAC Score        AM-[facility] Inpatient Daily Activity Raw Score: 17 (08/14/19 1008)  AM-PAC Inpatient ADL T-Scale Score : 37.26 (08/14/19 1008)  ADL Inpatient CMS 0-100% Score: 50.11 (08/14/19 1008)  ADL Inpatient CMS G-Code Modifier : CK (08/14/19 1008)    Goals  Short term goals  Time Frame for Short term goals: at d/c:  Short term goal 1: Supervision grooming  Short term goal 2: Min A bathing and dressing  Short term goal 3: Supervision for bed mobility  Short term goal 4: SBA transfers  Patient Goals   Patient goals :  \"To move around better\"       Therapy Time   Individual Concurrent Group Co-treatment   Time In 0930

## 2019-08-14 NOTE — PROGRESS NOTES
Pulmonary Progress Note    CC: Shortness of breath  Acute metabolic encephalopathy  Cystic lung disease  Acute on chronic hypercarbic respiratory failure  Chronic hypoxic respiratory failure (on 4L NC)    24 hr events:  She has baseline exertional dyspnea. She has a non-productive cough. ROS:  +SPIVEY  + cough  No vomiting      PHYSICAL EXAM:  Blood pressure 115/66, pulse 82, temperature 97.4 °F (36.3 °C), temperature source Oral, resp. rate 16, height 5' 3\" (1.6 m), weight 123 lb 7.3 oz (56 kg), SpO2 98 %, not currently breastfeeding. on 4L NC    Intake/Output Summary (Last 24 hours) at 8/14/2019 1113  Last data filed at 8/14/2019 0935  Gross per 24 hour   Intake 790 ml   Output 550 ml   Net 240 ml       Gen: Well developed; well nourished  Eyes: No scleral icterus. No conjunctival injection. ENT: External appearance of ears and nose normal.  Neck: Trachea midline. No obvious mass. No visible thyroid enlargement    Respiratory: Crackles over left lower lung zone, no accessory muscle use  Cardiovascular: Regular rate and rhythm, no appreciable murmurs. No edema. Skin: Warm and dry. No rashes or ulcers on visible areas. Normal texture and turgor  Musculoskeletal: No cyanosis, clubbing or joint deformity.     Psychiatric: Normal mood and affect; exhibits normal insight and judgement     Medications:  Current Facility-Administered Medications: azithromycin (ZITHROMAX) tablet 250 mg, 250 mg, Oral, Daily  aspirin chewable tablet 81 mg, 81 mg, Oral, Daily  buPROPion [facility]) extended release tablet 150 mg, 150 mg, Oral, BID  ipratropium-albuterol (DUONEB) nebulizer solution 3 mL, 1 vial, Inhalation, Q6H PRN  sertraline (ZOLOFT) tablet 100 mg, 100 mg, Oral, Daily  clonazePAM (KLONOPIN) tablet 0.25 mg, 0.25 mg, Oral, Nightly PRN  sodium chloride flush 0.9 % injection 10 mL, 10 mL, Intravenous, 2 times per day  sodium chloride flush 0.9 % injection 10 mL, 10 mL, Intravenous, PRN  magnesium hydroxide (MILK OF MAGNESIA) 400 MG/5ML suspension 30 mL, 30 mL, Oral, Daily PRN  ondansetron (ZOFRAN) injection 4 mg, 4 mg, Intravenous, Q6H PRN  enoxaparin (LOVENOX) injection 40 mg, 40 mg, Subcutaneous, Daily  cefTRIAXone (ROCEPHIN) 1 g IVPB in 50 mL D5W minibag, 1 g, Intravenous, Q24H  ipratropium-albuterol (DUONEB) nebulizer solution 1 ampule, 1 ampule, Inhalation, Q8H  collagenase ointment, , Topical, Daily    Data reviewed:  Labs:  CBC:   Recent Labs     08/11/19  2345 08/13/19  0756   WBC 12.5* 10.8   HGB 12.1 10.2*   HCT 37.1 30.9*   MCV 90.9 90.7    269     BMP:   Recent Labs     08/11/19  2344 08/13/19  0757    139   K 4.5 4.6   CL 99 101   CO2 26 27   BUN 27* 29*   CREATININE 1.0 0.8     LIVER PROFILE: No results for input(s): AST, ALT, LIPASE, BILIDIR, BILITOT, ALKPHOS in the last 72 hours. Invalid input(s): AMYLASE,  ALB  PT/INR: No results for input(s): PROTIME, INR in the last 72 hours. APTT: No results for input(s): APTT in the last 72 hours. Cultures:   Blood culture (8/12): NGTD  Urine culture (8/12): E.coli  Respiratory viral panel: Negative  Urine strep and Legionella antigens: Negative      Films:  Chest images and reports were reviewed by me. My interpretation is:  CXR (8/14/19): Low lung volumes with diffusely increased interstitial markings       Assessment:     Shortness of breath  Acute metabolic encephalopathy  Cystic lung disease  Acute on chronic hypercarbic respiratory failure  Chronic hypoxic respiratory failure (on 4L NC)      Plan:    Shortness of breath  -In the setting of cystic lung disease. Cannot exclude acute bronchitis or developing pneumonia  -Ceftriaxone and azithromycin (day #3). Can use cefdinir and azithromycin at discharge to complete 7 days of empiric antibiotics      Acute metabolic encephalopathy  -Due to UTI.  Antibiotics as above     Cystic lung disease  -Duonebs every 8 hours     Acute on chronic hypercarbic respiratory failure  -Duonebs every 8 hours     Chronic hypoxic respiratory failure (on 4L NC)  -Continue supplemental oxygen to keep saturation greater than 90%       Okay for discharge from pulmonary perspective. Thank you for allowing me to participate in the care of this patient. Will sign off. Please call with any questions or concerns. Has outpatient pulmonary follow-up scheduled.     Stefanie Askew MD  [facility] Pulmonary, Critical Care and Sleep

## 2019-08-14 NOTE — DISCHARGE INSTR - COC
Continuity of Care Form    Patient Name: Robbie Linares   :  1940  MRN:  [id number]    Admit date:  2019  Discharge date:  19    Code Status Order: Full Code   Advance Directives:   Advance Care Flowsheet Documentation     Date/Time Healthcare Directive Type of Healthcare Directive Copy in [address] Agent's Name Healthcare Agent's Phone Number    [phone]  Yes, patient has an advance directive for healthcare treatment  Durable power of  for health care;Living will  Yes, copy in chart  Healthcare power of   Cynda Rebecca  [phone]          Admitting Physician:  Sabiha Barron MD  PCP: Mauricio Pittman DO    Discharging Nurse: [facility] Unit/Room#: A4E-3986/7748-80  Discharging Unit Phone Number: [phone]    Emergency Contact:   Extended Emergency Contact Information  Primary Emergency Contact: Nick New  Address: [address], [address] [place] [address] [place] of [address]  Phone: [phone]  Mobile Phone: [phone]  Relation: Spouse  Secondary Emergency Contact: HermelindaBrissa  Address: [address] [place] [address], [address] [facility] [address]  [address]  Phone: [phone]  Mobile Phone: [phone]  Relation: Child    Past Surgical History:  Past Surgical History:   Procedure Laterality Date   [facility] SURGERY  [address], [place] 04    x4--fusions    BACK SURGERY  2000    laminectomy-lumbar    CARPAL TUNNEL RELEASE  11    Left    COLONOSCOPY      CYSTOSCOPY Left 2016    cysto left ureteral stent placement    CYSTOSCOPY  01/15/2018    Left Stent Insertion    CYSTOSCOPY  2018    cysto, left uretero with laser litho, left stent excahnge    EYE SURGERY Bilateral     cataract removed with lens implants    FRACTURE SURGERY Right 2016    femur fracture    FRACTURE SURGERY Left     femur fracture   [address] REPLACEMENT  4/20/2000    LEFT KNEE    JOINT REPLACEMENT  2/27/08    RIGHT KNEE    [address] W/BRNCL ALVEOLAR LAVAGE N/A 10/24/2018    BRONCHOSCOPY WITH BRONCHOALVEOLAR LAVAGE performed by Jose Schneider DO at [facility]. DEBRIDE NECROTIC SKIN/ TISSUE, GENIT & PERINM Left 11/28/2018    DEBRIDEMENT LEFT HIP SKIN AND SUBCUTANEOUS FASCIA performed by Xochilt Moya MD at [address] [place] [address]    For PUD       Immunization History:   Immunization History   Administered Date(s) Administered    Influenza 08/29/2011    Influenza Vaccine, unspecified formulation 09/28/2009, 10/24/2012, 10/10/2013    Influenza, High Dose (Fluzone 65 yrs and older) 11/01/2015, 01/09/2017, 08/25/2017    Influenza, Art Piter, 3 Years and older, IM (Fluzone 3 yrs and older or Afluria 5 yrs and older) 12/08/2014, 10/13/2015    Influenza, Art Piter, 6 mo and older, IM, PF (Flulaval, Fluarix) 10/07/2018    Pneumococcal Conjugate 13-valent ([id number]) 08/25/2017    Pneumococcal Polysaccharide ([id number]) 02/28/2008, 12/08/2010    Tdap (Boostrix, Adacel) 03/07/2014       Active Problems:  Patient Active Problem List   Diagnosis Code    Abnormality of gait R26.9    Osteoporosis M81.0    Peptic ulcer K27.9    Disorder of kidney and ureter N28.9    Anemia D64.9    Pneumonia due to organism J18.9    Acute bronchitis J20.9    Osteoarthrosis, unspecified whether generalized or localized, pelvic region and thigh M16.9    Closed fracture of thoracic vertebra (HCC) S22.009A    Displacement of lumbar intervertebral disc without myelopathy M51.26    Scoliosis (and kyphoscoliosis), idiopathic M41.20    Enthesopathy of hip region M76.899    Thoracic or lumbosacral neuritis or radiculitis, unspecified [id number]    Dermatitis due to drug taken internally L27.0    Acute cystitis without hematuria N30.00    Edema R60.9    Lumbago M54.5    CTS (carpal tunnel syndrome) G56.00    Depression F32.9    Anxiety F41.9    Depression F32.9    Femoral distal fracture ([facility]) S72.409A    Fibrotic lung diseases ([facility]) J84.10    Periprosthetic fracture around internal prosthetic left knee joint M97. 12XA    [facility] of femur [facility] S72.90XA    Acute on chronic respiratory failure with hypoxia ([facility]) J96.21    Acute blood loss anemia D62    Pain from implanted hardware T85.848A    Idiopathic pulmonary fibrosis ([facility]) J84.112    Hypoxia R09.02    Abnormal CT scan, chest R93.89    Exertional dyspnea R06.09    Scleroderma ([facility]) M34.9    Closed intertrochanteric fracture of right femur ([facility]) S72.141A    Chronic respiratory failure with hypoxia ([facility]) J96.11    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    Chronic interstitial lung disease ([facility]) J84.9    Obstructive nephropathy N13.8    Acute post-operative pain G89.18    Sepsis ([facility]) A41.9    Fever R50.9    Tachycardia R00.0    Tachypnea R06.82    Pressure injury of skin of left buttock L89.329    Multifocal pneumonia J18.9    Abnormal chest CT R93.89    Urinary tract infection without hematuria N39.0    Moderate protein-calorie malnutrition ([facility]) E44.0    Acute and chr resp failure, unsp w hypoxia or hypercapnia ([facility]) J96.20    RSV (respiratory syncytial virus infection) B97.4    Acute CVA (cerebrovascular accident) ([facility] X88.9    Complicated open wound of hip, left, initial encounter S71.002A    Pressure ulcer of sacral region, unstageable ([facility]) L89.150    SOB (shortness of breath) R06.02    Acute metabolic encephalopathy M31.60    Acute on chronic respiratory failure with hypercapnia ([facility]) J96.22       Isolation/Infection:   Isolation          No Isolation            Nurse Assessment:  Last Vital Signs: /66   Pulse 82   Temp 97.4 °F (36.3 °C) (Oral)   Resp 16   Ht 5' 3\" (1.6 m)   Wt 123 lb 7.3 oz (56 kg)   SpO2 98%   BMI 21.87 kg/m²     Last documented pain score (0-10 scale): Pain Level: 0  Last Weight:   Wt Readings from Last 1 Encounters: 08/14/19 123 lb 7.3 oz (56 kg)     Mental Status:  oriented and alert    IV Access:  - None    Nursing Mobility/ADLs:  Walking   Assisted  Transfer  Assisted  Bathing  Assisted  Dressing  Assisted  Toileting  Assisted  Feeding  Independent  Med Admin  Assisted  Med Delivery   prefers mixed with applesauce    Wound Care Documentation and Therapy:  Wound 12/03/18 Hip Left #5(acquired on 11/28/18-surgically created by Dr. Vic Coburn) (Active)   Wound Other 8/13/2019  2:21 PM   Dressing Status Clean;Dry; Intact 8/13/2019  8:20 PM   Dressing Changed Changed/New 8/12/2019  8:00 AM   Dressing/Treatment Vacuum dressing 8/13/2019  2:21 PM   Dressing Change Due 08/14/19 8/13/2019  2:21 PM   Wound Length (cm) 0.4 cm 7/31/2019 11:13 AM   Wound Width (cm) 1 cm 7/31/2019 11:13 AM   Wound Depth (cm) 1.8 cm 7/31/2019 11:13 AM   Wound Surface Area (cm^2) 0.4 cm^2 7/31/2019 11:13 AM   Change in Wound Size % (l*w) 96.67 7/31/2019 11:13 AM   Wound Volume (cm^3) 0.72 cm^3 7/31/2019 11:13 AM   Wound Healing % 98 7/31/2019 11:13 AM   Post-Procedure Length (cm) 0.4 cm 7/31/2019 12:28 PM   Post-Procedure Width (cm) 1 cm 7/31/2019 12:28 PM   Post-Procedure Depth (cm) 1.8 cm 7/31/2019 12:28 PM   Post-Procedure Surface Area (cm^2) 0.4 cm^2 7/31/2019 12:28 PM   Post-Procedure Volume (cm^3) 0.72 cm^3 7/31/2019 12:28 PM   Undermining Starts ___ O'Clock 12 7/31/2019 11:13 AM   Undermining Ends___ O'Clock 12 7/31/2019 11:13 AM   Undermining Maxium Distance (cm) 2.9 7/31/2019 11:13 AM   Wound Assessment GEOVANNY 8/13/2019  2:21 PM   Drainage Amount None 8/13/2019  2:21 PM   Drainage Description Serosanguinous;Green 7/31/2019 11:13 AM   Odor None 8/13/2019  2:21 PM   Margins Attached edges; Defined edges 7/31/2019 11:13 AM   Mirian-wound Assessment Clean;Dry; Intact 8/13/2019  2:21 PM   Non-staged Wound Description Full thickness 7/31/2019 11:13 AM   [place]%Wound Bed 80 7/31/2019 11:13 AM   Yellow%Wound Bed 20 7/31/2019 11:13 AM   Number of days: 253       Wound 07/24/19 Sacrum Right; Lower Wound #6; noted approx 7/20/19 (Active)   Wound Image   7/24/2019 11:20 AM   Wound Pressure Unstageable 8/13/2019  2:21 PM   Dressing Status Clean;Dry; Intact 8/13/2019  8:20 PM   Dressing Changed Changed/New 8/13/2019  2:21 PM   Dressing/Treatment Pharmaceutical agent (see MAR); Moist to dry 8/13/2019  2:21 PM   Wound Cleansed Wound cleanser 8/13/2019  2:21 PM   Dressing Change Due 08/14/19 8/13/2019  2:21 PM   Wound Length (cm) 4 cm 8/12/2019  4:22 AM   Wound Width (cm) 3 cm 8/12/2019  4:22 AM   Wound Depth (cm) 0.1 cm 7/31/2019 11:13 AM   Wound Surface Area (cm^2) 12 cm^2 8/12/2019  4:22 AM   Change in Wound Size % (l*w) -100 8/12/2019  4:22 AM   Wound Volume (cm^3) 1.04 cm^3 7/31/2019 11:13 AM   Wound Healing % -73 7/31/2019 11:13 AM   Post-Procedure Length (cm) 3.7 cm 7/31/2019 12:28 PM   Post-Procedure Width (cm) 2.8 cm 7/31/2019 12:28 PM   Post-Procedure Depth (cm) 0.1 cm 7/31/2019 12:28 PM   Post-Procedure Surface Area (cm^2) 10.36 cm^2 7/31/2019 12:28 PM   Post-Procedure Volume (cm^3) 1.04 cm^3 7/31/2019 12:28 PM   Wound Assessment Intact;Drainage;Edges well approximated;Black 8/13/2019  2:21 PM   Drainage Amount Moderate 8/13/2019  2:21 PM   Drainage Description Serosanguinous 8/13/2019  2:21 PM   Odor None 8/13/2019  2:21 PM   Margins Attached edges; Defined edges 8/12/2019  8:00 AM   Mirian-wound Assessment Clean;Dry; Intact 8/13/2019  2:21 PM   Non-staged Wound Description Full thickness 8/12/2019  8:00 AM   [place]%Wound Bed 10 7/31/2019 11:13 AM   Yellow%Wound Bed 90 7/31/2019 11:13 AM   Black%Wound Bed 100 8/13/2019  2:21 PM   Purple%Wound Bed 20 7/24/2019 11:20 AM   Number of days: 21        Elimination:  Continence:   · Bowel:  Yes  · Bladder: Yes  Urinary Catheter: None   Colostomy/Ileostomy/Ileal Conduit: No       Date of Last BM: 8/13/19    Intake/Output Summary (Last 24 hours) at 8/14/2019 1228  Last data filed at 8/14/2019 0935  Gross per 24 hour   Intake 790 ml   Output 550 ml   Net 240 ml     I/O last 3 completed shifts: In: 1030 [P.O.:1020; I.V.:10]  Out: 550 [Urine:550]    Safety Concerns: At Risk for Falls    Impairments/Disabilities:      Vision    Nutrition Therapy:  Current Nutrition Therapy:   - Oral Diet:  Low Sodium (2gm)    Routes of Feeding: Oral  Liquids: Thin Liquids  Daily Fluid Restriction: no  Last Modified Barium Swallow with Video (Video Swallowing Test): not done    Treatments at the Time of Hospital Discharge:   Respiratory Treatments: O2  Oxygen Therapy:  is on oxygen at 4 L/min per nasal cannula. Ventilator:    - No ventilator support    Rehab Therapies: PT/OT  Weight Bearing Status/Restrictions: No weight bearing restirctions  Other Medical Equipment (for information only, NOT a DME order):  walker  Other Treatments: WOUND VAC    Patient's personal belongings (please select all that are sent with patient):  Rashaun    RN SIGNATURE:  Electronically signed by Melinda Mosley RN on 8/14/19 at 12:32 PM    CASE MANAGEMENT/SOCIAL WORK SECTION    Inpatient Status Date: 08/11/2019    Readmission Risk Assessment Score:      Discharging to Facility/ Agency   · Name: [facility]  · Address:  · Phone: [phone]  · [phone]      / signature: Electronically signed by Hazel Rae RN on 8/14/19 at 1:13 PM    PHYSICIAN SECTION    Prognosis: Fair    Condition at Discharge: Stable    Rehab Potential (if transferring to Rehab): Fair    Recommended Labs or Other Treatments After Discharge: NA    Physician Certification: I certify the above information and transfer of No Fuentes  is necessary for the continuing treatment of the diagnosis listed and that she requires Home Care for less 30 days.      Update Admission H&P: Changes in H&P as follows - refer to d/c summary    PHYSICIAN SIGNATURE:  Electronically signed by Elena Johnson MD on 8/14/19 at 1:54 PM

## 2019-08-14 NOTE — PROGRESS NOTES
Physical Therapy    Daily Treatment Note    Patient Name: Deanne Espinosa  Medical Record Number: [id number]  Room Number: G1U-5389/2259-46    ASSESSMENT: Russ Rankin is a 70 y.o. F admit 8/12/19 with confusion and family concern for UTI -- pt with a history of pulmonary fibrosis and a chronic history of R sacral wound. Prior to admit pt was living at home with her spouse in a [facility] with stair lifts on stairs; she was ambulating household distances with a rolling walker and supervision/SBA from her spouse. Today she required CGA for transfers and CGA for ambulation up to 25' with a walker. She does have significant dyspnea with walking per her baseline. She appears to be functioning slightly below her baseline and I anticipate would benefit from resumed home PT (level 1) to optimize her functional mobility. Discharge Recommendations: S Level 1, Patient would benefit from continued therapy after discharge, 24 hour supervision or assist   [address] [place] [address]: LEVEL 1 STANDARD   -Initial home health evaluation to occur within 24-48 hours, in patient home    -Home health agency to establish plan of care for patient over 60 day period    -Medication Reconciliation    -PCP Visit scheduled within seven days of discharge    -PT/OT to evaluate with goal of regaining prior level of functioning    -OT to evaluate if patient has [address] needs for personal care   Equipment Needs: Equipment Needed: No    Admission Date: 8/11/2019 11:30 PM    Additional Pertinent Hx: Russ Rankin is a 70 y.o. F admit 8/12/19 with confusion and family concern for UTI -- pt with a history of pulmonary fibrosis and a chronic history of R sacral wound.   Diagnosis: UTI, acute metabolic encephalopathy  Restrictions/Precautions: Fall Risk(sacral wound with wound vac)       PMHx:  has a past medical history of Anxiety and depression, Arthritis, Cancer of skin of leg, Chronic low back pain, GERD (gastroesophageal reflux disease), Insomnia, Iron deficiency anemia, Kidney stone, PUD (peptic ulcer disease), Pulmonary fibrosis ([address] Stomach ulcer, Ulcer - lesion, and UTI (urinary tract infection).   PSgHx:   Past Surgical History:   Procedure Laterality Date   [facility] SURGERY  MARCH 2515,[facility], [place] 05    x4--fusions    BACK SURGERY  06/2000    laminectomy-lumbar    CARPAL TUNNEL RELEASE  9/27/11    Left    COLONOSCOPY      CYSTOSCOPY Left 09/2016    cysto left ureteral stent placement    CYSTOSCOPY  01/15/2018    Left Stent Insertion    CYSTOSCOPY  02/14/2018    cysto, left uretero with laser litho, left stent excahnge    EYE SURGERY Bilateral 2008    cataract removed with lens implants    FRACTURE SURGERY Right 06/05/2016    femur fracture    FRACTURE SURGERY Left     femur fracture    HYSTERECTOMY  1993    HYSTEROSCOPY  1993    JOINT REPLACEMENT  4/20/2000    LEFT KNEE    JOINT REPLACEMENT  2/27/08    RIGHT KNEE    MT [address] W/BRNCL ALVEOLAR LAVAGE N/A 10/24/2018    BRONCHOSCOPY WITH BRONCHOALVEOLAR LAVAGE performed by Jeffrey Steen DO at [facility]. DEBRIDE NECROTIC SKIN/ TISSUE, GENIT & PERINM Left 11/28/2018    DEBRIDEMENT LEFT HIP SKIN AND SUBCUTANEOUS FASCIA performed by Theresa Montiel MD at [address] [place] [address]  2010    For PUD       Home Environment / Functional History  Social/Functional History  Lives With: Spouse  Type of Home: (condo)  Home Layout: Two level(stair lift to 2nd floor)  Home Access: Stairs to enter with rails  Entrance Stairs - Number of Steps: 1 DANNY through garager, uses RW, spouse assists  Bathroom Shower/Tub: Tub/Shower unit  Bathroom Equipment: Tub transfer bench, 3-in-1 commode, Grab bars in shower, Grab bars around toilet, Toilet raiser(has comfort height toilet, has toilet safety frame -- does not use RTS)  Bathroom Accessibility: Walker accessible  Home Equipment: 4 wheeled walker, Rolling walker, Standard walker, Wheelchair-manual, Oxygen(4L/min oxygen supplement at all times, transport chair)  Receives Help From: Family(HHA and home PT/OT services stopped ~3 weeks ago)  ADL Assistance: Needs assistance(pt does her dressing unless time constraints are present (then spouse helps), independent with toileting, assist for bath transfer but pt does her own washing. Pt independent with grooming and feeding.)  Homemaking Assistance: Needs assistance(spouse does all cooking, cleaning, laundry)  Ambulation Assistance: Needs assistance(two-wheeled walker in house with supervision, SBA with 4-wheeled walker in community)  Transfer Assistance: Independent  Active : No  Patient's  Info:  drives  Additional Comments: Spouse denies patient has had any falls in last couple months. Restrictions  Restrictions/Precautions: Fall Risk(sacral wound with wound vac)             SUBJECTIVE: Pt sitting up in recliner chair viewing TV. Pt's spouse at her side. Pt willing to get up and participate in therapy.     Pain: Patient Currently in Pain: Denies      OBJECTIVE:    OBSERVATIONS  Observation: wound vac intact to sacral area    Bed mobility  Supine to Sit: Unable to assess  Scooting: Unable to assess    Transfers  Sit to Stand: Contact guard assistance(BUE support)  Stand to sit: Contact guard assistance(BUE support, cues to reach back for arm rests of recliner chair or use grab bar at toilet)    Ambulation  Ambulation  Ambulation?: Yes  Ambulation 1  Device: Rolling Walker  Other Apparatus: O2(3L/min via NC)  Assistance: Contact guard assistance, Stand by assistance  Quality of Gait: short steps, partial step-through pattern with R leading first, reduced foot clearance (R>L), hip flexion posture ambulating well behind walker base (able to ambulate further up in walker base with verbal cues), assist to maneuver walker in bathroom at times  Distance: 2 x 25'  Comments: pt with mild SPIVEY ambulating and required maximal verbal cues for pursed lip breathing; assist to manage oxygen line; wound vac hanging on walker    Stairs  Stairs/Curb  Stairs?: No    Other Activities:  Comment: Pt assisted to toilet to void her bladder but had a bladder accident en route. She required assistance to doff saturated briefs, change socks and don clean brief. Pt performed haley-care hygiene in standing with CGA for balance only. She performed hand-hygiene, hair combing and teeth brushing with CGA for balance only. Neuro/balance  Balance  Sitting - Static: Good  Standing - Static: Good, -(CGA amdulating with walker)      Safety Devices: Type of devices: All fall risk precautions in place, Call light within reach, Chair alarm in place, Patient at risk for falls, Left in chair      ASSESSMENT:   Elise Nguyen is a 70 y.o. F admit 8/12/19 with confusion and family concern for UTI -- pt with a history of pulmonary fibrosis and a chronic history of R sacral wound. Prior to admit pt was living at home with her spouse in a [facility] with stair lifts on stairs; she was ambulating household distances with a rolling walker and supervision/SBA from her spouse. Today she required CGA for transfers and CGA for ambulation up to 25' with a walker. She does have significant dyspnea with walking per her baseline. She appears to be functioning slightly below her baseline and I anticipate would benefit from resumed home PT (level 1) to optimize her functional mobility. Terrance Carrillo scored a 18/24 on the AM-PAC short mobility form. Initial research suggests that an AM-PAC score of 18 or greater may be associated with a discharge to patient's home setting. However in this initial research, cut off scores do not perfectly predict discharge location: 25% of patients with a score of 18 or greater actually went to a rehab facility and 20% of people with a score less than 18 actually went home.  Based on my clinical judgement I recommend that the patient have level 1 home Physical Therapy at d/c to increase the patients independence. Goals:  Time Frame for Short term goals:  upon d/c - all goals ongoing 08/14/19 except as noted below  Short term goal 1: sup<>sit Supervision   Short term goal 2: Sit<>stand Supervision   Short term goal 3: Amb 80' with walker and SBA            Safety devices:   Type of devices: All fall risk precautions in place, Call light within reach, Chair alarm in place, Patient at risk for falls, Left in chair        PLAN:  Times per week: 3-5x/wk while in the hospital;    Current Treatment Recommendations: Functional Mobility Training    If patient is discharged prior to next treatment, this note will serve as the discharge summary.     Therapy Time    Individual Concurrent Group Co-treatment   Time In 1140            Time Out 1225          Minutes 45             Timed Code Treatment Minutes: [address], PT

## 2019-08-14 NOTE — PROGRESS NOTES
Patient is A&O x3.  O2 4L, sat 99%. No complaints of pain or SOB. Respirations appear to be easy and unlabored. Lungs clear but diminished. Respirations easy with c/o non-productive cough. Cardiac monitor in place, current rhythm NSR. Left AC PIV intact and flushed. No complaints of nausea/vomiting/diarrhea. Up with assist/walker to the bathroom/BSC as needed. Tolerating low sodium diet. Right buttock drsg intact. Hip drsg intact. Plan of care and safety measures reviewed with the patient. Call light in reach and bed alarm in place. Will continue to monitor.   Electronically signed by Ana Rothman RN on 8/14/2019 at 1:05 AM

## 2019-08-14 NOTE — CARE COORDINATION
Received dc order. Spoke w/ David Nam at [address] and informed that she will need to resume nurse and restart PT/OT.  She confirmed that she can get records from [address] [place] [address] MD sign [address]  Electronically signed by Chintan Pollack RN on 8/14/2019 at 1:14 PM

## 2019-08-14 NOTE — PLAN OF CARE
Pt free from falls this shift. Fall precautions in place at all times. Call light always within reach. Pt able and agreeable to contact for safety appropriately. Pt free from falls this shift. Fall precautions in place at all times. Call light always withinreach. Pt able and agreeable to contact for safety appropriately. Bed alarm on. Patient activity levels improving with continued treatment    Patient able to maintain a clear airway and adequate gas exchange.

## 2019-08-15 ENCOUNTER — TELEPHONE (OUTPATIENT)
Dept: FAMILY MEDICINE CLINIC | Age: 79
End: 2019-08-15

## 2019-08-15 ENCOUNTER — CARE COORDINATION (OUTPATIENT)
Dept: CASE MANAGEMENT | Age: 79
End: 2019-08-15

## 2019-08-15 NOTE — CARE COORDINATION
Time Provider Karyn Hyde   8/19/2019 10:45 AM RONNY Hanks - CNP [facility] WOUND None   9/19/2019  2:40 PM [facility] PULM [facility]       Feliz Mirza RN

## 2019-08-15 NOTE — TELEPHONE ENCOUNTER
Patient just discharged from hospital for UTI. Abdelrahman Alvarez requesting verbal order for Physical therapy home health evaluation and 2 more visits for next week. Please return call.

## 2019-08-15 NOTE — DISCHARGE SUMMARY
General appearance: No apparent distress, appears stated age and cooperative. HEENT: Pupils equal, round, and reactive to light. Conjunctivae/corneas clear. Neck: Supple, with full range of motion. No jugular venous distention. Trachea midline. Respiratory:  Normal respiratory effort. Clear to auscultation, bilaterally without Rales/Wheezes/Rhonchi. Cardiovascular: Regular rate and rhythm with normal S1/S2 without murmurs, rubs or gallops. Abdomen: Soft, non-tender, non-distended with normal bowel sounds. Musculoskeletal: No clubbing, cyanosis or edema bilaterally. Full range of motion without deformity. Skin: Skin color, texture, turgor normal.  No rashes or lesions. Neurologic:  Neurovascularly intact without any focal sensory/motor deficits. Cranial nerves: II-XII intact, grossly non-focal.  Psychiatric: Alert and oriented, thought content appropriate, normal insight  Capillary Refill: Brisk,< 3 seconds   Peripheral Pulses: +2 palpable, equal bilaterally        Labs: For convenience and continuity at follow-up the following most recent labs are provided:      CBC:    Lab Results   Component Value Date    WBC 10.8 08/13/2019    HGB 10.2 08/13/2019    HCT 30.9 08/13/2019     08/13/2019       Renal:    Lab Results   Component Value Date     08/13/2019    K 4.6 08/13/2019     08/13/2019    CO2 27 08/13/2019    BUN 29 08/13/2019    CREATININE 0.8 08/13/2019    CALCIUM 10.7 08/13/2019    PHOS 2.1 03/02/2019         Significant Diagnostic Studies    Radiology:   XR CHEST PORTABLE   Final Result   Pulmonary fibrosis with airspace opacities now present within the left lung   base which could reflect developing pneumonia.          XR CHEST PORTABLE   Final Result   Increased interstitial-reticular opacities in the right upper lobe which   could represent developing pneumonia or edema                Consults:     IP CONSULT TO PULMONOLOGY  IP CONSULT TO HOME CARE NEEDS    Disposition:  home medications and discharge plan. Signed:    Junior Alberto MD   8/15/2019      Thank you Jeffery Rubio DO for the opportunity to be involved in this patient's care. If you have any questions or concerns please feel free to contact me at [phone].

## 2019-08-16 ENCOUNTER — CARE COORDINATION (OUTPATIENT)
Dept: CASE MANAGEMENT | Age: 79
End: 2019-08-16

## 2019-08-17 ENCOUNTER — CARE COORDINATION (OUTPATIENT)
Dept: CASE MANAGEMENT | Age: 79
End: 2019-08-17

## 2019-08-17 LAB
BLOOD CULTURE, ROUTINE: NORMAL
CULTURE, BLOOD 2: NORMAL

## 2019-08-19 ENCOUNTER — HOSPITAL ENCOUNTER (OUTPATIENT)
Dept: WOUND CARE | Age: 79
Discharge: HOME | DRG: 041 | End: 2019-08-19
Payer: MEDICARE

## 2019-08-19 VITALS
SYSTOLIC BLOOD PRESSURE: 128 MMHG | OXYGEN SATURATION: 88 % | DIASTOLIC BLOOD PRESSURE: 68 MMHG | TEMPERATURE: 97.8 F | HEART RATE: 80 BPM | RESPIRATION RATE: 18 BRPM

## 2019-08-19 DIAGNOSIS — S71.002D: Primary | ICD-10-CM

## 2019-08-19 PROCEDURE — 11046 DBRDMT MUSC&/FSCA EA ADDL: CPT | Performed by: NURSE PRACTITIONER

## 2019-08-19 PROCEDURE — 11045 DBRDMT SUBQ TISS EACH ADDL: CPT

## 2019-08-19 PROCEDURE — 11043 DBRDMT MUSC&/FSCA 1ST 20/<: CPT | Performed by: NURSE PRACTITIONER

## 2019-08-19 PROCEDURE — 11043 DBRDMT MUSC&/FSCA 1ST 20/<: CPT

## 2019-08-19 RX ORDER — LIDOCAINE HYDROCHLORIDE 40 MG/ML
SOLUTION TOPICAL ONCE
Status: DISCONTINUED | OUTPATIENT
Start: 2019-08-19 | End: 2019-08-20 | Stop reason: HOSPADM

## 2019-08-19 ASSESSMENT — PAIN SCALES - GENERAL: PAINLEVEL_OUTOF10: 0

## 2019-08-20 ENCOUNTER — APPOINTMENT (OUTPATIENT)
Dept: CT IMAGING | Age: 79
DRG: 041 | End: 2019-08-20
Payer: MEDICARE

## 2019-08-20 ENCOUNTER — HOSPITAL ENCOUNTER (INPATIENT)
Age: 79
LOS: 5 days | Discharge: NURSING, REHAB OR LONG-TERM CARE | DRG: 041 | End: 2019-08-25
Attending: EMERGENCY MEDICINE | Admitting: INTERNAL MEDICINE
Payer: MEDICARE

## 2019-08-20 ENCOUNTER — APPOINTMENT (OUTPATIENT)
Dept: GENERAL RADIOLOGY | Age: 79
DRG: 041 | End: 2019-08-20
Payer: MEDICARE

## 2019-08-20 ENCOUNTER — TELEPHONE (OUTPATIENT)
Dept: WOUND CARE | Age: 79
End: 2019-08-20

## 2019-08-20 DIAGNOSIS — M81.0 AGE-RELATED OSTEOPOROSIS WITHOUT CURRENT PATHOLOGICAL FRACTURE: ICD-10-CM

## 2019-08-20 DIAGNOSIS — E21.0 PRIMARY HYPERPARATHYROIDISM (HCC): ICD-10-CM

## 2019-08-20 DIAGNOSIS — G25.81 RESTLESS LEG SYNDROME: ICD-10-CM

## 2019-08-20 DIAGNOSIS — R41.82 ALTERED MENTAL STATUS, UNSPECIFIED ALTERED MENTAL STATUS TYPE: Primary | ICD-10-CM

## 2019-08-20 PROBLEM — L89.154 PRESSURE ULCER OF SACRAL REGION, STAGE 4 (HCC): Status: ACTIVE | Noted: 2019-07-24

## 2019-08-20 LAB
A/G RATIO: 1.1 (ref 1.1–2.2)
ALBUMIN SERPL-MCNC: 3.6 G/DL (ref 3.4–5)
ALP BLD-CCNC: 85 U/L (ref 40–129)
ALT SERPL-CCNC: 9 U/L (ref 10–40)
ANION GAP SERPL CALCULATED.3IONS-SCNC: 10 MMOL/L (ref 3–16)
APTT: 48.9 SEC (ref 26–36)
AST SERPL-CCNC: 16 U/L (ref 15–37)
BASE EXCESS VENOUS: 2.6 MMOL/L
BASOPHILS ABSOLUTE: 0.1 K/UL (ref 0–0.2)
BASOPHILS RELATIVE PERCENT: 0.5 %
BILIRUB SERPL-MCNC: <0.2 MG/DL (ref 0–1)
BILIRUBIN URINE: NEGATIVE
BLOOD, URINE: NEGATIVE
BUN BLDV-MCNC: 25 MG/DL (ref 7–20)
CALCIUM SERPL-MCNC: 11.2 MG/DL (ref 8.3–10.6)
CARBOXYHEMOGLOBIN: 1.6 %
CHLORIDE BLD-SCNC: 101 MMOL/L (ref 99–110)
CLARITY: CLEAR
CO2: 28 MMOL/L (ref 21–32)
COLOR: YELLOW
CREAT SERPL-MCNC: 0.9 MG/DL (ref 0.6–1.2)
EOSINOPHILS ABSOLUTE: 0.2 K/UL (ref 0–0.6)
EOSINOPHILS RELATIVE PERCENT: 1.6 %
GFR AFRICAN AMERICAN: >60
GFR NON-AFRICAN AMERICAN: >60
GLOBULIN: 3.2 G/DL
GLUCOSE BLD-MCNC: 104 MG/DL (ref 70–99)
GLUCOSE BLD-MCNC: 105 MG/DL (ref 70–99)
GLUCOSE BLD-MCNC: 135 MG/DL (ref 70–99)
GLUCOSE URINE: NEGATIVE MG/DL
HCO3 VENOUS: 29 MMOL/L (ref 23–29)
HCT VFR BLD CALC: 33.1 % (ref 36–48)
HEMOGLOBIN: 11 G/DL (ref 12–16)
INR BLD: 1.06 (ref 0.86–1.14)
KETONES, URINE: NEGATIVE MG/DL
LACTIC ACID: 1.6 MMOL/L (ref 0.4–2)
LEUKOCYTE ESTERASE, URINE: NEGATIVE
LYMPHOCYTES ABSOLUTE: 1.3 K/UL (ref 1–5.1)
LYMPHOCYTES RELATIVE PERCENT: 12.1 %
MCH RBC QN AUTO: 30.1 PG (ref 26–34)
MCHC RBC AUTO-ENTMCNC: 33.3 G/DL (ref 31–36)
MCV RBC AUTO: 90.3 FL (ref 80–100)
METHEMOGLOBIN VENOUS: 1.3 %
MICROSCOPIC EXAMINATION: NORMAL
MONOCYTES ABSOLUTE: 0.6 K/UL (ref 0–1.3)
MONOCYTES RELATIVE PERCENT: 5.6 %
NEUTROPHILS ABSOLUTE: 8.7 K/UL (ref 1.7–7.7)
NEUTROPHILS RELATIVE PERCENT: 80.2 %
NITRITE, URINE: NEGATIVE
O2 CONTENT, VEN: 10 ML/DL
O2 SAT, VEN: 69 %
O2 THERAPY: ABNORMAL
PCO2, VEN: 57.4 MMHG (ref 40–50)
PDW BLD-RTO: 15.9 % (ref 12.4–15.4)
PERFORMED ON: ABNORMAL
PERFORMED ON: ABNORMAL
PH UA: 6 (ref 5–8)
PH VENOUS: 7.33 (ref 7.35–7.45)
PLATELET # BLD: 390 K/UL (ref 135–450)
PMV BLD AUTO: 7.4 FL (ref 5–10.5)
PO2, VEN: 40 MMHG
POTASSIUM REFLEX MAGNESIUM: 4.8 MMOL/L (ref 3.5–5.1)
PRO-BNP: 387 PG/ML (ref 0–449)
PROTEIN UA: NEGATIVE MG/DL
PROTHROMBIN TIME: 12.1 SEC (ref 9.8–13)
RBC # BLD: 3.67 M/UL (ref 4–5.2)
SODIUM BLD-SCNC: 139 MMOL/L (ref 136–145)
SPECIFIC GRAVITY UA: >1.03 (ref 1–1.03)
TCO2 CALC VENOUS: 31 MMOL/L
TOTAL PROTEIN: 6.8 G/DL (ref 6.4–8.2)
TROPONIN: <0.01 NG/ML
URINE REFLEX TO CULTURE: NORMAL
URINE TYPE: NORMAL
UROBILINOGEN, URINE: 0.2 E.U./DL
WBC # BLD: 10.9 K/UL (ref 4–11)

## 2019-08-20 PROCEDURE — 85730 THROMBOPLASTIN TIME PARTIAL: CPT

## 2019-08-20 PROCEDURE — 71045 X-RAY EXAM CHEST 1 VIEW: CPT

## 2019-08-20 PROCEDURE — 2060000000 HC ICU INTERMEDIATE R&B

## 2019-08-20 PROCEDURE — 83605 ASSAY OF LACTIC ACID: CPT

## 2019-08-20 PROCEDURE — 80053 COMPREHEN METABOLIC PANEL: CPT

## 2019-08-20 PROCEDURE — 36415 COLL VENOUS BLD VENIPUNCTURE: CPT

## 2019-08-20 PROCEDURE — 0KBP0ZZ EXCISION OF LEFT HIP MUSCLE, OPEN APPROACH: ICD-10-PCS | Performed by: SURGERY

## 2019-08-20 PROCEDURE — 85610 PROTHROMBIN TIME: CPT

## 2019-08-20 PROCEDURE — 84484 ASSAY OF TROPONIN QUANT: CPT

## 2019-08-20 PROCEDURE — 82803 BLOOD GASES ANY COMBINATION: CPT

## 2019-08-20 PROCEDURE — 6360000004 HC RX CONTRAST MEDICATION: Performed by: EMERGENCY MEDICINE

## 2019-08-20 PROCEDURE — 85025 COMPLETE CBC W/AUTO DIFF WBC: CPT

## 2019-08-20 PROCEDURE — 0KBN0ZZ EXCISION OF RIGHT HIP MUSCLE, OPEN APPROACH: ICD-10-PCS | Performed by: SURGERY

## 2019-08-20 PROCEDURE — 83970 ASSAY OF PARATHORMONE: CPT

## 2019-08-20 PROCEDURE — 70450 CT HEAD/BRAIN W/O DYE: CPT

## 2019-08-20 PROCEDURE — 99285 EMERGENCY DEPT VISIT HI MDM: CPT

## 2019-08-20 PROCEDURE — 83880 ASSAY OF NATRIURETIC PEPTIDE: CPT

## 2019-08-20 PROCEDURE — 70496 CT ANGIOGRAPHY HEAD: CPT

## 2019-08-20 PROCEDURE — 93005 ELECTROCARDIOGRAM TRACING: CPT | Performed by: EMERGENCY MEDICINE

## 2019-08-20 PROCEDURE — 81003 URINALYSIS AUTO W/O SCOPE: CPT

## 2019-08-20 RX ORDER — SODIUM HYPOCHLORITE 1.25 MG/ML
SOLUTION TOPICAL DAILY
Qty: 1 BOTTLE | Refills: 2 | Status: SHIPPED | OUTPATIENT
Start: 2019-08-20 | End: 2019-09-12 | Stop reason: ALTCHOICE

## 2019-08-20 RX ADMIN — IOPAMIDOL 75 ML: 755 INJECTION, SOLUTION INTRAVENOUS at 21:40

## 2019-08-20 ASSESSMENT — ENCOUNTER SYMPTOMS
DIARRHEA: 0
RHINORRHEA: 0
VOMITING: 0
SHORTNESS OF BREATH: 1
WHEEZING: 0
BACK PAIN: 0
COUGH: 0
NAUSEA: 0
ABDOMINAL PAIN: 0
SORE THROAT: 0
EYE DISCHARGE: 0
EYE PAIN: 0

## 2019-08-20 NOTE — PROGRESS NOTES
Tunneling Position ___ O'Clock 9 8/19/2019 11:28 AM   Undermining Starts ___ O'Clock 12 8/19/2019 11:28 AM   Undermining Ends___ O'Clock 12 8/19/2019 11:28 AM   Undermining Maxium Distance (cm) 1.7 8/19/2019 11:28 AM   Wound Assessment Pink;Slough; Yellow 8/19/2019 11:28 AM   Drainage Amount Small 8/19/2019 11:28 AM   Drainage Description Yellow; Tan 8/19/2019 11:28 AM   Odor None 8/19/2019 11:28 AM   Margins Undefined edges 8/19/2019 11:28 AM   Exposed structure Fascia 8/19/2019 11:28 AM   Mirian-wound Assessment Maceration; Intact 8/19/2019 11:28 AM   Non-staged Wound Description Full thickness 8/19/2019 11:28 AM   Louann%Wound Bed 60 8/19/2019 11:28 AM   Red%Wound Bed 90 7/10/2019 11:09 AM   Yellow%Wound Bed 40 8/19/2019 11:28 AM   Number of days: 260       Wound 07/24/19 Sacrum Right; Lower Wound #6; noted approx 7/20/19 (Active)   Wound Image   8/19/2019 11:28 AM   Wound Pressure Unstageable 8/14/2019  3:22 PM   Dressing Status Old drainage 8/19/2019 11:28 AM   Dressing Changed Changed/New 8/19/2019 12:42 PM   Dressing/Treatment Santyl;Dry Dressing 8/19/2019 12:42 PM   Wound Cleansed Wound cleanser 8/14/2019  3:22 PM   Dressing Change Due 08/15/19 8/14/2019  3:22 PM   Wound Length (cm) 3.9 cm 8/19/2019 11:28 AM   Wound Width (cm) 4.8 cm 8/19/2019 11:28 AM   Wound Depth (cm) 3 cm 8/19/2019 11:28 AM   Wound Surface Area (cm^2) 18.72 cm^2 8/19/2019 11:28 AM   Change in Wound Size % (l*w) -212 8/19/2019 11:28 AM   Wound Volume (cm^3) 56.16 cm^3 8/19/2019 11:28 AM   Wound Healing % -9260 8/19/2019 11:28 AM   Post-Procedure Length (cm) 4 cm 8/19/2019 11:46 AM   Post-Procedure Width (cm) 4.9 cm 8/19/2019 11:46 AM   Post-Procedure Depth (cm) 3.6 cm 8/19/2019 11:46 AM   Post-Procedure Surface Area (cm^2) 19.6 cm^2 8/19/2019 11:46 AM   Post-Procedure Volume (cm^3) 70.56 cm^3 8/19/2019 11:46 AM   Distance Tunneling (cm) 1.5 cm 8/19/2019 11:28 AM   Tunneling Position ___ O'Clock 11 8/19/2019 11:28 AM   Undermining Starts ___ with:  [x] Normal Saline            [x] Keep Wound Dry in Shower            Topical Treatments:  Do not apply lotions, creams, or ointments to wound bed unless directed. [] Apply moisturizing lotion to skin surrounding the wound prior to dressing change.  [] Apply antifungal ointment to skin surrounding the wound prior to dressing change. [x] Apply thin film of moisture barrier ointment to skin immediately around wound. [x] Other: Apply Skin Prep to surrounding area around Buttock Wound         Dressings:                  Wound Location :   LEFT BUTTOCK     [x] Apply Primary Dressing:                                                [x]COLLAGEN WITH SILVER SLIGHTLY MOISTENED WITH SALINE PACKED TO BOTTOM OF WOUND WITH EACH WOUND VAC CHANGE     **IF WOUND VAC GETS DENIED, PACK WOUND WITH SILVER ALGINATE ROPE, GAUZE AND COVER ROLL TAPE. THEN CHANGE DRESSING THREE TIMES PER WEEK**               [] Cover and Secure with:                       [] DRY Gauze    [] Daniela   [] Kerlix              [] Ace Wrap       [] Cover Roll Tape         [] ABD                              [] Other:               Avoid contact of tape with skin. [x] Change dressing:       [] Daily              [] Every Other Day        [x] Three times per week WITH VAC CHANGES              [] Once a week  [] Do Not Change Dressing         [] Other:     Dressings : Wound location : RIGHT SACRUM    Apply Santyl with Moistened Saline Gauze to wound.  Cover with gauze and cover roll tape.  Change dressing DAILY           Negative Pressure:           Wound Location: LEFT BUTTOCK   **KEEP DRAPE OFF EXCORIATED AREA**    [email or web address]        [X ] Black Foam    [x] White Foam -BE SURE TO GET INTO UNDERMINING FROM 12:00 TO 12:00 = 2.9 CM  (DEEPEST AT 9:00)                                       [] Other:   [x]Change dressing:        [x]Three times per week             [x]Other: BRANDON RING TO INDENTED AREA ON MEDIAL ASPECT OF WOUND. VAC DRAPE MUST COVER BRANDON RING.        Pressure Relief:  [x] When sitting, shift position or do seat lifts every 15 minutes. DO NOT SIT FOR MORE THAN 60 MINUTES AT A TIME  [] Wheelchair cushion   [x] Specialty Bed/Mattress - SUGGEST THICK FOAM OVERLAY 4\"  [x] Turn every 2 hours when in bed.  Avoid position directing pressure on wound site.  Limit side lying to 30 degree tilt.  Limit HOB elevation to 30 degrees.        Off-Loading:          [x] Assistive Device         [x] Walker            [] Cane   [x] Wheelchair      [] Crutches                Dietary:  [x] Diet as tolerated:        [] Calorie Diabetic Diet: [] No Added Salt:  [x] Increase Protein:        [] Other:     Activity:  [x] Activity as tolerated:                Return Appointment:  [x] Wound and dressing supply provider: [facility]  [x] ECF or Home Healthcare: [facility]  [] Nurse visit:     [] Physician or NP scheduled for Nurse Visit:   [x] Return Appointment: With Dr. Phoenix Short 1  Week(s) MAIN LINE [facility]   [] Ordered tests:      Nurse Case Manger: Stephen Mejia  Electronically signed by Rodolfo Torres RN on 8/19/2019 at 12:14 PM       [facility] Information: Should you experience any significant changes in your wound(s) or have questions about your wound care, please contact the [address] [place] [address] [phone] [facility] - THURSDAY 8:30 am - 4:30 pm and Friday 8:30 am - 1:00 pm.  If you need help with your wound outside these hours and cannot wait until we are again available, contact your PCP or go to the hospital emergency room.         PLEASE NOTE: IF YOU ARE UNABLE TO OBTAIN WOUND SUPPLIES, CONTINUE TO USE THE SUPPLIES YOU HAVE AVAILABLE UNTIL YOU ARE ABLE TO [facility].  IT IS MOST IMPORTANT TO KEEP THE WOUND COVERED AT ALL TIMES.     Physician Signature:_______________________     Date: ___________ Time:  ____________        Angelia Sexton CNP        Electronically signed

## 2019-08-21 LAB
BASOPHILS ABSOLUTE: 0.1 K/UL (ref 0–0.2)
BASOPHILS RELATIVE PERCENT: 0.9 %
EKG ATRIAL RATE: 68 BPM
EKG DIAGNOSIS: NORMAL
EKG P AXIS: 42 DEGREES
EKG P-R INTERVAL: 180 MS
EKG Q-T INTERVAL: 396 MS
EKG QRS DURATION: 70 MS
EKG QTC CALCULATION (BAZETT): 421 MS
EKG R AXIS: -13 DEGREES
EKG T AXIS: 23 DEGREES
EKG VENTRICULAR RATE: 68 BPM
EOSINOPHILS ABSOLUTE: 0.2 K/UL (ref 0–0.6)
EOSINOPHILS RELATIVE PERCENT: 1.7 %
FERRITIN: 744.2 NG/ML (ref 15–150)
HCT VFR BLD CALC: 31 % (ref 36–48)
HEMOGLOBIN: 10.2 G/DL (ref 12–16)
IRON SATURATION: 16 % (ref 15–50)
IRON: 29 UG/DL (ref 37–145)
LYMPHOCYTES ABSOLUTE: 1.5 K/UL (ref 1–5.1)
LYMPHOCYTES RELATIVE PERCENT: 13.7 %
MCH RBC QN AUTO: 29.6 PG (ref 26–34)
MCHC RBC AUTO-ENTMCNC: 33 G/DL (ref 31–36)
MCV RBC AUTO: 89.5 FL (ref 80–100)
MONOCYTES ABSOLUTE: 0.7 K/UL (ref 0–1.3)
MONOCYTES RELATIVE PERCENT: 6.7 %
NEUTROPHILS ABSOLUTE: 8.4 K/UL (ref 1.7–7.7)
NEUTROPHILS RELATIVE PERCENT: 77 %
PDW BLD-RTO: 15.8 % (ref 12.4–15.4)
PLATELET # BLD: 358 K/UL (ref 135–450)
PMV BLD AUTO: 6.8 FL (ref 5–10.5)
RBC # BLD: 3.46 M/UL (ref 4–5.2)
TOTAL IRON BINDING CAPACITY: 186 UG/DL (ref 260–445)
WBC # BLD: 10.9 K/UL (ref 4–11)

## 2019-08-21 PROCEDURE — 85025 COMPLETE CBC W/AUTO DIFF WBC: CPT

## 2019-08-21 PROCEDURE — 1200000000 HC SEMI PRIVATE

## 2019-08-21 PROCEDURE — 83540 ASSAY OF IRON: CPT

## 2019-08-21 PROCEDURE — 94761 N-INVAS EAR/PLS OXIMETRY MLT: CPT

## 2019-08-21 PROCEDURE — 2580000003 HC RX 258: Performed by: INTERNAL MEDICINE

## 2019-08-21 PROCEDURE — 2700000000 HC OXYGEN THERAPY PER DAY

## 2019-08-21 PROCEDURE — 97530 THERAPEUTIC ACTIVITIES: CPT

## 2019-08-21 PROCEDURE — 6360000002 HC RX W HCPCS: Performed by: INTERNAL MEDICINE

## 2019-08-21 PROCEDURE — 97535 SELF CARE MNGMENT TRAINING: CPT

## 2019-08-21 PROCEDURE — 97166 OT EVAL MOD COMPLEX 45 MIN: CPT

## 2019-08-21 PROCEDURE — 82728 ASSAY OF FERRITIN: CPT

## 2019-08-21 PROCEDURE — 99231 SBSQ HOSP IP/OBS SF/LOW 25: CPT | Performed by: NURSE PRACTITIONER

## 2019-08-21 PROCEDURE — 93010 ELECTROCARDIOGRAM REPORT: CPT | Performed by: INTERNAL MEDICINE

## 2019-08-21 PROCEDURE — 6370000000 HC RX 637 (ALT 250 FOR IP): Performed by: INTERNAL MEDICINE

## 2019-08-21 PROCEDURE — 97162 PT EVAL MOD COMPLEX 30 MIN: CPT | Performed by: PHYSICAL THERAPIST

## 2019-08-21 PROCEDURE — 36415 COLL VENOUS BLD VENIPUNCTURE: CPT

## 2019-08-21 PROCEDURE — 97530 THERAPEUTIC ACTIVITIES: CPT | Performed by: PHYSICAL THERAPIST

## 2019-08-21 PROCEDURE — 83550 IRON BINDING TEST: CPT

## 2019-08-21 PROCEDURE — 97116 GAIT TRAINING THERAPY: CPT | Performed by: PHYSICAL THERAPIST

## 2019-08-21 RX ORDER — SODIUM CHLORIDE 0.9 % (FLUSH) 0.9 %
10 SYRINGE (ML) INJECTION EVERY 12 HOURS SCHEDULED
Status: DISCONTINUED | OUTPATIENT
Start: 2019-08-21 | End: 2019-08-25 | Stop reason: HOSPADM

## 2019-08-21 RX ORDER — ASPIRIN 81 MG/1
81 TABLET, CHEWABLE ORAL DAILY
Status: DISCONTINUED | OUTPATIENT
Start: 2019-08-21 | End: 2019-08-25 | Stop reason: HOSPADM

## 2019-08-21 RX ORDER — ONDANSETRON 2 MG/ML
4 INJECTION INTRAMUSCULAR; INTRAVENOUS EVERY 6 HOURS PRN
Status: DISCONTINUED | OUTPATIENT
Start: 2019-08-21 | End: 2019-08-25 | Stop reason: HOSPADM

## 2019-08-21 RX ORDER — SODIUM CHLORIDE 0.9 % (FLUSH) 0.9 %
10 SYRINGE (ML) INJECTION PRN
Status: DISCONTINUED | OUTPATIENT
Start: 2019-08-21 | End: 2019-08-25 | Stop reason: HOSPADM

## 2019-08-21 RX ORDER — BUPROPION HYDROCHLORIDE 150 MG/1
150 TABLET, EXTENDED RELEASE ORAL 2 TIMES DAILY
Status: DISCONTINUED | OUTPATIENT
Start: 2019-08-22 | End: 2019-08-21

## 2019-08-21 RX ORDER — SODIUM CHLORIDE, SODIUM LACTATE, POTASSIUM CHLORIDE, CALCIUM CHLORIDE 600; 310; 30; 20 MG/100ML; MG/100ML; MG/100ML; MG/100ML
INJECTION, SOLUTION INTRAVENOUS CONTINUOUS
Status: DISCONTINUED | OUTPATIENT
Start: 2019-08-21 | End: 2019-08-22

## 2019-08-21 RX ORDER — SERTRALINE HYDROCHLORIDE 100 MG/1
100 TABLET, FILM COATED ORAL DAILY
Status: DISCONTINUED | OUTPATIENT
Start: 2019-08-21 | End: 2019-08-25 | Stop reason: HOSPADM

## 2019-08-21 RX ORDER — PREDNISONE 10 MG/1
10 TABLET ORAL DAILY
Status: ON HOLD | COMMUNITY
End: 2019-10-11 | Stop reason: HOSPADM

## 2019-08-21 RX ORDER — BUPROPION HYDROCHLORIDE 100 MG/1
100 TABLET, EXTENDED RELEASE ORAL 2 TIMES DAILY
Status: DISCONTINUED | OUTPATIENT
Start: 2019-08-22 | End: 2019-08-25 | Stop reason: HOSPADM

## 2019-08-21 RX ORDER — CLONAZEPAM 0.5 MG/1
0.25 TABLET ORAL NIGHTLY PRN
Status: DISCONTINUED | OUTPATIENT
Start: 2019-08-21 | End: 2019-08-25 | Stop reason: HOSPADM

## 2019-08-21 RX ORDER — PANTOPRAZOLE SODIUM 40 MG/1
40 TABLET, DELAYED RELEASE ORAL
Status: DISCONTINUED | OUTPATIENT
Start: 2019-08-21 | End: 2019-08-25 | Stop reason: HOSPADM

## 2019-08-21 RX ORDER — ACETAMINOPHEN 325 MG/1
650 TABLET ORAL EVERY 6 HOURS PRN
Status: DISCONTINUED | OUTPATIENT
Start: 2019-08-21 | End: 2019-08-21 | Stop reason: SDUPTHER

## 2019-08-21 RX ORDER — CLONAZEPAM 1 MG/1
0.5 TABLET ORAL NIGHTLY
Status: ON HOLD | COMMUNITY
End: 2019-08-25 | Stop reason: SDUPTHER

## 2019-08-21 RX ORDER — IPRATROPIUM BROMIDE AND ALBUTEROL SULFATE 2.5; .5 MG/3ML; MG/3ML
1 SOLUTION RESPIRATORY (INHALATION) EVERY 6 HOURS PRN
Status: DISCONTINUED | OUTPATIENT
Start: 2019-08-21 | End: 2019-08-25 | Stop reason: HOSPADM

## 2019-08-21 RX ORDER — ACETAMINOPHEN 325 MG/1
650 TABLET ORAL EVERY 4 HOURS PRN
Status: DISCONTINUED | OUTPATIENT
Start: 2019-08-21 | End: 2019-08-25 | Stop reason: HOSPADM

## 2019-08-21 RX ORDER — PREDNISONE 10 MG/1
10 TABLET ORAL DAILY
Status: DISCONTINUED | OUTPATIENT
Start: 2019-08-22 | End: 2019-08-25 | Stop reason: HOSPADM

## 2019-08-21 RX ADMIN — SODIUM CHLORIDE, POTASSIUM CHLORIDE, SODIUM LACTATE AND CALCIUM CHLORIDE: 600; 310; 30; 20 INJECTION, SOLUTION INTRAVENOUS at 23:14

## 2019-08-21 RX ADMIN — ASPIRIN 81 MG 81 MG: 81 TABLET ORAL at 10:18

## 2019-08-21 RX ADMIN — SODIUM CHLORIDE, POTASSIUM CHLORIDE, SODIUM LACTATE AND CALCIUM CHLORIDE: 600; 310; 30; 20 INJECTION, SOLUTION INTRAVENOUS at 13:00

## 2019-08-21 RX ADMIN — SODIUM CHLORIDE, POTASSIUM CHLORIDE, SODIUM LACTATE AND CALCIUM CHLORIDE: 600; 310; 30; 20 INJECTION, SOLUTION INTRAVENOUS at 03:15

## 2019-08-21 RX ADMIN — SERTRALINE 100 MG: 100 TABLET, FILM COATED ORAL at 10:18

## 2019-08-21 RX ADMIN — ENOXAPARIN SODIUM 40 MG: 40 INJECTION SUBCUTANEOUS at 10:18

## 2019-08-21 RX ADMIN — PANTOPRAZOLE SODIUM 40 MG: 40 TABLET, DELAYED RELEASE ORAL at 10:18

## 2019-08-21 ASSESSMENT — PAIN SCALES - GENERAL
PAINLEVEL_OUTOF10: 0

## 2019-08-21 NOTE — ED PROVIDER NOTES
Socioeconomic History    Marital status:      Spouse name: RITO    Number of children: 2    Years of education: Not on file    Highest education level: Not on file   Occupational History    Occupation: retired   Social Needs    Financial resource strain: Not on file    Food insecurity:     Worry: Not on file     Inability: Not on file   Musicane needs:     Medical: Not on file     Non-medical: Not on file   Tobacco Use    Smoking status: Never Smoker    Smokeless tobacco: Never Used    Tobacco comment: encouraged to never smoke    Substance and Sexual Activity    Alcohol use: No     Alcohol/week: 0.0 standard drinks    Drug use: No    Sexual activity: Never   Lifestyle    Physical activity:     Days per week: Not on file     Minutes per session: Not on file    Stress: Not on file   Relationships    Social connections:     Talks on phone: Not on file     Gets together: Not on file     Attends Oriental orthodox service: Not on file     Active member of club or organization: Not on file     Attends meetings of clubs or organizations: Not on file     Relationship status: Not on file    Intimate partner violence:     Fear of current or ex partner: Not on file     Emotionally abused: Not on file     Physically abused: Not on file     Forced sexual activity: Not on file   Other Topics Concern    Not on file   Social History Narrative    Not on file       SCREENINGS   NIH Stroke Scale  Interval: Baseline  Level of Consciousness (1a. ): Alert  LOC Questions (1b. ):  Answers one correctly  LOC Commands (1c. ): Performs both tasks correctly  Best Gaze (2. ): Normal  Visual (3. ): No visual loss  Facial Palsy (4. ): Normal symmetrical movement  Motor Arm, Left (5a. ): No drift  Motor Arm, Right (5b. ): No drift  Motor Leg, Left (6a. ): No drift  Motor Leg, Right (6b. ): No drift  Limb Ataxia (7. ): Absent  Sensory (8. ): Normal  Best Language (9. ): No aphasia  Dysarthria (10. ):

## 2019-08-21 NOTE — PROGRESS NOTES
for UTI, still on oral antibiotics. PTA pt from home with  where she needed some assist for mobility and ADLs with use of RW. Pt currently functioning significantly below baseline with decreased overall strength, endurance, and cognition. Pt completes functional mobility and transfers with Min A x2 and use of RW. Pt completing toileting and LB dressing with total A. Pt will benefit from skilled OT services at this time. Prognosis: Fair  Decision Making: Medium Complexity  Exam: see above  Assistance / Modification: RW  OT Education: OT Role;Plan of Care  REQUIRES OT FOLLOW UP: Yes  Activity Tolerance  Activity Tolerance: Patient limited by fatigue;Treatment limited secondary to decreased cognition  Safety Devices  Safety Devices in place: Yes  Type of devices: Call light within reach;Nurse notified; Left in chair;Gait belt; Chair alarm in place           Patient Diagnosis(es): The encounter diagnosis was Altered mental status, unspecified altered mental status type. has a past medical history of Anxiety and depression, Arthritis, Cancer of skin of leg, Chronic low back pain, GERD (gastroesophageal reflux disease), Insomnia, Iron deficiency anemia, Kidney stone, PUD (peptic ulcer disease), Pulmonary fibrosis ([address] Stomach ulcer, Ulcer - lesion, and UTI (urinary tract infection). has a past surgical history that includes hysteroscopy (1993); joint replacement (4/20/2000); joint replacement (2/27/08); Carpal tunnel release (9/27/11); Hysterectomy (1993); Stomach surgery (2010); Cystocopy (Left, 09/2016); Colonoscopy; Cystoscopy (01/15/2018); eye surgery (Bilateral, 2008); back surgery (MARCH 2004,OCT 02, FEB 05); back surgery (06/2000); fracture surgery (Right, 06/05/2016); fracture surgery (Left); Cystoscopy (02/14/2018); pr brncc w/brncl alveolar lavage (N/A, 10/24/2018); and pr debride necrotic skin/ tissue, genit & perinm (Left, 11/28/2018). bars  Toilet Transfer: Minimal assistance; Moderate assistance  Toilet Transfers Comments: cues for hand placement  ADL  UE Dressing: Dependent/Total(to don/doff hospital gown)  LE Dressing: Dependent/Total(Ax2; Min A in stance for balance; assist to thread bilateral LE into briefs; assist to pull up over hips; completed x2 )  Toileting: Dependent/Total(Min A in stance for balance; assist for brief management and pericare in stance; pt incontinent of urine)  Additional Comments: anticipate pt needing up to Tota/Max A for dressing, bathing, and toileting; setup/min for grooming; setup feeding  Tone RUE  RUE Tone: Normotonic  Tone LUE  LUE Tone: Normotonic  Coordination  Movements Are Fluid And Coordinated: Yes     Bed mobility  Supine to Sit: Moderate assistance  Scooting: Moderate assistance  Transfers  Sit to stand: Minimal assistance; Moderate assistance  Stand to sit: Minimal assistance; Moderate assistance  Transfer Comments: to/from RW; cues for hand placement     Cognition  Overall Cognitive Status: Exceptions  Arousal/Alertness: Delayed responses to stimuli  Following Commands:  Follows one step commands with increased time  Memory: Decreased recall of biographical Information  Safety Judgement: Decreased awareness of need for assistance  Problem Solving: Assistance required to generate solutions;Assistance required to implement solutions;Assistance required to identify errors made;Assistance required to correct errors made  Insights: Decreased awareness of deficits  Initiation: Requires cues for some  Sequencing: Requires cues for some        Sensation  Overall Sensation Status: WFL        LUE AROM (degrees)  LUE AROM : WFL  RUE AROM (degrees)  RUE AROM : WFL  LUE Strength  Gross LUE Strength: Exceptions to [facility]  L Hand General: 4-/5  RUE Strength  Gross RUE Strength: Exceptions to [facility]  R Hand General: 4-/5                   Plan   Plan  Times per week: 3-5x  Current Treatment Recommendations: Strengthening, Balance Training, Endurance Training, Gait Training, Patient/Caregiver Education & Training, Safety Education & Training, Equipment Evaluation, Education, & procurement, Self-Care / ADL, Functional Mobility Training      AM-PAC Score        AM-PAC Inpatient Daily Activity Raw Score: 12 (08/21/19 1238)  AM-PAC Inpatient ADL T-Scale Score : 30.6 (08/21/19 1238)  ADL Inpatient CMS 0-100% Score: 66.57 (08/21/19 1238)  ADL Inpatient CMS G-Code Modifier : CL (08/21/19 1238)    Goals  Short term goals  Time Frame for Short term goals: prior to D/C  Short term goal 1: complete functional mobility and transfers with CGA and AD as needed  Short term goal 2: complete toileting with CGA  Short term goal 3: complete grooming in stance at sink with CGA  Short term goal 4: complete bed mobility with SBA  Short term goal 5: pt will tolerate x10 reps of B UE exercises to increased overall strength and endurance with ADLs  Long term goals  Time Frame for Long term goals : STG=LTG  Patient Goals   Patient goals : pt with no stated goals       Therapy Time   Individual Concurrent Group Co-treatment   Time In 1122         Time Out 1220         Minutes 58         Timed Code Treatment Minutes: 43 Minutes(15 minute eval)       JEAN CARLOS Spaulding/JOSE

## 2019-08-21 NOTE — PROGRESS NOTES
brought the patient in for further evaluation and treatment. Patient denies any chest pain, new shortness of breath, is currently on 4 L nasal cannula which is her baseline, no abdominal pain, no nausea vomiting or diarrhea expressed by the patient. She is currently being treated for UTI, still on oral antibiotics. Patient was brought in to the ED for further evaluation and treatment. CURRENT STATUS: Chief Complaint:  Altered mental status        History Of Present Illness:       The patient is a 70 y.o. female with hx CVA, anxiety and depression, recent UTI, iron deficiency anemia, and pulmonary fibrosis on home O2 who presents to [facility] with altered mental status. Pt and her  at bedside states that yesterday the patient became progressively more weak in her lower extremities and had some difficulty with ambulation. She also had increased confusion and lethargy yesterday. She was alert and oriented to person only and usually is alert and oriented to person and place. Denies fever, chills, chest pain, shortness of breath, abdominal pain, nausea, vomiting, constipation, diarrhea, and dysuria. Was recently admitted for altered mental status and diagnosed with a UTI and given PO Ceftin on discharge.     In the ED, CT Head and CTA Head and Neck without acute abnormalities. U/A without evidence of UTI. Labs WNL. VBG pH 7.325, pCO2 57.4 (close to baseline).    Currently, patient feels much better and has no acute complaints. Does state that she continue to have some lower extremity weakness. DR Hellen Sinha:  ASSESSMENT/PLAN:        Acute metabolic encephalopathy  -suspect may be component of polypharmacy with drug-drug interaction with new antibiotic with patient endorsing symptoms soon after starting oral antibiotic.  Less likely TIA/CVA as symptoms atypical and patient has no focal neurological deficits  -if mental status changes, would consider getting an MRI Brain to further evaluate  -stop

## 2019-08-21 NOTE — PROGRESS NOTES
D: pt has two wounds on backside, one is unstageable and wet-to-dry dressing was applied last night upon admission to unit, wound vac is running continuously on other wound, per  at bedside, this dressing is changed M,W,F by home care RN and was last changed on Monday, canister is using changed on Friday and is not full at this time A: this RN sent secure message to Dr. Venancio Villegas to inquire about plan of care R: waiting for response

## 2019-08-21 NOTE — ED PROVIDER NOTES
 sertraline (ZOLOFT) 100 MG tablet TAKE 1 TABLET BY MOUTH EVERY DAY 90 tablet 0    Cyanocobalamin (B-12) 100 MCG TABS Take by mouth daily      aspirin 81 MG tablet Take 81 mg by mouth daily      omeprazole (PRILOSEC) 40 MG delayed release capsule TAKE 1 CAPSULE BY MOUTH TWICE DAILY (Patient taking differently: 40 mg daily ) 180 capsule 0    buPROPion (WELLBUTRIN SR) 150 MG extended release tablet TAKE 1 TABLET BY MOUTH TWICE DAILY 180 tablet 0    alendronate (FOSAMAX) 70 MG tablet TAKE 1 TABLET BY MOUTH EVERY 7 DAYS (Patient taking differently: Take 70 mg by mouth every 7 days ) 12 tablet 0    ipratropium-albuterol (DUONEB) 0.5-2.5 (3) MG/3ML SOLN nebulizer solution Inhale 3 mLs into the lungs every 6 hours as needed for Shortness of Breath (wheezing) 360 mL 0    ferrous sulfate 325 (65 Fe) MG tablet Take 325 mg by mouth daily (with breakfast)      OXYGEN Inhale 4 L into the lungs continuous (Patient taking differently: Inhale 3 L into the lungs nightly ) 1 Container 1    Multiple Vitamins-Minerals (MULTIVITAMIN PO) Take 1 tablet by mouth daily.          ALLERGIES    Allergies   Allergen Reactions    Ampicillin Hives    Ciprofloxacin Other (See Comments)     Sores in mouth      Nitrofurantoin Other (See Comments)     Unable to recall reaction    Sulfa Antibiotics Other (See Comments)     Unable to recall reaction    Penicillins Hives and Rash       FAMILY OR SOCIAL HISTORY    Family History   Problem Relation Age of Onset    Emphysema Mother          AGE 64    Depression Mother     Clotting Disorder Father             Stroke Father      Social History     Socioeconomic History    Marital status:      Spouse name: RITO    Number of children: 2    Years of education: Not on file    Highest education level: Not on file   Occupational History    Occupation: retired   Social Needs    Financial resource strain: Not on file    Food insecurity:     Worry: Not on file RBC 3.67 (L) 4.00 - 5.20 M/uL    Hemoglobin 11.0 (L) 12.0 - 16.0 g/dL    Hematocrit 33.1 (L) 36.0 - 48.0 %    MCV 90.3 80.0 - 100.0 fL    MCH 30.1 26.0 - 34.0 pg    MCHC 33.3 31.0 - 36.0 g/dL    RDW 15.9 (H) 12.4 - 15.4 %    Platelets 579 459 - 401 K/uL    MPV 7.4 5.0 - 10.5 fL    Neutrophils % 80.2 %    Lymphocytes % 12.1 %    Monocytes % 5.6 %    Eosinophils % 1.6 %    Basophils % 0.5 %    Neutrophils # 8.7 (H) 1.7 - 7.7 K/uL    Lymphocytes # 1.3 1.0 - 5.1 K/uL    Monocytes # 0.6 0.0 - 1.3 K/uL    Eosinophils # 0.2 0.0 - 0.6 K/uL    Basophils # 0.1 0.0 - 0.2 K/uL   Comprehensive Metabolic Panel w/ Reflex to MG   Result Value Ref Range    Sodium 139 136 - 145 mmol/L    Potassium reflex Magnesium 4.8 3.5 - 5.1 mmol/L    Chloride 101 99 - 110 mmol/L    CO2 28 21 - 32 mmol/L    Anion Gap 10 3 - 16    Glucose 104 (H) 70 - 99 mg/dL    BUN 25 (H) 7 - 20 mg/dL    CREATININE 0.9 0.6 - 1.2 mg/dL    GFR Non-African American >60 >60    GFR African American >60 >60    Calcium 11.2 (H) 8.3 - 10.6 mg/dL    Total Protein 6.8 6.4 - 8.2 g/dL    Alb 3.6 3.4 - 5.0 g/dL    Albumin/Globulin Ratio 1.1 1.1 - 2.2    Total Bilirubin <0.2 0.0 - 1.0 mg/dL    Alkaline Phosphatase 85 40 - 129 U/L    ALT 9 (L) 10 - 40 U/L    AST 16 15 - 37 U/L    Globulin 3.2 g/dL   Troponin   Result Value Ref Range    Troponin <0.01 <0.01 ng/mL   Brain Natriuretic Peptide   Result Value Ref Range    Pro- 0 - 449 pg/mL   Protime-INR   Result Value Ref Range    Protime 12.1 9.8 - 13.0 sec    INR 1.06 0.86 - 1.14   APTT   Result Value Ref Range    aPTT 48.9 (H) 26.0 - 36.0 sec   Blood Gas, Venous   Result Value Ref Range    pH, Mani 7.325 (L) 7.350 - 7.450    pCO2, Mani 57.4 (H) 40.0 - 50.0 mmHg    pO2, Mani 40 Not Established mmHg    HCO3, Venous 29 23 - 29 mmol/L    Base Excess, Mani 2.6 Not Established mmol/L    O2 Sat, Mani 69 Not Established %    Carboxyhemoglobin 1.6 %    MetHgb, Mani 1.3 <1.5 %    TC02 (Calc), Mani 31 Not Established mmol/L    O2

## 2019-08-21 NOTE — CONSULTS
[facility]            Pt Name: Annabella Leon  MRN: [id number]  YOB: 1940  Admission date: 8/20/2019  9:01 PM   Date of evaluation: 8/21/2019  Requesting Provider: Dr. Fernando Young  Reason for Consult: unstageable wound and wound vac  Chief complaint: altered mental status      SUBJECTIVE:    History of Present Illness:       Annabella Leon is a 70 y.o. female with a history of UTI, anemia, GERD, and wounds that presented on 8/20/2019  9:01 PM with complaints of  Altered mental status, who we were asked to see for unstageable wound and wound vac. She is here for \"not herself\" since yesterday afternoon. She is unable to provide much history, family at bedside, and stated she has not been herself and still not quite back to normal.  She has two wounds that are being followed at the wound care center. She was last seen Monday and sacral wound debrided. Left hip wound with VAC in place.   She denies fever, chills, chest pain or SOB      Past Medical History:        Diagnosis Date    Anxiety and depression     Arthritis     Cancer of skin of leg     Chronic low back pain     GERD (gastroesophageal reflux disease)     Insomnia     Iron deficiency anemia     Kidney stone     PUD (peptic ulcer disease)     Pulmonary fibrosis (HCC)     Dr. Danielle Choudhury has a CT done every 6 months    Stomach ulcer     Ulcer - lesion MARCH 2010    UTI (urinary tract infection)     frequent       Past Surgical History:        Procedure Laterality Date   [facility] SURGERY  MARCH 2004,OCT 02, [place] 89    x4--fusions    BACK SURGERY  06/2000    laminectomy-lumbar    CARPAL TUNNEL RELEASE  9/27/11    Left    COLONOSCOPY      CYSTOSCOPY Left 09/2016    cysto left ureteral stent placement    CYSTOSCOPY  01/15/2018    Left Stent Insertion    CYSTOSCOPY  02/14/2018    cysto, left uretero with laser litho, left stent excahnge    EYE SURGERY Bilateral 2008    cataract removed with lens achievable.       COMPARISON:   February 27, 2019.       HISTORY:   ORDERING SYSTEM PROVIDED HISTORY: [facility] Previous Stroke   TECHNOLOGIST PROVIDED HISTORY:   Reason for Exam: code stroke, hx of same       FINDINGS:       CTA NECK:       AORTIC ARCH/ARCH VESSELS: There is a normal branch pattern of the aortic   arch. No significant stenosis is seen of the innominate artery or subclavian   arteries.       CAROTID ARTERIES: The common carotid arteries are normal in appearance   without evidence of a flow limiting stenosis.  Mild bilateral carotid bulb   atherosclerotic plaque.  The internal carotid arteries are normal in   appearance without evidence of a flow limiting stenosis by NASCET criteria. No dissection or arterial injury is seen.       VERTEBRAL ARTERIES: The vertebral arteries both arise from the subclavian   arteries and are normal in caliber without evidence of flow limiting stenosis   or dissection.       SOFT TISSUES: The lung apices demonstrate emphysema and scarring.  No   cervical or superior mediastinal lymphadenopathy.  The visualized portion of   the larynx and pharynx appear unremarkable.  The parotid, submandibular and   thyroid glands demonstrate no acute abnormality.       BONES: The visualized osseous structures appear unremarkable.           CTA HEAD:       ANTERIOR CIRCULATION: Atherosclerosis of the bilateral intracranial internal   carotid arteries without hemodynamically significant stenosis.  The anterior   cerebral and middle cerebral arteries demonstrate no focal stenosis.  No   anterior circulation aneurysm.       POSTERIOR CIRCULATION: The posterior cerebral arteries demonstrate no focal   stenosis. The vertebral and basilar arteries appear unremarkable.  No   posterior circulation aneurysm.       BRAIN: No mass effect or midline shift. No abnormal extra-axial fluid   collection. The gray-white differentiation appears grossly maintained.    Chronic small vessel ischemic disease.  Old

## 2019-08-21 NOTE — PROGRESS NOTES
Intake, Diet Tolerance, Skin Integrity, Wound Healing, Mental Status/Confusion, Pertinent Labs      Electronically signed by Carolyn Sanford RD, LD on 8/21/19 at 12:06 PM    Contact Number: [phone]

## 2019-08-22 LAB
A/G RATIO: 1 (ref 1.1–2.2)
ALBUMIN SERPL-MCNC: 3 G/DL (ref 3.4–5)
ALP BLD-CCNC: 75 U/L (ref 40–129)
ALT SERPL-CCNC: 7 U/L (ref 10–40)
ANION GAP SERPL CALCULATED.3IONS-SCNC: 9 MMOL/L (ref 3–16)
AST SERPL-CCNC: 11 U/L (ref 15–37)
BASOPHILS ABSOLUTE: 0 K/UL (ref 0–0.2)
BASOPHILS RELATIVE PERCENT: 0.4 %
BILIRUB SERPL-MCNC: 0.3 MG/DL (ref 0–1)
BUN BLDV-MCNC: 15 MG/DL (ref 7–20)
CALCIUM IONIZED: 1.38 MMOL/L (ref 1.12–1.32)
CALCIUM SERPL-MCNC: 10.6 MG/DL (ref 8.3–10.6)
CHLORIDE BLD-SCNC: 102 MMOL/L (ref 99–110)
CO2: 27 MMOL/L (ref 21–32)
CREAT SERPL-MCNC: 0.8 MG/DL (ref 0.6–1.2)
EOSINOPHILS ABSOLUTE: 0.1 K/UL (ref 0–0.6)
EOSINOPHILS RELATIVE PERCENT: 1 %
GFR AFRICAN AMERICAN: >60
GFR NON-AFRICAN AMERICAN: >60
GLOBULIN: 3.1 G/DL
GLUCOSE BLD-MCNC: 146 MG/DL (ref 70–99)
HCT VFR BLD CALC: 31.5 % (ref 36–48)
HEMOGLOBIN: 10.2 G/DL (ref 12–16)
LYMPHOCYTES ABSOLUTE: 0.9 K/UL (ref 1–5.1)
LYMPHOCYTES RELATIVE PERCENT: 9.2 %
MCH RBC QN AUTO: 29.2 PG (ref 26–34)
MCHC RBC AUTO-ENTMCNC: 32.4 G/DL (ref 31–36)
MCV RBC AUTO: 90.3 FL (ref 80–100)
MONOCYTES ABSOLUTE: 0.5 K/UL (ref 0–1.3)
MONOCYTES RELATIVE PERCENT: 5.5 %
NEUTROPHILS ABSOLUTE: 7.9 K/UL (ref 1.7–7.7)
NEUTROPHILS RELATIVE PERCENT: 83.9 %
PARATHYROID HORMONE INTACT: 95.3 PG/ML (ref 14–72)
PDW BLD-RTO: 15.6 % (ref 12.4–15.4)
PH VENOUS: 7.35 (ref 7.35–7.45)
PLATELET # BLD: 352 K/UL (ref 135–450)
PMV BLD AUTO: 6.9 FL (ref 5–10.5)
POTASSIUM REFLEX MAGNESIUM: 4.4 MMOL/L (ref 3.5–5.1)
RBC # BLD: 3.49 M/UL (ref 4–5.2)
SODIUM BLD-SCNC: 138 MMOL/L (ref 136–145)
TOTAL PROTEIN: 6.1 G/DL (ref 6.4–8.2)
VITAMIN D 25-HYDROXY: 31.9 NG/ML
WBC # BLD: 9.4 K/UL (ref 4–11)

## 2019-08-22 PROCEDURE — 94760 N-INVAS EAR/PLS OXIMETRY 1: CPT

## 2019-08-22 PROCEDURE — 97535 SELF CARE MNGMENT TRAINING: CPT

## 2019-08-22 PROCEDURE — 6360000002 HC RX W HCPCS: Performed by: INTERNAL MEDICINE

## 2019-08-22 PROCEDURE — 2700000000 HC OXYGEN THERAPY PER DAY

## 2019-08-22 PROCEDURE — 80053 COMPREHEN METABOLIC PANEL: CPT

## 2019-08-22 PROCEDURE — 2580000003 HC RX 258: Performed by: INTERNAL MEDICINE

## 2019-08-22 PROCEDURE — 82330 ASSAY OF CALCIUM: CPT

## 2019-08-22 PROCEDURE — 97530 THERAPEUTIC ACTIVITIES: CPT

## 2019-08-22 PROCEDURE — 85025 COMPLETE CBC W/AUTO DIFF WBC: CPT

## 2019-08-22 PROCEDURE — APPSS30 APP SPLIT SHARED TIME 16-30 MINUTES: Performed by: NURSE PRACTITIONER

## 2019-08-22 PROCEDURE — 1200000000 HC SEMI PRIVATE

## 2019-08-22 PROCEDURE — 11043 DBRDMT MUSC&/FSCA 1ST 20/<: CPT | Performed by: SURGERY

## 2019-08-22 PROCEDURE — 6370000000 HC RX 637 (ALT 250 FOR IP): Performed by: INTERNAL MEDICINE

## 2019-08-22 PROCEDURE — 82306 VITAMIN D 25 HYDROXY: CPT

## 2019-08-22 PROCEDURE — APPNB30 APP NON BILLABLE TIME 0-30 MINS: Performed by: NURSE PRACTITIONER

## 2019-08-22 PROCEDURE — 97530 THERAPEUTIC ACTIVITIES: CPT | Performed by: PHYSICAL THERAPIST

## 2019-08-22 PROCEDURE — 97116 GAIT TRAINING THERAPY: CPT | Performed by: PHYSICAL THERAPIST

## 2019-08-22 PROCEDURE — 36415 COLL VENOUS BLD VENIPUNCTURE: CPT

## 2019-08-22 RX ADMIN — PANTOPRAZOLE SODIUM 40 MG: 40 TABLET, DELAYED RELEASE ORAL at 06:22

## 2019-08-22 RX ADMIN — ASPIRIN 81 MG 81 MG: 81 TABLET ORAL at 11:31

## 2019-08-22 RX ADMIN — SERTRALINE 100 MG: 100 TABLET, FILM COATED ORAL at 11:31

## 2019-08-22 RX ADMIN — PREDNISONE 10 MG: 10 TABLET ORAL at 11:37

## 2019-08-22 RX ADMIN — BUPROPION HYDROCHLORIDE 100 MG: 100 TABLET, EXTENDED RELEASE ORAL at 11:36

## 2019-08-22 RX ADMIN — Medication 10 ML: at 21:00

## 2019-08-22 RX ADMIN — ENOXAPARIN SODIUM 40 MG: 40 INJECTION SUBCUTANEOUS at 11:30

## 2019-08-22 ASSESSMENT — PAIN SCALES - GENERAL
PAINLEVEL_OUTOF10: 0

## 2019-08-22 NOTE — PROGRESS NOTES
low back pain, GERD (gastroesophageal reflux disease), Insomnia, Iron deficiency anemia, Kidney stone, PUD (peptic ulcer disease), Pulmonary fibrosis ([address] Stomach ulcer, Ulcer - lesion, and UTI (urinary tract infection). has a past surgical history that includes hysteroscopy (1993); joint replacement (4/20/2000); joint replacement (2/27/08); Carpal tunnel release (9/27/11); Hysterectomy (1993); Stomach surgery (2010); Cystocopy (Left, 09/2016); Colonoscopy; Cystoscopy (01/15/2018); eye surgery (Bilateral, 2008); back surgery (MARCH 2004,OCT 02, FEB 05); back surgery (06/2000); fracture surgery (Right, 06/05/2016); fracture surgery (Left); Cystoscopy (02/14/2018); pr [facility] w/brncl alveolar lavage (N/A, 10/24/2018); and pr debride necrotic skin/ tissue, genit & perinm (Left, 11/28/2018). Restrictions  Restrictions/Precautions  Restrictions/Precautions: Fall Risk  Position Activity Restriction  Other position/activity restrictions: IV, O2 lines; incontient of urine  Subjective   General  Chart Reviewed: Yes  Patient assessed for rehabilitation services?: Yes  Additional Pertinent Hx: per NP note, Jared Rodriguez is a 70 y.o. female who presents with altered mental status noted by family today at 1 PM.  Does have a history of UTI, is currently on antibiotics for this. The duration has been constant since the onset. Last known well was 1300 today. The quality is according to the family she has been generally weak and lethargic all day. They do not note any focal neurological deficits, facial droop or slurred speech. No aggravating or alleviating factors. The context was a spontaneous onset. Does have a history of previous CVA and therefore the the brought the patient in for further evaluation and treatment. Patient denies any chest pain, new shortness of breath, is currently on 4 L nasal cannula which is her baseline, no abdominal pain, no nausea vomiting or diarrhea expressed by the patient.   She is currently being treated for UTI, still on oral antibiotics. Family / Caregiver Present: No  Referring Practitioner: Edward Gilbert MD  Subjective  Subjective: Pt supine in bed upon arrival. Pt agreeable to OT treatment session. Pt reports no pain prior to session. General Comment  Comments: okay for therapy per RN. Orientation  Orientation  Overall Orientation Status: Impaired  Orientation Level: Oriented to person;Disoriented to situation  Objective    ADL  Grooming: Setup(seated in chair to wash face, brush teeth, and wash hands)  LE Dressing: Dependent/Total(assist to thread bilateral LE into briefs; assist to pull over hips; CGA/Min Ax1 for balance in stance Ax1 to pull over hips; completed 2x)  Toileting: Dependent/Total(assist to brief management up/down; assist for pericare; CGA/Min A in stance with RW and Ax1 for brief management and pericare; pt incontinent of urine )  Additional Comments: Pt needing Mod cues for sequencing grooming tasks including cleaning teeth         Balance  Sitting Balance: Contact guard assistance(CGA/SBA seated EOB)  Standing Balance: Minimal assistance(Min A/CGA; RW)  Standing Balance  Time: 1 min   Activity: ADLs  Functional Mobility  Functional - Mobility Device: Rolling Walker  Activity: To/from bathroom  Assist Level: Minimal assistance(Min Ax1 and Ax1 for line management)  Functional Mobility Comments: Pt reporting \"it feels like my knees are buckling\"  Toilet Transfers  Toilet - Technique: Ambulating  Equipment Used: Grab bars  Toilet Transfer: 2 Person assistance;Minimal assistance; Moderate assistance  Bed mobility  Supine to Sit: Moderate assistance  Sit to Supine: Unable to assess  Scooting: Contact guard assistance  Transfers  Sit to stand: Minimal assistance; Moderate assistance;2 Person assistance  Stand to sit: Minimal assistance; Moderate assistance;2 Person assistance                       Cognition  Overall Cognitive Status: Exceptions(Simultaneous filing.

## 2019-08-23 PROCEDURE — 97530 THERAPEUTIC ACTIVITIES: CPT

## 2019-08-23 PROCEDURE — 6360000002 HC RX W HCPCS: Performed by: INTERNAL MEDICINE

## 2019-08-23 PROCEDURE — 97535 SELF CARE MNGMENT TRAINING: CPT

## 2019-08-23 PROCEDURE — 6370000000 HC RX 637 (ALT 250 FOR IP): Performed by: INTERNAL MEDICINE

## 2019-08-23 PROCEDURE — 2700000000 HC OXYGEN THERAPY PER DAY

## 2019-08-23 PROCEDURE — 2580000003 HC RX 258: Performed by: INTERNAL MEDICINE

## 2019-08-23 PROCEDURE — APPNB30 APP NON BILLABLE TIME 0-30 MINS: Performed by: NURSE PRACTITIONER

## 2019-08-23 PROCEDURE — APPSS30 APP SPLIT SHARED TIME 16-30 MINUTES: Performed by: NURSE PRACTITIONER

## 2019-08-23 PROCEDURE — 6370000000 HC RX 637 (ALT 250 FOR IP): Performed by: NURSE PRACTITIONER

## 2019-08-23 PROCEDURE — 94761 N-INVAS EAR/PLS OXIMETRY MLT: CPT

## 2019-08-23 PROCEDURE — 97116 GAIT TRAINING THERAPY: CPT

## 2019-08-23 PROCEDURE — 1200000000 HC SEMI PRIVATE

## 2019-08-23 RX ADMIN — Medication 10 ML: at 09:07

## 2019-08-23 RX ADMIN — DAKIN'S SOLUTION 0.125% (QUARTER STRENGTH): 0.12 SOLUTION at 09:07

## 2019-08-23 RX ADMIN — PREDNISONE 10 MG: 10 TABLET ORAL at 09:07

## 2019-08-23 RX ADMIN — Medication 10 ML: at 21:58

## 2019-08-23 RX ADMIN — PANTOPRAZOLE SODIUM 40 MG: 40 TABLET, DELAYED RELEASE ORAL at 05:57

## 2019-08-23 RX ADMIN — ASPIRIN 81 MG 81 MG: 81 TABLET ORAL at 09:07

## 2019-08-23 RX ADMIN — ENOXAPARIN SODIUM 40 MG: 40 INJECTION SUBCUTANEOUS at 09:06

## 2019-08-23 RX ADMIN — BUPROPION HYDROCHLORIDE 100 MG: 100 TABLET, EXTENDED RELEASE ORAL at 09:08

## 2019-08-23 RX ADMIN — SERTRALINE 100 MG: 100 TABLET, FILM COATED ORAL at 09:07

## 2019-08-23 RX ADMIN — BUPROPION HYDROCHLORIDE 100 MG: 100 TABLET, EXTENDED RELEASE ORAL at 21:57

## 2019-08-23 ASSESSMENT — PAIN SCALES - GENERAL
PAINLEVEL_OUTOF10: 0

## 2019-08-23 NOTE — PROGRESS NOTES
Hospitalist Progress Note      PCP: Malcolm Perry DO    Date of Admission: 8/20/2019    Chief Complaint: altered mental status    Hospital Course: The patient is a 70 y.o. female with hx CVA, anxiety and depression, recent UTI, iron deficiency anemia, and pulmonary fibrosis on home O2 who presents to [facility] with altered mental status. Pt and her  at bedside states that yesterday the patient became progressively more weak in her lower extremities and had some difficulty with ambulation. She also had increased confusion and lethargy yesterday. She was alert and oriented to person only and usually is alert and oriented to person and place. Denies fever, chills, chest pain, shortness of breath, abdominal pain, nausea, vomiting, constipation, diarrhea, and dysuria. Was recently admitted for altered mental status and diagnosed with a UTI and given PO Ceftin on discharge.     In the ED, CT Head and CTA Head and Neck without acute abnormalities. U/A without evidence of UTI. Labs WNL. VBG pH 7.325, pCO2 57.4 (close to baseline).    Currently, patient feels much better and has no acute complaints. Does state that she continue to have some lower extremity weakness. Subjective: Pt seen and examined. Feels well overall, however, still having some lower extremity weakness and fatigue.        Medications:  Reviewed    Infusion Medications   Scheduled Medications    sodium hypochlorite   Irrigation Daily    aspirin  81 mg Oral Daily    pantoprazole  40 mg Oral QAM AC    sertraline  100 mg Oral Daily    sodium chloride flush  10 mL Intravenous 2 times per day    enoxaparin  40 mg Subcutaneous Daily    buPROPion  100 mg Oral BID    predniSONE  10 mg Oral Daily     PRN Meds: ipratropium-albuterol, sodium chloride flush, magnesium hydroxide, ondansetron, acetaminophen, clonazePAM      Intake/Output Summary (Last 24 hours) at 8/23/2019 1344  Last data filed at 8/23/2019 1130  Gross per 24 hour calcium, creatinine study to differentiate between primary hyperparathyroidism and [address]  -not sure if patient is a surgical candidate regardless, but may benefit from medical management pending above studies     Unstageable wound with wound vac - sees Dr. Alma Tomas outpatient  -will consult vascular for recommendations  -wound vac removed and wet to dry dressing changes done  -wound VAC recommended on discharge     Pulmonary fibrosis  -currently on baseline oxygen requirement  -continue home meds     Chronic respiratory failure with hypoxia and hypercapnia  -VBG close to patient's baseline  -continue home O2     Iron deficiency anemia  -continue home meds  -will check iron studies     PUD  -continue home meds     Depression  -continue Zoloft  -will start to taper Wellbutrin as above; will need to taper over 2-4 week period     Recent UTI  -U/A negative for acute infection  -will stop PO Ceftin     Hx CVA  -continue aspirin  -PT/OT eval     Restless leg syndrome - pt endorses taking daily  -continue Klonopin nightly PRN  -consider tapering down/stopping medication and looking for alternative option  -pt has history of iron deficiency anemia so could be related to this underlying condition, however, iron studies ok      DVT Prophylaxis: Lovenox  Diet: Dietary Nutrition Supplements: Frozen Oral Supplement  DIET GENERAL;  Code Status: Full Code    PT/OT Eval Status: ordered, needs rehab on discharge    Dispo - continue care, anticipate discharge in 1-2 days    Hossein Dover MD

## 2019-08-23 NOTE — PROGRESS NOTES
Occupational Therapy  Facility/Department: [address] PROGRESSIVE CARE  Daily Treatment Note  NAME: Dominick Posada  : 1940  MRN: [id number]    Date of Service: 2019    Assessment: Pt tolerated session well but continues to be limited due to decreased strength and activity tolerance. Pt completed bed mobility with min A and sit <> stand transfers with varying level of assistance with up to min/mod Ax2. Pt completed functional mobility RW and CGA. Pt completes toileting and LB dressing with total assist. Pt would benefit from continued skilled therapy to increase mobility, safety, and independence prior to return home. Discharge Recommendations:  Continue to assess pending progress, 3-5 sessions per week  OT Equipment Recommendations  Other: defer to next level of care    Dominick Posada scored a  on the AM-PAC ADL Inpatient form. Current research shows that an AM-PAC score of 17 or less is typically not associated with a discharge to the patient's home setting. Based on the patients AM-PAC score and their current ADL deficits, it is recommended that the patient have 3-5 sessions per week of Occupational Therapy at d/c to increase the patients independence. Assessment   Performance deficits / Impairments: Decreased functional mobility ; Decreased safe awareness;Decreased endurance;Decreased balance;Decreased high-level IADLs;Decreased ADL status; Decreased strength;Decreased cognition  Assessment: Pt tolerated session well but continues to be limited due to decreased strength and activity tolerance. Pt completed bed mobility with min A and sit <> stand transfers with varying level of assistance with up to min/mod Ax2. Pt completed functional mobility RW and CGA. Pt completes toileting and LB dressing with total assist. Pt would benefit from continued skilled therapy to increase mobility, safety, and independence prior to return home.    OT Education: Plan of Care;OT Role;ADL Adaptive Strategies;Transfer Training  REQUIRES OT FOLLOW UP: Yes  Activity Tolerance  Activity Tolerance: Patient Tolerated treatment well;Patient limited by fatigue  Activity Tolerance: Pt on 4L of O2 - moderate SOB after short ambulation, required increased rest break   Safety Devices  Safety Devices in place: Yes(RN Satish Anderson) notified)  Type of devices: Call light within reach;Nurse notified; Left in chair;Gait belt; Chair alarm in place         Patient Diagnosis(es): The encounter diagnosis was Altered mental status, unspecified altered mental status type. has a past medical history of Anxiety and depression, Arthritis, Cancer of skin of leg, Chronic low back pain, GERD (gastroesophageal reflux disease), Insomnia, Iron deficiency anemia, Kidney stone, PUD (peptic ulcer disease), Pulmonary fibrosis ([address] Stomach ulcer, Ulcer - lesion, and UTI (urinary tract infection). has a past surgical history that includes hysteroscopy (1993); joint replacement (4/20/2000); joint replacement (2/27/08); Carpal tunnel release (9/27/11); Hysterectomy (1993); Stomach surgery (2010); Cystocopy (Left, 09/2016); Colonoscopy; Cystoscopy (01/15/2018); eye surgery (Bilateral, 2008); back surgery (MARCH 2004,OCT 02, FEB 05); back surgery (06/2000); fracture surgery (Right, 06/05/2016); fracture surgery (Left); Cystoscopy (02/14/2018); pr brncc w/brncl alveolar lavage (N/A, 10/24/2018); and pr debride necrotic skin/ tissue, genit & perinm (Left, 11/28/2018).     Restrictions  Restrictions/Precautions  Restrictions/Precautions: Fall Risk  Position Activity Restriction  Other position/activity restrictions: IV, 4L O2; incontient of urine     Subjective   General  Chart Reviewed: Yes  Patient assessed for rehabilitation services?: Yes  Additional Pertinent Hx: per NP note, Anna Alexander is a 70 y.o. female who presents with altered mental status noted by family today at 1 PM.  Does have a history of UTI, is currently on antibiotics for

## 2019-08-23 NOTE — PROGRESS NOTES
(gastroesophageal reflux disease), Insomnia, Iron deficiency anemia, Kidney stone, PUD (peptic ulcer disease), Pulmonary fibrosis ([facility] Stomach ulcer, Ulcer - lesion, and UTI (urinary tract infection). has a past surgical history that includes hysteroscopy (1993); joint replacement (4/20/2000); joint replacement (2/27/08); Carpal tunnel release (9/27/11); Hysterectomy (1993); Stomach surgery (2010); Cystocopy (Left, 09/2016); Colonoscopy; Cystoscopy (01/15/2018); eye surgery (Bilateral, 2008); back surgery (MARCH 2004,OCT 02, FEB 05); back surgery (06/2000); fracture surgery (Right, 06/05/2016); fracture surgery (Left); Cystoscopy (02/14/2018); [facility] (N/A, 10/24/2018); and pr debride necrotic skin/ tissue, genit & perinm (Left, 11/28/2018). Restrictions  Restrictions/Precautions  Restrictions/Precautions: Fall Risk  Position Activity Restriction  Other position/activity restrictions: IV, 4L O2; incontient of urine     Social/Functional History  Lives With: Spouse  Type of Home: (condo)  Home Layout: Two level(stair lift to 2nd floor)  Home Access: Stairs to enter with rails  Entrance Stairs - Number of Steps: 1 DANNY through garage, uses RW,  assists  Bathroom Shower/Tub: Tub/Shower unit  [employer]: Hand-held shower, Tub transfer bench, Grab bars in shower, 3-in-1 commode, Grab bars around toilet, Toilet raiser(height toilet, has toilet safety frame.  Pt reports took off RTS d/t difficulty cleaning it)  Bathroom Accessibility: Walker accessible  Home Equipment: 4 wheeled walker, Rolling walker, Standard walker, Sock aid, Reacher, Lift chair, Electric scooter, Wheelchair-manual(transport wheelchair; 4L O2 at baseline, bedrail)  ADL Assistance: Needs assistance(assist with LB dressing if in a hurry, setup for showers, independent UB ADLs and toileting)  Homemaking Assistance: Needs assistance(dusts occasionally,  does all)  Ambulation Assistance: Independent(uses RW around the [place]; 4WW when leaving [facility], uses transport chair for longer distances in the community; rides in electric cart when she goes to grocery store)  Transfer Assistance: Independent  Active : No  Patient's  Info:  drives  Leisure & Hobbies: watch movies, go out to eat, visit friends, play cards  Additional Comments: pt reports her family has told her not to get up unless they are with her which most times she does    Subjective   General  Chart Reviewed: Yes  Additional Pertinent Hx: per MD notes: The patient is a 70 y.o. female with hx CVA, anxiety and depression, recent UTI, iron deficiency anemia, and pulmonary fibrosis on home O2 who presents to [facility] with altered mental status. Pt and her  at bedside states that yesterday the patient became progressively more weak in her lower extremities and had some difficulty with ambulation. She also had increased confusion and lethargy yesterday. She was alert and oriented to person only and usually is alert and oriented to person and place. Denies fever, chills, chest pain, shortness of breath, abdominal pain, nausea, vomiting, constipation, diarrhea, and dysuria. Was recently admitted for altered mental status and diagnosed with a UTI and given PO Ceftin on discharge. Response To Previous Treatment: Patient with no complaints from previous session. Family / Caregiver Present: No  Subjective  Subjective: Pt supine in bed upon arrival.  Pleasant and agreeable to PT treatment. Family in room but leaving. Pt and family requesting that pt use restroom. Denies pain. Dressings in place at wound sites. General Comment  Comments: PT/OT COTX for safety                Objective   Bed mobility  Supine to Sit: Minimal assistance(HOB elevated, used rail, cues for technique)  Sit to Supine: Unable to assess(up in chair at end of session)     Transfers  Sit to Stand:  Moderate Assistance;Minimal Assistance;2 Person Assistance(Matt

## 2019-08-23 NOTE — CARE COORDINATION
Discharge Planning:  SW met with pt, pts spouse and pts dtr at length to discuss d/c plans. Pts spouse and dtr stated that they would really like for pt to go to [facility] and rehab. SW explained that this SW reached out to [facility] stated that they are unable to accommodate the need for an irrigating wound vac. Pt and family discussed their options and decided that they would like to move forward with placement at [facility]. SW contacted Vu Butcher at [facility] to inform her that this pt and family have decided on [place]. Vu Butcher stated that she will begin working on ordering the wound vac for this pt. Plan: [facility] upon d/c.  Vu Butcher from [facility] will facilitate this d/c including the HENS and ordering the wound vac.   Electronically signed by Tristan Mendez on 8/23/2019 at 11:43 AM

## 2019-08-23 NOTE — DISCHARGE INSTR - COC
11:28 AM   [place]%Wound Bed 20 8/19/2019 11:28 AM   Yellow%Wound Bed 50 8/19/2019 11:28 AM   Black%Wound Bed 30 8/19/2019 11:28 AM   Purple%Wound Bed 20 7/24/2019 11:20 AM   Number of days: 30       Wound 08/21/19 Hip Left;Dorsal Wound vac present (Active)   Wound Pressure Unstageable 8/21/2019  8:00 AM   Dressing Status Clean;Dry; Intact 8/22/2019  8:00 PM   Dressing/Treatment Dry Dressing 8/21/2019  3:00 PM   Odor None 8/21/2019  1:40 AM   Number of days: 2       Wound 08/21/19 Hip Right;Dorsal L=4.2; W=4; D=1.2; Tunnel=4.5 right (Active)   Wound Pressure Unstageable 8/23/2019  9:00 AM   Dressing Status Clean;Dry; Intact; Changed 8/23/2019  9:00 AM   Dressing Changed Changed/New 8/23/2019  9:00 AM   Dressing/Treatment Pharmaceutical agent (see MAR); Dry Dressing 8/23/2019  9:00 AM   Wound Cleansed Not Cleansed 8/23/2019  9:00 AM   Wound Length (cm) 4.2 cm 8/21/2019  1:40 AM   Wound Width (cm) 4 cm 8/21/2019  1:40 AM   Wound Depth (cm) 1.2 cm 8/21/2019  1:40 AM   Wound Surface Area (cm^2) 16.8 cm^2 8/21/2019  1:40 AM   Wound Volume (cm^3) 20.16 cm^3 8/21/2019  1:40 AM   Number of days: 2        Elimination:  Continence:   · Bowel: No  · Bladder: No  Urinary Catheter: Removal Date 8/24/2019 - Has urinated since. Colostomy/Ileostomy/Ileal Conduit: No       Date of Last BM: 8/24/2019    Intake/Output Summary (Last 24 hours) at 8/23/2019 1155  Last data filed at 8/23/2019 1000  Gross per 24 hour   Intake 240 ml   Output 0 ml   Net 240 ml     I/O last 3 completed shifts: In: 3626 [P.O.:200; I.V.:995]  Out: 0     Safety Concerns: At Risk for Falls    Impairments/Disabilities:      None    Nutrition Therapy:  Current Nutrition Therapy:   - Oral Diet:  General    Routes of Feeding: Oral  Liquids:  Thin Liquids  Daily Fluid Restriction: no  Last Modified Barium Swallow with Video (Video Swallowing Test): not done    Treatments at the Time of Hospital Discharge:   Respiratory Treatments: See Med List  Oxygen Therapy:  is on

## 2019-08-23 NOTE — PROGRESS NOTES
Blood culture  No results for input(s): BC in the last 72 hours. Wound abscess  No results for input(s): WNDABS in the last 72 hours. Scheduled Meds:   sodium hypochlorite   Irrigation Daily    aspirin  81 mg Oral Daily    pantoprazole  40 mg Oral QAM AC    sertraline  100 mg Oral Daily    sodium chloride flush  10 mL Intravenous 2 times per day    enoxaparin  40 mg Subcutaneous Daily    buPROPion  100 mg Oral BID    predniSONE  10 mg Oral Daily     Continuous Infusions:  PRN Meds:.ipratropium-albuterol, sodium chloride flush, magnesium hydroxide, ondansetron, acetaminophen, clonazePAM      ASSESSMENT/PLAN:      70 y.o. female admitted with   1.  Altered mental status, unspecified altered mental status type         with a history of:    Patient Active Problem List   Diagnosis    Abnormality of gait    Osteoporosis    Peptic ulcer    Disorder of kidney and ureter    Anemia    Pneumonia due to organism    Acute bronchitis    Osteoarthrosis, unspecified whether generalized or localized, pelvic region and thigh    Closed fracture of thoracic vertebra (HCC)    Displacement of lumbar intervertebral disc without myelopathy    Scoliosis (and kyphoscoliosis), idiopathic    Enthesopathy of hip region    Thoracic or lumbosacral neuritis or radiculitis, unspecified    Dermatitis due to drug taken internally    Acute cystitis without hematuria    Edema    Lumbago    CTS (carpal tunnel syndrome)    Depression    Anxiety    Depression    Femoral distal fracture (HCC)    Fibrotic lung diseases ([address]    Periprosthetic fracture around internal prosthetic left knee joint    Fracture of femur ([address]    Acute on chronic respiratory failure with hypoxia ([address]    Acute blood loss anemia    Pain from implanted hardware    Idiopathic pulmonary fibrosis (HCC)    Hypoxia    Abnormal CT scan, chest    Exertional dyspnea    Scleroderma ([address]    Closed intertrochanteric fracture of right femur

## 2019-08-23 NOTE — CARE COORDINATION
Skilled Bed available at [facility]. HENS completed. Wound Vac has been ordered in the event pt is D/C'd over the weekend.     PH# for Report to [facility]  [phone]  Fax# to [address] [place] [address]    Rosy Lockwood [phone]  Electronically signed by Perico Sinha on 8/23/2019 at 5:03 PM

## 2019-08-24 LAB
24HR URINE VOLUME (ML): 700 ML
A/G RATIO: 1.1 (ref 1.1–2.2)
ALBUMIN SERPL-MCNC: 3.2 G/DL (ref 3.4–5)
ALP BLD-CCNC: 69 U/L (ref 40–129)
ALT SERPL-CCNC: 9 U/L (ref 10–40)
ANION GAP SERPL CALCULATED.3IONS-SCNC: 9 MMOL/L (ref 3–16)
AST SERPL-CCNC: 12 U/L (ref 15–37)
BILIRUB SERPL-MCNC: <0.2 MG/DL (ref 0–1)
BUN BLDV-MCNC: 22 MG/DL (ref 7–20)
CALCIUM 24 HOUR URINE: 89 MG/24 HR (ref 42–353)
CALCIUM SERPL-MCNC: 10.4 MG/DL (ref 8.3–10.6)
CHLORIDE BLD-SCNC: 107 MMOL/L (ref 99–110)
CO2: 28 MMOL/L (ref 21–32)
CREAT SERPL-MCNC: 0.8 MG/DL (ref 0.6–1.2)
CREATININE 24 HOUR URINE: 0.7 G/24HR (ref 0.6–1.5)
GFR AFRICAN AMERICAN: >60
GFR NON-AFRICAN AMERICAN: >60
GLOBULIN: 2.8 G/DL
GLUCOSE BLD-MCNC: 100 MG/DL (ref 70–99)
POTASSIUM REFLEX MAGNESIUM: 4.8 MMOL/L (ref 3.5–5.1)
SODIUM BLD-SCNC: 144 MMOL/L (ref 136–145)
TOTAL PROTEIN: 6 G/DL (ref 6.4–8.2)

## 2019-08-24 PROCEDURE — 2700000000 HC OXYGEN THERAPY PER DAY

## 2019-08-24 PROCEDURE — 94760 N-INVAS EAR/PLS OXIMETRY 1: CPT

## 2019-08-24 PROCEDURE — 6360000002 HC RX W HCPCS: Performed by: INTERNAL MEDICINE

## 2019-08-24 PROCEDURE — 82340 ASSAY OF CALCIUM IN URINE: CPT

## 2019-08-24 PROCEDURE — 6370000000 HC RX 637 (ALT 250 FOR IP): Performed by: INTERNAL MEDICINE

## 2019-08-24 PROCEDURE — 6370000000 HC RX 637 (ALT 250 FOR IP): Performed by: NURSE PRACTITIONER

## 2019-08-24 PROCEDURE — 80053 COMPREHEN METABOLIC PANEL: CPT

## 2019-08-24 PROCEDURE — 1200000000 HC SEMI PRIVATE

## 2019-08-24 PROCEDURE — 2580000003 HC RX 258: Performed by: INTERNAL MEDICINE

## 2019-08-24 PROCEDURE — 36415 COLL VENOUS BLD VENIPUNCTURE: CPT

## 2019-08-24 RX ORDER — ALENDRONATE SODIUM 70 MG/1
TABLET ORAL
Qty: 12 TABLET | Refills: 0 | Status: SHIPPED | OUTPATIENT
Start: 2019-08-24

## 2019-08-24 RX ADMIN — PREDNISONE 10 MG: 10 TABLET ORAL at 08:19

## 2019-08-24 RX ADMIN — ASPIRIN 81 MG 81 MG: 81 TABLET ORAL at 08:19

## 2019-08-24 RX ADMIN — DAKIN'S SOLUTION 0.125% (QUARTER STRENGTH): 0.12 SOLUTION at 15:36

## 2019-08-24 RX ADMIN — BUPROPION HYDROCHLORIDE 100 MG: 100 TABLET, EXTENDED RELEASE ORAL at 22:12

## 2019-08-24 RX ADMIN — ENOXAPARIN SODIUM 40 MG: 40 INJECTION SUBCUTANEOUS at 08:19

## 2019-08-24 RX ADMIN — PANTOPRAZOLE SODIUM 40 MG: 40 TABLET, DELAYED RELEASE ORAL at 08:19

## 2019-08-24 RX ADMIN — Medication 10 ML: at 08:27

## 2019-08-24 RX ADMIN — SERTRALINE 100 MG: 100 TABLET, FILM COATED ORAL at 08:19

## 2019-08-24 RX ADMIN — Medication 10 ML: at 22:12

## 2019-08-24 RX ADMIN — BUPROPION HYDROCHLORIDE 100 MG: 100 TABLET, EXTENDED RELEASE ORAL at 08:26

## 2019-08-24 ASSESSMENT — PAIN SCALES - GENERAL
PAINLEVEL_OUTOF10: 0

## 2019-08-24 NOTE — PROGRESS NOTES
MD order received to place Mcgarry Catheter. Procedure and reason for mcgarry catheter placement explained to Patient and Patient agreed to mcgarry placement. Per sterile technique a 14 FR. Catheter inserted without problems. Immediate return of clear yellow urine noted. Patient tolerated procedure well. Explained to Patient that catheter will be removed once the 24 hour urine is collected.

## 2019-08-24 NOTE — PROGRESS NOTES
Hospitalist Progress Note      PCP: Austen Guevara,     Date of Admission: 8/20/2019    Chief Complaint: altered mental status    Hospital Course: The patient is a 70 y.o. female with hx CVA, anxiety and depression, recent UTI, iron deficiency anemia, and pulmonary fibrosis on home O2 who presents to [facility] with altered mental status. Pt and her  at bedside states that yesterday the patient became progressively more weak in her lower extremities and had some difficulty with ambulation. She also had increased confusion and lethargy yesterday. She was alert and oriented to person only and usually is alert and oriented to person and place. Denies fever, chills, chest pain, shortness of breath, abdominal pain, nausea, vomiting, constipation, diarrhea, and dysuria. Was recently admitted for altered mental status and diagnosed with a UTI and given PO Ceftin on discharge.     In the ED, CT Head and CTA Head and Neck without acute abnormalities. U/A without evidence of UTI. Labs WNL. VBG pH 7.325, pCO2 57.4 (close to baseline).    Currently, patient feels much better and has no acute complaints. Does state that she continue to have some lower extremity weakness. Subjective: Pt seen and examined. Feels ok overall, has no acute complaints.       Medications:  Reviewed    Infusion Medications   Scheduled Medications    sodium hypochlorite   Irrigation Daily    aspirin  81 mg Oral Daily    pantoprazole  40 mg Oral QAM AC    sertraline  100 mg Oral Daily    sodium chloride flush  10 mL Intravenous 2 times per day    enoxaparin  40 mg Subcutaneous Daily    buPROPion  100 mg Oral BID    predniSONE  10 mg Oral Daily     PRN Meds: ipratropium-albuterol, sodium chloride flush, magnesium hydroxide, ondansetron, acetaminophen, clonazePAM      Intake/Output Summary (Last 24 hours) at 8/24/2019 1142  Last data filed at 8/24/2019 0825  Gross per 24 hour   Intake 360 ml   Output 725 ml   Net -365 ml Final Result   1. No acute cardiopulmonary process. 2. Unchanged interstitial reticulations consistent with underlying fibrotic   changes. CTA HEAD NECK W CONTRAST   Final Result   No intracranial large artery focal high-grade stenosis, occlusion or aneurysm. No bilateral cervical internal carotid artery stenosis per NASCET criteria. Bilateral carotid bulb atherosclerotic plaque. Patent bilateral vertebral arteries. CT Head WO Contrast   Final Result   No acute intracranial abnormality detected. Evidence of a remote left posterior circulation infarct, unchanged. Findings were discussed with Olga Cee at 9:53 pm on 8/20/2019. Assessment/Plan:    Active Hospital Problems    Diagnosis Date Noted    Altered mental status [R41.82]     Sacral wound [S31.000A]     Acute metabolic encephalopathy [H82.70]     Open wound of left hip [S71.002A] 06/20/2019       Acute metabolic encephalopathy - improved  -suspect may be component of polypharmacy with drug-drug interaction with new antibiotic with patient endorsing symptoms soon after starting oral antibiotic. Less likely TIA/CVA as symptoms atypical and patient has no focal neurological deficits.  Possibly 2/2 primary hyperparathyroidism with elevated calcium level.  -if mental status changes, would consider getting an MRI Brain to further evaluate  -stop PO Ceftin  -will start to taper Wellbutrin from 150 mg to 100 mg BID, further adjustment can be made on discharge   -PT/OT eval  -PTH elevated, will ask for 24 hour urine and creatinine study, should complete by this evening    Primary hyperparathyroidism  -with nonspecific symptoms; upon chart review has had nephrolithiasis and fractures in the past   -correct calcium level for albumin is 11.4 today, has been elevated at least since May  -ionized calcium level is elevated, vitamin D level WNL  -check 24 hour urine calcium, creatinine study to differentiate

## 2019-08-25 VITALS
BODY MASS INDEX: 22.27 KG/M2 | OXYGEN SATURATION: 98 % | HEIGHT: 62 IN | SYSTOLIC BLOOD PRESSURE: 134 MMHG | HEART RATE: 63 BPM | TEMPERATURE: 98.2 F | RESPIRATION RATE: 16 BRPM | DIASTOLIC BLOOD PRESSURE: 65 MMHG | WEIGHT: 121.03 LBS

## 2019-08-25 LAB — PLATELET # BLD: 322 K/UL (ref 135–450)

## 2019-08-25 PROCEDURE — 6370000000 HC RX 637 (ALT 250 FOR IP): Performed by: NURSE PRACTITIONER

## 2019-08-25 PROCEDURE — 6370000000 HC RX 637 (ALT 250 FOR IP): Performed by: INTERNAL MEDICINE

## 2019-08-25 PROCEDURE — 36415 COLL VENOUS BLD VENIPUNCTURE: CPT

## 2019-08-25 PROCEDURE — 94760 N-INVAS EAR/PLS OXIMETRY 1: CPT

## 2019-08-25 PROCEDURE — 2700000000 HC OXYGEN THERAPY PER DAY

## 2019-08-25 PROCEDURE — 6360000002 HC RX W HCPCS: Performed by: INTERNAL MEDICINE

## 2019-08-25 PROCEDURE — 2580000003 HC RX 258: Performed by: INTERNAL MEDICINE

## 2019-08-25 PROCEDURE — 85049 AUTOMATED PLATELET COUNT: CPT

## 2019-08-25 RX ORDER — CLONAZEPAM 1 MG/1
0.5 TABLET ORAL NIGHTLY
Qty: 3 TABLET | Refills: 0 | Status: SHIPPED | OUTPATIENT
Start: 2019-08-25 | End: 2019-09-12 | Stop reason: ALTCHOICE

## 2019-08-25 RX ORDER — BUPROPION HYDROCHLORIDE 100 MG/1
TABLET, EXTENDED RELEASE ORAL
Qty: 60 TABLET | Refills: 3 | Status: SHIPPED | OUTPATIENT
Start: 2019-08-25 | End: 2019-09-12 | Stop reason: ALTCHOICE

## 2019-08-25 RX ADMIN — BUPROPION HYDROCHLORIDE 100 MG: 100 TABLET, EXTENDED RELEASE ORAL at 08:35

## 2019-08-25 RX ADMIN — SERTRALINE 100 MG: 100 TABLET, FILM COATED ORAL at 08:35

## 2019-08-25 RX ADMIN — PREDNISONE 10 MG: 10 TABLET ORAL at 08:35

## 2019-08-25 RX ADMIN — Medication 10 ML: at 08:36

## 2019-08-25 RX ADMIN — ENOXAPARIN SODIUM 40 MG: 40 INJECTION SUBCUTANEOUS at 08:35

## 2019-08-25 RX ADMIN — DAKIN'S SOLUTION 0.125% (QUARTER STRENGTH): 0.12 SOLUTION at 08:40

## 2019-08-25 RX ADMIN — PANTOPRAZOLE SODIUM 40 MG: 40 TABLET, DELAYED RELEASE ORAL at 06:45

## 2019-08-25 RX ADMIN — ASPIRIN 81 MG 81 MG: 81 TABLET ORAL at 08:35

## 2019-08-25 ASSESSMENT — PAIN SCALES - GENERAL: PAINLEVEL_OUTOF10: 0

## 2019-08-25 NOTE — DISCHARGE SUMMARY
underlying fibrotic   changes. CTA HEAD NECK W CONTRAST   Final Result   No intracranial large artery focal high-grade stenosis, occlusion or aneurysm. No bilateral cervical internal carotid artery stenosis per NASCET criteria. Bilateral carotid bulb atherosclerotic plaque. Patent bilateral vertebral arteries. CT Head WO Contrast   Final Result   No acute intracranial abnormality detected. Evidence of a remote left posterior circulation infarct, unchanged. Findings were discussed with Heydi Concepcion at 9:53 pm on 8/20/2019. Consults:     IP CONSULT TO STROKE TEAM  IP CONSULT TO VASCULAR SURGERY  IP CONSULT TO CASE MANAGEMENT    Disposition:  SNF     Condition at Discharge: Stable    Discharge Instructions/Follow-up:  PCP in 1 week, endocrinology as an outpatient, taper Wellbutrin as prescribed, discuss other SSRI, benzo meds with PCP    Code Status:  Full Code     Activity: activity as tolerated    Diet: regular diet      Discharge Medications:     Current Discharge Medication List           Details   clonazePAM (KLONOPIN) 1 MG tablet Take 0.5 tablets by mouth nightly for 6 days.   Qty: 3 tablet, Refills: 0    Associated Diagnoses: Restless leg syndrome      buPROPion (WELLBUTRIN SR) 100 MG extended release tablet Take one tablet (100 mg) BID x 2 weeks, then one tablet (100 mg) thereafter  Qty: 60 tablet, Refills: 3              Details   predniSONE (DELTASONE) 10 MG tablet Take 10 mg by mouth daily      alendronate (FOSAMAX) 70 MG tablet TAKE 1 TABLET BY MOUTH EVERY 7 DAYS  Qty: 12 tablet, Refills: 0      sodium hypochlorite (DAKINS) 0.125 % SOLN external solution Apply topically daily APPLY DAMPENED Dakins GAUZE TO SACRAL WOUND BED OVER LURDES  Qty: 1 Bottle, Refills: 2      acetaminophen (TYLENOL) 325 MG tablet Take 650 mg by mouth nightly as needed for Pain      docusate sodium (COLACE) 100 MG capsule Take 100 mg by mouth nightly      sertraline (ZOLOFT) 100 MG tablet TAKE 1 TABLET BY MOUTH EVERY DAY  Qty: 90 tablet, Refills: 0      Cyanocobalamin (B-12) 100 MCG TABS Take by mouth daily      aspirin 81 MG tablet Take 81 mg by mouth daily      omeprazole (PRILOSEC) 40 MG delayed release capsule TAKE 1 CAPSULE BY MOUTH TWICE DAILY  Qty: 180 capsule, Refills: 0      ipratropium-albuterol (DUONEB) 0.5-2.5 (3) MG/3ML SOLN nebulizer solution Inhale 3 mLs into the lungs every 6 hours as needed for Shortness of Breath (wheezing)  Qty: 360 mL, Refills: 0      ferrous sulfate 325 (65 Fe) MG tablet Take 325 mg by mouth daily (with breakfast)      OXYGEN Inhale 4 L into the lungs continuous  Qty: 1 Container, Refills: 1      Multiple Vitamins-Minerals (MULTIVITAMIN PO) Take 1 tablet by mouth daily. Time Spent on discharge is more than 30 minutes in the examination, evaluation, counseling and review of medications and discharge plan. Signed:    Elizabeth Saldivar MD   8/25/2019      Thank you Merissa Brian DO for the opportunity to be involved in this patient's care. If you have any questions or concerns please feel free to contact me at [phone].

## 2019-08-25 NOTE — CARE COORDINATION
Discharge Plan: [facility]    Patient discharging to: snf  JULIAN, AVS and prescriptions should be faxed to:  [phone]  RN can call report to: [phone]  Family wants to transport to snf  Family advised of discharge and in agreement   RN aware. Thank You.    Electronically signed by EMIL Argueta, ERICAW on 8/25/2019 at 3:21 PM

## 2019-08-26 ENCOUNTER — CARE COORDINATION (OUTPATIENT)
Dept: CASE MANAGEMENT | Age: 79
End: 2019-08-26

## 2019-08-26 ENCOUNTER — TELEPHONE (OUTPATIENT)
Dept: SURGERY | Age: 79
End: 2019-08-26

## 2019-08-29 ENCOUNTER — HOSPITAL ENCOUNTER (OUTPATIENT)
Dept: WOUND CARE | Age: 79
Discharge: HOME | End: 2019-08-29
Payer: MEDICARE

## 2019-08-29 VITALS — TEMPERATURE: 97.4 F | RESPIRATION RATE: 16 BRPM | HEART RATE: 88 BPM

## 2019-08-29 DIAGNOSIS — S71.002D OPEN WOUND OF LEFT HIP, SUBSEQUENT ENCOUNTER: ICD-10-CM

## 2019-08-29 DIAGNOSIS — L89.154 PRESSURE ULCER OF SACRAL REGION, STAGE 4 (HCC): Primary | ICD-10-CM

## 2019-08-29 PROCEDURE — 11043 DBRDMT MUSC&/FSCA 1ST 20/<: CPT

## 2019-08-29 PROCEDURE — 11043 DBRDMT MUSC&/FSCA 1ST 20/<: CPT | Performed by: NURSE PRACTITIONER

## 2019-08-29 RX ORDER — LIDOCAINE HYDROCHLORIDE 40 MG/ML
SOLUTION TOPICAL PRN
Status: DISCONTINUED | OUTPATIENT
Start: 2019-08-29 | End: 2019-08-30 | Stop reason: HOSPADM

## 2019-08-29 ASSESSMENT — PAIN SCALES - GENERAL: PAINLEVEL_OUTOF10: 0

## 2019-08-30 DIAGNOSIS — F41.1 GAD (GENERALIZED ANXIETY DISORDER): ICD-10-CM

## 2019-08-30 RX ORDER — BUPROPION HYDROCHLORIDE 150 MG/1
TABLET, EXTENDED RELEASE ORAL
Qty: 180 TABLET | Refills: 0 | Status: SHIPPED | OUTPATIENT
Start: 2019-08-30 | End: 2019-09-12 | Stop reason: ALTCHOICE

## 2019-08-30 NOTE — PLAN OF CARE
KCI to inservice nurses at [facility] today on [facility] for patient. I faxed updated orders for [employer] to [facility] at fax # [phone].      Electronically signed by Gustavo Gallo RN on 8/30/2019 at 12:13 PM

## 2019-08-30 NOTE — PROGRESS NOTES
fibrin, biofilm, slough and necrotic/eschar    Pre Debridement Measurements:  Are located in the [facility]  Documentation Flow Sheet    Wound/Ulcer #: 5 and 6    Post Debridement Measurements:  Wound/Ulcer Descriptions are Pre Debridement except measurements:    Wound 12/03/18 Hip Left #5(acquired on 11/28/18-surgically created by Dr. Yolanda Alejo) (Active)   Wound Image   7/10/2019 11:09 AM   Wound Other 8/25/2019  8:37 AM   Dressing Status Changed;Clean;Dry; Intact 8/25/2019  8:37 AM   Dressing Changed Changed/New 8/29/2019  2:47 PM   Dressing/Treatment Other (comment) 8/29/2019  2:47 PM   Wound Cleansed Rinsed/Irrigated with saline 7/10/2019 11:09 AM   Dressing Change Due 08/26/19 8/25/2019  8:37 AM   Wound Length (cm) 0.3 cm 8/29/2019  1:56 PM   Wound Width (cm) 0.7 cm 8/29/2019  1:56 PM   Wound Depth (cm) 1.4 cm 8/29/2019  1:56 PM   Wound Surface Area (cm^2) 0.21 cm^2 8/29/2019  1:56 PM   Change in Wound Size % (l*w) 98.25 8/29/2019  1:56 PM   Wound Volume (cm^3) 0.29 cm^3 8/29/2019  1:56 PM   Wound Healing % 99 8/29/2019  1:56 PM   Post-Procedure Length (cm) 0.4 cm 8/29/2019  2:24 PM   Post-Procedure Width (cm) 0.8 cm 8/29/2019  2:24 PM   Post-Procedure Depth (cm) 1.4 cm 8/29/2019  2:24 PM   Post-Procedure Surface Area (cm^2) 0.32 cm^2 8/29/2019  2:24 PM   Post-Procedure Volume (cm^3) 0.45 cm^3 8/29/2019  2:24 PM   Distance Tunneling (cm) 2.9 cm 8/29/2019  1:56 PM   Tunneling Position ___ O'Clock 9 8/19/2019 11:28 AM   Undermining Starts ___ O'Clock 12 8/29/2019  1:56 PM   Undermining Ends___ O'Clock 12 8/29/2019  1:56 PM   Undermining Maxium Distance (cm) 2.3 8/29/2019  1:56 PM   Wound Assessment Pink;Slough; Yellow 8/29/2019  1:56 PM   Drainage Amount Small 8/29/2019  1:56 PM   Drainage Description Serous 8/29/2019  1:56 PM   Odor None 8/29/2019  1:56 PM   Margins Undefined edges 8/29/2019  1:56 PM   Exposed structure Fascia 8/19/2019 11:28 AM   Mirian-wound Assessment Dry; Intact; White 8/29/2019  1:56 PM when getting up should be applied to affected leg(s) from mid foot to knee making sure to cover the heel. Remove every night before going to bed. [] Elevate leg(s) above the level of the heart when sitting. [] Avoid prolonged standing in one place. [] Elevate arm/hand above the level of the heart []RightArm []LeftArm     Compression:  Apply: [] Multilayer Compression Wrap Applied in Clinic []RightLeg []Left Leg   [] Multi-layer compression. Do not get leg(s) with wrap wet. If wraps become too tight call the center or completely remove the wrap. [] Elevate leg(s) above the level of the heart when sitting. [] Avoid prolonged standing in one place. Off-Loading:   [] Off-loading when [] walking  [] in bed [] sitting  [] Total non-weight bearing  [] Right Leg  [] Left Leg   [] Assistive Device [] Walker [] Cane  [] Wheelchair  [] Crutches   [] Surgical shoe    [] Podus Boot(s)   [] Foam Boot(s)  [] Roll About    [] Cast Boot [] CROW Boot  [] Other:    HAVE PATIENT UP AND MOVING AT LEAST 3 TIMES A DAY- ENCOURAGE OUT OF BED    Dietary:  [x] Diet as tolerated: [] Calorie Diabetic Diet: [] No Added Salt:  [x] Increase Protein: [] Other:   Activity:  [x] Activity as tolerated:  [] Patient has no activity restrictions     [] Strict Bedrest: [] Remain off Work:     [] May return to full duty work:                                   [] Return to work with restrictions: If you are still having pain after you go home:  [x] Elevate the affected limb. [x] Use over-the-counter medications you would normally use for pain as permitted by your family doctor. [x] For persistent pain not relieved by the above interventions, please call your family doctor.              Return Appointment:  [] Wound and dressing supply provider:   [x] ECF or Home Healthcare: Bela Mercado  [] Wound Assessment: [] Physician or NP scheduled for Wound Assessment:   [x] Return Appointment: With DR Gardner Part  in  1 Week(s)  [] Ordered

## 2019-09-03 ENCOUNTER — TELEPHONE (OUTPATIENT)
Dept: ENDOCRINOLOGY | Age: 79
End: 2019-09-03

## 2019-09-03 PROBLEM — N13.8 OBSTRUCTIVE NEPHROPATHY: Status: RESOLVED | Noted: 2018-01-15 | Resolved: 2019-09-03

## 2019-09-03 PROBLEM — R50.9 FEVER: Status: RESOLVED | Noted: 2018-10-05 | Resolved: 2019-09-03

## 2019-09-03 PROBLEM — R00.0 TACHYCARDIA: Status: RESOLVED | Noted: 2018-10-05 | Resolved: 2019-09-03

## 2019-09-03 PROBLEM — R06.82 TACHYPNEA: Status: RESOLVED | Noted: 2018-10-05 | Resolved: 2019-09-03

## 2019-09-03 PROBLEM — N39.0 UTI (URINARY TRACT INFECTION): Status: RESOLVED | Noted: 2018-10-05 | Resolved: 2019-09-03

## 2019-09-12 ENCOUNTER — HOSPITAL ENCOUNTER (OUTPATIENT)
Dept: WOUND CARE | Age: 79
Discharge: HOME | End: 2019-09-12
Payer: MEDICARE

## 2019-09-12 VITALS
RESPIRATION RATE: 24 BRPM | TEMPERATURE: 97.6 F | SYSTOLIC BLOOD PRESSURE: 156 MMHG | DIASTOLIC BLOOD PRESSURE: 74 MMHG | HEART RATE: 80 BPM

## 2019-09-12 DIAGNOSIS — L89.154 PRESSURE ULCER OF SACRAL REGION, STAGE 4 (HCC): Primary | ICD-10-CM

## 2019-09-12 DIAGNOSIS — S71.002D: ICD-10-CM

## 2019-09-12 PROCEDURE — 11043 DBRDMT MUSC&/FSCA 1ST 20/<: CPT

## 2019-09-12 PROCEDURE — 11043 DBRDMT MUSC&/FSCA 1ST 20/<: CPT | Performed by: SURGERY

## 2019-09-12 RX ORDER — LIDOCAINE HYDROCHLORIDE 40 MG/ML
SOLUTION TOPICAL ONCE
Status: DISCONTINUED | OUTPATIENT
Start: 2019-09-12 | End: 2019-09-13 | Stop reason: HOSPADM

## 2019-09-12 RX ORDER — BUPROPION HYDROCHLORIDE 100 MG/1
100 TABLET ORAL DAILY
COMMUNITY

## 2019-09-12 ASSESSMENT — PAIN SCALES - GENERAL: PAINLEVEL_OUTOF10: 0

## 2019-09-12 NOTE — PROGRESS NOTES
Age of Onset    Emphysema Mother            AGE 64    Depression Mother      Clotting Disorder Father               Stroke Father              SOCIAL HISTORY     Social History            Tobacco Use    Smoking status: Never Smoker    Smokeless tobacco: Never Used    Tobacco comment: encouraged to never smoke    Substance Use Topics    Alcohol use: No       Alcohol/week: 0.0 standard drinks    Drug use: No         ALLERGIES           Allergies   Allergen Reactions    Ampicillin Hives    Ciprofloxacin Other (See Comments)       Sores in mouth       Nitrofurantoin Other (See Comments)       Unable to recall reaction    Sulfa Antibiotics Other (See Comments)       Unable to recall reaction    Penicillins Hives and Rash         MEDICATIONS            Current Outpatient Medications on File Prior to Encounter   Medication Sig Dispense Refill    clonazePAM (KLONOPIN) 1 MG tablet Take 0.5 tablets by mouth nightly for 6 days.  3 tablet 0    buPROPion (WELLBUTRIN SR) 100 MG extended release tablet Take one tablet (100 mg) BID x 2 weeks, then one tablet (100 mg) thereafter 60 tablet 3    alendronate (FOSAMAX) 70 MG tablet TAKE 1 TABLET BY MOUTH EVERY 7 DAYS 12 tablet 0    predniSONE (DELTASONE) 10 MG tablet Take 10 mg by mouth daily        sodium hypochlorite (DAKINS) 0.125 % SOLN external solution Apply topically daily APPLY DAMPENED Dakins GAUZE TO SACRAL WOUND BED OVER LURDES 1 Bottle 2    acetaminophen (TYLENOL) 325 MG tablet Take 650 mg by mouth nightly as needed for Pain        docusate sodium (COLACE) 100 MG capsule Take 100 mg by mouth nightly        sertraline (ZOLOFT) 100 MG tablet TAKE 1 TABLET BY MOUTH EVERY DAY 90 tablet 0    Cyanocobalamin (B-12) 100 MCG TABS Take by mouth daily        aspirin 81 MG tablet Take 81 mg by mouth daily        omeprazole (PRILOSEC) 40 MG delayed release capsule TAKE 1 CAPSULE BY MOUTH TWICE DAILY (Patient taking differently: 40 mg daily ) 180 capsule edges;Undefined edges 9/12/2019  1:44 PM   Exposed structure Bone 9/12/2019  1:44 PM   Mirian-wound Assessment Intact; Pink 9/12/2019  1:44 PM   Non-staged Wound Description Full thickness 9/12/2019  1:44 PM   [place]%Wound Bed 80 9/12/2019  1:44 PM   Red%Wound Bed 30 8/29/2019  1:56 PM   Yellow%Wound Bed 15 9/12/2019  1:44 PM   Black%Wound Bed 5 9/12/2019  1:44 PM   Purple%Wound Bed 20 7/24/2019 11:20 AM   Number of days: 50       Percent of Wound/Ulcer Debrided: 100%    Total Surface Area Debrided:  13.58 sq cm    Diabetic/Pressure/Non Pressure Ulcers only:  Ulcer: Pressure ulcer, Stage 4    Bleeding: Minimal    Hemostasis Achieved: by pressure    Procedural Pain: 5  / 10     Post Procedural Pain: 1 / 10     Response to treatment:  Well tolerated by patient.

## 2019-09-26 ENCOUNTER — HOSPITAL ENCOUNTER (OUTPATIENT)
Dept: WOUND CARE | Age: 79
Discharge: HOME | End: 2019-09-26
Payer: MEDICARE

## 2019-09-26 VITALS
RESPIRATION RATE: 22 BRPM | HEART RATE: 101 BPM | DIASTOLIC BLOOD PRESSURE: 61 MMHG | TEMPERATURE: 97 F | SYSTOLIC BLOOD PRESSURE: 147 MMHG

## 2019-09-26 DIAGNOSIS — L89.154 PRESSURE ULCER OF SACRAL REGION, STAGE 4 (HCC): Primary | ICD-10-CM

## 2019-09-26 DIAGNOSIS — S71.002D: ICD-10-CM

## 2019-09-26 PROCEDURE — 11043 DBRDMT MUSC&/FSCA 1ST 20/<: CPT | Performed by: SURGERY

## 2019-09-26 PROCEDURE — 11043 DBRDMT MUSC&/FSCA 1ST 20/<: CPT

## 2019-09-26 RX ORDER — LIDOCAINE HYDROCHLORIDE 40 MG/ML
SOLUTION TOPICAL ONCE
Status: DISCONTINUED | OUTPATIENT
Start: 2019-09-26 | End: 2019-09-27 | Stop reason: HOSPADM

## 2019-09-26 ASSESSMENT — PAIN SCALES - GENERAL: PAINLEVEL_OUTOF10: 0

## 2019-09-26 NOTE — PROGRESS NOTES
pain     Ulcer Identification:  Ulcer Type: traumatic, pessure  Contributing Factors: decreased mobility, shear force and decreased tissue oxygenation, immune suppression                          PAST MEDICAL HISTORY     Past Medical History             Diagnosis Date    Anxiety and depression      Arthritis      Cancer of skin of leg      Chronic low back pain      GERD (gastroesophageal reflux disease)      Insomnia      Iron deficiency anemia      Kidney stone      PUD (peptic ulcer disease)      Pulmonary fibrosis (HCC)       Dr. Debbie Frank has a CT done every 6 months    Stomach ulcer      Ulcer - lesion MARCH 2010    UTI (urinary tract infection)       frequent            PAST SURGICAL HISTORY     Past Surgical History         Past Surgical History:   Procedure Laterality Date    BACK SURGERY   MARCH 2004,OCT 02, FEB 5     x4--fusions    BACK SURGERY   06/2000     laminectomy-lumbar    CARPAL TUNNEL RELEASE   9/27/11     Left    COLONOSCOPY        CYSTOSCOPY Left 09/2016     cysto left ureteral stent placement    CYSTOSCOPY   01/15/2018     Left Stent Insertion    CYSTOSCOPY   02/14/2018     cysto, left uretero with laser litho, left stent excahnge    EYE SURGERY Bilateral 2008     cataract removed with lens implants    FRACTURE SURGERY Right 06/05/2016     femur fracture    FRACTURE SURGERY Left       femur fracture    HYSTERECTOMY   1993    HYSTEROSCOPY   1993    JOINT REPLACEMENT   4/20/2000     LEFT KNEE    JOINT REPLACEMENT   2/27/08     RIGHT KNEE    [place] [address] W/BRNCL ALVEOLAR LAVAGE N/A 10/24/2018     BRONCHOSCOPY WITH BRONCHOALVEOLAR LAVAGE performed by Negro Sousa DO at [facility]. DEBRIDE NECROTIC SKIN/ TISSUE, GENIT & PERINM Left 11/28/2018     DEBRIDEMENT LEFT HIP SKIN AND SUBCUTANEOUS FASCIA performed by Shira Hernandez MD at [address] [place] [address]     For PUD            FAMILY HISTORY     Family History         Family History daily ) 180 capsule 0    ipratropium-albuterol (DUONEB) 0.5-2.5 (3) MG/3ML SOLN nebulizer solution Inhale 3 mLs into the lungs every 6 hours as needed for Shortness of Breath (wheezing) 360 mL 0    ferrous sulfate 325 (65 Fe) MG tablet Take 325 mg by mouth daily (with breakfast)        OXYGEN Inhale 4 L into the lungs continuous (Patient taking differently: Inhale 3 L into the lungs nightly ) 1 Container 1    Multiple Vitamins-Minerals (MULTIVITAMIN PO) Take 1 tablet by mouth daily.          No current facility-administered medications on file prior to encounter.          REVIEW OF SYSTEMS     Pertinent items are noted in HPI.     Objective:       Pulse 88   Temp 97.4 °F (36.3 °C) (Oral)   Resp 16          Wt Readings from Last 3 Encounters:   08/25/19 121 lb 0.5 oz (54.9 kg)   08/14/19 123 lb 7.3 oz (56 kg)   06/05/19 125 lb 10.6 oz (57 kg)         PHYSICAL EXAM     General Appearance: alert and oriented to person, place and time, with anxious affect, frail-appearing and pale  Skin: warm and dry  Head: normocephalic and atraumatic  Eyes: pupils equal, round, and reactive to light  Pulmonary/Chest: normal air movement, no respiratory distress  Cardiovascular: normal rate and regular rhythm  Wound:  Left hip wound continues to close at surface and is larger beneath skin making application of NPWT foam difficult.  Wound base clean with red, moist tissue.  Sacral wound is deep with exposed muscle.       Assessment:              Problem List Items Addressed This Visit           Open wound of left hip      Pressure ulcer of sacral region, stage 4 (HCC) - Primary                Excisional Debridement Procedure Note  Indications:  Based on my examination of this patient's wound(s)/ulcer(s) today, debridement is required to promote healing and evaluate the wound base. Performed by: Declan Roberts MD    Consent obtained?  Yes    Time out taken: Yes    Pain Control: Anesthetic: 4% Lidocaine Liquid Topical(2.5 ml) Debridement:Excisional Debridement    Using curette the wound/ulcer was sharply debrided    down through and included excision of  subcutaneous tissue and muscle/fascia.         Devitalized Tissue Debrided:  fibrin, biofilm and slough      Pre Debridement Measurements:  Are located in the [facility]  Documentation Flow Sheet   Wound/Ulcer #: 5 and 6     Post  Debridement Measurements:  Wound 12/03/18 Hip Left #5(acquired on 11/28/18-surgically created by Dr. Aria Mcallister) (Active)   Wound Image   7/10/2019 11:09 AM   Wound Other 7/31/2019 11:13 AM   Dressing Status Old drainage 9/26/2019  1:39 PM   Dressing Changed Changed/New 9/12/2019  3:00 PM   Dressing/Treatment Other (comment) 9/12/2019  3:00 PM   Wound Cleansed Rinsed/Irrigated with saline 7/10/2019 11:09 AM   Wound Length (cm) 0.3 cm 9/26/2019  1:39 PM   Wound Width (cm) 0.5 cm 9/26/2019  1:39 PM   Wound Depth (cm) 2 cm 9/26/2019  1:39 PM   Wound Surface Area (cm^2) 0.15 cm^2 9/26/2019  1:39 PM   Change in Wound Size % (l*w) 98.75 9/26/2019  1:39 PM   Wound Volume (cm^3) 0.3 cm^3 9/26/2019  1:39 PM   Wound Healing % 99 9/26/2019  1:39 PM   Post-Procedure Length (cm) 0.4 cm 9/26/2019  1:58 PM   Post-Procedure Width (cm) 0.6 cm 9/26/2019  1:58 PM   Post-Procedure Depth (cm) 2 cm 9/26/2019  1:58 PM   Post-Procedure Surface Area (cm^2) 0.24 cm^2 9/26/2019  1:58 PM   Post-Procedure Volume (cm^3) 0.48 cm^3 9/26/2019  1:58 PM   Distance Tunneling (cm) 0 cm 9/12/2019  1:44 PM   Tunneling Position ___ O'Clock 9 8/19/2019 11:28 AM   Undermining Starts ___ O'Clock 0 9/12/2019  1:44 PM   Undermining Ends___ O'Clock 0 9/12/2019  1:44 PM   Undermining Maxium Distance (cm) 0 9/12/2019  1:44 PM   Wound Assessment Granulation tissue;Slough 9/26/2019  1:39 PM   Drainage Amount Scant 9/26/2019  1:39 PM   Drainage Description Serous 9/26/2019  1:39 PM   Odor None 9/26/2019  1:39 PM   Margins Undefined edges 9/26/2019  1:39 PM   Exposed structure Bone 9/26/2019  1:39 PM Mirian-wound Assessment Excoriated 9/26/2019  1:39 PM   Non-staged Wound Description Full thickness 9/26/2019  1:39 PM   [place]%Wound Bed 90 9/12/2019  1:44 PM   Red%Wound Bed 95 9/26/2019  1:39 PM   Yellow%Wound Bed 5 9/26/2019  1:39 PM   Number of days: 297       Wound 07/24/19 Sacrum Right; Lower Wound #6; noted approx 7/20/19 (Active)   Wound Image   8/19/2019 11:28 AM   Wound Pressure Unstageable 7/31/2019 11:13 AM   Dressing Status Old drainage 9/26/2019  1:39 PM   Dressing Changed Changed/New 9/12/2019  3:00 PM   Dressing/Treatment Other (comment) 9/12/2019  3:00 PM   Wound Length (cm) 3.2 cm 9/26/2019  1:39 PM   Wound Width (cm) 2.1 cm 9/26/2019  1:39 PM   Wound Depth (cm) 1.9 cm 9/26/2019  1:39 PM   Wound Surface Area (cm^2) 6.72 cm^2 9/26/2019  1:39 PM   Change in Wound Size % (l*w) -12 9/26/2019  1:39 PM   Wound Volume (cm^3) 12.77 cm^3 9/26/2019  1:39 PM   Wound Healing % -2028 9/26/2019  1:39 PM   Post-Procedure Length (cm) 3.3 cm 9/26/2019  1:58 PM   Post-Procedure Width (cm) 2.2 cm 9/26/2019  1:58 PM   Post-Procedure Depth (cm) 1.9 cm 9/26/2019  1:58 PM   Post-Procedure Surface Area (cm^2) 7.26 cm^2 9/26/2019  1:58 PM   Post-Procedure Volume (cm^3) 13.79 cm^3 9/26/2019  1:58 PM   Distance Tunneling (cm) 1.5 cm 8/19/2019 11:28 AM   Tunneling Position ___ O'Clock 11 8/19/2019 11:28 AM   Undermining Starts ___ O'Clock 9 9/26/2019  1:39 PM   Undermining Ends___ O'Clock 3 9/26/2019  1:39 PM   Undermining Maxium Distance (cm) 2.5 9/26/2019  1:39 PM   Wound Assessment Granulation tissue;Slough 9/26/2019  1:39 PM   Drainage Amount Small 9/26/2019  1:39 PM   Drainage Description Serosanguinous 9/26/2019  1:39 PM   Odor None 9/26/2019  1:39 PM   Margins Unattached edges 9/26/2019  1:39 PM   Exposed structure Bone 9/26/2019  1:39 PM   Mirian-wound Assessment Dry;Pink 9/26/2019  1:39 PM   Non-staged Wound Description Full thickness 9/26/2019  1:39 PM   [place]%Wound Bed 30 9/26/2019  1:39 PM   Red%Wound Bed 30 8/29/2019 1:56 PM   Yellow%Wound Bed 70 9/26/2019  1:39 PM   Black%Wound Bed 5 9/12/2019  1:44 PM   Purple%Wound Bed 20 7/24/2019 11:20 AM   Number of days: 64       Percent of Wound/Ulcer Debrided: 100%    Total Surface Area Debrided:  6.96 sq cm    Diabetic/Pressure/Non Pressure Ulcers only:  Ulcer: Pressure ulcer, Stage 4    Bleeding: Minimal    Hemostasis Achieved: by pressure    Procedural Pain: 5  / 10     Post Procedural Pain: 2 / 10     Response to treatment:  Well tolerated by patient. Discharge Instructions         Discharge [address] and [facility] Therapy   Physician Orders and Discharge Instructions  [address]   [address] [place] [address]  Telephone: [phone]      FAX [phone]     NAME: Jana Womack OF BIRTH:  1940  MEDICAL RECORD NUMBER:  [id number]  DATE:  9/26/2019     Wound Cleansing:   Do not scrub or use excessive force. Cleanse wound prior to applying a clean dressing with:  [] Normal Saline            [] Keep Wound Dry in Shower    [] Wound Cleanser   [] Cleanse wound with Mild Soap & Water  [] May Shower at Discharge   [] Other:        Topical Treatments:  Do not apply lotions, creams, or ointments to wound bed unless directed. [] Apply moisturizing lotion to skin surrounding the wound prior to dressing change.  [] Apply antifungal ointment to skin surrounding the wound prior to dressing change.  [] Apply thin film of moisture barrier ointment to skin immediately around wound.   [] Other:       Dressings:                  Wound Location -    RIGHT SACRAL  [x] Apply Primary Dressing:                                              [] MediHoney Gel          [] Alginate with Silver    [] Alginate              [] Collagen         [] Collagen with Silver   [] Santyl with Moisten saline gauze                [] Hydrocolloid     [] MediHoney Alginate [] Foam with Silver              [] Foam   [] Hydrofera Blue         [] Mepilex Border                         [] Moisten with Saline    [] Hydrogel         [] Mepitel                          [] Bactroban/Mupirocin  [] Polysporin                  [x] Other:  WET TO DRY NS IN CLINIC TODAY- RE APPLY VAC ONCE RETURNED TO ECF  [x] Pack wound loosely with       [] Iodoform   [] Plain Packing       [] Other -   [x] Cover and Secure with:                       [x] Gauze            [] Daniela   [] Kerlix              [] Ace Wrap       [] Cover Roll Tape         [x] ABD                             [] Other:               Avoid contact of tape with skin. [x] Change dressing:       [] Daily               [] Every Other Day        [] Three times per week              [] Once a week  [] Do Not Change Dressing         [] Other:       RIGHT SACRAL WOUND-  IRRIGATING VAC     VERAFLO INSTILL 10 MINUTE SOAKS EVERY 4 HOURS  mmHG TWICE A WEEK           LEFT HIP-    PLAIN ALGINATE (WOUND DEPTH 2.0CM ON 9/26/19  GAUZE  COVER ROLL  THREE TIMES A WEEK             Pressure Relief:  [] When sitting, shift position or do seat lifts every 15 minutes.   [] Wheelchair cushion   [] Specialty Bed/Mattress  [] Turn every 2 hours when in bed.  Avoid position directing pressure on wound site.  Limit side lying to 30 degree tilt.  Limit HOB elevation to 30 degrees.     Edema Control:  Apply:  [] Compression Stocking           []Right Leg         []Left Leg              [] Tubigrip          []Right Leg Double Layer          []Left Leg Double Layer                                                  []Right Leg Single Layer           []Left Leg Single Layer              [] SpandaGrip    []Right Leg         []Left Leg                                      []Low compression 5-10 mm/Hg                                                 []Medium compression 10-20 mm/Hg                                      []High compression  20-30 mm/Hg              every morning

## 2019-10-04 ENCOUNTER — TELEPHONE (OUTPATIENT)
Dept: INTERNAL MEDICINE CLINIC | Age: 79
End: 2019-10-04

## 2019-10-05 ENCOUNTER — APPOINTMENT (OUTPATIENT)
Dept: GENERAL RADIOLOGY | Age: 79
DRG: 853 | End: 2019-10-05
Payer: MEDICARE

## 2019-10-05 ENCOUNTER — APPOINTMENT (OUTPATIENT)
Dept: CT IMAGING | Age: 79
DRG: 853 | End: 2019-10-05
Payer: MEDICARE

## 2019-10-05 ENCOUNTER — HOSPITAL ENCOUNTER (INPATIENT)
Age: 79
LOS: 6 days | Discharge: NURSING, REHAB OR LONG-TERM CARE | DRG: 853 | End: 2019-10-11
Attending: EMERGENCY MEDICINE | Admitting: INTERNAL MEDICINE
Payer: MEDICARE

## 2019-10-05 DIAGNOSIS — A41.9 SEPSIS, DUE TO UNSPECIFIED ORGANISM, UNSPECIFIED WHETHER ACUTE ORGAN DYSFUNCTION PRESENT (HCC): ICD-10-CM

## 2019-10-05 DIAGNOSIS — J96.21 ACUTE ON CHRONIC RESPIRATORY FAILURE WITH HYPOXIA (HCC): Primary | ICD-10-CM

## 2019-10-05 DIAGNOSIS — S22.040A COMPRESSION FRACTURE OF T4 VERTEBRA, INITIAL ENCOUNTER (HCC): ICD-10-CM

## 2019-10-05 DIAGNOSIS — J18.9 HCAP (HEALTHCARE-ASSOCIATED PNEUMONIA): ICD-10-CM

## 2019-10-05 LAB
ANION GAP SERPL CALCULATED.3IONS-SCNC: 14 MMOL/L (ref 3–16)
BASOPHILS ABSOLUTE: 0 K/UL (ref 0–0.2)
BASOPHILS RELATIVE PERCENT: 0.2 %
BUN BLDV-MCNC: 23 MG/DL (ref 7–20)
CALCIUM SERPL-MCNC: 10.8 MG/DL (ref 8.3–10.6)
CHLORIDE BLD-SCNC: 100 MMOL/L (ref 99–110)
CO2: 26 MMOL/L (ref 21–32)
CREAT SERPL-MCNC: 0.8 MG/DL (ref 0.6–1.2)
EOSINOPHILS ABSOLUTE: 0.1 K/UL (ref 0–0.6)
EOSINOPHILS RELATIVE PERCENT: 0.7 %
GFR AFRICAN AMERICAN: >60
GFR NON-AFRICAN AMERICAN: >60
GLUCOSE BLD-MCNC: 130 MG/DL (ref 70–99)
HCT VFR BLD CALC: 35.1 % (ref 36–48)
HEMOGLOBIN: 11.1 G/DL (ref 12–16)
LACTIC ACID: 1.8 MMOL/L (ref 0.4–2)
LYMPHOCYTES ABSOLUTE: 0.6 K/UL (ref 1–5.1)
LYMPHOCYTES RELATIVE PERCENT: 4.1 %
MCH RBC QN AUTO: 28 PG (ref 26–34)
MCHC RBC AUTO-ENTMCNC: 31.8 G/DL (ref 31–36)
MCV RBC AUTO: 88.1 FL (ref 80–100)
MONOCYTES ABSOLUTE: 0.6 K/UL (ref 0–1.3)
MONOCYTES RELATIVE PERCENT: 3.6 %
NEUTROPHILS ABSOLUTE: 14 K/UL (ref 1.7–7.7)
NEUTROPHILS RELATIVE PERCENT: 91.4 %
PDW BLD-RTO: 15.4 % (ref 12.4–15.4)
PLATELET # BLD: 335 K/UL (ref 135–450)
PMV BLD AUTO: 7.3 FL (ref 5–10.5)
POTASSIUM REFLEX MAGNESIUM: 4.6 MMOL/L (ref 3.5–5.1)
PRO-BNP: 386 PG/ML (ref 0–449)
RAPID INFLUENZA  B AGN: NEGATIVE
RAPID INFLUENZA A AGN: NEGATIVE
RBC # BLD: 3.98 M/UL (ref 4–5.2)
SODIUM BLD-SCNC: 140 MMOL/L (ref 136–145)
TROPONIN: <0.01 NG/ML
WBC # BLD: 15.3 K/UL (ref 4–11)

## 2019-10-05 PROCEDURE — 1200000000 HC SEMI PRIVATE

## 2019-10-05 PROCEDURE — 87040 BLOOD CULTURE FOR BACTERIA: CPT

## 2019-10-05 PROCEDURE — 6360000002 HC RX W HCPCS: Performed by: INTERNAL MEDICINE

## 2019-10-05 PROCEDURE — 93005 ELECTROCARDIOGRAM TRACING: CPT | Performed by: EMERGENCY MEDICINE

## 2019-10-05 PROCEDURE — 84484 ASSAY OF TROPONIN QUANT: CPT

## 2019-10-05 PROCEDURE — 80048 BASIC METABOLIC PNL TOTAL CA: CPT

## 2019-10-05 PROCEDURE — 6370000000 HC RX 637 (ALT 250 FOR IP): Performed by: PHYSICIAN ASSISTANT

## 2019-10-05 PROCEDURE — 87804 INFLUENZA ASSAY W/OPTIC: CPT

## 2019-10-05 PROCEDURE — 2700000000 HC OXYGEN THERAPY PER DAY

## 2019-10-05 PROCEDURE — 96367 TX/PROPH/DG ADDL SEQ IV INF: CPT

## 2019-10-05 PROCEDURE — 96375 TX/PRO/DX INJ NEW DRUG ADDON: CPT

## 2019-10-05 PROCEDURE — 87641 MR-STAPH DNA AMP PROBE: CPT

## 2019-10-05 PROCEDURE — 96366 THER/PROPH/DIAG IV INF ADDON: CPT

## 2019-10-05 PROCEDURE — 6370000000 HC RX 637 (ALT 250 FOR IP): Performed by: INTERNAL MEDICINE

## 2019-10-05 PROCEDURE — 2580000003 HC RX 258: Performed by: INTERNAL MEDICINE

## 2019-10-05 PROCEDURE — 71046 X-RAY EXAM CHEST 2 VIEWS: CPT

## 2019-10-05 PROCEDURE — 2580000003 HC RX 258: Performed by: PHYSICIAN ASSISTANT

## 2019-10-05 PROCEDURE — 99285 EMERGENCY DEPT VISIT HI MDM: CPT

## 2019-10-05 PROCEDURE — 83605 ASSAY OF LACTIC ACID: CPT

## 2019-10-05 PROCEDURE — 6360000002 HC RX W HCPCS: Performed by: PHYSICIAN ASSISTANT

## 2019-10-05 PROCEDURE — 96365 THER/PROPH/DIAG IV INF INIT: CPT

## 2019-10-05 PROCEDURE — 71260 CT THORAX DX C+: CPT

## 2019-10-05 PROCEDURE — 87081 CULTURE SCREEN ONLY: CPT

## 2019-10-05 PROCEDURE — 85025 COMPLETE CBC W/AUTO DIFF WBC: CPT

## 2019-10-05 PROCEDURE — 6360000004 HC RX CONTRAST MEDICATION

## 2019-10-05 PROCEDURE — 83880 ASSAY OF NATRIURETIC PEPTIDE: CPT

## 2019-10-05 PROCEDURE — 94640 AIRWAY INHALATION TREATMENT: CPT

## 2019-10-05 RX ORDER — DOCUSATE SODIUM 100 MG/1
100 CAPSULE, LIQUID FILLED ORAL NIGHTLY
Status: DISCONTINUED | OUTPATIENT
Start: 2019-10-05 | End: 2019-10-11 | Stop reason: HOSPADM

## 2019-10-05 RX ORDER — FERROUS SULFATE TAB EC 324 MG (65 MG FE EQUIVALENT) 324 (65 FE) MG
324 TABLET DELAYED RESPONSE ORAL
Status: DISCONTINUED | OUTPATIENT
Start: 2019-10-06 | End: 2019-10-11 | Stop reason: HOSPADM

## 2019-10-05 RX ORDER — IPRATROPIUM BROMIDE AND ALBUTEROL SULFATE 2.5; .5 MG/3ML; MG/3ML
3 SOLUTION RESPIRATORY (INHALATION) ONCE
Status: COMPLETED | OUTPATIENT
Start: 2019-10-05 | End: 2019-10-05

## 2019-10-05 RX ORDER — ALENDRONATE SODIUM 35 MG/1
70 TABLET ORAL WEEKLY
Status: DISCONTINUED | OUTPATIENT
Start: 2019-10-05 | End: 2019-10-05

## 2019-10-05 RX ORDER — ASPIRIN 81 MG/1
81 TABLET, CHEWABLE ORAL DAILY
Status: DISCONTINUED | OUTPATIENT
Start: 2019-10-06 | End: 2019-10-11 | Stop reason: HOSPADM

## 2019-10-05 RX ORDER — ACETAMINOPHEN 325 MG/1
650 TABLET ORAL EVERY 4 HOURS PRN
Status: DISCONTINUED | OUTPATIENT
Start: 2019-10-05 | End: 2019-10-11 | Stop reason: HOSPADM

## 2019-10-05 RX ORDER — SODIUM CHLORIDE 0.9 % (FLUSH) 0.9 %
10 SYRINGE (ML) INJECTION PRN
Status: DISCONTINUED | OUTPATIENT
Start: 2019-10-05 | End: 2019-10-11 | Stop reason: HOSPADM

## 2019-10-05 RX ORDER — M-VIT,TX,IRON,MINS/CALC/FOLIC 27MG-0.4MG
1 TABLET ORAL DAILY
Status: DISCONTINUED | OUTPATIENT
Start: 2019-10-06 | End: 2019-10-11 | Stop reason: HOSPADM

## 2019-10-05 RX ORDER — SERTRALINE HYDROCHLORIDE 100 MG/1
100 TABLET, FILM COATED ORAL DAILY
Status: DISCONTINUED | OUTPATIENT
Start: 2019-10-06 | End: 2019-10-11 | Stop reason: HOSPADM

## 2019-10-05 RX ORDER — BUPROPION HYDROCHLORIDE 100 MG/1
100 TABLET ORAL DAILY
Status: DISCONTINUED | OUTPATIENT
Start: 2019-10-06 | End: 2019-10-11 | Stop reason: HOSPADM

## 2019-10-05 RX ORDER — PANTOPRAZOLE SODIUM 40 MG/1
40 TABLET, DELAYED RELEASE ORAL
Status: DISCONTINUED | OUTPATIENT
Start: 2019-10-06 | End: 2019-10-11 | Stop reason: HOSPADM

## 2019-10-05 RX ORDER — SODIUM CHLORIDE 0.9 % (FLUSH) 0.9 %
10 SYRINGE (ML) INJECTION EVERY 12 HOURS SCHEDULED
Status: DISCONTINUED | OUTPATIENT
Start: 2019-10-05 | End: 2019-10-11 | Stop reason: HOSPADM

## 2019-10-05 RX ORDER — METHYLPREDNISOLONE SODIUM SUCCINATE 125 MG/2ML
125 INJECTION, POWDER, LYOPHILIZED, FOR SOLUTION INTRAMUSCULAR; INTRAVENOUS ONCE
Status: COMPLETED | OUTPATIENT
Start: 2019-10-05 | End: 2019-10-05

## 2019-10-05 RX ORDER — IPRATROPIUM BROMIDE AND ALBUTEROL SULFATE 2.5; .5 MG/3ML; MG/3ML
1 SOLUTION RESPIRATORY (INHALATION)
Status: DISCONTINUED | OUTPATIENT
Start: 2019-10-06 | End: 2019-10-11 | Stop reason: HOSPADM

## 2019-10-05 RX ORDER — UBIDECARENONE 75 MG
100 CAPSULE ORAL DAILY
Status: DISCONTINUED | OUTPATIENT
Start: 2019-10-06 | End: 2019-10-11 | Stop reason: HOSPADM

## 2019-10-05 RX ORDER — ONDANSETRON 2 MG/ML
4 INJECTION INTRAMUSCULAR; INTRAVENOUS EVERY 6 HOURS PRN
Status: DISCONTINUED | OUTPATIENT
Start: 2019-10-05 | End: 2019-10-11 | Stop reason: HOSPADM

## 2019-10-05 RX ORDER — 0.9 % SODIUM CHLORIDE 0.9 %
30 INTRAVENOUS SOLUTION INTRAVENOUS ONCE
Status: DISCONTINUED | OUTPATIENT
Start: 2019-10-05 | End: 2019-10-05

## 2019-10-05 RX ORDER — METHYLPREDNISOLONE SODIUM SUCCINATE 40 MG/ML
40 INJECTION, POWDER, LYOPHILIZED, FOR SOLUTION INTRAMUSCULAR; INTRAVENOUS EVERY 6 HOURS
Status: DISCONTINUED | OUTPATIENT
Start: 2019-10-05 | End: 2019-10-09

## 2019-10-05 RX ADMIN — METHYLPREDNISOLONE SODIUM SUCCINATE 40 MG: 40 INJECTION, POWDER, FOR SOLUTION INTRAMUSCULAR; INTRAVENOUS at 22:38

## 2019-10-05 RX ADMIN — VANCOMYCIN HYDROCHLORIDE 750 MG: 750 INJECTION, POWDER, LYOPHILIZED, FOR SOLUTION INTRAVENOUS at 17:26

## 2019-10-05 RX ADMIN — IOPAMIDOL 75 ML: 755 INJECTION, SOLUTION INTRAVENOUS at 15:15

## 2019-10-05 RX ADMIN — IPRATROPIUM BROMIDE AND ALBUTEROL SULFATE 3 AMPULE: .5; 3 SOLUTION RESPIRATORY (INHALATION) at 15:50

## 2019-10-05 RX ADMIN — METHYLPREDNISOLONE SODIUM SUCCINATE 125 MG: 125 INJECTION, POWDER, FOR SOLUTION INTRAMUSCULAR; INTRAVENOUS at 15:28

## 2019-10-05 RX ADMIN — AZITHROMYCIN MONOHYDRATE 500 MG: 500 INJECTION, POWDER, LYOPHILIZED, FOR SOLUTION INTRAVENOUS at 18:50

## 2019-10-05 RX ADMIN — DOCUSATE SODIUM 100 MG: 100 CAPSULE, LIQUID FILLED ORAL at 22:38

## 2019-10-05 RX ADMIN — CEFEPIME HYDROCHLORIDE 2 G: 2 INJECTION, POWDER, FOR SOLUTION INTRAVENOUS at 15:28

## 2019-10-05 RX ADMIN — SODIUM CHLORIDE, PRESERVATIVE FREE 10 ML: 5 INJECTION INTRAVENOUS at 22:40

## 2019-10-05 ASSESSMENT — PAIN SCALES - GENERAL
PAINLEVEL_OUTOF10: 0

## 2019-10-06 LAB
ANION GAP SERPL CALCULATED.3IONS-SCNC: 14 MMOL/L (ref 3–16)
BASOPHILS ABSOLUTE: 0 K/UL (ref 0–0.2)
BASOPHILS RELATIVE PERCENT: 0 %
BILIRUBIN URINE: NEGATIVE
BLOOD, URINE: ABNORMAL
BUN BLDV-MCNC: 22 MG/DL (ref 7–20)
CALCIUM SERPL-MCNC: 10 MG/DL (ref 8.3–10.6)
CHLORIDE BLD-SCNC: 103 MMOL/L (ref 99–110)
CLARITY: CLEAR
CO2: 23 MMOL/L (ref 21–32)
COLOR: YELLOW
CREAT SERPL-MCNC: 0.7 MG/DL (ref 0.6–1.2)
EKG ATRIAL RATE: 94 BPM
EKG DIAGNOSIS: NORMAL
EKG P AXIS: 19 DEGREES
EKG P-R INTERVAL: 122 MS
EKG Q-T INTERVAL: 376 MS
EKG QRS DURATION: 80 MS
EKG QTC CALCULATION (BAZETT): 470 MS
EKG R AXIS: -16 DEGREES
EKG T AXIS: 36 DEGREES
EKG VENTRICULAR RATE: 94 BPM
EOSINOPHILS ABSOLUTE: 0 K/UL (ref 0–0.6)
EOSINOPHILS RELATIVE PERCENT: 0 %
EPITHELIAL CELLS, UA: 1 /HPF (ref 0–5)
GFR AFRICAN AMERICAN: >60
GFR NON-AFRICAN AMERICAN: >60
GLUCOSE BLD-MCNC: 155 MG/DL (ref 70–99)
GLUCOSE URINE: NEGATIVE MG/DL
HCT VFR BLD CALC: 33.2 % (ref 36–48)
HEMOGLOBIN: 10.5 G/DL (ref 12–16)
HYALINE CASTS: 1 /LPF (ref 0–8)
KETONES, URINE: NEGATIVE MG/DL
LEUKOCYTE ESTERASE, URINE: NEGATIVE
LYMPHOCYTES ABSOLUTE: 0.5 K/UL (ref 1–5.1)
LYMPHOCYTES RELATIVE PERCENT: 4.1 %
MAGNESIUM: 2.3 MG/DL (ref 1.8–2.4)
MCH RBC QN AUTO: 27.8 PG (ref 26–34)
MCHC RBC AUTO-ENTMCNC: 31.7 G/DL (ref 31–36)
MCV RBC AUTO: 87.6 FL (ref 80–100)
MICROSCOPIC EXAMINATION: YES
MONOCYTES ABSOLUTE: 0.2 K/UL (ref 0–1.3)
MONOCYTES RELATIVE PERCENT: 1.6 %
NEUTROPHILS ABSOLUTE: 12.5 K/UL (ref 1.7–7.7)
NEUTROPHILS RELATIVE PERCENT: 94.3 %
NITRITE, URINE: POSITIVE
PDW BLD-RTO: 15.2 % (ref 12.4–15.4)
PH UA: 6 (ref 5–8)
PLATELET # BLD: 325 K/UL (ref 135–450)
PMV BLD AUTO: 7.9 FL (ref 5–10.5)
POTASSIUM REFLEX MAGNESIUM: 4.9 MMOL/L (ref 3.5–5.1)
PROCALCITONIN: 0.08 NG/ML (ref 0–0.15)
PROCALCITONIN: 0.1 NG/ML (ref 0–0.15)
PROTEIN UA: ABNORMAL MG/DL
RBC # BLD: 3.79 M/UL (ref 4–5.2)
RBC UA: 5 /HPF (ref 0–4)
REPORT: NORMAL
RESPIRATORY PANEL PCR: NORMAL
SODIUM BLD-SCNC: 140 MMOL/L (ref 136–145)
SPECIFIC GRAVITY UA: >1.03 (ref 1–1.03)
URINE REFLEX TO CULTURE: YES
URINE TYPE: ABNORMAL
UROBILINOGEN, URINE: 0.2 E.U./DL
WBC # BLD: 13.3 K/UL (ref 4–11)
WBC UA: 2 /HPF (ref 0–5)

## 2019-10-06 PROCEDURE — 6370000000 HC RX 637 (ALT 250 FOR IP): Performed by: INTERNAL MEDICINE

## 2019-10-06 PROCEDURE — 87798 DETECT AGENT NOS DNA AMP: CPT

## 2019-10-06 PROCEDURE — 87086 URINE CULTURE/COLONY COUNT: CPT

## 2019-10-06 PROCEDURE — 94640 AIRWAY INHALATION TREATMENT: CPT

## 2019-10-06 PROCEDURE — 94760 N-INVAS EAR/PLS OXIMETRY 1: CPT

## 2019-10-06 PROCEDURE — 2580000003 HC RX 258: Performed by: INTERNAL MEDICINE

## 2019-10-06 PROCEDURE — 85025 COMPLETE CBC W/AUTO DIFF WBC: CPT

## 2019-10-06 PROCEDURE — 93010 ELECTROCARDIOGRAM REPORT: CPT | Performed by: INTERNAL MEDICINE

## 2019-10-06 PROCEDURE — 6360000002 HC RX W HCPCS: Performed by: INTERNAL MEDICINE

## 2019-10-06 PROCEDURE — 83735 ASSAY OF MAGNESIUM: CPT

## 2019-10-06 PROCEDURE — 87070 CULTURE OTHR SPECIMN AEROBIC: CPT

## 2019-10-06 PROCEDURE — 87633 RESP VIRUS 12-25 TARGETS: CPT

## 2019-10-06 PROCEDURE — 36415 COLL VENOUS BLD VENIPUNCTURE: CPT

## 2019-10-06 PROCEDURE — 87486 CHLMYD PNEUM DNA AMP PROBE: CPT

## 2019-10-06 PROCEDURE — 2700000000 HC OXYGEN THERAPY PER DAY

## 2019-10-06 PROCEDURE — 87581 M.PNEUMON DNA AMP PROBE: CPT

## 2019-10-06 PROCEDURE — 84145 PROCALCITONIN (PCT): CPT

## 2019-10-06 PROCEDURE — 81001 URINALYSIS AUTO W/SCOPE: CPT

## 2019-10-06 PROCEDURE — 99223 1ST HOSP IP/OBS HIGH 75: CPT | Performed by: INTERNAL MEDICINE

## 2019-10-06 PROCEDURE — 80048 BASIC METABOLIC PNL TOTAL CA: CPT

## 2019-10-06 PROCEDURE — 1200000000 HC SEMI PRIVATE

## 2019-10-06 RX ADMIN — CEFEPIME HYDROCHLORIDE 2 G: 2 INJECTION, POWDER, FOR SOLUTION INTRAVENOUS at 16:02

## 2019-10-06 RX ADMIN — SERTRALINE 100 MG: 100 TABLET, FILM COATED ORAL at 08:42

## 2019-10-06 RX ADMIN — METHYLPREDNISOLONE SODIUM SUCCINATE 40 MG: 40 INJECTION, POWDER, FOR SOLUTION INTRAMUSCULAR; INTRAVENOUS at 04:47

## 2019-10-06 RX ADMIN — AZITHROMYCIN MONOHYDRATE 500 MG: 500 INJECTION, POWDER, LYOPHILIZED, FOR SOLUTION INTRAVENOUS at 20:19

## 2019-10-06 RX ADMIN — PANTOPRAZOLE SODIUM 40 MG: 40 TABLET, DELAYED RELEASE ORAL at 07:16

## 2019-10-06 RX ADMIN — CEFEPIME HYDROCHLORIDE 2 G: 2 INJECTION, POWDER, FOR SOLUTION INTRAVENOUS at 04:47

## 2019-10-06 RX ADMIN — SODIUM CHLORIDE, PRESERVATIVE FREE 10 ML: 5 INJECTION INTRAVENOUS at 09:00

## 2019-10-06 RX ADMIN — Medication 100 MCG: at 08:42

## 2019-10-06 RX ADMIN — IPRATROPIUM BROMIDE AND ALBUTEROL SULFATE 3 ML: .5; 3 SOLUTION RESPIRATORY (INHALATION) at 08:42

## 2019-10-06 RX ADMIN — IPRATROPIUM BROMIDE AND ALBUTEROL SULFATE 3 ML: .5; 3 SOLUTION RESPIRATORY (INHALATION) at 16:25

## 2019-10-06 RX ADMIN — BUPROPION HYDROCHLORIDE 100 MG: 100 TABLET, FILM COATED ORAL at 08:43

## 2019-10-06 RX ADMIN — DOCUSATE SODIUM 100 MG: 100 CAPSULE, LIQUID FILLED ORAL at 20:19

## 2019-10-06 RX ADMIN — IPRATROPIUM BROMIDE AND ALBUTEROL SULFATE 3 ML: .5; 3 SOLUTION RESPIRATORY (INHALATION) at 20:27

## 2019-10-06 RX ADMIN — METHYLPREDNISOLONE SODIUM SUCCINATE 40 MG: 40 INJECTION, POWDER, FOR SOLUTION INTRAMUSCULAR; INTRAVENOUS at 10:27

## 2019-10-06 RX ADMIN — METHYLPREDNISOLONE SODIUM SUCCINATE 40 MG: 40 INJECTION, POWDER, FOR SOLUTION INTRAMUSCULAR; INTRAVENOUS at 16:02

## 2019-10-06 RX ADMIN — IPRATROPIUM BROMIDE AND ALBUTEROL SULFATE 3 ML: .5; 3 SOLUTION RESPIRATORY (INHALATION) at 12:45

## 2019-10-06 RX ADMIN — ASPIRIN 81 MG 81 MG: 81 TABLET ORAL at 08:42

## 2019-10-06 RX ADMIN — MULTIPLE VITAMINS W/ MINERALS TAB 1 TABLET: TAB at 08:42

## 2019-10-06 RX ADMIN — ENOXAPARIN SODIUM 40 MG: 40 INJECTION SUBCUTANEOUS at 08:42

## 2019-10-06 RX ADMIN — VANCOMYCIN HYDROCHLORIDE 750 MG: 750 INJECTION, POWDER, LYOPHILIZED, FOR SOLUTION INTRAVENOUS at 17:33

## 2019-10-06 RX ADMIN — METHYLPREDNISOLONE SODIUM SUCCINATE 40 MG: 40 INJECTION, POWDER, FOR SOLUTION INTRAMUSCULAR; INTRAVENOUS at 20:24

## 2019-10-06 RX ADMIN — SODIUM CHLORIDE, PRESERVATIVE FREE 10 ML: 5 INJECTION INTRAVENOUS at 22:08

## 2019-10-06 RX ADMIN — FERROUS SULFATE TAB EC 324 MG (65 MG FE EQUIVALENT) 324 MG: 324 (65 FE) TABLET DELAYED RESPONSE at 08:42

## 2019-10-06 ASSESSMENT — ENCOUNTER SYMPTOMS
COUGH: 1
VOMITING: 1
SHORTNESS OF BREATH: 1
NAUSEA: 1
ABDOMINAL PAIN: 0
RHINORRHEA: 0

## 2019-10-06 ASSESSMENT — PAIN SCALES - GENERAL
PAINLEVEL_OUTOF10: 0
PAINLEVEL_OUTOF10: 0

## 2019-10-07 ENCOUNTER — APPOINTMENT (OUTPATIENT)
Dept: CT IMAGING | Age: 79
DRG: 853 | End: 2019-10-07
Payer: MEDICARE

## 2019-10-07 LAB
C-REACTIVE PROTEIN: 44.4 MG/L (ref 0–5.1)
MRSA CULTURE ONLY: NORMAL
REASON FOR REJECTION: NORMAL
REJECTED TEST: NORMAL
SEDIMENTATION RATE, ERYTHROCYTE: 80 MM/HR (ref 0–30)
URINE CULTURE, ROUTINE: NORMAL

## 2019-10-07 PROCEDURE — 6360000002 HC RX W HCPCS: Performed by: INTERNAL MEDICINE

## 2019-10-07 PROCEDURE — 97162 PT EVAL MOD COMPLEX 30 MIN: CPT

## 2019-10-07 PROCEDURE — 6370000000 HC RX 637 (ALT 250 FOR IP): Performed by: INTERNAL MEDICINE

## 2019-10-07 PROCEDURE — 99231 SBSQ HOSP IP/OBS SF/LOW 25: CPT | Performed by: NURSE PRACTITIONER

## 2019-10-07 PROCEDURE — 97116 GAIT TRAINING THERAPY: CPT

## 2019-10-07 PROCEDURE — 85652 RBC SED RATE AUTOMATED: CPT

## 2019-10-07 PROCEDURE — 2580000003 HC RX 258: Performed by: INTERNAL MEDICINE

## 2019-10-07 PROCEDURE — 97530 THERAPEUTIC ACTIVITIES: CPT

## 2019-10-07 PROCEDURE — 36415 COLL VENOUS BLD VENIPUNCTURE: CPT

## 2019-10-07 PROCEDURE — 1200000000 HC SEMI PRIVATE

## 2019-10-07 PROCEDURE — 2700000000 HC OXYGEN THERAPY PER DAY

## 2019-10-07 PROCEDURE — 99233 SBSQ HOSP IP/OBS HIGH 50: CPT | Performed by: INTERNAL MEDICINE

## 2019-10-07 PROCEDURE — 94761 N-INVAS EAR/PLS OXIMETRY MLT: CPT

## 2019-10-07 PROCEDURE — 94640 AIRWAY INHALATION TREATMENT: CPT

## 2019-10-07 PROCEDURE — 11043 DBRDMT MUSC&/FSCA 1ST 20/<: CPT | Performed by: SURGERY

## 2019-10-07 PROCEDURE — 72131 CT LUMBAR SPINE W/O DYE: CPT

## 2019-10-07 PROCEDURE — 99232 SBSQ HOSP IP/OBS MODERATE 35: CPT | Performed by: INTERNAL MEDICINE

## 2019-10-07 PROCEDURE — 0JB70ZZ EXCISION OF BACK SUBCUTANEOUS TISSUE AND FASCIA, OPEN APPROACH: ICD-10-PCS | Performed by: SURGERY

## 2019-10-07 PROCEDURE — 86140 C-REACTIVE PROTEIN: CPT

## 2019-10-07 RX ADMIN — ENOXAPARIN SODIUM 40 MG: 40 INJECTION SUBCUTANEOUS at 09:20

## 2019-10-07 RX ADMIN — MULTIPLE VITAMINS W/ MINERALS TAB 1 TABLET: TAB at 09:20

## 2019-10-07 RX ADMIN — SODIUM CHLORIDE, PRESERVATIVE FREE 10 ML: 5 INJECTION INTRAVENOUS at 20:52

## 2019-10-07 RX ADMIN — METHYLPREDNISOLONE SODIUM SUCCINATE 40 MG: 40 INJECTION, POWDER, FOR SOLUTION INTRAMUSCULAR; INTRAVENOUS at 09:19

## 2019-10-07 RX ADMIN — IPRATROPIUM BROMIDE AND ALBUTEROL SULFATE 3 ML: .5; 3 SOLUTION RESPIRATORY (INHALATION) at 16:46

## 2019-10-07 RX ADMIN — METHYLPREDNISOLONE SODIUM SUCCINATE 40 MG: 40 INJECTION, POWDER, FOR SOLUTION INTRAMUSCULAR; INTRAVENOUS at 05:27

## 2019-10-07 RX ADMIN — IPRATROPIUM BROMIDE AND ALBUTEROL SULFATE 3 ML: .5; 3 SOLUTION RESPIRATORY (INHALATION) at 09:34

## 2019-10-07 RX ADMIN — AZITHROMYCIN MONOHYDRATE 500 MG: 500 INJECTION, POWDER, LYOPHILIZED, FOR SOLUTION INTRAVENOUS at 16:54

## 2019-10-07 RX ADMIN — IPRATROPIUM BROMIDE AND ALBUTEROL SULFATE 3 ML: .5; 3 SOLUTION RESPIRATORY (INHALATION) at 20:19

## 2019-10-07 RX ADMIN — CEFEPIME HYDROCHLORIDE 2 G: 2 INJECTION, POWDER, FOR SOLUTION INTRAVENOUS at 16:54

## 2019-10-07 RX ADMIN — FERROUS SULFATE TAB EC 324 MG (65 MG FE EQUIVALENT) 324 MG: 324 (65 FE) TABLET DELAYED RESPONSE at 09:19

## 2019-10-07 RX ADMIN — ASPIRIN 81 MG 81 MG: 81 TABLET ORAL at 09:19

## 2019-10-07 RX ADMIN — BUPROPION HYDROCHLORIDE 100 MG: 100 TABLET, FILM COATED ORAL at 09:19

## 2019-10-07 RX ADMIN — PANTOPRAZOLE SODIUM 40 MG: 40 TABLET, DELAYED RELEASE ORAL at 09:19

## 2019-10-07 RX ADMIN — Medication 100 MCG: at 09:19

## 2019-10-07 RX ADMIN — SERTRALINE 100 MG: 100 TABLET, FILM COATED ORAL at 09:20

## 2019-10-07 RX ADMIN — CEFEPIME HYDROCHLORIDE 2 G: 2 INJECTION, POWDER, FOR SOLUTION INTRAVENOUS at 05:26

## 2019-10-07 RX ADMIN — METHYLPREDNISOLONE SODIUM SUCCINATE 40 MG: 40 INJECTION, POWDER, FOR SOLUTION INTRAMUSCULAR; INTRAVENOUS at 16:54

## 2019-10-07 RX ADMIN — METHYLPREDNISOLONE SODIUM SUCCINATE 40 MG: 40 INJECTION, POWDER, FOR SOLUTION INTRAMUSCULAR; INTRAVENOUS at 20:52

## 2019-10-07 RX ADMIN — DOCUSATE SODIUM 100 MG: 100 CAPSULE, LIQUID FILLED ORAL at 20:52

## 2019-10-07 ASSESSMENT — ENCOUNTER SYMPTOMS
WHEEZING: 0
COUGH: 1
CHEST TIGHTNESS: 0
SHORTNESS OF BREATH: 1
BACK PAIN: 1
GASTROINTESTINAL NEGATIVE: 1

## 2019-10-07 ASSESSMENT — PAIN SCALES - GENERAL
PAINLEVEL_OUTOF10: 0
PAINLEVEL_OUTOF10: 0

## 2019-10-08 PROCEDURE — 1200000000 HC SEMI PRIVATE

## 2019-10-08 PROCEDURE — APPNB30 APP NON BILLABLE TIME 0-30 MINS: Performed by: NURSE PRACTITIONER

## 2019-10-08 PROCEDURE — 6370000000 HC RX 637 (ALT 250 FOR IP): Performed by: INTERNAL MEDICINE

## 2019-10-08 PROCEDURE — 99024 POSTOP FOLLOW-UP VISIT: CPT | Performed by: SURGERY

## 2019-10-08 PROCEDURE — 94760 N-INVAS EAR/PLS OXIMETRY 1: CPT

## 2019-10-08 PROCEDURE — 97530 THERAPEUTIC ACTIVITIES: CPT

## 2019-10-08 PROCEDURE — APPSS30 APP SPLIT SHARED TIME 16-30 MINUTES: Performed by: NURSE PRACTITIONER

## 2019-10-08 PROCEDURE — 94640 AIRWAY INHALATION TREATMENT: CPT

## 2019-10-08 PROCEDURE — 97166 OT EVAL MOD COMPLEX 45 MIN: CPT

## 2019-10-08 PROCEDURE — 2700000000 HC OXYGEN THERAPY PER DAY

## 2019-10-08 PROCEDURE — 97535 SELF CARE MNGMENT TRAINING: CPT

## 2019-10-08 PROCEDURE — 87205 SMEAR GRAM STAIN: CPT

## 2019-10-08 PROCEDURE — 87070 CULTURE OTHR SPECIMN AEROBIC: CPT

## 2019-10-08 PROCEDURE — 6360000002 HC RX W HCPCS: Performed by: INTERNAL MEDICINE

## 2019-10-08 PROCEDURE — 2580000003 HC RX 258: Performed by: INTERNAL MEDICINE

## 2019-10-08 PROCEDURE — 99233 SBSQ HOSP IP/OBS HIGH 50: CPT | Performed by: INTERNAL MEDICINE

## 2019-10-08 PROCEDURE — 99232 SBSQ HOSP IP/OBS MODERATE 35: CPT | Performed by: INTERNAL MEDICINE

## 2019-10-08 PROCEDURE — 97116 GAIT TRAINING THERAPY: CPT

## 2019-10-08 RX ORDER — GUAIFENESIN/DEXTROMETHORPHAN 100-10MG/5
5 SYRUP ORAL EVERY 4 HOURS PRN
Status: DISCONTINUED | OUTPATIENT
Start: 2019-10-08 | End: 2019-10-11 | Stop reason: HOSPADM

## 2019-10-08 RX ADMIN — METHYLPREDNISOLONE SODIUM SUCCINATE 40 MG: 40 INJECTION, POWDER, FOR SOLUTION INTRAMUSCULAR; INTRAVENOUS at 16:07

## 2019-10-08 RX ADMIN — FERROUS SULFATE TAB EC 324 MG (65 MG FE EQUIVALENT) 324 MG: 324 (65 FE) TABLET DELAYED RESPONSE at 08:43

## 2019-10-08 RX ADMIN — CEFEPIME HYDROCHLORIDE 2 G: 2 INJECTION, POWDER, FOR SOLUTION INTRAVENOUS at 16:07

## 2019-10-08 RX ADMIN — IPRATROPIUM BROMIDE AND ALBUTEROL SULFATE 3 ML: .5; 3 SOLUTION RESPIRATORY (INHALATION) at 08:27

## 2019-10-08 RX ADMIN — IPRATROPIUM BROMIDE AND ALBUTEROL SULFATE 3 ML: .5; 3 SOLUTION RESPIRATORY (INHALATION) at 15:42

## 2019-10-08 RX ADMIN — DOCUSATE SODIUM 100 MG: 100 CAPSULE, LIQUID FILLED ORAL at 20:04

## 2019-10-08 RX ADMIN — Medication 100 MCG: at 08:43

## 2019-10-08 RX ADMIN — BUPROPION HYDROCHLORIDE 100 MG: 100 TABLET, FILM COATED ORAL at 08:43

## 2019-10-08 RX ADMIN — AZITHROMYCIN MONOHYDRATE 500 MG: 500 INJECTION, POWDER, LYOPHILIZED, FOR SOLUTION INTRAVENOUS at 17:37

## 2019-10-08 RX ADMIN — GUAIFENESIN AND DEXTROMETHORPHAN 5 ML: 100; 10 SYRUP ORAL at 08:47

## 2019-10-08 RX ADMIN — METHYLPREDNISOLONE SODIUM SUCCINATE 40 MG: 40 INJECTION, POWDER, FOR SOLUTION INTRAMUSCULAR; INTRAVENOUS at 20:04

## 2019-10-08 RX ADMIN — IPRATROPIUM BROMIDE AND ALBUTEROL SULFATE 3 ML: .5; 3 SOLUTION RESPIRATORY (INHALATION) at 12:56

## 2019-10-08 RX ADMIN — GUAIFENESIN AND DEXTROMETHORPHAN 5 ML: 100; 10 SYRUP ORAL at 23:30

## 2019-10-08 RX ADMIN — PANTOPRAZOLE SODIUM 40 MG: 40 TABLET, DELAYED RELEASE ORAL at 07:06

## 2019-10-08 RX ADMIN — METHYLPREDNISOLONE SODIUM SUCCINATE 40 MG: 40 INJECTION, POWDER, FOR SOLUTION INTRAMUSCULAR; INTRAVENOUS at 04:32

## 2019-10-08 RX ADMIN — SODIUM CHLORIDE, PRESERVATIVE FREE 10 ML: 5 INJECTION INTRAVENOUS at 08:44

## 2019-10-08 RX ADMIN — IPRATROPIUM BROMIDE AND ALBUTEROL SULFATE 3 ML: .5; 3 SOLUTION RESPIRATORY (INHALATION) at 19:51

## 2019-10-08 RX ADMIN — METHYLPREDNISOLONE SODIUM SUCCINATE 40 MG: 40 INJECTION, POWDER, FOR SOLUTION INTRAMUSCULAR; INTRAVENOUS at 10:16

## 2019-10-08 RX ADMIN — CEFEPIME HYDROCHLORIDE 2 G: 2 INJECTION, POWDER, FOR SOLUTION INTRAVENOUS at 04:32

## 2019-10-08 RX ADMIN — MULTIPLE VITAMINS W/ MINERALS TAB 1 TABLET: TAB at 08:43

## 2019-10-08 RX ADMIN — SERTRALINE 100 MG: 100 TABLET, FILM COATED ORAL at 08:43

## 2019-10-08 RX ADMIN — ENOXAPARIN SODIUM 40 MG: 40 INJECTION SUBCUTANEOUS at 08:44

## 2019-10-08 RX ADMIN — ASPIRIN 81 MG 81 MG: 81 TABLET ORAL at 08:43

## 2019-10-08 ASSESSMENT — PAIN SCALES - GENERAL
PAINLEVEL_OUTOF10: 0
PAINLEVEL_OUTOF10: 0

## 2019-10-08 ASSESSMENT — ENCOUNTER SYMPTOMS
COUGH: 1
GASTROINTESTINAL NEGATIVE: 1
WHEEZING: 0
SHORTNESS OF BREATH: 1
BACK PAIN: 1

## 2019-10-09 ENCOUNTER — TELEPHONE (OUTPATIENT)
Dept: INTERNAL MEDICINE CLINIC | Age: 79
End: 2019-10-09

## 2019-10-09 PROCEDURE — APPNB45 APP NON BILLABLE 31-45 MINUTES: Performed by: NURSE PRACTITIONER

## 2019-10-09 PROCEDURE — 1200000000 HC SEMI PRIVATE

## 2019-10-09 PROCEDURE — 6360000002 HC RX W HCPCS: Performed by: INTERNAL MEDICINE

## 2019-10-09 PROCEDURE — 94760 N-INVAS EAR/PLS OXIMETRY 1: CPT

## 2019-10-09 PROCEDURE — 2700000000 HC OXYGEN THERAPY PER DAY

## 2019-10-09 PROCEDURE — 97110 THERAPEUTIC EXERCISES: CPT

## 2019-10-09 PROCEDURE — 94640 AIRWAY INHALATION TREATMENT: CPT

## 2019-10-09 PROCEDURE — 2580000003 HC RX 258: Performed by: INTERNAL MEDICINE

## 2019-10-09 PROCEDURE — APPSS30 APP SPLIT SHARED TIME 16-30 MINUTES: Performed by: NURSE PRACTITIONER

## 2019-10-09 PROCEDURE — 99232 SBSQ HOSP IP/OBS MODERATE 35: CPT | Performed by: INTERNAL MEDICINE

## 2019-10-09 PROCEDURE — 6370000000 HC RX 637 (ALT 250 FOR IP): Performed by: INTERNAL MEDICINE

## 2019-10-09 PROCEDURE — 97116 GAIT TRAINING THERAPY: CPT

## 2019-10-09 PROCEDURE — 97530 THERAPEUTIC ACTIVITIES: CPT

## 2019-10-09 PROCEDURE — 97535 SELF CARE MNGMENT TRAINING: CPT

## 2019-10-09 RX ORDER — METHYLPREDNISOLONE SODIUM SUCCINATE 40 MG/ML
40 INJECTION, POWDER, LYOPHILIZED, FOR SOLUTION INTRAMUSCULAR; INTRAVENOUS EVERY 12 HOURS
Status: DISCONTINUED | OUTPATIENT
Start: 2019-10-09 | End: 2019-10-10

## 2019-10-09 RX ADMIN — IPRATROPIUM BROMIDE AND ALBUTEROL SULFATE 3 ML: .5; 3 SOLUTION RESPIRATORY (INHALATION) at 16:55

## 2019-10-09 RX ADMIN — SODIUM CHLORIDE, PRESERVATIVE FREE 10 ML: 5 INJECTION INTRAVENOUS at 09:38

## 2019-10-09 RX ADMIN — FERROUS SULFATE TAB EC 324 MG (65 MG FE EQUIVALENT) 324 MG: 324 (65 FE) TABLET DELAYED RESPONSE at 09:37

## 2019-10-09 RX ADMIN — PANTOPRAZOLE SODIUM 40 MG: 40 TABLET, DELAYED RELEASE ORAL at 05:32

## 2019-10-09 RX ADMIN — IPRATROPIUM BROMIDE AND ALBUTEROL SULFATE 3 ML: .5; 3 SOLUTION RESPIRATORY (INHALATION) at 20:05

## 2019-10-09 RX ADMIN — SODIUM CHLORIDE, PRESERVATIVE FREE 10 ML: 5 INJECTION INTRAVENOUS at 20:47

## 2019-10-09 RX ADMIN — IPRATROPIUM BROMIDE AND ALBUTEROL SULFATE 3 ML: .5; 3 SOLUTION RESPIRATORY (INHALATION) at 12:01

## 2019-10-09 RX ADMIN — ASPIRIN 81 MG 81 MG: 81 TABLET ORAL at 09:37

## 2019-10-09 RX ADMIN — DOCUSATE SODIUM 100 MG: 100 CAPSULE, LIQUID FILLED ORAL at 20:46

## 2019-10-09 RX ADMIN — METHYLPREDNISOLONE SODIUM SUCCINATE 40 MG: 40 INJECTION, POWDER, FOR SOLUTION INTRAMUSCULAR; INTRAVENOUS at 17:40

## 2019-10-09 RX ADMIN — CEFEPIME HYDROCHLORIDE 2 G: 2 INJECTION, POWDER, FOR SOLUTION INTRAVENOUS at 15:43

## 2019-10-09 RX ADMIN — ENOXAPARIN SODIUM 40 MG: 40 INJECTION SUBCUTANEOUS at 09:37

## 2019-10-09 RX ADMIN — CEFEPIME HYDROCHLORIDE 2 G: 2 INJECTION, POWDER, FOR SOLUTION INTRAVENOUS at 05:32

## 2019-10-09 RX ADMIN — BUPROPION HYDROCHLORIDE 100 MG: 100 TABLET, FILM COATED ORAL at 09:37

## 2019-10-09 RX ADMIN — METHYLPREDNISOLONE SODIUM SUCCINATE 40 MG: 40 INJECTION, POWDER, FOR SOLUTION INTRAMUSCULAR; INTRAVENOUS at 05:32

## 2019-10-09 RX ADMIN — Medication 100 MCG: at 09:37

## 2019-10-09 RX ADMIN — AZITHROMYCIN MONOHYDRATE 500 MG: 500 INJECTION, POWDER, LYOPHILIZED, FOR SOLUTION INTRAVENOUS at 18:32

## 2019-10-09 RX ADMIN — SERTRALINE 100 MG: 100 TABLET, FILM COATED ORAL at 09:37

## 2019-10-09 RX ADMIN — MULTIPLE VITAMINS W/ MINERALS TAB 1 TABLET: TAB at 09:37

## 2019-10-09 ASSESSMENT — ENCOUNTER SYMPTOMS
COUGH: 1
GASTROINTESTINAL NEGATIVE: 1
BACK PAIN: 0

## 2019-10-09 ASSESSMENT — PAIN SCALES - GENERAL: PAINLEVEL_OUTOF10: 0

## 2019-10-10 LAB
BLOOD CULTURE, ROUTINE: NORMAL
CULTURE, BLOOD 2: NORMAL
CULTURE, RESPIRATORY: ABNORMAL
CULTURE, RESPIRATORY: ABNORMAL
GRAM STAIN RESULT: ABNORMAL
ORGANISM: ABNORMAL

## 2019-10-10 PROCEDURE — 94760 N-INVAS EAR/PLS OXIMETRY 1: CPT

## 2019-10-10 PROCEDURE — 97530 THERAPEUTIC ACTIVITIES: CPT

## 2019-10-10 PROCEDURE — 6360000002 HC RX W HCPCS: Performed by: INTERNAL MEDICINE

## 2019-10-10 PROCEDURE — 94640 AIRWAY INHALATION TREATMENT: CPT

## 2019-10-10 PROCEDURE — APPSS30 APP SPLIT SHARED TIME 16-30 MINUTES: Performed by: NURSE PRACTITIONER

## 2019-10-10 PROCEDURE — APPNB30 APP NON BILLABLE TIME 0-30 MINS: Performed by: NURSE PRACTITIONER

## 2019-10-10 PROCEDURE — 6370000000 HC RX 637 (ALT 250 FOR IP): Performed by: INTERNAL MEDICINE

## 2019-10-10 PROCEDURE — 97535 SELF CARE MNGMENT TRAINING: CPT

## 2019-10-10 PROCEDURE — 2580000003 HC RX 258: Performed by: INTERNAL MEDICINE

## 2019-10-10 PROCEDURE — 2700000000 HC OXYGEN THERAPY PER DAY

## 2019-10-10 PROCEDURE — 97116 GAIT TRAINING THERAPY: CPT

## 2019-10-10 PROCEDURE — 1200000000 HC SEMI PRIVATE

## 2019-10-10 PROCEDURE — 99232 SBSQ HOSP IP/OBS MODERATE 35: CPT | Performed by: INTERNAL MEDICINE

## 2019-10-10 RX ORDER — AZITHROMYCIN 250 MG/1
250 TABLET, FILM COATED ORAL DAILY
Status: DISCONTINUED | OUTPATIENT
Start: 2019-10-11 | End: 2019-10-11 | Stop reason: HOSPADM

## 2019-10-10 RX ORDER — PREDNISONE 20 MG/1
40 TABLET ORAL DAILY
Status: DISCONTINUED | OUTPATIENT
Start: 2019-10-10 | End: 2019-10-11 | Stop reason: HOSPADM

## 2019-10-10 RX ADMIN — SODIUM CHLORIDE, PRESERVATIVE FREE 10 ML: 5 INJECTION INTRAVENOUS at 20:30

## 2019-10-10 RX ADMIN — ENOXAPARIN SODIUM 40 MG: 40 INJECTION SUBCUTANEOUS at 09:21

## 2019-10-10 RX ADMIN — IPRATROPIUM BROMIDE AND ALBUTEROL SULFATE 3 ML: .5; 3 SOLUTION RESPIRATORY (INHALATION) at 11:59

## 2019-10-10 RX ADMIN — DOCUSATE SODIUM 100 MG: 100 CAPSULE, LIQUID FILLED ORAL at 22:13

## 2019-10-10 RX ADMIN — CEFEPIME HYDROCHLORIDE 2 G: 2 INJECTION, POWDER, FOR SOLUTION INTRAVENOUS at 05:11

## 2019-10-10 RX ADMIN — PANTOPRAZOLE SODIUM 40 MG: 40 TABLET, DELAYED RELEASE ORAL at 05:11

## 2019-10-10 RX ADMIN — SODIUM CHLORIDE, PRESERVATIVE FREE 10 ML: 5 INJECTION INTRAVENOUS at 09:25

## 2019-10-10 RX ADMIN — METHYLPREDNISOLONE SODIUM SUCCINATE 40 MG: 40 INJECTION, POWDER, FOR SOLUTION INTRAMUSCULAR; INTRAVENOUS at 05:11

## 2019-10-10 RX ADMIN — Medication 100 MCG: at 09:21

## 2019-10-10 RX ADMIN — PREDNISONE 40 MG: 20 TABLET ORAL at 09:21

## 2019-10-10 RX ADMIN — FERROUS SULFATE TAB EC 324 MG (65 MG FE EQUIVALENT) 324 MG: 324 (65 FE) TABLET DELAYED RESPONSE at 09:21

## 2019-10-10 RX ADMIN — BUPROPION HYDROCHLORIDE 100 MG: 100 TABLET, FILM COATED ORAL at 09:24

## 2019-10-10 RX ADMIN — CEFEPIME HYDROCHLORIDE 2 G: 2 INJECTION, POWDER, FOR SOLUTION INTRAVENOUS at 16:36

## 2019-10-10 RX ADMIN — SERTRALINE 100 MG: 100 TABLET, FILM COATED ORAL at 09:21

## 2019-10-10 RX ADMIN — IPRATROPIUM BROMIDE AND ALBUTEROL SULFATE 3 ML: .5; 3 SOLUTION RESPIRATORY (INHALATION) at 16:08

## 2019-10-10 RX ADMIN — IPRATROPIUM BROMIDE AND ALBUTEROL SULFATE 3 ML: .5; 3 SOLUTION RESPIRATORY (INHALATION) at 21:12

## 2019-10-10 RX ADMIN — IPRATROPIUM BROMIDE AND ALBUTEROL SULFATE 3 ML: .5; 3 SOLUTION RESPIRATORY (INHALATION) at 08:14

## 2019-10-10 RX ADMIN — AZITHROMYCIN MONOHYDRATE 500 MG: 500 INJECTION, POWDER, LYOPHILIZED, FOR SOLUTION INTRAVENOUS at 17:25

## 2019-10-10 RX ADMIN — MULTIPLE VITAMINS W/ MINERALS TAB 1 TABLET: TAB at 09:21

## 2019-10-10 RX ADMIN — ASPIRIN 81 MG 81 MG: 81 TABLET ORAL at 09:21

## 2019-10-10 ASSESSMENT — PAIN SCALES - GENERAL
PAINLEVEL_OUTOF10: 0

## 2019-10-11 VITALS
TEMPERATURE: 98 F | OXYGEN SATURATION: 94 % | HEIGHT: 62 IN | BODY MASS INDEX: 22.88 KG/M2 | WEIGHT: 124.34 LBS | HEART RATE: 91 BPM | SYSTOLIC BLOOD PRESSURE: 129 MMHG | RESPIRATION RATE: 16 BRPM | DIASTOLIC BLOOD PRESSURE: 66 MMHG

## 2019-10-11 PROCEDURE — 94760 N-INVAS EAR/PLS OXIMETRY 1: CPT

## 2019-10-11 PROCEDURE — 6370000000 HC RX 637 (ALT 250 FOR IP): Performed by: INTERNAL MEDICINE

## 2019-10-11 PROCEDURE — 97110 THERAPEUTIC EXERCISES: CPT

## 2019-10-11 PROCEDURE — 99239 HOSP IP/OBS DSCHRG MGMT >30: CPT | Performed by: INTERNAL MEDICINE

## 2019-10-11 PROCEDURE — 97116 GAIT TRAINING THERAPY: CPT

## 2019-10-11 PROCEDURE — 94640 AIRWAY INHALATION TREATMENT: CPT

## 2019-10-11 PROCEDURE — 2700000000 HC OXYGEN THERAPY PER DAY

## 2019-10-11 PROCEDURE — 99232 SBSQ HOSP IP/OBS MODERATE 35: CPT | Performed by: INTERNAL MEDICINE

## 2019-10-11 PROCEDURE — 97530 THERAPEUTIC ACTIVITIES: CPT

## 2019-10-11 PROCEDURE — 6360000002 HC RX W HCPCS: Performed by: INTERNAL MEDICINE

## 2019-10-11 PROCEDURE — 99232 SBSQ HOSP IP/OBS MODERATE 35: CPT | Performed by: SURGERY

## 2019-10-11 RX ORDER — GUAIFENESIN/DEXTROMETHORPHAN 100-10MG/5
5 SYRUP ORAL EVERY 4 HOURS PRN
Qty: 120 ML | Refills: 1
Start: 2019-10-11 | End: 2019-10-21

## 2019-10-11 RX ORDER — AZITHROMYCIN 250 MG/1
250 TABLET, FILM COATED ORAL DAILY
Qty: 10 TABLET | Refills: 0
Start: 2019-10-11 | End: 2019-10-21

## 2019-10-11 RX ORDER — PREDNISONE 20 MG/1
40 TABLET ORAL DAILY
Qty: 10 TABLET | Refills: 0
Start: 2019-10-11 | End: 2019-10-16

## 2019-10-11 RX ADMIN — AZITHROMYCIN 250 MG: 250 TABLET, FILM COATED ORAL at 09:53

## 2019-10-11 RX ADMIN — BUPROPION HYDROCHLORIDE 100 MG: 100 TABLET, FILM COATED ORAL at 09:59

## 2019-10-11 RX ADMIN — ENOXAPARIN SODIUM 40 MG: 40 INJECTION SUBCUTANEOUS at 09:53

## 2019-10-11 RX ADMIN — PREDNISONE 40 MG: 20 TABLET ORAL at 09:53

## 2019-10-11 RX ADMIN — Medication 100 MCG: at 09:53

## 2019-10-11 RX ADMIN — SERTRALINE 100 MG: 100 TABLET, FILM COATED ORAL at 09:53

## 2019-10-11 RX ADMIN — FERROUS SULFATE TAB EC 324 MG (65 MG FE EQUIVALENT) 324 MG: 324 (65 FE) TABLET DELAYED RESPONSE at 09:53

## 2019-10-11 RX ADMIN — IPRATROPIUM BROMIDE AND ALBUTEROL SULFATE 3 ML: .5; 3 SOLUTION RESPIRATORY (INHALATION) at 11:45

## 2019-10-11 RX ADMIN — ASPIRIN 81 MG 81 MG: 81 TABLET ORAL at 09:53

## 2019-10-11 RX ADMIN — IPRATROPIUM BROMIDE AND ALBUTEROL SULFATE 3 ML: .5; 3 SOLUTION RESPIRATORY (INHALATION) at 08:12

## 2019-10-11 ASSESSMENT — PAIN SCALES - GENERAL
PAINLEVEL_OUTOF10: 0
PAINLEVEL_OUTOF10: 0

## 2019-10-22 ENCOUNTER — APPOINTMENT (OUTPATIENT)
Dept: GENERAL RADIOLOGY | Age: 79
DRG: 853 | End: 2019-10-22
Payer: MEDICARE

## 2019-10-22 ENCOUNTER — HOSPITAL ENCOUNTER (INPATIENT)
Age: 79
LOS: 5 days | Discharge: NURSING, REHAB OR LONG-TERM CARE | DRG: 853 | End: 2019-10-28
Attending: EMERGENCY MEDICINE | Admitting: INTERNAL MEDICINE
Payer: MEDICARE

## 2019-10-22 ENCOUNTER — NURSE TRIAGE (OUTPATIENT)
Dept: OTHER | Facility: CLINIC | Age: 79
End: 2019-10-22

## 2019-10-22 ENCOUNTER — HOSPITAL ENCOUNTER (OUTPATIENT)
Dept: WOUND CARE | Age: 79
Discharge: HOME | DRG: 853 | End: 2019-10-22
Payer: MEDICARE

## 2019-10-22 ENCOUNTER — HOSPITAL ENCOUNTER (EMERGENCY)
Age: 79
Discharge: HOME | DRG: 853 | End: 2019-10-22
Attending: EMERGENCY MEDICINE
Payer: MEDICARE

## 2019-10-22 VITALS
DIASTOLIC BLOOD PRESSURE: 84 MMHG | OXYGEN SATURATION: 75 % | TEMPERATURE: 97.5 F | SYSTOLIC BLOOD PRESSURE: 160 MMHG | RESPIRATION RATE: 28 BRPM | HEART RATE: 71 BPM

## 2019-10-22 VITALS
OXYGEN SATURATION: 97 % | HEIGHT: 62 IN | TEMPERATURE: 98.7 F | WEIGHT: 123.24 LBS | RESPIRATION RATE: 28 BRPM | BODY MASS INDEX: 22.68 KG/M2 | HEART RATE: 70 BPM | DIASTOLIC BLOOD PRESSURE: 56 MMHG | SYSTOLIC BLOOD PRESSURE: 148 MMHG

## 2019-10-22 DIAGNOSIS — S71.002D: Primary | ICD-10-CM

## 2019-10-22 DIAGNOSIS — J84.9 INTERSTITIAL LUNG DISEASE (HCC): ICD-10-CM

## 2019-10-22 DIAGNOSIS — J18.9 PNEUMONIA DUE TO ORGANISM: Primary | ICD-10-CM

## 2019-10-22 DIAGNOSIS — L89.154 PRESSURE ULCER OF SACRAL REGION, STAGE 4 (HCC): ICD-10-CM

## 2019-10-22 DIAGNOSIS — R06.00 DYSPNEA, UNSPECIFIED TYPE: Primary | ICD-10-CM

## 2019-10-22 LAB
A/G RATIO: 1 (ref 1.1–2.2)
A/G RATIO: 1.1 (ref 1.1–2.2)
ALBUMIN SERPL-MCNC: 3.6 G/DL (ref 3.4–5)
ALBUMIN SERPL-MCNC: 3.7 G/DL (ref 3.4–5)
ALP BLD-CCNC: 81 U/L (ref 40–129)
ALP BLD-CCNC: 87 U/L (ref 40–129)
ALT SERPL-CCNC: 13 U/L (ref 10–40)
ALT SERPL-CCNC: 14 U/L (ref 10–40)
ANION GAP SERPL CALCULATED.3IONS-SCNC: 15 MMOL/L (ref 3–16)
ANION GAP SERPL CALCULATED.3IONS-SCNC: 16 MMOL/L (ref 3–16)
APTT: 44.2 SEC (ref 26–36)
AST SERPL-CCNC: 17 U/L (ref 15–37)
AST SERPL-CCNC: 19 U/L (ref 15–37)
BASE EXCESS ARTERIAL: 0.5 MMOL/L (ref -3–3)
BASE EXCESS VENOUS: 0.9 MMOL/L
BASOPHILS ABSOLUTE: 0.1 K/UL (ref 0–0.2)
BASOPHILS ABSOLUTE: 0.1 K/UL (ref 0–0.2)
BASOPHILS RELATIVE PERCENT: 0.3 %
BASOPHILS RELATIVE PERCENT: 0.5 %
BILIRUB SERPL-MCNC: 0.3 MG/DL (ref 0–1)
BILIRUB SERPL-MCNC: 0.6 MG/DL (ref 0–1)
BUN BLDV-MCNC: 23 MG/DL (ref 7–20)
BUN BLDV-MCNC: 24 MG/DL (ref 7–20)
CALCIUM SERPL-MCNC: 10.4 MG/DL (ref 8.3–10.6)
CALCIUM SERPL-MCNC: 10.5 MG/DL (ref 8.3–10.6)
CARBOXYHEMOGLOBIN ARTERIAL: 0.3 % (ref 0–1.5)
CARBOXYHEMOGLOBIN: 3.2 %
CHLORIDE BLD-SCNC: 101 MMOL/L (ref 99–110)
CHLORIDE BLD-SCNC: 105 MMOL/L (ref 99–110)
CO2: 23 MMOL/L (ref 21–32)
CO2: 25 MMOL/L (ref 21–32)
CREAT SERPL-MCNC: 0.8 MG/DL (ref 0.6–1.2)
CREAT SERPL-MCNC: 1 MG/DL (ref 0.6–1.2)
EOSINOPHILS ABSOLUTE: 0.2 K/UL (ref 0–0.6)
EOSINOPHILS ABSOLUTE: 0.3 K/UL (ref 0–0.6)
EOSINOPHILS RELATIVE PERCENT: 1.2 %
EOSINOPHILS RELATIVE PERCENT: 1.5 %
GFR AFRICAN AMERICAN: >60
GFR AFRICAN AMERICAN: >60
GFR NON-AFRICAN AMERICAN: 54
GFR NON-AFRICAN AMERICAN: >60
GLOBULIN: 3.5 G/DL
GLOBULIN: 3.5 G/DL
GLUCOSE BLD-MCNC: 137 MG/DL (ref 70–99)
GLUCOSE BLD-MCNC: 96 MG/DL (ref 70–99)
HCO3 ARTERIAL: 23.7 MMOL/L (ref 21–29)
HCO3 VENOUS: 25 MMOL/L (ref 23–29)
HCT VFR BLD CALC: 37.9 % (ref 36–48)
HCT VFR BLD CALC: 38.4 % (ref 36–48)
HEMOGLOBIN, ART, EXTENDED: 11.7 G/DL (ref 12–16)
HEMOGLOBIN: 12 G/DL (ref 12–16)
HEMOGLOBIN: 12.1 G/DL (ref 12–16)
INR BLD: 1.04 (ref 0.86–1.14)
LACTIC ACID: 2.7 MMOL/L (ref 0.4–2)
LYMPHOCYTES ABSOLUTE: 1.4 K/UL (ref 1–5.1)
LYMPHOCYTES ABSOLUTE: 1.8 K/UL (ref 1–5.1)
LYMPHOCYTES RELATIVE PERCENT: 10.5 %
LYMPHOCYTES RELATIVE PERCENT: 7.5 %
MCH RBC QN AUTO: 27.8 PG (ref 26–34)
MCH RBC QN AUTO: 28.2 PG (ref 26–34)
MCHC RBC AUTO-ENTMCNC: 31.3 G/DL (ref 31–36)
MCHC RBC AUTO-ENTMCNC: 31.9 G/DL (ref 31–36)
MCV RBC AUTO: 87.3 FL (ref 80–100)
MCV RBC AUTO: 90.2 FL (ref 80–100)
METHEMOGLOBIN ARTERIAL: 0.5 %
METHEMOGLOBIN VENOUS: 0.7 %
MONOCYTES ABSOLUTE: 0.6 K/UL (ref 0–1.3)
MONOCYTES ABSOLUTE: 0.8 K/UL (ref 0–1.3)
MONOCYTES RELATIVE PERCENT: 3.1 %
MONOCYTES RELATIVE PERCENT: 4.1 %
NEUTROPHILS ABSOLUTE: 11.2 K/UL (ref 1.7–7.7)
NEUTROPHILS ABSOLUTE: 21 K/UL (ref 1.7–7.7)
NEUTROPHILS RELATIVE PERCENT: 83.4 %
NEUTROPHILS RELATIVE PERCENT: 87.9 %
O2 CONTENT ARTERIAL: 16 ML/DL
O2 CONTENT, VEN: 16 ML/DL
O2 SAT, ARTERIAL: 96.7 %
O2 SAT, VEN: 96 %
O2 THERAPY: ABNORMAL
O2 THERAPY: ABNORMAL
PCO2 ARTERIAL: 35 MMHG (ref 35–45)
PCO2, VEN: 39.8 MMHG (ref 40–50)
PDW BLD-RTO: 16.4 % (ref 12.4–15.4)
PDW BLD-RTO: 16.7 % (ref 12.4–15.4)
PH ARTERIAL: 7.45 (ref 7.35–7.45)
PH VENOUS: 7.41 (ref 7.35–7.45)
PLATELET # BLD: 227 K/UL (ref 135–450)
PLATELET # BLD: 292 K/UL (ref 135–450)
PMV BLD AUTO: 7.7 FL (ref 5–10.5)
PMV BLD AUTO: 7.8 FL (ref 5–10.5)
PO2 ARTERIAL: 133 MMHG (ref 75–108)
PO2, VEN: 97 MMHG
POTASSIUM REFLEX MAGNESIUM: 4.3 MMOL/L (ref 3.5–5.1)
POTASSIUM REFLEX MAGNESIUM: 4.5 MMOL/L (ref 3.5–5.1)
PRO-BNP: 607 PG/ML (ref 0–449)
PRO-BNP: 786 PG/ML (ref 0–449)
PROTHROMBIN TIME: 11.8 SEC (ref 9.8–13)
RBC # BLD: 4.26 M/UL (ref 4–5.2)
RBC # BLD: 4.34 M/UL (ref 4–5.2)
SODIUM BLD-SCNC: 141 MMOL/L (ref 136–145)
SODIUM BLD-SCNC: 144 MMOL/L (ref 136–145)
TCO2 ARTERIAL: 24.8 MMOL/L
TCO2 CALC VENOUS: 26 MMOL/L
TOTAL PROTEIN: 7.1 G/DL (ref 6.4–8.2)
TOTAL PROTEIN: 7.2 G/DL (ref 6.4–8.2)
TROPONIN: <0.01 NG/ML
TROPONIN: <0.01 NG/ML
WBC # BLD: 13.4 K/UL (ref 4–11)
WBC # BLD: 24 K/UL (ref 4–11)

## 2019-10-22 PROCEDURE — 99285 EMERGENCY DEPT VISIT HI MDM: CPT

## 2019-10-22 PROCEDURE — 82803 BLOOD GASES ANY COMBINATION: CPT

## 2019-10-22 PROCEDURE — 6370000000 HC RX 637 (ALT 250 FOR IP): Performed by: EMERGENCY MEDICINE

## 2019-10-22 PROCEDURE — 85730 THROMBOPLASTIN TIME PARTIAL: CPT

## 2019-10-22 PROCEDURE — 87040 BLOOD CULTURE FOR BACTERIA: CPT

## 2019-10-22 PROCEDURE — 99212 OFFICE O/P EST SF 10 MIN: CPT | Performed by: NURSE PRACTITIONER

## 2019-10-22 PROCEDURE — 36600 WITHDRAWAL OF ARTERIAL BLOOD: CPT

## 2019-10-22 PROCEDURE — 71045 X-RAY EXAM CHEST 1 VIEW: CPT

## 2019-10-22 PROCEDURE — 94660 CPAP INITIATION&MGMT: CPT

## 2019-10-22 PROCEDURE — 2700000000 HC OXYGEN THERAPY PER DAY

## 2019-10-22 PROCEDURE — 85025 COMPLETE CBC W/AUTO DIFF WBC: CPT

## 2019-10-22 PROCEDURE — 83880 ASSAY OF NATRIURETIC PEPTIDE: CPT

## 2019-10-22 PROCEDURE — 93005 ELECTROCARDIOGRAM TRACING: CPT | Performed by: EMERGENCY MEDICINE

## 2019-10-22 PROCEDURE — 83605 ASSAY OF LACTIC ACID: CPT

## 2019-10-22 PROCEDURE — 84484 ASSAY OF TROPONIN QUANT: CPT

## 2019-10-22 PROCEDURE — 11043 DBRDMT MUSC&/FSCA 1ST 20/<: CPT | Performed by: NURSE PRACTITIONER

## 2019-10-22 PROCEDURE — 6360000002 HC RX W HCPCS: Performed by: EMERGENCY MEDICINE

## 2019-10-22 PROCEDURE — 94640 AIRWAY INHALATION TREATMENT: CPT

## 2019-10-22 PROCEDURE — 96375 TX/PRO/DX INJ NEW DRUG ADDON: CPT

## 2019-10-22 PROCEDURE — 83735 ASSAY OF MAGNESIUM: CPT

## 2019-10-22 PROCEDURE — 80053 COMPREHEN METABOLIC PANEL: CPT

## 2019-10-22 PROCEDURE — 85610 PROTHROMBIN TIME: CPT

## 2019-10-22 PROCEDURE — 71046 X-RAY EXAM CHEST 2 VIEWS: CPT

## 2019-10-22 PROCEDURE — 11043 DBRDMT MUSC&/FSCA 1ST 20/<: CPT

## 2019-10-22 PROCEDURE — 94761 N-INVAS EAR/PLS OXIMETRY MLT: CPT

## 2019-10-22 PROCEDURE — 11046 DBRDMT MUSC&/FSCA EA ADDL: CPT | Performed by: NURSE PRACTITIONER

## 2019-10-22 PROCEDURE — 36415 COLL VENOUS BLD VENIPUNCTURE: CPT

## 2019-10-22 RX ORDER — IPRATROPIUM BROMIDE AND ALBUTEROL SULFATE 2.5; .5 MG/3ML; MG/3ML
1 SOLUTION RESPIRATORY (INHALATION) ONCE
Status: COMPLETED | OUTPATIENT
Start: 2019-10-22 | End: 2019-10-22

## 2019-10-22 RX ORDER — METHYLPREDNISOLONE SODIUM SUCCINATE 125 MG/2ML
125 INJECTION, POWDER, LYOPHILIZED, FOR SOLUTION INTRAMUSCULAR; INTRAVENOUS ONCE
Status: COMPLETED | OUTPATIENT
Start: 2019-10-22 | End: 2019-10-22

## 2019-10-22 RX ORDER — LIDOCAINE HYDROCHLORIDE 40 MG/ML
SOLUTION TOPICAL ONCE
Status: DISCONTINUED | OUTPATIENT
Start: 2019-10-22 | End: 2019-10-23 | Stop reason: HOSPADM

## 2019-10-22 RX ORDER — PREDNISONE 10 MG/1
10 TABLET ORAL DAILY
COMMUNITY

## 2019-10-22 RX ADMIN — IPRATROPIUM BROMIDE AND ALBUTEROL SULFATE 1 AMPULE: .5; 3 SOLUTION RESPIRATORY (INHALATION) at 23:02

## 2019-10-22 RX ADMIN — METHYLPREDNISOLONE SODIUM SUCCINATE 125 MG: 125 INJECTION, POWDER, FOR SOLUTION INTRAMUSCULAR; INTRAVENOUS at 23:11

## 2019-10-22 ASSESSMENT — ENCOUNTER SYMPTOMS
NAUSEA: 0
SHORTNESS OF BREATH: 1
COUGH: 0
VOMITING: 0
ABDOMINAL PAIN: 0

## 2019-10-22 ASSESSMENT — PAIN SCALES - GENERAL
PAINLEVEL_OUTOF10: 0
PAINLEVEL_OUTOF10: 0

## 2019-10-23 ENCOUNTER — APPOINTMENT (OUTPATIENT)
Dept: GENERAL RADIOLOGY | Age: 79
DRG: 853 | End: 2019-10-23
Payer: MEDICARE

## 2019-10-23 PROBLEM — E44.0 MODERATE MALNUTRITION (HCC): Status: ACTIVE | Noted: 2019-10-23

## 2019-10-23 LAB
ANION GAP SERPL CALCULATED.3IONS-SCNC: 13 MMOL/L (ref 3–16)
BACTERIA: ABNORMAL /HPF
BASOPHILS ABSOLUTE: 0.2 K/UL (ref 0–0.2)
BASOPHILS RELATIVE PERCENT: 1 %
BILIRUBIN URINE: NEGATIVE
BLOOD, URINE: ABNORMAL
BUN BLDV-MCNC: 24 MG/DL (ref 7–20)
CALCIUM SERPL-MCNC: 9.6 MG/DL (ref 8.3–10.6)
CHLORIDE BLD-SCNC: 102 MMOL/L (ref 99–110)
CLARITY: ABNORMAL
CO2: 24 MMOL/L (ref 21–32)
COLOR: YELLOW
COMMENT UA: ABNORMAL
CREAT SERPL-MCNC: 0.7 MG/DL (ref 0.6–1.2)
EKG ATRIAL RATE: 130 BPM
EKG DIAGNOSIS: NORMAL
EKG P AXIS: 58 DEGREES
EKG P-R INTERVAL: 124 MS
EKG Q-T INTERVAL: 314 MS
EKG QRS DURATION: 88 MS
EKG QTC CALCULATION (BAZETT): 462 MS
EKG R AXIS: -22 DEGREES
EKG T AXIS: 37 DEGREES
EKG VENTRICULAR RATE: 130 BPM
EOSINOPHILS ABSOLUTE: 0 K/UL (ref 0–0.6)
EOSINOPHILS RELATIVE PERCENT: 0.1 %
EPITHELIAL CELLS, UA: 2 /HPF (ref 0–5)
ESTIMATED AVERAGE GLUCOSE: 114 MG/DL
GFR AFRICAN AMERICAN: >60
GFR NON-AFRICAN AMERICAN: >60
GLUCOSE BLD-MCNC: 119 MG/DL (ref 70–99)
GLUCOSE BLD-MCNC: 202 MG/DL (ref 70–99)
GLUCOSE BLD-MCNC: 219 MG/DL (ref 70–99)
GLUCOSE URINE: NEGATIVE MG/DL
HBA1C MFR BLD: 5.6 %
HCT VFR BLD CALC: 34.5 % (ref 36–48)
HEMOGLOBIN: 11.1 G/DL (ref 12–16)
HYALINE CASTS: 2 /LPF (ref 0–8)
KETONES, URINE: NEGATIVE MG/DL
LACTIC ACID: 1.7 MMOL/L (ref 0.4–2)
LEUKOCYTE ESTERASE, URINE: NEGATIVE
LYMPHOCYTES ABSOLUTE: 0.2 K/UL (ref 1–5.1)
LYMPHOCYTES RELATIVE PERCENT: 1.6 %
MAGNESIUM: 1.9 MG/DL (ref 1.8–2.4)
MAGNESIUM: 2 MG/DL (ref 1.8–2.4)
MCH RBC QN AUTO: 28 PG (ref 26–34)
MCHC RBC AUTO-ENTMCNC: 32.1 G/DL (ref 31–36)
MCV RBC AUTO: 87.3 FL (ref 80–100)
MICROSCOPIC EXAMINATION: YES
MONOCYTES ABSOLUTE: 0.1 K/UL (ref 0–1.3)
MONOCYTES RELATIVE PERCENT: 1 %
MRSA SCREEN RT-PCR: NORMAL
NEUTROPHILS ABSOLUTE: 14.7 K/UL (ref 1.7–7.7)
NEUTROPHILS RELATIVE PERCENT: 96.3 %
NITRITE, URINE: NEGATIVE
ORGANISM: ABNORMAL
PDW BLD-RTO: 16.2 % (ref 12.4–15.4)
PERFORMED ON: ABNORMAL
PERFORMED ON: ABNORMAL
PH UA: 6 (ref 5–8)
PHOSPHORUS: 3 MG/DL (ref 2.5–4.9)
PLATELET # BLD: 214 K/UL (ref 135–450)
PMV BLD AUTO: 7.6 FL (ref 5–10.5)
POTASSIUM REFLEX MAGNESIUM: 4.9 MMOL/L (ref 3.5–5.1)
PROCALCITONIN: 1.01 NG/ML (ref 0–0.15)
PROTEIN UA: NEGATIVE MG/DL
RBC # BLD: 3.95 M/UL (ref 4–5.2)
RBC UA: ABNORMAL /HPF (ref 0–2)
REPORT: NORMAL
RESPIRATORY PANEL PCR: ABNORMAL
SODIUM BLD-SCNC: 139 MMOL/L (ref 136–145)
SPECIFIC GRAVITY UA: 1.01 (ref 1–1.03)
URINE REFLEX TO CULTURE: ABNORMAL
URINE TYPE: ABNORMAL
UROBILINOGEN, URINE: 0.2 E.U./DL
WBC # BLD: 15.2 K/UL (ref 4–11)
WBC UA: 2 /HPF (ref 0–5)

## 2019-10-23 PROCEDURE — 99223 1ST HOSP IP/OBS HIGH 75: CPT | Performed by: INTERNAL MEDICINE

## 2019-10-23 PROCEDURE — 85025 COMPLETE CBC W/AUTO DIFF WBC: CPT

## 2019-10-23 PROCEDURE — 83036 HEMOGLOBIN GLYCOSYLATED A1C: CPT

## 2019-10-23 PROCEDURE — 2000000000 HC ICU R&B

## 2019-10-23 PROCEDURE — 2500000003 HC RX 250 WO HCPCS: Performed by: NURSE PRACTITIONER

## 2019-10-23 PROCEDURE — 87798 DETECT AGENT NOS DNA AMP: CPT

## 2019-10-23 PROCEDURE — 2500000003 HC RX 250 WO HCPCS: Performed by: INTERNAL MEDICINE

## 2019-10-23 PROCEDURE — 93010 ELECTROCARDIOGRAM REPORT: CPT | Performed by: INTERNAL MEDICINE

## 2019-10-23 PROCEDURE — 81001 URINALYSIS AUTO W/SCOPE: CPT

## 2019-10-23 PROCEDURE — 87449 NOS EACH ORGANISM AG IA: CPT

## 2019-10-23 PROCEDURE — 6360000002 HC RX W HCPCS: Performed by: INTERNAL MEDICINE

## 2019-10-23 PROCEDURE — 94761 N-INVAS EAR/PLS OXIMETRY MLT: CPT

## 2019-10-23 PROCEDURE — 94640 AIRWAY INHALATION TREATMENT: CPT

## 2019-10-23 PROCEDURE — 87486 CHLMYD PNEUM DNA AMP PROBE: CPT

## 2019-10-23 PROCEDURE — 6370000000 HC RX 637 (ALT 250 FOR IP): Performed by: INTERNAL MEDICINE

## 2019-10-23 PROCEDURE — 2580000003 HC RX 258: Performed by: EMERGENCY MEDICINE

## 2019-10-23 PROCEDURE — 87641 MR-STAPH DNA AMP PROBE: CPT

## 2019-10-23 PROCEDURE — 84100 ASSAY OF PHOSPHORUS: CPT

## 2019-10-23 PROCEDURE — 2580000003 HC RX 258: Performed by: INTERNAL MEDICINE

## 2019-10-23 PROCEDURE — 87633 RESP VIRUS 12-25 TARGETS: CPT

## 2019-10-23 PROCEDURE — 76937 US GUIDE VASCULAR ACCESS: CPT

## 2019-10-23 PROCEDURE — 71045 X-RAY EXAM CHEST 1 VIEW: CPT

## 2019-10-23 PROCEDURE — 84145 PROCALCITONIN (PCT): CPT

## 2019-10-23 PROCEDURE — 99231 SBSQ HOSP IP/OBS SF/LOW 25: CPT | Performed by: NURSE PRACTITIONER

## 2019-10-23 PROCEDURE — 80048 BASIC METABOLIC PNL TOTAL CA: CPT

## 2019-10-23 PROCEDURE — 83605 ASSAY OF LACTIC ACID: CPT

## 2019-10-23 PROCEDURE — 6360000002 HC RX W HCPCS: Performed by: EMERGENCY MEDICINE

## 2019-10-23 PROCEDURE — 87581 M.PNEUMON DNA AMP PROBE: CPT

## 2019-10-23 PROCEDURE — C1751 CATH, INF, PER/CENT/MIDLINE: HCPCS

## 2019-10-23 PROCEDURE — 2700000000 HC OXYGEN THERAPY PER DAY

## 2019-10-23 PROCEDURE — 6370000000 HC RX 637 (ALT 250 FOR IP): Performed by: NURSE PRACTITIONER

## 2019-10-23 PROCEDURE — 2580000003 HC RX 258: Performed by: NURSE PRACTITIONER

## 2019-10-23 PROCEDURE — 36415 COLL VENOUS BLD VENIPUNCTURE: CPT

## 2019-10-23 PROCEDURE — 02HV33Z INSERTION OF INFUSION DEVICE INTO SUPERIOR VENA CAVA, PERCUTANEOUS APPROACH: ICD-10-PCS | Performed by: NURSE PRACTITIONER

## 2019-10-23 PROCEDURE — 83735 ASSAY OF MAGNESIUM: CPT

## 2019-10-23 PROCEDURE — 6360000002 HC RX W HCPCS: Performed by: NURSE PRACTITIONER

## 2019-10-23 PROCEDURE — 96365 THER/PROPH/DIAG IV INF INIT: CPT

## 2019-10-23 PROCEDURE — 36569 INSJ PICC 5 YR+ W/O IMAGING: CPT

## 2019-10-23 PROCEDURE — APPNB15 APP NON BILLABLE TIME 0-15 MINS: Performed by: NURSE PRACTITIONER

## 2019-10-23 RX ORDER — SODIUM CHLORIDE 9 MG/ML
INJECTION, SOLUTION INTRAVENOUS CONTINUOUS
Status: DISCONTINUED | OUTPATIENT
Start: 2019-10-23 | End: 2019-10-24

## 2019-10-23 RX ORDER — NICOTINE POLACRILEX 4 MG
15 LOZENGE BUCCAL PRN
Status: DISCONTINUED | OUTPATIENT
Start: 2019-10-23 | End: 2019-10-28 | Stop reason: HOSPADM

## 2019-10-23 RX ORDER — BUPROPION HYDROCHLORIDE 100 MG/1
100 TABLET ORAL DAILY
Status: DISCONTINUED | OUTPATIENT
Start: 2019-10-23 | End: 2019-10-28 | Stop reason: HOSPADM

## 2019-10-23 RX ORDER — ALENDRONATE SODIUM 35 MG/1
70 TABLET ORAL WEEKLY
Status: DISCONTINUED | OUTPATIENT
Start: 2019-10-23 | End: 2019-10-23

## 2019-10-23 RX ORDER — METHYLPREDNISOLONE SODIUM SUCCINATE 40 MG/ML
40 INJECTION, POWDER, LYOPHILIZED, FOR SOLUTION INTRAMUSCULAR; INTRAVENOUS EVERY 6 HOURS
Status: DISCONTINUED | OUTPATIENT
Start: 2019-10-23 | End: 2019-10-23

## 2019-10-23 RX ORDER — SERTRALINE HYDROCHLORIDE 100 MG/1
100 TABLET, FILM COATED ORAL DAILY
Status: DISCONTINUED | OUTPATIENT
Start: 2019-10-23 | End: 2019-10-28 | Stop reason: HOSPADM

## 2019-10-23 RX ORDER — M-VIT,TX,IRON,MINS/CALC/FOLIC 27MG-0.4MG
1 TABLET ORAL DAILY
Status: DISCONTINUED | OUTPATIENT
Start: 2019-10-23 | End: 2019-10-28 | Stop reason: HOSPADM

## 2019-10-23 RX ORDER — PANTOPRAZOLE SODIUM 40 MG/1
40 TABLET, DELAYED RELEASE ORAL
Status: DISCONTINUED | OUTPATIENT
Start: 2019-10-23 | End: 2019-10-28 | Stop reason: HOSPADM

## 2019-10-23 RX ORDER — FERROUS SULFATE TAB EC 324 MG (65 MG FE EQUIVALENT) 324 (65 FE) MG
324 TABLET DELAYED RESPONSE ORAL
Status: DISCONTINUED | OUTPATIENT
Start: 2019-10-23 | End: 2019-10-28 | Stop reason: HOSPADM

## 2019-10-23 RX ORDER — SODIUM CHLORIDE 9 MG/ML
10 INJECTION INTRAVENOUS PRN
Status: DISCONTINUED | OUTPATIENT
Start: 2019-10-23 | End: 2019-10-24

## 2019-10-23 RX ORDER — DEXTROSE MONOHYDRATE 50 MG/ML
100 INJECTION, SOLUTION INTRAVENOUS PRN
Status: DISCONTINUED | OUTPATIENT
Start: 2019-10-23 | End: 2019-10-28 | Stop reason: HOSPADM

## 2019-10-23 RX ORDER — SODIUM CHLORIDE 0.9 % (FLUSH) 0.9 %
10 SYRINGE (ML) INJECTION EVERY 12 HOURS SCHEDULED
Status: DISCONTINUED | OUTPATIENT
Start: 2019-10-23 | End: 2019-10-28 | Stop reason: HOSPADM

## 2019-10-23 RX ORDER — CLINDAMYCIN PHOSPHATE 600 MG/50ML
600 INJECTION INTRAVENOUS EVERY 8 HOURS
Status: DISCONTINUED | OUTPATIENT
Start: 2019-10-23 | End: 2019-10-23

## 2019-10-23 RX ORDER — IPRATROPIUM BROMIDE AND ALBUTEROL SULFATE 2.5; .5 MG/3ML; MG/3ML
1 SOLUTION RESPIRATORY (INHALATION)
Status: DISCONTINUED | OUTPATIENT
Start: 2019-10-23 | End: 2019-10-28 | Stop reason: HOSPADM

## 2019-10-23 RX ORDER — LIDOCAINE HYDROCHLORIDE 10 MG/ML
5 INJECTION, SOLUTION EPIDURAL; INFILTRATION; INTRACAUDAL; PERINEURAL ONCE
Status: COMPLETED | OUTPATIENT
Start: 2019-10-23 | End: 2019-10-23

## 2019-10-23 RX ORDER — DOCUSATE SODIUM 100 MG/1
100 CAPSULE, LIQUID FILLED ORAL NIGHTLY
Status: DISCONTINUED | OUTPATIENT
Start: 2019-10-23 | End: 2019-10-28 | Stop reason: HOSPADM

## 2019-10-23 RX ORDER — ONDANSETRON 2 MG/ML
4 INJECTION INTRAMUSCULAR; INTRAVENOUS EVERY 6 HOURS PRN
Status: DISCONTINUED | OUTPATIENT
Start: 2019-10-23 | End: 2019-10-28 | Stop reason: HOSPADM

## 2019-10-23 RX ORDER — SODIUM CHLORIDE 0.9 % (FLUSH) 0.9 %
10 SYRINGE (ML) INJECTION PRN
Status: DISCONTINUED | OUTPATIENT
Start: 2019-10-23 | End: 2019-10-28 | Stop reason: HOSPADM

## 2019-10-23 RX ORDER — METHYLPREDNISOLONE SODIUM SUCCINATE 40 MG/ML
40 INJECTION, POWDER, LYOPHILIZED, FOR SOLUTION INTRAMUSCULAR; INTRAVENOUS EVERY 12 HOURS
Status: DISCONTINUED | OUTPATIENT
Start: 2019-10-23 | End: 2019-10-28

## 2019-10-23 RX ORDER — ASPIRIN 81 MG/1
81 TABLET, CHEWABLE ORAL DAILY
Status: DISCONTINUED | OUTPATIENT
Start: 2019-10-23 | End: 2019-10-28 | Stop reason: HOSPADM

## 2019-10-23 RX ORDER — CLINDAMYCIN PHOSPHATE 150 MG/ML
600 INJECTION, SOLUTION INTRAVENOUS EVERY 8 HOURS
Status: DISCONTINUED | OUTPATIENT
Start: 2019-10-23 | End: 2019-10-23

## 2019-10-23 RX ORDER — DEXTROSE MONOHYDRATE 25 G/50ML
12.5 INJECTION, SOLUTION INTRAVENOUS PRN
Status: DISCONTINUED | OUTPATIENT
Start: 2019-10-23 | End: 2019-10-28 | Stop reason: HOSPADM

## 2019-10-23 RX ORDER — ACETAMINOPHEN 325 MG/1
650 TABLET ORAL NIGHTLY PRN
Status: DISCONTINUED | OUTPATIENT
Start: 2019-10-23 | End: 2019-10-28 | Stop reason: HOSPADM

## 2019-10-23 RX ORDER — SODIUM CHLORIDE 9 MG/ML
10 INJECTION INTRAVENOUS EVERY 12 HOURS SCHEDULED
Status: DISCONTINUED | OUTPATIENT
Start: 2019-10-23 | End: 2019-10-24

## 2019-10-23 RX ADMIN — MEROPENEM 500 MG: 500 INJECTION, POWDER, FOR SOLUTION INTRAVENOUS at 10:34

## 2019-10-23 RX ADMIN — MEROPENEM 500 MG: 500 INJECTION, POWDER, FOR SOLUTION INTRAVENOUS at 22:38

## 2019-10-23 RX ADMIN — CEFEPIME HYDROCHLORIDE 2 G: 2 INJECTION, POWDER, FOR SOLUTION INTRAVENOUS at 03:09

## 2019-10-23 RX ADMIN — IPRATROPIUM BROMIDE AND ALBUTEROL SULFATE 3 ML: .5; 3 SOLUTION RESPIRATORY (INHALATION) at 20:44

## 2019-10-23 RX ADMIN — SODIUM CHLORIDE: 9 INJECTION, SOLUTION INTRAVENOUS at 20:27

## 2019-10-23 RX ADMIN — METHYLPREDNISOLONE SODIUM SUCCINATE 40 MG: 40 INJECTION, POWDER, FOR SOLUTION INTRAMUSCULAR; INTRAVENOUS at 10:34

## 2019-10-23 RX ADMIN — CLINDAMYCIN PHOSPHATE 600 MG: 600 INJECTION, SOLUTION INTRAVENOUS at 03:09

## 2019-10-23 RX ADMIN — Medication 10 ML: at 22:39

## 2019-10-23 RX ADMIN — SERTRALINE 100 MG: 100 TABLET, FILM COATED ORAL at 10:34

## 2019-10-23 RX ADMIN — MEROPENEM 500 MG: 500 INJECTION, POWDER, FOR SOLUTION INTRAVENOUS at 17:41

## 2019-10-23 RX ADMIN — IPRATROPIUM BROMIDE AND ALBUTEROL SULFATE 3 ML: .5; 3 SOLUTION RESPIRATORY (INHALATION) at 16:48

## 2019-10-23 RX ADMIN — PANTOPRAZOLE SODIUM 40 MG: 40 TABLET, DELAYED RELEASE ORAL at 17:41

## 2019-10-23 RX ADMIN — SODIUM CHLORIDE: 9 INJECTION, SOLUTION INTRAVENOUS at 03:09

## 2019-10-23 RX ADMIN — ASPIRIN 81 MG 81 MG: 81 TABLET ORAL at 10:34

## 2019-10-23 RX ADMIN — METHYLPREDNISOLONE SODIUM SUCCINATE 40 MG: 40 INJECTION, POWDER, FOR SOLUTION INTRAMUSCULAR; INTRAVENOUS at 22:38

## 2019-10-23 RX ADMIN — AZITHROMYCIN MONOHYDRATE 500 MG: 500 INJECTION, POWDER, LYOPHILIZED, FOR SOLUTION INTRAVENOUS at 10:33

## 2019-10-23 RX ADMIN — INSULIN LISPRO 2 UNITS: 100 INJECTION, SOLUTION INTRAVENOUS; SUBCUTANEOUS at 22:38

## 2019-10-23 RX ADMIN — VANCOMYCIN HYDROCHLORIDE 1000 MG: 1 INJECTION, POWDER, LYOPHILIZED, FOR SOLUTION INTRAVENOUS at 00:27

## 2019-10-23 RX ADMIN — IPRATROPIUM BROMIDE AND ALBUTEROL SULFATE 3 ML: .5; 3 SOLUTION RESPIRATORY (INHALATION) at 08:44

## 2019-10-23 RX ADMIN — FERROUS SULFATE TAB EC 324 MG (65 MG FE EQUIVALENT) 324 MG: 324 (65 FE) TABLET DELAYED RESPONSE at 10:34

## 2019-10-23 RX ADMIN — PANTOPRAZOLE SODIUM 40 MG: 40 TABLET, DELAYED RELEASE ORAL at 06:18

## 2019-10-23 RX ADMIN — Medication 10 ML: at 10:35

## 2019-10-23 RX ADMIN — IPRATROPIUM BROMIDE AND ALBUTEROL SULFATE 3 ML: .5; 3 SOLUTION RESPIRATORY (INHALATION) at 13:24

## 2019-10-23 RX ADMIN — SODIUM CHLORIDE, PRESERVATIVE FREE 10 ML: 5 INJECTION INTRAVENOUS at 13:15

## 2019-10-23 RX ADMIN — BUPROPION HYDROCHLORIDE 100 MG: 100 TABLET, FILM COATED ORAL at 10:34

## 2019-10-23 RX ADMIN — LIDOCAINE HYDROCHLORIDE 5 ML: 10 INJECTION, SOLUTION EPIDURAL; INFILTRATION; INTRACAUDAL; PERINEURAL at 13:14

## 2019-10-23 RX ADMIN — DOCUSATE SODIUM 100 MG: 100 CAPSULE, LIQUID FILLED ORAL at 22:38

## 2019-10-23 RX ADMIN — METHYLPREDNISOLONE SODIUM SUCCINATE 40 MG: 40 INJECTION, POWDER, FOR SOLUTION INTRAMUSCULAR; INTRAVENOUS at 04:40

## 2019-10-23 RX ADMIN — ENOXAPARIN SODIUM 30 MG: 30 INJECTION SUBCUTANEOUS at 10:34

## 2019-10-23 RX ADMIN — MULTIPLE VITAMINS W/ MINERALS TAB 1 TABLET: TAB at 10:34

## 2019-10-23 ASSESSMENT — PAIN SCALES - GENERAL
PAINLEVEL_OUTOF10: 0

## 2019-10-24 ENCOUNTER — HOSPITAL ENCOUNTER (OUTPATIENT)
Dept: WOUND CARE | Age: 79
Discharge: HOME | End: 2019-10-24
Payer: MEDICARE

## 2019-10-24 LAB
ANION GAP SERPL CALCULATED.3IONS-SCNC: 10 MMOL/L (ref 3–16)
BASOPHILS ABSOLUTE: 0 K/UL (ref 0–0.2)
BASOPHILS RELATIVE PERCENT: 0.2 %
BUN BLDV-MCNC: 25 MG/DL (ref 7–20)
CALCIUM SERPL-MCNC: 9.3 MG/DL (ref 8.3–10.6)
CHLORIDE BLD-SCNC: 110 MMOL/L (ref 99–110)
CO2: 22 MMOL/L (ref 21–32)
CREAT SERPL-MCNC: 0.7 MG/DL (ref 0.6–1.2)
EOSINOPHILS ABSOLUTE: 0 K/UL (ref 0–0.6)
EOSINOPHILS RELATIVE PERCENT: 0 %
GFR AFRICAN AMERICAN: >60
GFR NON-AFRICAN AMERICAN: >60
GLUCOSE BLD-MCNC: 110 MG/DL (ref 70–99)
GLUCOSE BLD-MCNC: 135 MG/DL (ref 70–99)
GLUCOSE BLD-MCNC: 150 MG/DL (ref 70–99)
GLUCOSE BLD-MCNC: 152 MG/DL (ref 70–99)
GLUCOSE BLD-MCNC: 155 MG/DL (ref 70–99)
GLUCOSE BLD-MCNC: 233 MG/DL (ref 70–99)
HCT VFR BLD CALC: 28.5 % (ref 36–48)
HEMOGLOBIN: 9.2 G/DL (ref 12–16)
L. PNEUMOPHILA SEROGP 1 UR AG: NORMAL
LYMPHOCYTES ABSOLUTE: 0.4 K/UL (ref 1–5.1)
LYMPHOCYTES RELATIVE PERCENT: 3.1 %
MAGNESIUM: 2 MG/DL (ref 1.8–2.4)
MCH RBC QN AUTO: 27.9 PG (ref 26–34)
MCHC RBC AUTO-ENTMCNC: 32.3 G/DL (ref 31–36)
MCV RBC AUTO: 86.4 FL (ref 80–100)
MONOCYTES ABSOLUTE: 0.2 K/UL (ref 0–1.3)
MONOCYTES RELATIVE PERCENT: 1.9 %
NEUTROPHILS ABSOLUTE: 12.4 K/UL (ref 1.7–7.7)
NEUTROPHILS RELATIVE PERCENT: 94.8 %
PDW BLD-RTO: 16.3 % (ref 12.4–15.4)
PERFORMED ON: ABNORMAL
PHOSPHORUS: 2.2 MG/DL (ref 2.5–4.9)
PLATELET # BLD: 176 K/UL (ref 135–450)
PMV BLD AUTO: 7.6 FL (ref 5–10.5)
POTASSIUM REFLEX MAGNESIUM: 4.7 MMOL/L (ref 3.5–5.1)
PROCALCITONIN: 0.54 NG/ML (ref 0–0.15)
RBC # BLD: 3.31 M/UL (ref 4–5.2)
SODIUM BLD-SCNC: 142 MMOL/L (ref 136–145)
STREP PNEUMONIAE ANTIGEN, URINE: NORMAL
WBC # BLD: 13 K/UL (ref 4–11)

## 2019-10-24 PROCEDURE — 2700000000 HC OXYGEN THERAPY PER DAY

## 2019-10-24 PROCEDURE — 94760 N-INVAS EAR/PLS OXIMETRY 1: CPT

## 2019-10-24 PROCEDURE — 36415 COLL VENOUS BLD VENIPUNCTURE: CPT

## 2019-10-24 PROCEDURE — 94640 AIRWAY INHALATION TREATMENT: CPT

## 2019-10-24 PROCEDURE — 97110 THERAPEUTIC EXERCISES: CPT

## 2019-10-24 PROCEDURE — 80048 BASIC METABOLIC PNL TOTAL CA: CPT

## 2019-10-24 PROCEDURE — G0008 ADMIN INFLUENZA VIRUS VAC: HCPCS | Performed by: INTERNAL MEDICINE

## 2019-10-24 PROCEDURE — APPNB15 APP NON BILLABLE TIME 0-15 MINS: Performed by: NURSE PRACTITIONER

## 2019-10-24 PROCEDURE — 99232 SBSQ HOSP IP/OBS MODERATE 35: CPT | Performed by: INTERNAL MEDICINE

## 2019-10-24 PROCEDURE — 84100 ASSAY OF PHOSPHORUS: CPT

## 2019-10-24 PROCEDURE — 11042 DBRDMT SUBQ TIS 1ST 20SQCM/<: CPT | Performed by: NURSE PRACTITIONER

## 2019-10-24 PROCEDURE — 6370000000 HC RX 637 (ALT 250 FOR IP): Performed by: INTERNAL MEDICINE

## 2019-10-24 PROCEDURE — 2000000000 HC ICU R&B

## 2019-10-24 PROCEDURE — 97530 THERAPEUTIC ACTIVITIES: CPT

## 2019-10-24 PROCEDURE — 97535 SELF CARE MNGMENT TRAINING: CPT

## 2019-10-24 PROCEDURE — 2500000003 HC RX 250 WO HCPCS: Performed by: NURSE PRACTITIONER

## 2019-10-24 PROCEDURE — 6360000002 HC RX W HCPCS: Performed by: NURSE PRACTITIONER

## 2019-10-24 PROCEDURE — 83735 ASSAY OF MAGNESIUM: CPT

## 2019-10-24 PROCEDURE — 97116 GAIT TRAINING THERAPY: CPT

## 2019-10-24 PROCEDURE — 6370000000 HC RX 637 (ALT 250 FOR IP): Performed by: NURSE PRACTITIONER

## 2019-10-24 PROCEDURE — 6360000002 HC RX W HCPCS: Performed by: INTERNAL MEDICINE

## 2019-10-24 PROCEDURE — 97162 PT EVAL MOD COMPLEX 30 MIN: CPT

## 2019-10-24 PROCEDURE — 85025 COMPLETE CBC W/AUTO DIFF WBC: CPT

## 2019-10-24 PROCEDURE — 0JB70ZZ EXCISION OF BACK SUBCUTANEOUS TISSUE AND FASCIA, OPEN APPROACH: ICD-10-PCS | Performed by: NURSE PRACTITIONER

## 2019-10-24 PROCEDURE — 90686 IIV4 VACC NO PRSV 0.5 ML IM: CPT | Performed by: INTERNAL MEDICINE

## 2019-10-24 PROCEDURE — 2580000003 HC RX 258: Performed by: NURSE PRACTITIONER

## 2019-10-24 PROCEDURE — 84145 PROCALCITONIN (PCT): CPT

## 2019-10-24 PROCEDURE — 2580000003 HC RX 258: Performed by: INTERNAL MEDICINE

## 2019-10-24 PROCEDURE — 97166 OT EVAL MOD COMPLEX 45 MIN: CPT

## 2019-10-24 PROCEDURE — 99233 SBSQ HOSP IP/OBS HIGH 50: CPT | Performed by: INTERNAL MEDICINE

## 2019-10-24 RX ORDER — SODIUM CHLORIDE FOR INHALATION 3 %
15 VIAL, NEBULIZER (ML) INHALATION 3 TIMES DAILY
Status: DISCONTINUED | OUTPATIENT
Start: 2019-10-24 | End: 2019-10-28 | Stop reason: HOSPADM

## 2019-10-24 RX ADMIN — INSULIN LISPRO 1 UNITS: 100 INJECTION, SOLUTION INTRAVENOUS; SUBCUTANEOUS at 09:02

## 2019-10-24 RX ADMIN — MEROPENEM 500 MG: 500 INJECTION, POWDER, FOR SOLUTION INTRAVENOUS at 20:54

## 2019-10-24 RX ADMIN — ENOXAPARIN SODIUM 30 MG: 30 INJECTION SUBCUTANEOUS at 09:00

## 2019-10-24 RX ADMIN — VANCOMYCIN HYDROCHLORIDE 750 MG: 750 INJECTION, POWDER, LYOPHILIZED, FOR SOLUTION INTRAVENOUS at 00:25

## 2019-10-24 RX ADMIN — ASPIRIN 81 MG 81 MG: 81 TABLET ORAL at 09:00

## 2019-10-24 RX ADMIN — INSULIN LISPRO 2 UNITS: 100 INJECTION, SOLUTION INTRAVENOUS; SUBCUTANEOUS at 12:45

## 2019-10-24 RX ADMIN — IPRATROPIUM BROMIDE AND ALBUTEROL SULFATE 3 ML: .5; 3 SOLUTION RESPIRATORY (INHALATION) at 11:36

## 2019-10-24 RX ADMIN — SODIUM PHOSPHATE, MONOBASIC, MONOHYDRATE 15 MMOL: 276; 142 INJECTION, SOLUTION INTRAVENOUS at 08:59

## 2019-10-24 RX ADMIN — METHYLPREDNISOLONE SODIUM SUCCINATE 40 MG: 40 INJECTION, POWDER, FOR SOLUTION INTRAMUSCULAR; INTRAVENOUS at 20:54

## 2019-10-24 RX ADMIN — INFLUENZA A VIRUS A/BRISBANE/02/2018 IVR-190 (H1N1) ANTIGEN (PROPIOLACTONE INACTIVATED), INFLUENZA A VIRUS A/KANSAS/14/2017 X-327 (H3N2) ANTIGEN (PROPIOLACTONE INACTIVATED), INFLUENZA B VIRUS B/MARYLAND/15/2016 ANTIGEN (PROPIOLACTONE INACTIVATED), INFLUENZA B VIRUS B/PHUKET/3073/2013 BVR-1B ANTIGEN (PROPIOLACTONE INACTIVATED) 0.5 ML: 15; 15; 15; 15 INJECTION, SUSPENSION INTRAMUSCULAR at 09:07

## 2019-10-24 RX ADMIN — AZITHROMYCIN MONOHYDRATE 500 MG: 500 INJECTION, POWDER, LYOPHILIZED, FOR SOLUTION INTRAVENOUS at 11:31

## 2019-10-24 RX ADMIN — INSULIN LISPRO 1 UNITS: 100 INJECTION, SOLUTION INTRAVENOUS; SUBCUTANEOUS at 20:53

## 2019-10-24 RX ADMIN — DOCUSATE SODIUM 100 MG: 100 CAPSULE, LIQUID FILLED ORAL at 20:54

## 2019-10-24 RX ADMIN — MULTIPLE VITAMINS W/ MINERALS TAB 1 TABLET: TAB at 09:00

## 2019-10-24 RX ADMIN — IPRATROPIUM BROMIDE AND ALBUTEROL SULFATE 3 ML: .5; 3 SOLUTION RESPIRATORY (INHALATION) at 21:06

## 2019-10-24 RX ADMIN — BUPROPION HYDROCHLORIDE 100 MG: 100 TABLET, FILM COATED ORAL at 14:36

## 2019-10-24 RX ADMIN — MEROPENEM 500 MG: 500 INJECTION, POWDER, FOR SOLUTION INTRAVENOUS at 15:06

## 2019-10-24 RX ADMIN — MEROPENEM 500 MG: 500 INJECTION, POWDER, FOR SOLUTION INTRAVENOUS at 08:59

## 2019-10-24 RX ADMIN — IPRATROPIUM BROMIDE AND ALBUTEROL SULFATE 3 ML: .5; 3 SOLUTION RESPIRATORY (INHALATION) at 08:03

## 2019-10-24 RX ADMIN — SODIUM CHLORIDE SOLN NEBU 3% 15 ML: 3 NEBU SOLN at 11:36

## 2019-10-24 RX ADMIN — SODIUM CHLORIDE SOLN NEBU 3% 15 ML: 3 NEBU SOLN at 21:11

## 2019-10-24 RX ADMIN — MEROPENEM 500 MG: 500 INJECTION, POWDER, FOR SOLUTION INTRAVENOUS at 03:03

## 2019-10-24 RX ADMIN — DAKIN'S SOLUTION 0.125% (QUARTER STRENGTH): 0.12 SOLUTION at 14:36

## 2019-10-24 RX ADMIN — IPRATROPIUM BROMIDE AND ALBUTEROL SULFATE 3 ML: .5; 3 SOLUTION RESPIRATORY (INHALATION) at 17:02

## 2019-10-24 RX ADMIN — METHYLPREDNISOLONE SODIUM SUCCINATE 40 MG: 40 INJECTION, POWDER, FOR SOLUTION INTRAMUSCULAR; INTRAVENOUS at 09:00

## 2019-10-24 RX ADMIN — PANTOPRAZOLE SODIUM 40 MG: 40 TABLET, DELAYED RELEASE ORAL at 05:58

## 2019-10-24 RX ADMIN — SODIUM CHLORIDE, PRESERVATIVE FREE 10 ML: 5 INJECTION INTRAVENOUS at 09:00

## 2019-10-24 RX ADMIN — SERTRALINE 100 MG: 100 TABLET, FILM COATED ORAL at 09:00

## 2019-10-24 RX ADMIN — SODIUM CHLORIDE, PRESERVATIVE FREE 10 ML: 5 INJECTION INTRAVENOUS at 20:58

## 2019-10-24 RX ADMIN — INSULIN LISPRO 1 UNITS: 100 INJECTION, SOLUTION INTRAVENOUS; SUBCUTANEOUS at 05:58

## 2019-10-24 RX ADMIN — FERROUS SULFATE TAB EC 324 MG (65 MG FE EQUIVALENT) 324 MG: 324 (65 FE) TABLET DELAYED RESPONSE at 09:00

## 2019-10-24 RX ADMIN — PANTOPRAZOLE SODIUM 40 MG: 40 TABLET, DELAYED RELEASE ORAL at 15:06

## 2019-10-24 ASSESSMENT — ENCOUNTER SYMPTOMS
CHEST TIGHTNESS: 0
GASTROINTESTINAL NEGATIVE: 1
COUGH: 1
SHORTNESS OF BREATH: 1

## 2019-10-24 ASSESSMENT — PAIN SCALES - GENERAL
PAINLEVEL_OUTOF10: 0

## 2019-10-25 ENCOUNTER — APPOINTMENT (OUTPATIENT)
Dept: GENERAL RADIOLOGY | Age: 79
DRG: 853 | End: 2019-10-25
Payer: MEDICARE

## 2019-10-25 LAB
ANION GAP SERPL CALCULATED.3IONS-SCNC: 12 MMOL/L (ref 3–16)
BASOPHILS ABSOLUTE: 0 K/UL (ref 0–0.2)
BASOPHILS RELATIVE PERCENT: 0.1 %
BUN BLDV-MCNC: 22 MG/DL (ref 7–20)
CALCIUM SERPL-MCNC: 9.2 MG/DL (ref 8.3–10.6)
CHLORIDE BLD-SCNC: 107 MMOL/L (ref 99–110)
CO2: 24 MMOL/L (ref 21–32)
CREAT SERPL-MCNC: 0.6 MG/DL (ref 0.6–1.2)
EOSINOPHILS ABSOLUTE: 0 K/UL (ref 0–0.6)
EOSINOPHILS RELATIVE PERCENT: 0.2 %
GFR AFRICAN AMERICAN: >60
GFR NON-AFRICAN AMERICAN: >60
GLUCOSE BLD-MCNC: 100 MG/DL (ref 70–99)
GLUCOSE BLD-MCNC: 100 MG/DL (ref 70–99)
GLUCOSE BLD-MCNC: 102 MG/DL (ref 70–99)
GLUCOSE BLD-MCNC: 115 MG/DL (ref 70–99)
GLUCOSE BLD-MCNC: 119 MG/DL (ref 70–99)
GLUCOSE BLD-MCNC: 123 MG/DL (ref 70–99)
GLUCOSE BLD-MCNC: 152 MG/DL (ref 70–99)
GLUCOSE BLD-MCNC: 179 MG/DL (ref 70–99)
HCT VFR BLD CALC: 27.9 % (ref 36–48)
HEMOGLOBIN: 9.1 G/DL (ref 12–16)
LYMPHOCYTES ABSOLUTE: 0.9 K/UL (ref 1–5.1)
LYMPHOCYTES RELATIVE PERCENT: 7.6 %
MAGNESIUM: 2 MG/DL (ref 1.8–2.4)
MCH RBC QN AUTO: 28.2 PG (ref 26–34)
MCHC RBC AUTO-ENTMCNC: 32.7 G/DL (ref 31–36)
MCV RBC AUTO: 86.3 FL (ref 80–100)
MONOCYTES ABSOLUTE: 0.5 K/UL (ref 0–1.3)
MONOCYTES RELATIVE PERCENT: 3.9 %
NEUTROPHILS ABSOLUTE: 10.3 K/UL (ref 1.7–7.7)
NEUTROPHILS RELATIVE PERCENT: 88.2 %
PDW BLD-RTO: 16.6 % (ref 12.4–15.4)
PERFORMED ON: ABNORMAL
PHOSPHORUS: 2.5 MG/DL (ref 2.5–4.9)
PLATELET # BLD: 194 K/UL (ref 135–450)
PMV BLD AUTO: 7.6 FL (ref 5–10.5)
POTASSIUM REFLEX MAGNESIUM: 4.1 MMOL/L (ref 3.5–5.1)
PROCALCITONIN: 0.32 NG/ML (ref 0–0.15)
RBC # BLD: 3.23 M/UL (ref 4–5.2)
SODIUM BLD-SCNC: 143 MMOL/L (ref 136–145)
WBC # BLD: 11.6 K/UL (ref 4–11)

## 2019-10-25 PROCEDURE — 6370000000 HC RX 637 (ALT 250 FOR IP): Performed by: NURSE PRACTITIONER

## 2019-10-25 PROCEDURE — 71045 X-RAY EXAM CHEST 1 VIEW: CPT

## 2019-10-25 PROCEDURE — 6360000002 HC RX W HCPCS: Performed by: INTERNAL MEDICINE

## 2019-10-25 PROCEDURE — 6360000002 HC RX W HCPCS: Performed by: NURSE PRACTITIONER

## 2019-10-25 PROCEDURE — 6370000000 HC RX 637 (ALT 250 FOR IP): Performed by: INTERNAL MEDICINE

## 2019-10-25 PROCEDURE — 94761 N-INVAS EAR/PLS OXIMETRY MLT: CPT

## 2019-10-25 PROCEDURE — 85025 COMPLETE CBC W/AUTO DIFF WBC: CPT

## 2019-10-25 PROCEDURE — 2700000000 HC OXYGEN THERAPY PER DAY

## 2019-10-25 PROCEDURE — 80048 BASIC METABOLIC PNL TOTAL CA: CPT

## 2019-10-25 PROCEDURE — 1200000000 HC SEMI PRIVATE

## 2019-10-25 PROCEDURE — 83735 ASSAY OF MAGNESIUM: CPT

## 2019-10-25 PROCEDURE — 99232 SBSQ HOSP IP/OBS MODERATE 35: CPT | Performed by: SURGERY

## 2019-10-25 PROCEDURE — 2580000003 HC RX 258: Performed by: INTERNAL MEDICINE

## 2019-10-25 PROCEDURE — 94640 AIRWAY INHALATION TREATMENT: CPT

## 2019-10-25 PROCEDURE — 2580000003 HC RX 258: Performed by: NURSE PRACTITIONER

## 2019-10-25 PROCEDURE — 99232 SBSQ HOSP IP/OBS MODERATE 35: CPT | Performed by: INTERNAL MEDICINE

## 2019-10-25 PROCEDURE — 84145 PROCALCITONIN (PCT): CPT

## 2019-10-25 PROCEDURE — 97116 GAIT TRAINING THERAPY: CPT

## 2019-10-25 PROCEDURE — 97530 THERAPEUTIC ACTIVITIES: CPT

## 2019-10-25 PROCEDURE — 84100 ASSAY OF PHOSPHORUS: CPT

## 2019-10-25 PROCEDURE — 99233 SBSQ HOSP IP/OBS HIGH 50: CPT | Performed by: INTERNAL MEDICINE

## 2019-10-25 RX ORDER — OMEPRAZOLE 20 MG/1
20 CAPSULE, DELAYED RELEASE ORAL DAILY
Status: ON HOLD | COMMUNITY
End: 2019-10-28 | Stop reason: HOSPADM

## 2019-10-25 RX ORDER — AZITHROMYCIN 500 MG/1
500 TABLET, FILM COATED ORAL DAILY
Status: DISCONTINUED | OUTPATIENT
Start: 2019-10-25 | End: 2019-10-28

## 2019-10-25 RX ADMIN — PANTOPRAZOLE SODIUM 40 MG: 40 TABLET, DELAYED RELEASE ORAL at 17:26

## 2019-10-25 RX ADMIN — IPRATROPIUM BROMIDE AND ALBUTEROL SULFATE 3 ML: .5; 3 SOLUTION RESPIRATORY (INHALATION) at 12:26

## 2019-10-25 RX ADMIN — METHYLPREDNISOLONE SODIUM SUCCINATE 40 MG: 40 INJECTION, POWDER, FOR SOLUTION INTRAMUSCULAR; INTRAVENOUS at 21:10

## 2019-10-25 RX ADMIN — SODIUM CHLORIDE SOLN NEBU 3% 15 ML: 3 NEBU SOLN at 08:38

## 2019-10-25 RX ADMIN — CEFTRIAXONE 1 G: 1 INJECTION, POWDER, FOR SOLUTION INTRAMUSCULAR; INTRAVENOUS at 09:01

## 2019-10-25 RX ADMIN — MEROPENEM 500 MG: 500 INJECTION, POWDER, FOR SOLUTION INTRAVENOUS at 02:54

## 2019-10-25 RX ADMIN — IPRATROPIUM BROMIDE AND ALBUTEROL SULFATE 3 ML: .5; 3 SOLUTION RESPIRATORY (INHALATION) at 15:22

## 2019-10-25 RX ADMIN — SODIUM CHLORIDE, PRESERVATIVE FREE 10 ML: 5 INJECTION INTRAVENOUS at 08:56

## 2019-10-25 RX ADMIN — PANTOPRAZOLE SODIUM 40 MG: 40 TABLET, DELAYED RELEASE ORAL at 08:55

## 2019-10-25 RX ADMIN — METHYLPREDNISOLONE SODIUM SUCCINATE 40 MG: 40 INJECTION, POWDER, FOR SOLUTION INTRAMUSCULAR; INTRAVENOUS at 09:01

## 2019-10-25 RX ADMIN — ASPIRIN 81 MG 81 MG: 81 TABLET ORAL at 08:55

## 2019-10-25 RX ADMIN — IPRATROPIUM BROMIDE AND ALBUTEROL SULFATE 3 ML: .5; 3 SOLUTION RESPIRATORY (INHALATION) at 08:38

## 2019-10-25 RX ADMIN — SODIUM CHLORIDE SOLN NEBU 3% 15 ML: 3 NEBU SOLN at 14:21

## 2019-10-25 RX ADMIN — SERTRALINE 100 MG: 100 TABLET, FILM COATED ORAL at 08:55

## 2019-10-25 RX ADMIN — INSULIN LISPRO 1 UNITS: 100 INJECTION, SOLUTION INTRAVENOUS; SUBCUTANEOUS at 17:25

## 2019-10-25 RX ADMIN — IPRATROPIUM BROMIDE AND ALBUTEROL SULFATE 3 ML: .5; 3 SOLUTION RESPIRATORY (INHALATION) at 20:41

## 2019-10-25 RX ADMIN — DAKIN'S SOLUTION 0.125% (QUARTER STRENGTH): 0.12 SOLUTION at 14:10

## 2019-10-25 RX ADMIN — BUPROPION HYDROCHLORIDE 100 MG: 100 TABLET, FILM COATED ORAL at 09:00

## 2019-10-25 RX ADMIN — AZITHROMYCIN 500 MG: 500 TABLET, FILM COATED ORAL at 08:55

## 2019-10-25 RX ADMIN — SODIUM CHLORIDE, PRESERVATIVE FREE 10 ML: 5 INJECTION INTRAVENOUS at 21:10

## 2019-10-25 RX ADMIN — FERROUS SULFATE TAB EC 324 MG (65 MG FE EQUIVALENT) 324 MG: 324 (65 FE) TABLET DELAYED RESPONSE at 08:55

## 2019-10-25 RX ADMIN — SODIUM CHLORIDE SOLN NEBU 3% 15 ML: 3 NEBU SOLN at 20:42

## 2019-10-25 RX ADMIN — ENOXAPARIN SODIUM 30 MG: 30 INJECTION SUBCUTANEOUS at 08:55

## 2019-10-25 RX ADMIN — INSULIN LISPRO 1 UNITS: 100 INJECTION, SOLUTION INTRAVENOUS; SUBCUTANEOUS at 12:13

## 2019-10-25 RX ADMIN — DOCUSATE SODIUM 100 MG: 100 CAPSULE, LIQUID FILLED ORAL at 21:10

## 2019-10-25 RX ADMIN — MULTIPLE VITAMINS W/ MINERALS TAB 1 TABLET: TAB at 08:55

## 2019-10-25 ASSESSMENT — PAIN SCALES - GENERAL
PAINLEVEL_OUTOF10: 0

## 2019-10-25 ASSESSMENT — ENCOUNTER SYMPTOMS
GASTROINTESTINAL NEGATIVE: 1
SHORTNESS OF BREATH: 1

## 2019-10-26 LAB
GLUCOSE BLD-MCNC: 103 MG/DL (ref 70–99)
GLUCOSE BLD-MCNC: 122 MG/DL (ref 70–99)
GLUCOSE BLD-MCNC: 138 MG/DL (ref 70–99)
GLUCOSE BLD-MCNC: 144 MG/DL (ref 70–99)
GLUCOSE BLD-MCNC: 164 MG/DL (ref 70–99)
MAGNESIUM: 2 MG/DL (ref 1.8–2.4)
PERFORMED ON: ABNORMAL
PHOSPHORUS: 2.5 MG/DL (ref 2.5–4.9)
PROCALCITONIN: 0.21 NG/ML (ref 0–0.15)

## 2019-10-26 PROCEDURE — 6370000000 HC RX 637 (ALT 250 FOR IP): Performed by: NURSE PRACTITIONER

## 2019-10-26 PROCEDURE — 6370000000 HC RX 637 (ALT 250 FOR IP): Performed by: INTERNAL MEDICINE

## 2019-10-26 PROCEDURE — 2580000003 HC RX 258: Performed by: NURSE PRACTITIONER

## 2019-10-26 PROCEDURE — 94760 N-INVAS EAR/PLS OXIMETRY 1: CPT

## 2019-10-26 PROCEDURE — 6360000002 HC RX W HCPCS: Performed by: INTERNAL MEDICINE

## 2019-10-26 PROCEDURE — 99233 SBSQ HOSP IP/OBS HIGH 50: CPT | Performed by: INTERNAL MEDICINE

## 2019-10-26 PROCEDURE — 94640 AIRWAY INHALATION TREATMENT: CPT

## 2019-10-26 PROCEDURE — 84100 ASSAY OF PHOSPHORUS: CPT

## 2019-10-26 PROCEDURE — 1200000000 HC SEMI PRIVATE

## 2019-10-26 PROCEDURE — 84145 PROCALCITONIN (PCT): CPT

## 2019-10-26 PROCEDURE — 2700000000 HC OXYGEN THERAPY PER DAY

## 2019-10-26 PROCEDURE — 2580000003 HC RX 258: Performed by: INTERNAL MEDICINE

## 2019-10-26 PROCEDURE — 83735 ASSAY OF MAGNESIUM: CPT

## 2019-10-26 RX ADMIN — SODIUM CHLORIDE SOLN NEBU 3% 15 ML: 3 NEBU SOLN at 07:48

## 2019-10-26 RX ADMIN — DOCUSATE SODIUM 100 MG: 100 CAPSULE, LIQUID FILLED ORAL at 21:48

## 2019-10-26 RX ADMIN — CEFTRIAXONE 1 G: 1 INJECTION, POWDER, FOR SOLUTION INTRAMUSCULAR; INTRAVENOUS at 08:46

## 2019-10-26 RX ADMIN — ASPIRIN 81 MG 81 MG: 81 TABLET ORAL at 08:47

## 2019-10-26 RX ADMIN — IPRATROPIUM BROMIDE AND ALBUTEROL SULFATE 3 ML: .5; 3 SOLUTION RESPIRATORY (INHALATION) at 11:38

## 2019-10-26 RX ADMIN — SERTRALINE 100 MG: 100 TABLET, FILM COATED ORAL at 08:47

## 2019-10-26 RX ADMIN — BUPROPION HYDROCHLORIDE 100 MG: 100 TABLET, FILM COATED ORAL at 11:59

## 2019-10-26 RX ADMIN — AZITHROMYCIN 500 MG: 500 TABLET, FILM COATED ORAL at 08:47

## 2019-10-26 RX ADMIN — ENOXAPARIN SODIUM 30 MG: 30 INJECTION SUBCUTANEOUS at 08:46

## 2019-10-26 RX ADMIN — IPRATROPIUM BROMIDE AND ALBUTEROL SULFATE 3 ML: .5; 3 SOLUTION RESPIRATORY (INHALATION) at 15:41

## 2019-10-26 RX ADMIN — SODIUM CHLORIDE SOLN NEBU 3% 15 ML: 3 NEBU SOLN at 20:07

## 2019-10-26 RX ADMIN — METHYLPREDNISOLONE SODIUM SUCCINATE 40 MG: 40 INJECTION, POWDER, FOR SOLUTION INTRAMUSCULAR; INTRAVENOUS at 21:48

## 2019-10-26 RX ADMIN — PANTOPRAZOLE SODIUM 40 MG: 40 TABLET, DELAYED RELEASE ORAL at 16:40

## 2019-10-26 RX ADMIN — IPRATROPIUM BROMIDE AND ALBUTEROL SULFATE 3 ML: .5; 3 SOLUTION RESPIRATORY (INHALATION) at 07:51

## 2019-10-26 RX ADMIN — FERROUS SULFATE TAB EC 324 MG (65 MG FE EQUIVALENT) 324 MG: 324 (65 FE) TABLET DELAYED RESPONSE at 08:47

## 2019-10-26 RX ADMIN — PANTOPRAZOLE SODIUM 40 MG: 40 TABLET, DELAYED RELEASE ORAL at 08:56

## 2019-10-26 RX ADMIN — DAKIN'S SOLUTION 0.125% (QUARTER STRENGTH): 0.12 SOLUTION at 15:13

## 2019-10-26 RX ADMIN — SODIUM CHLORIDE, PRESERVATIVE FREE 10 ML: 5 INJECTION INTRAVENOUS at 08:46

## 2019-10-26 RX ADMIN — SODIUM CHLORIDE SOLN NEBU 3% 15 ML: 3 NEBU SOLN at 11:40

## 2019-10-26 RX ADMIN — MULTIPLE VITAMINS W/ MINERALS TAB 1 TABLET: TAB at 08:47

## 2019-10-26 RX ADMIN — METHYLPREDNISOLONE SODIUM SUCCINATE 40 MG: 40 INJECTION, POWDER, FOR SOLUTION INTRAMUSCULAR; INTRAVENOUS at 08:46

## 2019-10-26 RX ADMIN — SODIUM CHLORIDE, PRESERVATIVE FREE 10 ML: 5 INJECTION INTRAVENOUS at 21:48

## 2019-10-26 RX ADMIN — INSULIN LISPRO 1 UNITS: 100 INJECTION, SOLUTION INTRAVENOUS; SUBCUTANEOUS at 21:48

## 2019-10-26 RX ADMIN — IPRATROPIUM BROMIDE AND ALBUTEROL SULFATE 3 ML: .5; 3 SOLUTION RESPIRATORY (INHALATION) at 20:07

## 2019-10-26 ASSESSMENT — PAIN SCALES - GENERAL
PAINLEVEL_OUTOF10: 0

## 2019-10-27 LAB
GLUCOSE BLD-MCNC: 109 MG/DL (ref 70–99)
GLUCOSE BLD-MCNC: 118 MG/DL (ref 70–99)
GLUCOSE BLD-MCNC: 120 MG/DL (ref 70–99)
GLUCOSE BLD-MCNC: 122 MG/DL (ref 70–99)
GLUCOSE BLD-MCNC: 140 MG/DL (ref 70–99)
GLUCOSE BLD-MCNC: 99 MG/DL (ref 70–99)
MAGNESIUM: 2 MG/DL (ref 1.8–2.4)
PERFORMED ON: ABNORMAL
PERFORMED ON: NORMAL
PHOSPHORUS: 2.4 MG/DL (ref 2.5–4.9)
PROCALCITONIN: 0.13 NG/ML (ref 0–0.15)

## 2019-10-27 PROCEDURE — 83735 ASSAY OF MAGNESIUM: CPT

## 2019-10-27 PROCEDURE — 6370000000 HC RX 637 (ALT 250 FOR IP): Performed by: NURSE PRACTITIONER

## 2019-10-27 PROCEDURE — 99233 SBSQ HOSP IP/OBS HIGH 50: CPT | Performed by: INTERNAL MEDICINE

## 2019-10-27 PROCEDURE — 6370000000 HC RX 637 (ALT 250 FOR IP): Performed by: INTERNAL MEDICINE

## 2019-10-27 PROCEDURE — 6360000002 HC RX W HCPCS: Performed by: INTERNAL MEDICINE

## 2019-10-27 PROCEDURE — 2700000000 HC OXYGEN THERAPY PER DAY

## 2019-10-27 PROCEDURE — 94760 N-INVAS EAR/PLS OXIMETRY 1: CPT

## 2019-10-27 PROCEDURE — 2580000003 HC RX 258: Performed by: NURSE PRACTITIONER

## 2019-10-27 PROCEDURE — 1200000000 HC SEMI PRIVATE

## 2019-10-27 PROCEDURE — 84100 ASSAY OF PHOSPHORUS: CPT

## 2019-10-27 PROCEDURE — 94640 AIRWAY INHALATION TREATMENT: CPT

## 2019-10-27 PROCEDURE — 2580000003 HC RX 258: Performed by: INTERNAL MEDICINE

## 2019-10-27 PROCEDURE — 84145 PROCALCITONIN (PCT): CPT

## 2019-10-27 PROCEDURE — 99232 SBSQ HOSP IP/OBS MODERATE 35: CPT | Performed by: NURSE PRACTITIONER

## 2019-10-27 RX ORDER — OXYMETAZOLINE HYDROCHLORIDE 0.05 G/100ML
2 SPRAY NASAL 2 TIMES DAILY PRN
Status: DISCONTINUED | OUTPATIENT
Start: 2019-10-27 | End: 2019-10-28 | Stop reason: HOSPADM

## 2019-10-27 RX ORDER — PREDNISONE 10 MG/1
10 TABLET ORAL DAILY
Status: DISCONTINUED | OUTPATIENT
Start: 2019-10-29 | End: 2019-10-28

## 2019-10-27 RX ADMIN — SODIUM CHLORIDE SOLN NEBU 3% 15 ML: 3 NEBU SOLN at 19:50

## 2019-10-27 RX ADMIN — INSULIN LISPRO 1 UNITS: 100 INJECTION, SOLUTION INTRAVENOUS; SUBCUTANEOUS at 17:09

## 2019-10-27 RX ADMIN — SERTRALINE 100 MG: 100 TABLET, FILM COATED ORAL at 08:07

## 2019-10-27 RX ADMIN — BUPROPION HYDROCHLORIDE 100 MG: 100 TABLET, FILM COATED ORAL at 08:07

## 2019-10-27 RX ADMIN — MULTIPLE VITAMINS W/ MINERALS TAB 1 TABLET: TAB at 08:07

## 2019-10-27 RX ADMIN — PANTOPRAZOLE SODIUM 40 MG: 40 TABLET, DELAYED RELEASE ORAL at 08:07

## 2019-10-27 RX ADMIN — IPRATROPIUM BROMIDE AND ALBUTEROL SULFATE 3 ML: .5; 3 SOLUTION RESPIRATORY (INHALATION) at 11:35

## 2019-10-27 RX ADMIN — IPRATROPIUM BROMIDE AND ALBUTEROL SULFATE 3 ML: .5; 3 SOLUTION RESPIRATORY (INHALATION) at 15:51

## 2019-10-27 RX ADMIN — ENOXAPARIN SODIUM 30 MG: 30 INJECTION SUBCUTANEOUS at 08:07

## 2019-10-27 RX ADMIN — CEFTRIAXONE 1 G: 1 INJECTION, POWDER, FOR SOLUTION INTRAMUSCULAR; INTRAVENOUS at 10:21

## 2019-10-27 RX ADMIN — SODIUM CHLORIDE, PRESERVATIVE FREE 10 ML: 5 INJECTION INTRAVENOUS at 08:08

## 2019-10-27 RX ADMIN — METHYLPREDNISOLONE SODIUM SUCCINATE 40 MG: 40 INJECTION, POWDER, FOR SOLUTION INTRAMUSCULAR; INTRAVENOUS at 20:43

## 2019-10-27 RX ADMIN — FERROUS SULFATE TAB EC 324 MG (65 MG FE EQUIVALENT) 324 MG: 324 (65 FE) TABLET DELAYED RESPONSE at 08:07

## 2019-10-27 RX ADMIN — IPRATROPIUM BROMIDE AND ALBUTEROL SULFATE 3 ML: .5; 3 SOLUTION RESPIRATORY (INHALATION) at 07:58

## 2019-10-27 RX ADMIN — SODIUM CHLORIDE SOLN NEBU 3% 15 ML: 3 NEBU SOLN at 07:59

## 2019-10-27 RX ADMIN — PANTOPRAZOLE SODIUM 40 MG: 40 TABLET, DELAYED RELEASE ORAL at 17:09

## 2019-10-27 RX ADMIN — SODIUM CHLORIDE, PRESERVATIVE FREE 10 ML: 5 INJECTION INTRAVENOUS at 20:43

## 2019-10-27 RX ADMIN — DOCUSATE SODIUM 100 MG: 100 CAPSULE, LIQUID FILLED ORAL at 20:43

## 2019-10-27 RX ADMIN — AZITHROMYCIN 500 MG: 500 TABLET, FILM COATED ORAL at 08:07

## 2019-10-27 RX ADMIN — METHYLPREDNISOLONE SODIUM SUCCINATE 40 MG: 40 INJECTION, POWDER, FOR SOLUTION INTRAMUSCULAR; INTRAVENOUS at 10:21

## 2019-10-27 RX ADMIN — SODIUM CHLORIDE SOLN NEBU 3% 15 ML: 3 NEBU SOLN at 11:35

## 2019-10-27 RX ADMIN — ASPIRIN 81 MG 81 MG: 81 TABLET ORAL at 08:07

## 2019-10-27 RX ADMIN — IPRATROPIUM BROMIDE AND ALBUTEROL SULFATE 3 ML: .5; 3 SOLUTION RESPIRATORY (INHALATION) at 19:50

## 2019-10-27 RX ADMIN — DAKIN'S SOLUTION 0.125% (QUARTER STRENGTH): 0.12 SOLUTION at 08:10

## 2019-10-27 ASSESSMENT — PAIN SCALES - GENERAL
PAINLEVEL_OUTOF10: 0
PAINLEVEL_OUTOF10: 0

## 2019-10-28 ENCOUNTER — TELEPHONE (OUTPATIENT)
Dept: INTERNAL MEDICINE CLINIC | Age: 79
End: 2019-10-28

## 2019-10-28 VITALS
WEIGHT: 116.4 LBS | DIASTOLIC BLOOD PRESSURE: 69 MMHG | BODY MASS INDEX: 19.87 KG/M2 | RESPIRATION RATE: 18 BRPM | TEMPERATURE: 97.6 F | SYSTOLIC BLOOD PRESSURE: 167 MMHG | HEIGHT: 64 IN | HEART RATE: 66 BPM | OXYGEN SATURATION: 91 %

## 2019-10-28 LAB
BLOOD CULTURE, ROUTINE: NORMAL
CULTURE, BLOOD 2: NORMAL
GLUCOSE BLD-MCNC: 106 MG/DL (ref 70–99)
GLUCOSE BLD-MCNC: 125 MG/DL (ref 70–99)
GLUCOSE BLD-MCNC: 165 MG/DL (ref 70–99)
MAGNESIUM: 2.1 MG/DL (ref 1.8–2.4)
PERFORMED ON: ABNORMAL
PHOSPHORUS: 3.1 MG/DL (ref 2.5–4.9)
PROCALCITONIN: 0.12 NG/ML (ref 0–0.15)

## 2019-10-28 PROCEDURE — 2580000003 HC RX 258: Performed by: NURSE PRACTITIONER

## 2019-10-28 PROCEDURE — 6370000000 HC RX 637 (ALT 250 FOR IP): Performed by: INTERNAL MEDICINE

## 2019-10-28 PROCEDURE — 6370000000 HC RX 637 (ALT 250 FOR IP): Performed by: NURSE PRACTITIONER

## 2019-10-28 PROCEDURE — 94760 N-INVAS EAR/PLS OXIMETRY 1: CPT

## 2019-10-28 PROCEDURE — 97110 THERAPEUTIC EXERCISES: CPT

## 2019-10-28 PROCEDURE — 6360000002 HC RX W HCPCS: Performed by: INTERNAL MEDICINE

## 2019-10-28 PROCEDURE — 84100 ASSAY OF PHOSPHORUS: CPT

## 2019-10-28 PROCEDURE — 83735 ASSAY OF MAGNESIUM: CPT

## 2019-10-28 PROCEDURE — 94640 AIRWAY INHALATION TREATMENT: CPT

## 2019-10-28 PROCEDURE — 97530 THERAPEUTIC ACTIVITIES: CPT

## 2019-10-28 PROCEDURE — 84145 PROCALCITONIN (PCT): CPT

## 2019-10-28 PROCEDURE — 2580000003 HC RX 258: Performed by: INTERNAL MEDICINE

## 2019-10-28 PROCEDURE — 99233 SBSQ HOSP IP/OBS HIGH 50: CPT | Performed by: INTERNAL MEDICINE

## 2019-10-28 PROCEDURE — 2700000000 HC OXYGEN THERAPY PER DAY

## 2019-10-28 PROCEDURE — 99232 SBSQ HOSP IP/OBS MODERATE 35: CPT | Performed by: INTERNAL MEDICINE

## 2019-10-28 RX ORDER — SODIUM CHLORIDE FOR INHALATION 3 %
15 VIAL, NEBULIZER (ML) INHALATION 3 TIMES DAILY
Qty: 15 ML | Refills: 0
Start: 2019-10-28

## 2019-10-28 RX ORDER — PREDNISONE 20 MG/1
20 TABLET ORAL DAILY
Status: DISCONTINUED | OUTPATIENT
Start: 2019-10-29 | End: 2019-10-28 | Stop reason: HOSPADM

## 2019-10-28 RX ORDER — CEFDINIR 300 MG/1
300 CAPSULE ORAL 2 TIMES DAILY
Qty: 10 CAPSULE | Refills: 0
Start: 2019-10-28 | End: 2019-11-02

## 2019-10-28 RX ORDER — OMEPRAZOLE 40 MG/1
40 CAPSULE, DELAYED RELEASE ORAL DAILY
Qty: 60 CAPSULE | Refills: 0
Start: 2019-10-28

## 2019-10-28 RX ADMIN — SODIUM CHLORIDE SOLN NEBU 3% 2 ML: 3 NEBU SOLN at 12:24

## 2019-10-28 RX ADMIN — IPRATROPIUM BROMIDE AND ALBUTEROL SULFATE 3 ML: .5; 3 SOLUTION RESPIRATORY (INHALATION) at 17:09

## 2019-10-28 RX ADMIN — PANTOPRAZOLE SODIUM 40 MG: 40 TABLET, DELAYED RELEASE ORAL at 05:47

## 2019-10-28 RX ADMIN — SERTRALINE 100 MG: 100 TABLET, FILM COATED ORAL at 09:32

## 2019-10-28 RX ADMIN — DAKIN'S SOLUTION 0.125% (QUARTER STRENGTH): 0.12 SOLUTION at 14:43

## 2019-10-28 RX ADMIN — IPRATROPIUM BROMIDE AND ALBUTEROL SULFATE 3 ML: .5; 3 SOLUTION RESPIRATORY (INHALATION) at 12:24

## 2019-10-28 RX ADMIN — BUPROPION HYDROCHLORIDE 100 MG: 100 TABLET, FILM COATED ORAL at 09:40

## 2019-10-28 RX ADMIN — ASPIRIN 81 MG 81 MG: 81 TABLET ORAL at 09:32

## 2019-10-28 RX ADMIN — SODIUM CHLORIDE, PRESERVATIVE FREE 10 ML: 5 INJECTION INTRAVENOUS at 09:33

## 2019-10-28 RX ADMIN — IPRATROPIUM BROMIDE AND ALBUTEROL SULFATE 3 ML: .5; 3 SOLUTION RESPIRATORY (INHALATION) at 08:28

## 2019-10-28 RX ADMIN — MULTIPLE VITAMINS W/ MINERALS TAB 1 TABLET: TAB at 09:32

## 2019-10-28 RX ADMIN — INSULIN LISPRO 1 UNITS: 100 INJECTION, SOLUTION INTRAVENOUS; SUBCUTANEOUS at 01:19

## 2019-10-28 RX ADMIN — AZITHROMYCIN 500 MG: 500 TABLET, FILM COATED ORAL at 09:32

## 2019-10-28 RX ADMIN — FERROUS SULFATE TAB EC 324 MG (65 MG FE EQUIVALENT) 324 MG: 324 (65 FE) TABLET DELAYED RESPONSE at 09:32

## 2019-10-28 RX ADMIN — SODIUM CHLORIDE SOLN NEBU 3% 4 ML: 3 NEBU SOLN at 08:28

## 2019-10-28 RX ADMIN — CEFTRIAXONE 1 G: 1 INJECTION, POWDER, FOR SOLUTION INTRAMUSCULAR; INTRAVENOUS at 09:32

## 2019-10-28 RX ADMIN — ENOXAPARIN SODIUM 30 MG: 30 INJECTION SUBCUTANEOUS at 09:32

## 2019-10-28 ASSESSMENT — ENCOUNTER SYMPTOMS
WHEEZING: 0
GASTROINTESTINAL NEGATIVE: 1
COUGH: 1
SHORTNESS OF BREATH: 1

## 2019-10-29 ENCOUNTER — CARE COORDINATION (OUTPATIENT)
Dept: CASE MANAGEMENT | Age: 79
End: 2019-10-29

## 2019-10-31 ENCOUNTER — HOSPITAL ENCOUNTER (OUTPATIENT)
Dept: WOUND CARE | Age: 79
Discharge: HOME | End: 2019-10-31
Payer: MEDICARE

## 2019-11-06 ENCOUNTER — TELEPHONE (OUTPATIENT)
Dept: INTERNAL MEDICINE CLINIC | Age: 79
End: 2019-11-06

## 2019-11-06 ENCOUNTER — TELEPHONE (OUTPATIENT)
Dept: WOUND CARE | Age: 79
End: 2019-11-06

## 2019-11-07 ENCOUNTER — HOSPITAL ENCOUNTER (OUTPATIENT)
Dept: WOUND CARE | Age: 79
Discharge: HOME | End: 2019-11-07
Payer: MEDICARE

## 2019-11-11 ENCOUNTER — TELEPHONE (OUTPATIENT)
Dept: PULMONOLOGY | Age: 79
End: 2019-11-11

## 2019-11-12 ENCOUNTER — TELEPHONE (OUTPATIENT)
Dept: WOUND CARE | Age: 79
End: 2019-11-12

## 2020-05-08 NOTE — TELEPHONE ENCOUNTER
JORGE LUIS Chavez from [address] [place] [address] patient had confusion yesterday. RN viewed the urine sample and the urine was cloudy. She wanted to know if Dr. Reed Armenta wanted to go ahead and send an antibiotic over. Please return call. 2 to 3 Sessions Ambulate 25ft with RW maintaining WB status Min A

## (undated) DEVICE — BANDAGE COMPR W6INXL4.5YD LTWT E EC SGL LAYERED CLP CLSR

## (undated) DEVICE — SOLUTION IV IRRIG POUR BRL 0.9% SODIUM CHL 2F7124

## (undated) DEVICE — SINGLE USE SUCTION VALVE MAJ-209: Brand: SINGLE USE SUCTION VALVE (STERILE)

## (undated) DEVICE — SINGLE USE BIOPSY VALVE MAJ-210: Brand: SINGLE USE BIOPSY VALVE (STERILE)

## (undated) DEVICE — AIRLIFE™ MISTY MAX 10™ NEBULIZER WITH 7 FOOT (2.1 M) CRUSH RESISTANT OXYGEN TUBING, BAFFLED TEE ADAPTER (22 MM I.D./22 MM O.D.), MOUTHPIECE AND 6 INCH (15 CM) FLEXTUBE: Brand: AIRLIFE™

## (undated) DEVICE — MINOR SET UP PK

## (undated) DEVICE — SPONGE LAP W18XL18IN WHT COT 4 PLY FLD STRUNG RADPQ DISP ST

## (undated) DEVICE — 60 ML SYRINGE REGULAR TIP: Brand: MONOJECT

## (undated) DEVICE — T-DRAPE,EXTREMITY,STERILE: Brand: MEDLINE

## (undated) DEVICE — KIT OR ROOM TURNOVER W/STRAP

## (undated) DEVICE — STOCKINETTE IMPERV M 9X44IN POLY INNR LAYR COT ORTH EXT

## (undated) DEVICE — SOLUTION SCRB 4OZ 10% POVIDONE IOD ANTIMIC BTL

## (undated) DEVICE — GLOVE SURG SZ 8 L12IN FNGR THK87MIL WHT LTX FREE

## (undated) DEVICE — COVER LT HNDL BLU PLAS

## (undated) DEVICE — CANISTER, RIGID, 1200CC: Brand: MEDLINE INDUSTRIES, INC.

## (undated) DEVICE — SYRINGE MED 10ML POLYPR LUERSLIP TIP FLAT TOP W/O SFTY DISP

## (undated) DEVICE — Z CONVERTED USE 2273164 BANDAGE COMPR W4INXL4 1/2YD E EC SGL LAYERED CLP CLSR ECONO

## (undated) DEVICE — Z DISCONTINUED BY MEDLINE USE 2711682 TRAY SKIN PREP DRY W/ PREM GLV

## (undated) DEVICE — SPONGE GZ W4XL4IN COT 12 PLY TYP VII WVN C FLD DSGN

## (undated) DEVICE — SOLUTION PREP POVIDONE IOD FOR SKIN MUCOUS MEM PRIOR TO

## (undated) DEVICE — ELECTRODE PT RET AD L9FT HI MOIST COND ADH HYDRGEL CORDED

## (undated) DEVICE — MASK, AEROSOL, ELONGATED, ADULT: Brand: MEDLINE

## (undated) DEVICE — SWAB CULT DBL W/O CHAR RAYON TIP AMIES GEL CLMN FOR COLL

## (undated) DEVICE — BANDAGE,GAUZE,BULKEE II,4.5"X4.1YD,STRL: Brand: MEDLINE

## (undated) DEVICE — Z INACTIVE USE 2711638 MASK SURG WHT STD PREM NONOIL HLTH CARE PARTICULATE RESP

## (undated) DEVICE — TIP CLN S STL ES DEV

## (undated) DEVICE — SHEET,DRAPE,53X77,STERILE: Brand: MEDLINE